# Patient Record
Sex: MALE | Race: WHITE | NOT HISPANIC OR LATINO | Employment: OTHER | URBAN - METROPOLITAN AREA
[De-identification: names, ages, dates, MRNs, and addresses within clinical notes are randomized per-mention and may not be internally consistent; named-entity substitution may affect disease eponyms.]

---

## 2020-05-21 ENCOUNTER — OFFICE VISIT (OUTPATIENT)
Dept: INTERNAL MEDICINE | Facility: CLINIC | Age: 65
End: 2020-05-21

## 2020-05-21 VITALS
HEART RATE: 68 BPM | HEIGHT: 71 IN | SYSTOLIC BLOOD PRESSURE: 144 MMHG | WEIGHT: 197 LBS | BODY MASS INDEX: 27.58 KG/M2 | DIASTOLIC BLOOD PRESSURE: 70 MMHG | TEMPERATURE: 97.3 F

## 2020-05-21 DIAGNOSIS — E11.65 TYPE 2 DIABETES MELLITUS WITH HYPERGLYCEMIA, WITH LONG-TERM CURRENT USE OF INSULIN (HCC): ICD-10-CM

## 2020-05-21 DIAGNOSIS — Z79.4 TYPE 2 DIABETES MELLITUS WITH HYPERGLYCEMIA, WITH LONG-TERM CURRENT USE OF INSULIN (HCC): ICD-10-CM

## 2020-05-21 DIAGNOSIS — I10 ESSENTIAL HYPERTENSION: Primary | ICD-10-CM

## 2020-05-21 DIAGNOSIS — K57.90 DIVERTICULOSIS: ICD-10-CM

## 2020-05-21 PROBLEM — E11.9 DIABETES (HCC): Status: ACTIVE | Noted: 2020-05-21

## 2020-05-21 PROCEDURE — 99203 OFFICE O/P NEW LOW 30 MIN: CPT | Performed by: INTERNAL MEDICINE

## 2020-05-21 RX ORDER — AMLODIPINE BESYLATE 10 MG/1
10 TABLET ORAL DAILY
COMMUNITY
Start: 2020-05-08 | End: 2021-07-28

## 2020-05-21 RX ORDER — LOSARTAN POTASSIUM 50 MG/1
50 TABLET ORAL DAILY
COMMUNITY
Start: 2020-05-08 | End: 2020-05-21 | Stop reason: SDUPTHER

## 2020-05-21 RX ORDER — LOSARTAN POTASSIUM 100 MG/1
100 TABLET ORAL DAILY
Qty: 90 TABLET | Refills: 3 | Status: SHIPPED | OUTPATIENT
Start: 2020-05-21 | End: 2021-04-05

## 2020-05-21 NOTE — PROGRESS NOTES
Patient is a 64 y.o. male who is here to establish care for hypertension and diabetes.  Chief Complaint   Patient presents with   • Diabetes   • Hypertension         HPI:    Here for mgmt of HTN and DM.  Onset years. Diagnosed with DM 2 years ago.  Initially place on metformin.  AIC was 17.  Was then placed on Basaglar.  Tries to watch his carbs.  Has some polyuria and dipsia.  Some nocturia, 2 to 4 times per night.  Last eye exam about 1 year ago.  No paresthesia.    Has long hx of HTN since 1998.  No dizziness or lightheadedness.  No CP.  No edema.  No palpitations.      History:     Patient Active Problem List   Diagnosis   • Diabetes (CMS/HCC)   • Essential hypertension   • Diverticulosis       Past Medical History:   Diagnosis Date   • Melanoma (CMS/HCC)        Past Surgical History:   Procedure Laterality Date   • ANKLE SURGERY      metal in ankle   • COLON RESECTION  05/2009   • SKIN CANCER EXCISION      melanoma       Current Outpatient Medications on File Prior to Visit   Medication Sig   • amLODIPine (NORVASC) 10 MG tablet Take 10 mg by mouth Daily.   • Insulin Glargine (BASAGLAR KWIKPEN SC) 24 Units Daily.   • [DISCONTINUED] losartan (COZAAR) 50 MG tablet Take 50 mg by mouth Daily.     No current facility-administered medications on file prior to visit.        Family History   Problem Relation Age of Onset   • Diabetes Mother    • Heart attack Mother    • Hypertension Mother    • Hodgkin's lymphoma Father    • Hypertension Father    • Diabetes Brother    • Hyperlipidemia Brother        Social History     Socioeconomic History   • Marital status:      Spouse name: Not on file   • Number of children: Not on file   • Years of education: Not on file   • Highest education level: Not on file   Tobacco Use   • Smoking status: Current Every Day Smoker   • Smokeless tobacco: Never Used   Substance and Sexual Activity   • Alcohol use: Yes         Review of Systems   Constitutional: Positive for fatigue and  "unexpected weight change. Negative for chills, diaphoresis and fever.   HENT: Negative for congestion, ear pain, hearing loss, nosebleeds, postnasal drip, sinus pressure and sore throat.    Eyes: Negative for pain, discharge and itching.   Respiratory: Negative for cough, chest tightness, shortness of breath and wheezing.    Cardiovascular: Negative for chest pain, palpitations and leg swelling.   Gastrointestinal: Negative for abdominal distention, abdominal pain, blood in stool, constipation, diarrhea, nausea and vomiting.        2009 colonoscopy   Endocrine: Positive for polydipsia and polyuria.   Genitourinary: Positive for frequency and urgency. Negative for difficulty urinating, dysuria and hematuria.        2009 colonoscopy   Musculoskeletal: Negative for arthralgias, back pain, gait problem, joint swelling and myalgias.   Skin: Negative for rash and wound.   Neurological: Negative for dizziness, syncope, weakness and headaches.   Psychiatric/Behavioral: Negative for dysphoric mood and sleep disturbance. The patient is not nervous/anxious.        /70   Pulse 68   Temp 97.3 °F (36.3 °C) (Temporal)   Ht 180.3 cm (71\")   Wt 89.4 kg (197 lb)   BMI 27.48 kg/m²       Physical Exam   Constitutional: He is oriented to person, place, and time. He appears well-developed and well-nourished.   HENT:   Head: Normocephalic and atraumatic.   Right Ear: External ear normal.   Left Ear: External ear normal.   Mouth/Throat: Oropharynx is clear and moist.   Eyes: Conjunctivae and EOM are normal.   Neck: Normal range of motion. Neck supple.   Cardiovascular: Normal rate, regular rhythm and normal heart sounds.   Pulmonary/Chest: Effort normal and breath sounds normal.   Abdominal: Soft. Bowel sounds are normal.   Musculoskeletal: Normal range of motion.   Lymphadenopathy:     He has no cervical adenopathy.   Neurological: He is alert and oriented to person, place, and time.   Skin: Skin is warm and dry.   Psychiatric: " He has a normal mood and affect. His behavior is normal. Thought content normal.       Procedure:      Discussion/Summary:    HTN-increase losartan to 100 mg  DM-fasting labs soon  High risk meds-labs soon  Hx of melanoma-advised f/u with Derm  Diverticulosis-Citrucel qd        Current Outpatient Medications:   •  amLODIPine (NORVASC) 10 MG tablet, Take 10 mg by mouth Daily., Disp: , Rfl:   •  Insulin Glargine (BASAGLAR KWIKPEN SC), 24 Units Daily., Disp: , Rfl:   •  losartan (COZAAR) 100 MG tablet, Take 1 tablet by mouth Daily., Disp: 90 tablet, Rfl: 3        Costa was seen today for diabetes and hypertension.    Diagnoses and all orders for this visit:    Essential hypertension  -     losartan (COZAAR) 100 MG tablet; Take 1 tablet by mouth Daily.    Diverticulosis    Type 2 diabetes mellitus with hyperglycemia, with long-term current use of insulin (CMS/[facility])

## 2020-06-10 ENCOUNTER — TELEPHONE (OUTPATIENT)
Dept: INTERNAL MEDICINE | Facility: CLINIC | Age: 65
End: 2020-06-10

## 2020-06-10 DIAGNOSIS — Z82.49 FAMILY HISTORY OF EARLY CAD: ICD-10-CM

## 2020-06-10 DIAGNOSIS — I10 ESSENTIAL HYPERTENSION: Primary | ICD-10-CM

## 2020-06-10 DIAGNOSIS — R07.89 OTHER CHEST PAIN: ICD-10-CM

## 2020-06-10 NOTE — TELEPHONE ENCOUNTER
Pt stated that he has double his Losartan to 100mg as requested. Pt stated that the pas 2 or 3 days he has experienced some light headiness, and pain around shoulder area.  Pt took bp readings in both arms and read,     Right arm- 138/84    Left arm-176/96    He would like to know 's opinion on current symptoms  [phone]

## 2021-04-05 ENCOUNTER — OFFICE VISIT (OUTPATIENT)
Dept: FAMILY MEDICINE CLINIC | Facility: CLINIC | Age: 66
End: 2021-04-05

## 2021-04-05 VITALS
TEMPERATURE: 97.8 F | HEIGHT: 71 IN | SYSTOLIC BLOOD PRESSURE: 132 MMHG | WEIGHT: 194 LBS | HEART RATE: 76 BPM | DIASTOLIC BLOOD PRESSURE: 78 MMHG | BODY MASS INDEX: 27.16 KG/M2 | RESPIRATION RATE: 18 BRPM

## 2021-04-05 DIAGNOSIS — E78.00 HYPERCHOLESTEROLEMIA: ICD-10-CM

## 2021-04-05 DIAGNOSIS — I10 ESSENTIAL HYPERTENSION: ICD-10-CM

## 2021-04-05 DIAGNOSIS — Z79.4 TYPE 2 DIABETES MELLITUS WITH HYPERGLYCEMIA, WITH LONG-TERM CURRENT USE OF INSULIN (HCC): Primary | ICD-10-CM

## 2021-04-05 DIAGNOSIS — E11.65 TYPE 2 DIABETES MELLITUS WITH HYPERGLYCEMIA, WITH LONG-TERM CURRENT USE OF INSULIN (HCC): Primary | ICD-10-CM

## 2021-04-05 PROCEDURE — 99214 OFFICE O/P EST MOD 30 MIN: CPT | Performed by: FAMILY MEDICINE

## 2021-04-05 RX ORDER — INSULIN GLARGINE 100 [IU]/ML
24 INJECTION, SOLUTION SUBCUTANEOUS EVERY MORNING
Qty: 5 PEN | Refills: 2 | Status: SHIPPED | OUTPATIENT
Start: 2021-04-05 | End: 2021-07-28 | Stop reason: SDUPTHER

## 2021-04-05 RX ORDER — LISINOPRIL 40 MG/1
40 TABLET ORAL DAILY
Qty: 90 TABLET | Refills: 0 | Status: SHIPPED | OUTPATIENT
Start: 2021-04-05 | End: 2021-07-28 | Stop reason: SDUPTHER

## 2021-04-05 NOTE — PROGRESS NOTES
Subjective   Costa Robbins is a 65 y.o. male.     History of Present Illness     Diabetes Mellitus Type II, Follow-up:   Costa Robbins is a 65 y.o. male who is here for follow-up of Type 2 diabetes mellitus.  Current symptoms/problems include none and have been stable. Patient is adherent with medications.  Known diabetic complications: none  Cardiovascular risk factors: advanced age (older than 55 for men, 65 for women), diabetes mellitus, hypertension and male gender  Current diabetic medications include basaglar 24 units.   Current monitoring regimen: none  Home blood sugar records: not checking his labs at all  Any episodes of hypoglycemia? no  Eye exam current (within one year): no  He is not on ACE inhibitor or angiotensin II receptor blocker. Patient is not on a statin.   He notes he drinks water all the time, about 1 gallon per day      Costa Robbins  is here for follow-up of hypertension of several years duration. He is not exercising and is not adherent to a low-salt diet. Patient does not check his blood pressure.   He is compliant with meds.      Pt complains of fatigue all the time  He does feel drained all the time  Hard to do things     The following portions of the patient's history were reviewed and updated as appropriate: allergies, current medications, past family history, past medical history, past social history, past surgical history and problem list.    Review of Systems   Constitutional: Negative.    Psychiatric/Behavioral: Negative.        Objective   Physical Exam  Vitals and nursing note reviewed.   Constitutional:       General: He is not in acute distress.     Appearance: Normal appearance. He is well-developed.   HENT:      Head: Normocephalic and atraumatic.      Right Ear: Tympanic membrane, ear canal and external ear normal.      Left Ear: Tympanic membrane, ear canal and external ear normal.      Nose: Nose normal.   Eyes:      Extraocular Movements: Extraocular movements  intact.      Conjunctiva/sclera: Conjunctivae normal.   Neck:      Thyroid: No thyromegaly.   Cardiovascular:      Rate and Rhythm: Normal rate and regular rhythm.      Heart sounds: Normal heart sounds. No murmur heard.     Pulmonary:      Effort: Pulmonary effort is normal. No respiratory distress.      Breath sounds: Normal breath sounds.   Abdominal:      General: Bowel sounds are normal. There is no distension.      Palpations: Abdomen is soft.      Tenderness: There is no abdominal tenderness.   Musculoskeletal:      Cervical back: Normal range of motion and neck supple.   Lymphadenopathy:      Cervical: No cervical adenopathy.   Skin:     General: Skin is warm and dry.   Neurological:      Mental Status: He is alert and oriented to person, place, and time.   Psychiatric:         Mood and Affect: Mood normal.         Behavior: Behavior normal.         Thought Content: Thought content normal.         Judgment: Judgment normal.         Assessment/Plan   Diagnoses and all orders for this visit:    1. Type 2 diabetes mellitus with hyperglycemia, with long-term current use of insulin (CMS/[facility]) (Primary)  -     Hemoglobin A1c  -     metFORMIN (Glucophage) 500 MG tablet; Take 1 tablet by mouth 2 (Two) Times a Day With Meals.  Dispense: 60 tablet; Refill: 2  -     Insulin Glargine (BASAGLAR KWIKPEN) 100 UNIT/ML injection pen; Inject 24 Units under the skin into the appropriate area as directed Every Morning.  Dispense: 5 pen; Refill: 2    2. Essential hypertension  -     lisinopril (PRINIVIL,ZESTRIL) 40 MG tablet; Take 1 tablet by mouth Daily.  Dispense: 90 tablet; Refill: 0    3. Hypercholesterolemia  -     Lipid Panel    he has been on insulin only, 24 units, and not checking his glucose levels at all.  Discussed need for metformin and will start this.  He notes he is tired and drinks water all the time.  I have high concern about poor DM control due to symptoms and history of HgA1C at 17 in the past!  Discussed  need to check glucose at home to adjust medicine.  He will look into glucometer that is affordable  Will use lisinopril instead of cozaar and amlodipine for BP control.  He wonders if the Bp medicines were making him tired and had no SE to lisinopril in the past.  Recheck in 3 months  He likely needs to be on a statin due to DM and family history of CAD.  Will check levels and recheck in 3 months.  Med decision pending labs    F/u in 3 months

## 2021-07-26 ENCOUNTER — LAB (OUTPATIENT)
Dept: FAMILY MEDICINE CLINIC | Facility: CLINIC | Age: 66
End: 2021-07-26

## 2021-07-27 LAB
CHOLEST SERPL-MCNC: 248 MG/DL (ref 100–199)
HBA1C MFR BLD: 12.3 % (ref 4.8–5.6)
HDLC SERPL-MCNC: 51 MG/DL
LDLC SERPL CALC-MCNC: 156 MG/DL (ref 0–99)
TRIGL SERPL-MCNC: 224 MG/DL (ref 0–149)
VLDLC SERPL CALC-MCNC: 41 MG/DL (ref 5–40)

## 2021-07-28 ENCOUNTER — OFFICE VISIT (OUTPATIENT)
Dept: FAMILY MEDICINE CLINIC | Facility: CLINIC | Age: 66
End: 2021-07-28

## 2021-07-28 VITALS
BODY MASS INDEX: 27.16 KG/M2 | HEART RATE: 76 BPM | SYSTOLIC BLOOD PRESSURE: 142 MMHG | DIASTOLIC BLOOD PRESSURE: 70 MMHG | TEMPERATURE: 98.4 F | RESPIRATION RATE: 18 BRPM | WEIGHT: 194 LBS | HEIGHT: 71 IN

## 2021-07-28 DIAGNOSIS — I10 ESSENTIAL HYPERTENSION: ICD-10-CM

## 2021-07-28 DIAGNOSIS — E78.00 HYPERCHOLESTEROLEMIA: ICD-10-CM

## 2021-07-28 DIAGNOSIS — Z79.4 TYPE 2 DIABETES MELLITUS WITH HYPERGLYCEMIA, WITH LONG-TERM CURRENT USE OF INSULIN (HCC): Primary | ICD-10-CM

## 2021-07-28 DIAGNOSIS — E11.65 TYPE 2 DIABETES MELLITUS WITH HYPERGLYCEMIA, WITH LONG-TERM CURRENT USE OF INSULIN (HCC): Primary | ICD-10-CM

## 2021-07-28 PROCEDURE — 99212 OFFICE O/P EST SF 10 MIN: CPT | Performed by: FAMILY MEDICINE

## 2021-07-28 RX ORDER — ATORVASTATIN CALCIUM 40 MG/1
40 TABLET, FILM COATED ORAL DAILY
Qty: 30 TABLET | Refills: 5 | Status: SHIPPED | OUTPATIENT
Start: 2021-07-28 | End: 2022-01-28 | Stop reason: SDUPTHER

## 2021-07-28 RX ORDER — LISINOPRIL 40 MG/1
40 TABLET ORAL DAILY
Qty: 90 TABLET | Refills: 1 | Status: SHIPPED | OUTPATIENT
Start: 2021-07-28 | End: 2022-01-28 | Stop reason: SDUPTHER

## 2021-07-28 RX ORDER — INSULIN GLARGINE 100 [IU]/ML
50 INJECTION, SOLUTION SUBCUTANEOUS EVERY MORNING
Qty: 10 PEN | Refills: 2 | Status: SHIPPED | OUTPATIENT
Start: 2021-07-28 | End: 2021-08-06

## 2021-07-28 NOTE — PROGRESS NOTES
Subjective   Costa Robbins is a 66 y.o. male.     History of Present Illness     He has not been checking his glucose as he has no insurance and so has no glucometer  He has been out of lisinopril as well  tolerating the metformin 500 ok    Not taking a statin at this time either      Review of Systems   Constitutional: Negative.        Objective   Physical Exam  Vitals and nursing note reviewed.   Constitutional:       General: He is not in acute distress.     Appearance: Normal appearance. He is well-developed.   Cardiovascular:      Rate and Rhythm: Normal rate and regular rhythm.      Heart sounds: Normal heart sounds.   Pulmonary:      Effort: Pulmonary effort is normal.      Breath sounds: Normal breath sounds.   Neurological:      Mental Status: He is alert and oriented to person, place, and time.   Psychiatric:         Mood and Affect: Mood normal.         Behavior: Behavior normal.         Thought Content: Thought content normal.         Judgment: Judgment normal.         Assessment/Plan   Diagnoses and all orders for this visit:    1. Type 2 diabetes mellitus with hyperglycemia, with long-term current use of insulin (CMS/[facility]) (Primary)  -     metFORMIN (Glucophage) 1000 MG tablet; Take 1 tablet by mouth 2 (Two) Times a Day With Meals.  Dispense: 60 tablet; Refill: 5  -     Insulin Glargine (BASAGLAR KWIKPEN) 100 UNIT/ML injection pen; Inject 50 Units under the skin into the appropriate area as directed Every Morning.  Dispense: 10 pen; Refill: 2    2. Essential hypertension  -     lisinopril (PRINIVIL,ZESTRIL) 40 MG tablet; Take 1 tablet by mouth Daily.  Dispense: 90 tablet; Refill: 1    3. Hypercholesterolemia  -     atorvastatin (Lipitor) 40 MG tablet; Take 1 tablet by mouth Daily.  Dispense: 30 tablet; Refill: 5    needs to increase insulin as his HghA1C is 12, will increase to 48 and he will check numbers at home.  Increase metformin to 1000 BID  Start statin as well and check labs in 3  months

## 2021-08-06 ENCOUNTER — OFFICE VISIT (OUTPATIENT)
Dept: FAMILY MEDICINE CLINIC | Facility: CLINIC | Age: 66
End: 2021-08-06

## 2021-08-06 ENCOUNTER — TELEPHONE (OUTPATIENT)
Dept: FAMILY MEDICINE CLINIC | Facility: CLINIC | Age: 66
End: 2021-08-06

## 2021-08-06 VITALS
HEART RATE: 80 BPM | HEIGHT: 71 IN | BODY MASS INDEX: 27.64 KG/M2 | RESPIRATION RATE: 20 BRPM | SYSTOLIC BLOOD PRESSURE: 190 MMHG | WEIGHT: 197.4 LBS | OXYGEN SATURATION: 98 % | DIASTOLIC BLOOD PRESSURE: 90 MMHG | TEMPERATURE: 99.3 F

## 2021-08-06 DIAGNOSIS — L08.9 INFECTED WOUND: Primary | ICD-10-CM

## 2021-08-06 DIAGNOSIS — E11.65 TYPE 2 DIABETES MELLITUS WITH HYPERGLYCEMIA, WITH LONG-TERM CURRENT USE OF INSULIN (HCC): ICD-10-CM

## 2021-08-06 DIAGNOSIS — Z79.4 TYPE 2 DIABETES MELLITUS WITH HYPERGLYCEMIA, WITH LONG-TERM CURRENT USE OF INSULIN (HCC): ICD-10-CM

## 2021-08-06 DIAGNOSIS — T14.8XXA INFECTED WOUND: Primary | ICD-10-CM

## 2021-08-06 PROCEDURE — 99213 OFFICE O/P EST LOW 20 MIN: CPT | Performed by: FAMILY MEDICINE

## 2021-08-06 RX ORDER — INSULIN GLARGINE 100 [IU]/ML
40 INJECTION, SOLUTION SUBCUTANEOUS EVERY MORNING
Qty: 10 PEN | Refills: 2 | COMMUNITY
Start: 2021-08-06 | End: 2022-01-28 | Stop reason: SDUPTHER

## 2021-08-06 RX ORDER — SULFAMETHOXAZOLE AND TRIMETHOPRIM 800; 160 MG/1; MG/1
1 TABLET ORAL 2 TIMES DAILY
Qty: 14 TABLET | Refills: 0 | Status: SHIPPED | OUTPATIENT
Start: 2021-08-06 | End: 2022-01-28

## 2021-08-06 NOTE — PROGRESS NOTES
Assessment/Plan       Diagnoses and all orders for this visit:    1. Infected wound (Primary)  -     sulfamethoxazole-trimethoprim (Bactrim DS) 800-160 MG per tablet; Take 1 tablet by mouth 2 (Two) Times a Day.  Dispense: 14 tablet; Refill: 0    2. Type 2 diabetes mellitus with hyperglycemia, with long-term current use of insulin (CMS/[facility])           Follow up: Return if symptoms worsen or fail to improve.     DISCUSSION  Infected wound of left hand.  Keep clean.  Add Bactrim DS 1 twice a day.  Continue topical antibiotic.  If not improving or worsening over the next several days, he is to call.    Bactrim was given since significant allergy to penicillin and he frequently in the sun and at risk for reaction if taken doxycycline.    Diabetes mellitus type 2.  He is wishing to try a medication which is sold directly to consumer called Glucofort.  Explained to him that it may not be helpful and could interact with his medication but no way to tell.  He still wishes to try get off some of his medication.          MEDICATIONS PRESCRIBED  Requested Prescriptions     Signed Prescriptions Disp Refills   • sulfamethoxazole-trimethoprim (Bactrim DS) 800-160 MG per tablet 14 tablet 0     Sig: Take 1 tablet by mouth 2 (Two) Times a Day.          -------------------------------------------    Subjective     Chief Complaint   Patient presents with   • check L hand wound     scratched hand on gate one week ago          History of Present Illness    Left hand  Scraped on a gate  + bled at the time  + redness around the wound  No drainage    Used H202 and now neosporin and bandage  A friend gave her some med for horses - used that  Used for one day ( this am)    Rash with penicillin    [place]    DM 2  Bought some Glucofort (direct to consumer)  ? If can take with his other meds        Social History     Tobacco Use   Smoking Status Current Every Day Smoker   • Packs/day: 0.50   • Years: 43.00   • Pack years: 21.50  "  Smokeless Tobacco Never Used          Past Medical History,Medications, Allergies, and social history was reviewed.          Review of Systems   Constitutional: Negative.    HENT: Negative.    Respiratory: Negative.    Cardiovascular: Negative.    Gastrointestinal: Negative.        Objective     Vitals:    08/06/21 1504   BP: (!) 190/90   Pulse: 80   Resp: 20   Temp: 99.3 °F (37.4 °C)   SpO2: 98%   Weight: 89.5 kg (197 lb 6.4 oz)   Height: 180.3 cm (70.98\")          Physical Exam  Vitals and nursing note reviewed.   Constitutional:       Appearance: He is well-developed.   HENT:      Head: Normocephalic and atraumatic.      Right Ear: External ear normal.      Left Ear: External ear normal.   Eyes:      Pupils: Pupils are equal, round, and reactive to light.   Pulmonary:      Effort: Pulmonary effort is normal.   Skin:     General: Skin is warm and dry.   Neurological:      Mental Status: He is alert.   Psychiatric:         Behavior: Behavior normal.       Left hand.  There is a 1 cm abrasion with surrounding erythema.  No active drainage.  No abscess.              Robert Krishna MD    "

## 2021-08-06 NOTE — TELEPHONE ENCOUNTER
Please call, I was reviewing his chart and missed that his BP was increased. Have him keep a check on that and let us know if not coming down. It was good the last time he was here.

## 2022-01-28 ENCOUNTER — OFFICE VISIT (OUTPATIENT)
Dept: FAMILY MEDICINE CLINIC | Facility: CLINIC | Age: 67
End: 2022-01-28

## 2022-01-28 VITALS
RESPIRATION RATE: 18 BRPM | SYSTOLIC BLOOD PRESSURE: 142 MMHG | TEMPERATURE: 98.9 F | HEART RATE: 84 BPM | WEIGHT: 213 LBS | HEIGHT: 71 IN | DIASTOLIC BLOOD PRESSURE: 84 MMHG | BODY MASS INDEX: 29.82 KG/M2

## 2022-01-28 DIAGNOSIS — Z79.4 TYPE 2 DIABETES MELLITUS WITH HYPERGLYCEMIA, WITH LONG-TERM CURRENT USE OF INSULIN: ICD-10-CM

## 2022-01-28 DIAGNOSIS — I10 ESSENTIAL HYPERTENSION: ICD-10-CM

## 2022-01-28 DIAGNOSIS — E11.65 TYPE 2 DIABETES MELLITUS WITH HYPERGLYCEMIA, WITH LONG-TERM CURRENT USE OF INSULIN: ICD-10-CM

## 2022-01-28 DIAGNOSIS — E78.00 HYPERCHOLESTEROLEMIA: ICD-10-CM

## 2022-01-28 LAB
EXPIRATION DATE: ABNORMAL
HBA1C MFR BLD: 8.3 %
Lab: ABNORMAL

## 2022-01-28 RX ORDER — LISINOPRIL 40 MG/1
40 TABLET ORAL DAILY
Qty: 30 TABLET | Refills: 5 | Status: SHIPPED | OUTPATIENT
Start: 2022-01-28 | End: 2022-09-01 | Stop reason: SDUPTHER

## 2022-01-28 RX ORDER — ATORVASTATIN CALCIUM 40 MG/1
40 TABLET, FILM COATED ORAL DAILY
Qty: 30 TABLET | Refills: 5 | Status: SHIPPED | OUTPATIENT
Start: 2022-01-28 | End: 2022-05-16

## 2022-01-28 RX ORDER — INSULIN GLARGINE 100 [IU]/ML
40 INJECTION, SOLUTION SUBCUTANEOUS EVERY MORNING
Qty: 10 PEN | Refills: 2 | Status: SHIPPED | OUTPATIENT
Start: 2022-01-28

## 2022-01-28 NOTE — PROGRESS NOTES
Subjective   Costa Robbins is a 66 y.o. male.     History of Present Illness     Diabetes Mellitus Type II, Follow-up:   Costa Robbins is a 66 y.o. male who is here for follow-up of Type 2 diabetes mellitus.  Current symptoms/problems include none and have been stable. Patient is adherent with medications.  Known diabetic complications: none  Cardiovascular risk factors: diabetes mellitus, dyslipidemia, hypertension and male gender  Current diabetic medications include basaglar 40 units QAM.   Current monitoring regimen: home blood tests - daily  Home blood sugar records: fasting range: low 100s  Any episodes of hypoglycemia? no  Eye exam current (within one year): yes  He is on ACE inhibitor or angiotensin II receptor blocker. Patient is on a statin.       Csota Robbins  is here for follow-up of hypertension of several years duration. He is not exercising and is not adherent to a low-salt diet. Patient does not check his blood pressure.  He is compliant with meds.        Costa Robbins returns today for follow up of Hyperlipidemia  Costa indicates his exercise level as irregularly.  Diet: trying to eat healthy  Patient is compliant with medications   Any side effects to medications:   chest pain No myalgia No memory change No  Pt is due for labs        The following portions of the patient's history were reviewed and updated as appropriate: allergies, current medications, past family history, past medical history, past social history, past surgical history and problem list.    Review of Systems   Constitutional: Negative.    Respiratory: Negative.    Cardiovascular: Negative.    Psychiatric/Behavioral: Negative.        Objective   Physical Exam  Vitals and nursing note reviewed.   Constitutional:       General: He is not in acute distress.     Appearance: Normal appearance. He is well-developed.   Cardiovascular:      Rate and Rhythm: Normal rate and regular rhythm.      Heart sounds: Normal heart sounds.    Pulmonary:      Effort: Pulmonary effort is normal.      Breath sounds: Normal breath sounds.   Neurological:      Mental Status: He is alert and oriented to person, place, and time.   Psychiatric:         Mood and Affect: Mood normal.         Behavior: Behavior normal.         Thought Content: Thought content normal.         Judgment: Judgment normal.         Assessment/Plan   Diagnoses and all orders for this visit:    1. Essential hypertension  -     lisinopril (PRINIVIL,ZESTRIL) 40 MG tablet; Take 1 tablet by mouth Daily.  Dispense: 30 tablet; Refill: 5    2. Hypercholesterolemia  -     atorvastatin (Lipitor) 40 MG tablet; Take 1 tablet by mouth Daily.  Dispense: 30 tablet; Refill: 5    3. Type 2 diabetes mellitus with hyperglycemia, with long-term current use of insulin (HCC)  -     metFORMIN (Glucophage) 1000 MG tablet; Take 1 tablet by mouth 2 (Two) Times a Day With Meals.  Dispense: 60 tablet; Refill: 5  -     Insulin Glargine (BASAGLAR KWIKPEN) 100 UNIT/ML injection pen; Inject 40 Units under the skin into the appropriate area as directed Every Morning.  Dispense: 10 pen; Refill: 2    last HgA1C was 12.3, cost of test was an issue for pt.  Checked with machine here today and level was 8.3, excellent improvement.  Continue lantus and metformin, recheck in future.  BP doing well on lisinopril, no change in medicine  Will continue lipitor and check lipids in future when he has insurance

## 2022-05-16 ENCOUNTER — OFFICE VISIT (OUTPATIENT)
Dept: FAMILY MEDICINE CLINIC | Facility: CLINIC | Age: 67
End: 2022-05-16

## 2022-05-16 VITALS
DIASTOLIC BLOOD PRESSURE: 82 MMHG | OXYGEN SATURATION: 99 % | RESPIRATION RATE: 18 BRPM | HEART RATE: 74 BPM | BODY MASS INDEX: 27.37 KG/M2 | HEIGHT: 71 IN | TEMPERATURE: 97.3 F | SYSTOLIC BLOOD PRESSURE: 162 MMHG | WEIGHT: 195.5 LBS

## 2022-05-16 DIAGNOSIS — Z85.820 PERSONAL HISTORY OF MALIGNANT MELANOMA OF SKIN: ICD-10-CM

## 2022-05-16 DIAGNOSIS — R10.12 LUQ PAIN: Primary | ICD-10-CM

## 2022-05-16 DIAGNOSIS — R10.9 LEFT FLANK PAIN: ICD-10-CM

## 2022-05-16 DIAGNOSIS — Z12.5 SCREENING FOR MALIGNANT NEOPLASM OF PROSTATE: ICD-10-CM

## 2022-05-16 LAB
BILIRUB BLD-MCNC: NEGATIVE MG/DL
CLARITY, POC: CLEAR
COLOR UR: YELLOW
EXPIRATION DATE: ABNORMAL
GLUCOSE UR STRIP-MCNC: ABNORMAL MG/DL
KETONES UR QL: NEGATIVE
LEUKOCYTE EST, POC: NEGATIVE
Lab: ABNORMAL
NITRITE UR-MCNC: NEGATIVE MG/ML
PH UR: 6 [PH] (ref 5–8)
PROT UR STRIP-MCNC: ABNORMAL MG/DL
RBC # UR STRIP: NEGATIVE /UL
SP GR UR: 1.01 (ref 1–1.03)
UROBILINOGEN UR QL: NORMAL

## 2022-05-16 PROCEDURE — 99214 OFFICE O/P EST MOD 30 MIN: CPT | Performed by: PHYSICIAN ASSISTANT

## 2022-05-16 NOTE — PROGRESS NOTES
"Subjective   Costa Robbins is a 66 y.o. male.   Chief Complaint   Patient presents with   • Pain     Pain left side below rib cage going to back same side for 3-4 weeks      History of Present Illness   Pt presents with CC of left upper abdominal/ side pain below rib cage. Pain is going into back on this same side X 3-4 weeks. Feels like burning pins and needles sensation multiple times per day   No rash. Sometimes itches     No bowel changes   Some decrease in urine stream over the last several years but no recent changes   No hx of kidney stones     No SOB or chest pain     Nothing seems to make pain better or worse     No URI symptoms  No fever or chills.     No indigestion or heartburn     Hx of diverticulitis. States this does not feel the same     Hx of shingles. States this does not feel the same     Some NSAID use. No hx of pancreatitis or stomach ulcer. Food does not seem to make pain better or worse.     No injury. No abdominal wall bulge or mass.   No rib pain       Pt has hx of melanoma of back. Worried about metastatic disease. Has not followed with dermatology in several years. Denies referral at this time.     Pt does have uncontrolled diabetes    The following portions of the patient's history were reviewed and updated as appropriate: allergies, current medications, past family history, past medical history, past social history, past surgical history and problem list.    Review of Systems   Constitutional: Negative for chills and fever.   Respiratory: Negative for cough, shortness of breath and wheezing.    Cardiovascular: Negative for chest pain.   Gastrointestinal: Positive for abdominal pain. Negative for nausea and vomiting.   Genitourinary: Negative for difficulty urinating, dysuria, frequency and urgency.   Musculoskeletal: Positive for back pain.   Skin: Negative for rash.       Objective    Blood pressure 162/82, pulse 74, temperature 97.3 °F (36.3 °C), resp. rate 18, height 180.3 cm (71\"), " weight 88.7 kg (195 lb 8 oz), SpO2 99 %.     Physical Exam  Vitals and nursing note reviewed.   Constitutional:       Appearance: Normal appearance.   HENT:      Head: Normocephalic.      Right Ear: Tympanic membrane, ear canal and external ear normal.      Left Ear: Tympanic membrane, ear canal and external ear normal.      Nose: Nose normal.      Mouth/Throat:      Pharynx: No oropharyngeal exudate or posterior oropharyngeal erythema.   Eyes:      Conjunctiva/sclera: Conjunctivae normal.   Cardiovascular:      Rate and Rhythm: Normal rate and regular rhythm.      Heart sounds: Normal heart sounds.   Pulmonary:      Effort: Pulmonary effort is normal. No respiratory distress.      Breath sounds: Normal breath sounds. No wheezing or rhonchi.   Abdominal:      Tenderness: There is abdominal tenderness in the suprapubic area and left upper quadrant.   Skin:     General: Skin is warm and dry.   Neurological:      Mental Status: He is alert and oriented to person, place, and time.   Psychiatric:         Mood and Affect: Mood normal.         Assessment & Plan   Diagnoses and all orders for this visit:    1. LUQ pain (Primary)  -     POCT urinalysis dipstick, automated  -     CBC w AUTO Differential  -     Comprehensive metabolic panel  -     Lipase  -     CT Abdomen Pelvis With Contrast    2. Left flank pain  -     POCT urinalysis dipstick, automated  -     CBC w AUTO Differential  -     Comprehensive metabolic panel  -     CT Abdomen Pelvis With Contrast    3. Screening for malignant neoplasm of prostate  -     PSA SCREENING    4. Personal history of malignant melanoma of skin  -     CT Abdomen Pelvis With Contrast    UA normal other than positive glucose   Pt is self pay. Check labs as outlined in plan   Order for CT scan placed for additional evaluation of pain

## 2022-05-17 LAB
BASOPHILS # BLD AUTO: 0.03 10*3/MM3 (ref 0–0.2)
BASOPHILS NFR BLD AUTO: 0.4 % (ref 0–1.5)
EOSINOPHIL # BLD AUTO: 0.21 10*3/MM3 (ref 0–0.4)
EOSINOPHIL NFR BLD AUTO: 2.5 % (ref 0.3–6.2)
ERYTHROCYTE [DISTWIDTH] IN BLOOD BY AUTOMATED COUNT: 12.8 % (ref 12.3–15.4)
HCT VFR BLD AUTO: 47.4 % (ref 37.5–51)
HGB BLD-MCNC: 15.9 G/DL (ref 13–17.7)
IMM GRANULOCYTES # BLD AUTO: 0.03 10*3/MM3 (ref 0–0.05)
IMM GRANULOCYTES NFR BLD AUTO: 0.4 % (ref 0–0.5)
LIPASE SERPL-CCNC: 70 U/L (ref 13–60)
LYMPHOCYTES # BLD AUTO: 1.64 10*3/MM3 (ref 0.7–3.1)
LYMPHOCYTES NFR BLD AUTO: 19.3 % (ref 19.6–45.3)
MCH RBC QN AUTO: 32.4 PG (ref 26.6–33)
MCHC RBC AUTO-ENTMCNC: 33.5 G/DL (ref 31.5–35.7)
MCV RBC AUTO: 96.7 FL (ref 79–97)
MONOCYTES # BLD AUTO: 0.64 10*3/MM3 (ref 0.1–0.9)
MONOCYTES NFR BLD AUTO: 7.5 % (ref 5–12)
NEUTROPHILS # BLD AUTO: 5.94 10*3/MM3 (ref 1.7–7)
NEUTROPHILS NFR BLD AUTO: 69.9 % (ref 42.7–76)
NRBC BLD AUTO-RTO: 0 /100 WBC (ref 0–0.2)
PLATELET # BLD AUTO: 226 10*3/MM3 (ref 140–450)
PSA SERPL-MCNC: 1.29 NG/ML (ref 0–4)
RBC # BLD AUTO: 4.9 10*6/MM3 (ref 4.14–5.8)
REQUEST PROBLEM: NORMAL
WBC # BLD AUTO: 8.49 10*3/MM3 (ref 3.4–10.8)

## 2022-05-24 ENCOUNTER — TELEPHONE (OUTPATIENT)
Dept: FAMILY MEDICINE CLINIC | Facility: CLINIC | Age: 67
End: 2022-05-24

## 2022-05-24 NOTE — TELEPHONE ENCOUNTER
Patient is waiting to know what was order on the CT Scan because the price of it went up. He would like a call back.

## 2022-06-01 ENCOUNTER — TELEPHONE (OUTPATIENT)
Dept: FAMILY MEDICINE CLINIC | Facility: CLINIC | Age: 67
End: 2022-06-01

## 2022-06-01 NOTE — TELEPHONE ENCOUNTER
Please call pt about ct scan. He states he has not heard from our office.    [phone]

## 2022-06-03 ENCOUNTER — TELEPHONE (OUTPATIENT)
Dept: FAMILY MEDICINE CLINIC | Facility: CLINIC | Age: 67
End: 2022-06-03

## 2022-06-03 DIAGNOSIS — K43.9 VENTRAL HERNIA WITHOUT OBSTRUCTION OR GANGRENE: Primary | ICD-10-CM

## 2022-06-03 NOTE — TELEPHONE ENCOUNTER
Caller: Costa Robbins    Relationship: Self    Best call back number: [phone]     What is the medical concern/diagnosis: HERNIA IN ABDOMINAL WALL    What specialty or service is being requested:GASTROENTEROLOGIST     Any additional details: PATIENT ASKED FOR CORI SMITH TO PUT A REFERRAL IN FOR HIM SINCE SHE KNEW WHAT WAS GOING ON WITH HIM.

## 2022-06-24 ENCOUNTER — TELEPHONE (OUTPATIENT)
Dept: FAMILY MEDICINE CLINIC | Facility: CLINIC | Age: 67
End: 2022-06-24

## 2022-06-24 NOTE — TELEPHONE ENCOUNTER
Caller: Costa Robbins    Relationship: Self    Best call back number: [phone]    What form or medical record are you requesting: REPORT OF CT SCAN FROM 5/26/2022    How would you like to receive the form or medical records (pick-up, mail, fax): FAX ATTN EVELIN [phone]    Timeframe paperwork needed: AS SOON AS POSSIBLE

## 2022-06-29 NOTE — TELEPHONE ENCOUNTER
Who is Juanjo?     After speaking w/ pt, Juanjo is w/ Dr Mckenna in [facility]. Results faxed via Epic. Pt aware.

## 2022-07-31 DIAGNOSIS — Z79.4 TYPE 2 DIABETES MELLITUS WITH HYPERGLYCEMIA, WITH LONG-TERM CURRENT USE OF INSULIN: ICD-10-CM

## 2022-07-31 DIAGNOSIS — E11.65 TYPE 2 DIABETES MELLITUS WITH HYPERGLYCEMIA, WITH LONG-TERM CURRENT USE OF INSULIN: ICD-10-CM

## 2022-08-03 DIAGNOSIS — Z79.4 TYPE 2 DIABETES MELLITUS WITH HYPERGLYCEMIA, WITH LONG-TERM CURRENT USE OF INSULIN: ICD-10-CM

## 2022-08-03 DIAGNOSIS — E11.65 TYPE 2 DIABETES MELLITUS WITH HYPERGLYCEMIA, WITH LONG-TERM CURRENT USE OF INSULIN: ICD-10-CM

## 2022-08-03 NOTE — TELEPHONE ENCOUNTER
Caller: Costa Robbins    Relationship: Self    Best call back number [phone]    Requested Prescriptions:   Requested Prescriptions     Pending Prescriptions Disp Refills   • metFORMIN (Glucophage) 1000 MG tablet 60 tablet 5     Sig: Take 1 tablet by mouth 2 (Two) Times a Day With Meals.        Pharmacy where request should be sent: [facility] [address] [place] [address] [phone] [facility] [phone] FX     Additional details provided by patient:   PATIENT HAS ONE PILL LEFT OF MEDICATION     Does the patient have less than a 3 day supply:  [x] Yes  [] No    Harman Negrete Rep   08/03/22 12:57 EDT

## 2022-08-04 ENCOUNTER — TELEPHONE (OUTPATIENT)
Dept: FAMILY MEDICINE CLINIC | Facility: CLINIC | Age: 67
End: 2022-08-04

## 2022-08-04 NOTE — TELEPHONE ENCOUNTER
Caller: LAWRENCE KESSLER    Relationship to patient: Emergency Contact    Best call back number:     Chief complaint: SINUS PRESSURE, HEADACHE, EAR PAIN    Type of visit: OFFICE VISIT, WANTING TOMORROW IF POSSIBLE     Requested date: Friday, [id number]      Additional notes: THERE WERE NO APPTS FOR TOMORROW, AND PATIENT PASSED COVD SCREENING

## 2022-08-05 ENCOUNTER — OFFICE VISIT (OUTPATIENT)
Dept: FAMILY MEDICINE CLINIC | Facility: CLINIC | Age: 67
End: 2022-08-05

## 2022-08-05 VITALS
TEMPERATURE: 97.8 F | BODY MASS INDEX: 27.58 KG/M2 | OXYGEN SATURATION: 99 % | HEIGHT: 71 IN | SYSTOLIC BLOOD PRESSURE: 138 MMHG | HEART RATE: 76 BPM | WEIGHT: 197 LBS | RESPIRATION RATE: 16 BRPM | DIASTOLIC BLOOD PRESSURE: 82 MMHG

## 2022-08-05 DIAGNOSIS — R05.9 COUGH: ICD-10-CM

## 2022-08-05 DIAGNOSIS — U07.1 ACUTE COVID-19: Primary | ICD-10-CM

## 2022-08-05 DIAGNOSIS — Z79.4 TYPE 2 DIABETES MELLITUS WITH HYPERGLYCEMIA, WITH LONG-TERM CURRENT USE OF INSULIN: ICD-10-CM

## 2022-08-05 DIAGNOSIS — E11.65 TYPE 2 DIABETES MELLITUS WITH HYPERGLYCEMIA, WITH LONG-TERM CURRENT USE OF INSULIN: ICD-10-CM

## 2022-08-05 LAB
EXPIRATION DATE: ABNORMAL
FLUAV AG UPPER RESP QL IA.RAPID: NOT DETECTED
FLUBV AG UPPER RESP QL IA.RAPID: NOT DETECTED
INTERNAL CONTROL: ABNORMAL
Lab: ABNORMAL
SARS-COV-2 AG UPPER RESP QL IA.RAPID: DETECTED

## 2022-08-05 PROCEDURE — 87428 SARSCOV & INF VIR A&B AG IA: CPT | Performed by: FAMILY MEDICINE

## 2022-08-05 PROCEDURE — 99213 OFFICE O/P EST LOW 20 MIN: CPT | Performed by: FAMILY MEDICINE

## 2022-08-05 NOTE — PROGRESS NOTES
"Chief Complaint  Fever w/ body aches  (103 yesterday ) and Cough w/ congestion     Subjective          Costa Robbins presents to [facility]  Cough  This is a new problem. The current episode started in the past 7 days. The problem has been unchanged. The cough is productive of sputum. Associated symptoms include a fever and headaches. Pertinent negatives include no shortness of breath. Associated symptoms comments: Body aches  . Treatments tried: Robitussin  The treatment provided moderate relief.     The following portions of the patient's history were reviewed and updated as appropriate: allergies, current medications, past family history, past medical history, past social history, past surgical history and problem list.    Objective   Vital Signs:   /82   Pulse 76   Temp 97.8 °F (36.6 °C)   Resp 16   Ht 180.3 cm (71\")   Wt 89.4 kg (197 lb)   SpO2 99%   BMI 27.48 kg/m²     Physical Exam  Vitals and nursing note reviewed.   Constitutional:       General: He is not in acute distress.     Appearance: He is well-developed.   HENT:      Head: Normocephalic and atraumatic.      Right Ear: External ear normal.      Left Ear: External ear normal.   Eyes:      Conjunctiva/sclera: Conjunctivae normal.   Cardiovascular:      Rate and Rhythm: Normal rate and regular rhythm.      Heart sounds: Normal heart sounds.   Pulmonary:      Effort: Pulmonary effort is normal.      Breath sounds: Normal breath sounds. No wheezing or rhonchi.   Neurological:      Mental Status: He is alert and oriented to person, place, and time.        Result Review :{Labs  Result Review  Imaging  Med Tab  Media :23}                 Assessment and Plan    Diagnoses and all orders for this visit:    1. Acute COVID-19 (Primary)    2. Cough  -     POCT SARS-CoV-2 Antigen LINDA + Flu    3. Type 2 diabetes mellitus with hyperglycemia, with long-term current use of insulin ([facility])  Comments:  He needs to follow " up with PCP to update labs  Orders:  -     metFORMIN (Glucophage) 1000 MG tablet; Take 1 tablet by mouth 2 (Two) Times a Day With Meals.  Dispense: 60 tablet; Refill: 5    COVID positive  Self quarantine instructions provided to him  He declined treatment with Paxlovid or any additional cough syrup.   States that his symptoms has started improving as of today, will continue Robitussin.       Follow Up   Return if symptoms worsen or fail to improve.  Patient was given instructions and counseling regarding his condition or for health maintenance advice. Please see specific information pulled into the AVS if appropriate.

## 2022-08-31 ENCOUNTER — TELEPHONE (OUTPATIENT)
Dept: FAMILY MEDICINE CLINIC | Facility: CLINIC | Age: 67
End: 2022-08-31

## 2022-09-01 ENCOUNTER — OFFICE VISIT (OUTPATIENT)
Dept: FAMILY MEDICINE CLINIC | Facility: CLINIC | Age: 67
End: 2022-09-01

## 2022-09-01 VITALS
BODY MASS INDEX: 27.8 KG/M2 | WEIGHT: 198.6 LBS | OXYGEN SATURATION: 97 % | SYSTOLIC BLOOD PRESSURE: 170 MMHG | RESPIRATION RATE: 20 BRPM | TEMPERATURE: 98.4 F | HEART RATE: 75 BPM | DIASTOLIC BLOOD PRESSURE: 80 MMHG | HEIGHT: 71 IN

## 2022-09-01 DIAGNOSIS — F17.210 CIGARETTE SMOKER: ICD-10-CM

## 2022-09-01 DIAGNOSIS — E11.65 TYPE 2 DIABETES MELLITUS WITH HYPERGLYCEMIA, WITH LONG-TERM CURRENT USE OF INSULIN: ICD-10-CM

## 2022-09-01 DIAGNOSIS — R00.2 PALPITATIONS: ICD-10-CM

## 2022-09-01 DIAGNOSIS — I20.8 ANGINA AT REST: ICD-10-CM

## 2022-09-01 DIAGNOSIS — I10 ESSENTIAL HYPERTENSION: Primary | ICD-10-CM

## 2022-09-01 DIAGNOSIS — E78.2 MIXED HYPERLIPIDEMIA: ICD-10-CM

## 2022-09-01 DIAGNOSIS — Z79.4 TYPE 2 DIABETES MELLITUS WITH HYPERGLYCEMIA, WITH LONG-TERM CURRENT USE OF INSULIN: ICD-10-CM

## 2022-09-01 DIAGNOSIS — Z82.49 FAMILY HISTORY OF CORONARY ARTERY DISEASE: ICD-10-CM

## 2022-09-01 PROCEDURE — 93000 ELECTROCARDIOGRAM COMPLETE: CPT | Performed by: FAMILY MEDICINE

## 2022-09-01 PROCEDURE — 99214 OFFICE O/P EST MOD 30 MIN: CPT | Performed by: FAMILY MEDICINE

## 2022-09-01 RX ORDER — LISINOPRIL 40 MG/1
40 TABLET ORAL DAILY
Qty: 30 TABLET | Refills: 5 | Status: SHIPPED | OUTPATIENT
Start: 2022-09-01 | End: 2023-03-06

## 2022-09-01 NOTE — PROGRESS NOTES
"Chief Complaint   Patient presents with   • light headed episodes      Pt has been out of Lisinopril and taking old Rx of Losartan ?mg & Amlodipine 10mg     • Rapid Heart Rate   • Left arm tingling    • episodes of shooting pain in head        Subjective      Costa Robbins is a 67 y.o. who presents for concerns regarding episodes of lightheadedness, rapid heart rate that woke him from sleep, abnormal sensation in the left forearm and episodes of shooting pain in his head.   48 hours prior to this visit patient was awoken from sleep at 2 AM with sensation of racing heart.  Patient estimates it lasted 1 hour before resolving gradually.  Because of the poor quality sleep he was not able to work is complete shift that day.    Patient had COVID-19 approximately 3 weeks ago.  He has noticed episodes of lightheadedness not associated with position changes.  Patient also had an episode of discomfort in the left arm described as tingling.  He has also noticed a shooting pain that begins on the right side of his head and crosses over to the left front of his head.    CV risk factors: Uncontrolled diabetes, uncontrolled hypertension, untreated hypercholesterolemia, family history of coronary artery disease in multiple first-degree relatives including the death of his brother in 2011 in his mid 50s.  Patient is also a cigarette smoker    The following portions of the patient's history were reviewed and updated as appropriate: allergies, current medications, past family history, past medical history, past social history, past surgical history and problem list.    Review of Systems    Objective   Vital Signs:  /80   Pulse 75   Temp 98.4 °F (36.9 °C)   Resp 20   Ht 180.3 cm (70.98\")   Wt 90.1 kg (198 lb 9.6 oz)   SpO2 97%   BMI 27.71 kg/m²           Physical Exam     Result Review   The following data was reviewed by: Elias Vásquez MD on 09/01/2022:      Data reviewed: Lipid panel from July 2021       ECG 12 " Lead    Date/Time: 9/1/2022 1:04 PM  Performed by: Elias Vásquez MD  Authorized by: Elias Vásquez MD   Comparison: not compared with previous ECG   Previous ECG: no previous ECG available  Rhythm: sinus rhythm  Rate: normal  Conduction: conduction normal  ST Segments: ST segments normal  T Waves: T waves normal  QRS axis: normal  Other: no other findings    Clinical impression: normal ECG                Assessment and Plan  Diagnoses and all orders for this visit:    1. Essential hypertension (Primary)  -     lisinopril (PRINIVIL,ZESTRIL) 40 MG tablet; Take 1 tablet by mouth Daily.  Dispense: 30 tablet; Refill: 5  -     Cancel: Stress Test With Myocardial Perfusion - One Day; Future  -     Stress Test With Myocardial Perfusion - One Day; Future  -     ECG 12 Lead    2. Angina at rest (HCC)  -     Cancel: Stress Test With Myocardial Perfusion - One Day; Future  -     Stress Test With Myocardial Perfusion - One Day; Future  -     ECG 12 Lead    3. Palpitations  -     Cancel: Stress Test With Myocardial Perfusion - One Day; Future  -     Stress Test With Myocardial Perfusion - One Day; Future  -     ECG 12 Lead    4. Type 2 diabetes mellitus with hyperglycemia, with long-term current use of insulin (HCC)  -     Cancel: Stress Test With Myocardial Perfusion - One Day; Future  -     Stress Test With Myocardial Perfusion - One Day; Future  -     ECG 12 Lead    5. Mixed hyperlipidemia  -     Cancel: Stress Test With Myocardial Perfusion - One Day; Future  -     Stress Test With Myocardial Perfusion - One Day; Future  -     ECG 12 Lead    6. Family history of coronary artery disease  -     Cancel: Stress Test With Myocardial Perfusion - One Day; Future  -     Stress Test With Myocardial Perfusion - One Day; Future  -     ECG 12 Lead    7. Cigarette smoker  -     Stress Test With Myocardial Perfusion - One Day; Future  -     ECG 12 Lead    1.  Blood pressure is not controlled on lisinopril 40 mg was  refilled today.  Patient seemingly has a better response to this then losartan and amlodipine both of which were old prescriptions.  2.  Recommend aspirin 81 mg daily until patient's stress test is completed  3.  Recommended statin therapy although he can discuss this further with Dr. Landrum at his follow-up maintenance visit.  4.  Arrange pharmacologic stress testing.  Patient has had 1 prior exercise stress test but due to sciatic issues that developed after walking approximately 60 yards he did not feel like he can complete an exercise stress test.        Follow Up  No follow-ups on file.  Patient was given instructions and counseling regarding his condition or for health maintenance advice. Please see specific information pulled into the AVS if appropriate.

## 2022-09-08 ENCOUNTER — OFFICE VISIT (OUTPATIENT)
Dept: FAMILY MEDICINE CLINIC | Facility: CLINIC | Age: 67
End: 2022-09-08

## 2022-09-08 VITALS
RESPIRATION RATE: 20 BRPM | HEART RATE: 66 BPM | BODY MASS INDEX: 28.11 KG/M2 | SYSTOLIC BLOOD PRESSURE: 190 MMHG | HEIGHT: 71 IN | DIASTOLIC BLOOD PRESSURE: 90 MMHG | WEIGHT: 200.8 LBS | TEMPERATURE: 98 F | OXYGEN SATURATION: 98 %

## 2022-09-08 DIAGNOSIS — I10 ESSENTIAL HYPERTENSION: Primary | ICD-10-CM

## 2022-09-08 PROCEDURE — 99213 OFFICE O/P EST LOW 20 MIN: CPT | Performed by: FAMILY MEDICINE

## 2022-09-08 RX ORDER — AMLODIPINE BESYLATE 10 MG/1
10 TABLET ORAL DAILY
Qty: 30 TABLET | Refills: 0 | Status: SHIPPED | OUTPATIENT
Start: 2022-09-08 | End: 2022-09-28 | Stop reason: SDUPTHER

## 2022-09-08 RX ORDER — ASPIRIN 325 MG
325 TABLET ORAL DAILY
COMMUNITY

## 2022-09-08 NOTE — PROGRESS NOTES
"Chief Complaint   Patient presents with   • Follow-up   • Hypertension     Pt say's, this mornings readings 204/114 and 238/183 several days ago. Scheduled for stress test in the morning.        Subjective      Costa Robbins is a 67 y.o. who presents for concerns over extremely high blood pressure.  Patient reports at home during the week his blood pressure has been as high as 238 systolic and 183 diastolic.  Patient states today it was 183 he felt terrible and today he \"feels like I been hit by a truck\".  He is accompanied by his wife.  Patient has a stress test tomorrow.  Patient was seen last week and placed on lisinopril 40 mg.    The following portions of the patient's history were reviewed and updated as appropriate: allergies, current medications, past family history, past medical history, past social history, past surgical history and problem list.    Review of Systems    Objective   Vital Signs:  BP (!) 190/90   Pulse 66   Temp 98 °F (36.7 °C)   Resp 20   Ht 180.3 cm (70.98\")   Wt 91.1 kg (200 lb 12.8 oz)   SpO2 98%   BMI 28.02 kg/m²             Physical Exam  Cardiovascular:      Rate and Rhythm: Normal rate and regular rhythm.      Pulses: Normal pulses.      Heart sounds: Normal heart sounds.   Neurological:      General: No focal deficit present.      Mental Status: He is alert.          Result Review                     Assessment and Plan  Diagnoses and all orders for this visit:    1. Essential hypertension (Primary)  -     amLODIPine (NORVASC) 10 MG tablet; Take 1 tablet by mouth Daily.  Dispense: 30 tablet; Refill: 0  -     Basic Metabolic Panel; Future    Add amlodipine 10 mg to her regimen.  Take a dose when he returns home this afternoon.  May repeat his dose this evening.  Patient has stress test tomorrow.  Patient is high risk for vascular disease due to multiple comorbidities.  BMP ordered for tomorrow        Follow Up  No follow-ups on file.  Patient was given instructions and " counseling regarding his condition or for health maintenance advice. Please see specific information pulled into the AVS if appropriate.

## 2022-09-09 ENCOUNTER — HOSPITAL ENCOUNTER (OUTPATIENT)
Dept: CARDIOLOGY | Facility: HOSPITAL | Age: 67
Discharge: HOME | End: 2022-09-09
Admitting: FAMILY MEDICINE

## 2022-09-09 VITALS
SYSTOLIC BLOOD PRESSURE: 170 MMHG | HEIGHT: 71 IN | BODY MASS INDEX: 28 KG/M2 | WEIGHT: 200 LBS | HEART RATE: 63 BPM | DIASTOLIC BLOOD PRESSURE: 110 MMHG | OXYGEN SATURATION: 100 %

## 2022-09-09 DIAGNOSIS — E78.2 MIXED HYPERLIPIDEMIA: ICD-10-CM

## 2022-09-09 DIAGNOSIS — Z82.49 FAMILY HISTORY OF CORONARY ARTERY DISEASE: ICD-10-CM

## 2022-09-09 DIAGNOSIS — F17.210 CIGARETTE SMOKER: ICD-10-CM

## 2022-09-09 DIAGNOSIS — I10 ESSENTIAL HYPERTENSION: ICD-10-CM

## 2022-09-09 DIAGNOSIS — Z79.4 TYPE 2 DIABETES MELLITUS WITH HYPERGLYCEMIA, WITH LONG-TERM CURRENT USE OF INSULIN: ICD-10-CM

## 2022-09-09 DIAGNOSIS — I20.8 ANGINA AT REST: ICD-10-CM

## 2022-09-09 DIAGNOSIS — R00.2 PALPITATIONS: ICD-10-CM

## 2022-09-09 DIAGNOSIS — E11.65 TYPE 2 DIABETES MELLITUS WITH HYPERGLYCEMIA, WITH LONG-TERM CURRENT USE OF INSULIN: ICD-10-CM

## 2022-09-09 PROCEDURE — 0 TECHNETIUM SESTAMIBI: Performed by: FAMILY MEDICINE

## 2022-09-09 PROCEDURE — A9500 TC99M SESTAMIBI: HCPCS | Performed by: FAMILY MEDICINE

## 2022-09-09 PROCEDURE — 78452 HT MUSCLE IMAGE SPECT MULT: CPT

## 2022-09-09 PROCEDURE — 25010000002 REGADENOSON 0.4 MG/5ML SOLUTION: Performed by: FAMILY MEDICINE

## 2022-09-09 PROCEDURE — 93017 CV STRESS TEST TRACING ONLY: CPT

## 2022-09-09 RX ADMIN — REGADENOSON 0.4 MG: 0.08 INJECTION, SOLUTION INTRAVENOUS at 10:10

## 2022-09-09 RX ADMIN — TECHNETIUM TC 99M SESTAMIBI 1 DOSE: 1 INJECTION INTRAVENOUS at 08:31

## 2022-09-09 RX ADMIN — TECHNETIUM TC 99M SESTAMIBI 1 DOSE: 1 INJECTION INTRAVENOUS at 10:10

## 2022-09-12 LAB
BH CV REST NUCLEAR ISOTOPE DOSE: 9.4 MCI
BH CV STRESS BP STAGE 1: NORMAL
BH CV STRESS BP STAGE 3: NORMAL
BH CV STRESS COMMENTS STAGE 1: NORMAL
BH CV STRESS DOSE REGADENOSON STAGE 1: 0.4
BH CV STRESS DURATION MIN STAGE 1: 1
BH CV STRESS DURATION MIN STAGE 2: 1
BH CV STRESS DURATION MIN STAGE 3: 1
BH CV STRESS DURATION MIN STAGE 4: 1
BH CV STRESS DURATION SEC STAGE 1: 0
BH CV STRESS DURATION SEC STAGE 2: 0
BH CV STRESS DURATION SEC STAGE 3: 0
BH CV STRESS DURATION SEC STAGE 4: 0
BH CV STRESS HR STAGE 1: 62
BH CV STRESS HR STAGE 2: 84
BH CV STRESS HR STAGE 3: 81
BH CV STRESS HR STAGE 4: 78
BH CV STRESS NUCLEAR ISOTOPE DOSE: 31.7 MCI
BH CV STRESS O2 STAGE 1: 100
BH CV STRESS O2 STAGE 2: 99
BH CV STRESS O2 STAGE 3: 100
BH CV STRESS O2 STAGE 4: 100
BH CV STRESS PROTOCOL 1: NORMAL
BH CV STRESS RECOVERY BP: NORMAL MMHG
BH CV STRESS RECOVERY HR: 75 BPM
BH CV STRESS RECOVERY O2: 100 %
BH CV STRESS STAGE 1: 1
BH CV STRESS STAGE 2: 2
BH CV STRESS STAGE 3: 3
BH CV STRESS STAGE 4: 4
LV EF NUC BP: 73 %
MAXIMAL PREDICTED HEART RATE: 155 BPM
PERCENT MAX PREDICTED HR: 54.84 %
STRESS BASELINE BP: NORMAL MMHG
STRESS BASELINE HR: 63 BPM
STRESS O2 SAT REST: 100 %
STRESS PERCENT HR: 65 %
STRESS POST ESTIMATED WORKLOAD: 1 METS
STRESS POST EXERCISE DUR MIN: 4 MIN
STRESS POST EXERCISE DUR SEC: 0 SEC
STRESS POST O2 SAT PEAK: 100 %
STRESS POST PEAK BP: NORMAL MMHG
STRESS POST PEAK HR: 85 BPM
STRESS TARGET HR: 132 BPM

## 2022-09-13 ENCOUNTER — TELEPHONE (OUTPATIENT)
Dept: FAMILY MEDICINE CLINIC | Facility: CLINIC | Age: 67
End: 2022-09-13

## 2022-09-13 NOTE — TELEPHONE ENCOUNTER
Pt called and stated that he would like to switch providers from dr pace to dr montague for his PCP?    Wanted to make sure this is okay?

## 2022-09-14 NOTE — TELEPHONE ENCOUNTER
I called and let him know what you said.. he made lai for 09/28 and told him about blood pressure and a1c check.

## 2022-09-28 ENCOUNTER — OFFICE VISIT (OUTPATIENT)
Dept: FAMILY MEDICINE CLINIC | Facility: CLINIC | Age: 67
End: 2022-09-28

## 2022-09-28 VITALS
SYSTOLIC BLOOD PRESSURE: 170 MMHG | TEMPERATURE: 98.2 F | HEIGHT: 71 IN | DIASTOLIC BLOOD PRESSURE: 82 MMHG | WEIGHT: 203.2 LBS | HEART RATE: 72 BPM | BODY MASS INDEX: 28.45 KG/M2 | OXYGEN SATURATION: 97 % | RESPIRATION RATE: 20 BRPM

## 2022-09-28 DIAGNOSIS — Z79.4 TYPE 2 DIABETES MELLITUS WITH HYPERGLYCEMIA, WITH LONG-TERM CURRENT USE OF INSULIN: Primary | ICD-10-CM

## 2022-09-28 DIAGNOSIS — I10 PRIMARY HYPERTENSION: ICD-10-CM

## 2022-09-28 DIAGNOSIS — E78.2 MIXED HYPERLIPIDEMIA: ICD-10-CM

## 2022-09-28 DIAGNOSIS — E11.65 TYPE 2 DIABETES MELLITUS WITH HYPERGLYCEMIA, WITH LONG-TERM CURRENT USE OF INSULIN: Primary | ICD-10-CM

## 2022-09-28 DIAGNOSIS — Z79.899 HIGH RISK MEDICATION USE: ICD-10-CM

## 2022-09-28 LAB
EXPIRATION DATE: ABNORMAL
HBA1C MFR BLD: 8.5 %
Lab: ABNORMAL

## 2022-09-28 PROCEDURE — 99214 OFFICE O/P EST MOD 30 MIN: CPT | Performed by: FAMILY MEDICINE

## 2022-09-28 PROCEDURE — 83036 HEMOGLOBIN GLYCOSYLATED A1C: CPT | Performed by: FAMILY MEDICINE

## 2022-09-28 PROCEDURE — 3052F HG A1C>EQUAL 8.0%<EQUAL 9.0%: CPT | Performed by: FAMILY MEDICINE

## 2022-09-28 RX ORDER — SPIRONOLACTONE 25 MG/1
25 TABLET ORAL DAILY
Qty: 30 TABLET | Refills: 0 | Status: SHIPPED | OUTPATIENT
Start: 2022-09-28 | End: 2022-10-27

## 2022-09-28 RX ORDER — DAPAGLIFLOZIN 5 MG/1
5 TABLET, FILM COATED ORAL DAILY
Qty: 30 TABLET | Refills: 0 | Status: SHIPPED | OUTPATIENT
Start: 2022-09-28 | End: 2022-10-27

## 2022-09-28 RX ORDER — AMLODIPINE BESYLATE 10 MG/1
10 TABLET ORAL DAILY
Qty: 30 TABLET | Refills: 5 | Status: SHIPPED | OUTPATIENT
Start: 2022-09-28

## 2022-09-28 NOTE — PROGRESS NOTES
"Chief Complaint   Patient presents with   • Follow-up   • Diabetes   • Hypertension       Subjective      Costa Robbins is a 67 y.o. who presents for DM, HTN.     DM. Does not check glucose. Forgets evening dose of Metformin sometimes    HTN. BP still severely elevated at home with systolics in 220. He has quit his job to avoid the stress.     Review of Systems    Objective   Vital Signs:  /82   Pulse 72   Temp 98.2 °F (36.8 °C)   Resp 20   Ht 180.3 cm (70.98\")   Wt 92.2 kg (203 lb 3.2 oz)   SpO2 97%   BMI 28.35 kg/m²         Physical Exam  Cardiovascular:      Rate and Rhythm: Normal rate and regular rhythm.      Pulses: Normal pulses.      Heart sounds: Normal heart sounds.   Pulmonary:      Effort: Pulmonary effort is normal.      Breath sounds: Normal breath sounds.   Neurological:      Mental Status: He is alert.          Result Review   The following data was reviewed by: Elias Vásquez MD on 09/28/2022:    Data reviewed: ESS-8(copy in chart)         Advance Care Planning   ACP discussion was held with the patient during this visit. Patient does not have an advance directive, information provided.          Assessment and Plan  Diagnoses and all orders for this visit:    1. Type 2 diabetes mellitus with hyperglycemia, with long-term current use of insulin (HCC) (Primary)  Comments:  Add SGLT-2 if affordable. Cont. Metformin and Basaglar  Orders:  -     Cancel: Hemoglobin A1c  -     POC Glycosylated Hemoglobin (Hb A1C)  -     dapagliflozin (Farxiga) 5 MG tablet tablet; Take 1 tablet by mouth Daily.  Dispense: 30 tablet; Refill: 0    2. Primary hypertension  Comments:  Add Spironolactone. BMP in 1 week. IF no improvement begin workup for secondary HTN  Orders:  -     spironolactone (Aldactone) 25 MG tablet; Take 1 tablet by mouth Daily.  Dispense: 30 tablet; Refill: 0  -     amLODIPine (NORVASC) 10 MG tablet; Take 1 tablet by mouth Daily.  Dispense: 30 tablet; Refill: 5  -     Basic " Metabolic Panel; Future    3. Mixed hyperlipidemia  Comments:  Continue Statin    4. High risk medication use  -     Basic Metabolic Panel; Future            Follow Up  Return in about 4 weeks (around 10/26/2022).  Patient was given instructions and counseling regarding his condition or for health maintenance advice. Please see specific information pulled into the AVS if appropriate.

## 2022-10-03 ENCOUNTER — APPOINTMENT (OUTPATIENT)
Dept: CARDIOLOGY | Facility: HOSPITAL | Age: 67
End: 2022-10-03

## 2022-10-27 ENCOUNTER — OFFICE VISIT (OUTPATIENT)
Dept: FAMILY MEDICINE CLINIC | Facility: CLINIC | Age: 67
End: 2022-10-27

## 2022-10-27 VITALS
DIASTOLIC BLOOD PRESSURE: 80 MMHG | WEIGHT: 200.8 LBS | RESPIRATION RATE: 20 BRPM | BODY MASS INDEX: 28.11 KG/M2 | HEART RATE: 76 BPM | SYSTOLIC BLOOD PRESSURE: 190 MMHG | HEIGHT: 71 IN | TEMPERATURE: 97.8 F

## 2022-10-27 DIAGNOSIS — I10 PRIMARY HYPERTENSION: ICD-10-CM

## 2022-10-27 PROCEDURE — 99213 OFFICE O/P EST LOW 20 MIN: CPT | Performed by: FAMILY MEDICINE

## 2022-10-27 RX ORDER — ATENOLOL 25 MG/1
25 TABLET ORAL DAILY
Qty: 30 TABLET | Refills: 2 | Status: SHIPPED | OUTPATIENT
Start: 2022-10-27 | End: 2023-02-20

## 2022-10-27 RX ORDER — SPIRONOLACTONE 50 MG/1
50 TABLET, FILM COATED ORAL DAILY
Qty: 30 TABLET | Refills: 2 | Status: SHIPPED | OUTPATIENT
Start: 2022-10-27 | End: 2022-10-27 | Stop reason: SDUPTHER

## 2022-10-27 RX ORDER — SPIRONOLACTONE 50 MG/1
50 TABLET, FILM COATED ORAL DAILY
Qty: 30 TABLET | Refills: 2 | Status: SHIPPED | OUTPATIENT
Start: 2022-10-27 | End: 2023-02-20

## 2022-10-27 NOTE — PROGRESS NOTES
"Chief Complaint   Patient presents with   • Follow-up   • Hypertension       Subjective      Costa Robbins is a 67 y.o. who presents for Hypertension. He has had no side effects from medications and daily BP shows consistent -190. He has a constant dull headache.     Review of Systems    Objective   Vital Signs:  BP (!) 190/80   Pulse 76   Temp 97.8 °F (36.6 °C)   Resp 20   Ht 180.3 cm (70.98\")   Wt 91.1 kg (200 lb 12.8 oz)   BMI 28.02 kg/m²             Physical Exam  Vitals (Repeat /80) reviewed.   Neurological:      Mental Status: He is alert.          Result Review                     Assessment and Plan  Diagnoses and all orders for this visit:    1. Primary hypertension  Comments:  Increase spironolactone to 50mg. Add Atenolol 25mg. RTO 1 month. Start secondary w/u labs if bp not improving  Orders:  -     Discontinue: spironolactone (Aldactone) 50 MG tablet; Take 1 tablet by mouth Daily.  Dispense: 30 tablet; Refill: 2  -     spironolactone (Aldactone) 50 MG tablet; Take 1 tablet by mouth Daily.  Dispense: 30 tablet; Refill: 2  -     atenolol (Tenormin) 25 MG tablet; Take 1 tablet by mouth Daily.  Dispense: 30 tablet; Refill: 2            Follow Up  Return in about 1 month (around 11/27/2022) for Recheck.  Patient was given instructions and counseling regarding his condition or for health maintenance advice. Please see specific information pulled into the AVS if appropriate.       "

## 2022-11-02 ENCOUNTER — TELEPHONE (OUTPATIENT)
Dept: FAMILY MEDICINE CLINIC | Facility: CLINIC | Age: 67
End: 2022-11-02

## 2022-11-02 NOTE — TELEPHONE ENCOUNTER
Caller: Costa Robbins    Relationship: Self    Best call back number: [phone]     What medications are you currently taking:   Current Outpatient Medications on File Prior to Visit   Medication Sig Dispense Refill   • amLODIPine (NORVASC) 10 MG tablet Take 1 tablet by mouth Daily. 30 tablet 5   • aspirin 325 MG tablet Take 325 mg by mouth Daily.     • atenolol (Tenormin) 25 MG tablet Take 1 tablet by mouth Daily. 30 tablet 2   • Insulin Glargine (BASAGLAR KWIKPEN) 100 UNIT/ML injection pen Inject 40 Units under the skin into the appropriate area as directed Every Morning. 10 pen 2   • lisinopril (PRINIVIL,ZESTRIL) 40 MG tablet Take 1 tablet by mouth Daily. 30 tablet 5   • metFORMIN (Glucophage) 1000 MG tablet Take 1 tablet by mouth 2 (Two) Times a Day With Meals. 60 tablet 5   • spironolactone (Aldactone) 50 MG tablet Take 1 tablet by mouth Daily. 30 tablet 2     No current facility-administered medications on file prior to visit.          When did you start taking these medications: 3 DAYS AGO    Which medication are you concerned about: atenolol (Tenormin) 25 MG tablet    Who prescribed you this medication: DR LUNSFORD    What are your concerns: PATIENT STATED THAT HE TOOK HIS BLOOD PRESSURE DAILY.  FOR THE PAST 3 DAYS HIS HEAR RATED IN THE MORNING HAS BEEN 53, 46, AND 51.  PATIENT STOPPED TAKING THE MEDICATION ON THE 2ND NIGHT.  PATIENT FEELS LIKE THIS IS TOO LOW.     How long have you had these concerns:

## 2022-11-03 NOTE — TELEPHONE ENCOUNTER
Take 1/2 tablet daily with daily monitoring of pulse AND blood pressure. Contact office on Monday with results

## 2022-12-28 ENCOUNTER — TELEPHONE (OUTPATIENT)
Dept: CARDIAC SURGERY | Facility: CLINIC | Age: 67
End: 2022-12-28

## 2023-01-18 ENCOUNTER — OFFICE VISIT (OUTPATIENT)
Dept: CARDIAC SURGERY | Facility: CLINIC | Age: 68
End: 2023-01-18
Payer: MEDICARE

## 2023-01-18 VITALS
SYSTOLIC BLOOD PRESSURE: 118 MMHG | HEART RATE: 75 BPM | DIASTOLIC BLOOD PRESSURE: 70 MMHG | TEMPERATURE: 98.6 F | WEIGHT: 199 LBS | HEIGHT: 72 IN | BODY MASS INDEX: 26.95 KG/M2 | OXYGEN SATURATION: 98 %

## 2023-01-18 DIAGNOSIS — I70.1 RENAL ARTERY STENOSIS: Primary | ICD-10-CM

## 2023-01-18 PROCEDURE — 99203 OFFICE O/P NEW LOW 30 MIN: CPT | Performed by: THORACIC SURGERY (CARDIOTHORACIC VASCULAR SURGERY)

## 2023-01-18 NOTE — PROGRESS NOTES
01/18/2023  Patient Information  Costa Robbins                                                                                          [address] [place] [address]   1955  'PCP/Referring Physician'  Elias Vásquez MD  [phone]  Jonathan Conner MD  [phone]  Chief Complaint   Patient presents with   • Consult     N/p per Dr. Griffin Conner for renal artery stenosis. Pt states that his blood pressure has been high for the last couple of months. Today his bp was 118/70 pt states that this the lowest it has been in weeks. Complains of weak lower extremities, SOB, fatigue and no stamina. Also concerned about left shoulder pain and left arm pain.        History of Present Illness:    The patient is a 67-year-old male everyday smoker 1/2 pk/day for 43 years who is being referred for possible renal artery stenosis by Dr. Ori Conner. He has had a renal artery ultrasound, which did not reveal any tight stenosis. There were 0% to 59% bilateral stenosis. He has been referred for evaluation. His blood pressure has been running, systolic over 200 and his diastolic has been over 100. He has seen Dr. Conner after seeing other physicians. He has had hypertension since his mid 30's.       Patient Active Problem List   Diagnosis   • Diabetes (HCC)   • Essential hypertension   • Diverticulosis   • Family history of coronary artery disease   • Mixed hyperlipidemia   • Renal artery stenosis (HCC)     Past Medical History:   Diagnosis Date   • Melanoma (HCC)      Past Surgical History:   Procedure Laterality Date   • ANKLE SURGERY      metal in ankle   • COLON RESECTION  05/2009    partial colectomy   • SKIN CANCER EXCISION      melanoma       Current Outpatient Medications:   •  amLODIPine (NORVASC) 10 MG tablet, Take 1 tablet by mouth Daily., Disp: 30 tablet, Rfl: 5  •  aspirin 325 MG tablet, Take 325 mg by mouth Daily., Disp: , Rfl:   •  Insulin Glargine (BASAGLAR KWIKPEN) 100 UNIT/ML injection pen,  Inject 40 Units under the skin into the appropriate area as directed Every Morning., Disp: 10 pen, Rfl: 2  •  lisinopril (PRINIVIL,ZESTRIL) 40 MG tablet, Take 1 tablet by mouth Daily., Disp: 30 tablet, Rfl: 5  •  metFORMIN (Glucophage) 1000 MG tablet, Take 1 tablet by mouth 2 (Two) Times a Day With Meals., Disp: 60 tablet, Rfl: 5  •  spironolactone (Aldactone) 50 MG tablet, Take 1 tablet by mouth Daily., Disp: 30 tablet, Rfl: 2  •  atenolol (Tenormin) 25 MG tablet, Take 1 tablet by mouth Daily., Disp: 30 tablet, Rfl: 2  Allergies   Allergen Reactions   • Morphine Itching   • Penicillins Rash     Social History     Socioeconomic History   • Marital status:    • Number of children: 0   Tobacco Use   • Smoking status: Every Day     Packs/day: 0.50     Years: 43.00     Pack years: 21.50     Types: Cigarettes   • Smokeless tobacco: Never   • Tobacco comments:     Pt states that he is smoking less 1/2 ppd   Vaping Use   • Vaping Use: Never used   Substance and Sexual Activity   • Alcohol use: Yes     Comment: beers every day   • Sexual activity: Yes     Partners: Female     Comment:      Family History   Problem Relation Age of Onset   • Diabetes Mother    • Heart attack Mother    • Hypertension Mother    • Hyperlipidemia Mother    • Stroke Mother    • Hodgkin's lymphoma Father    • Hypertension Father    • Diabetes Brother    • Hyperlipidemia Brother      Review of Systems   Constitutional: Positive for decreased appetite, malaise/fatigue and weight loss (pt states that he has lost about 12 pounds). Negative for chills, fever and weight gain.   HENT: Negative for hearing loss.    Cardiovascular: Positive for dyspnea on exertion and leg swelling (slight lower leg swelling at the ankles). Negative for chest pain, claudication, cyanosis, irregular heartbeat, near-syncope, orthopnea, palpitations, paroxysmal nocturnal dyspnea and syncope.   Endocrine: Negative for polydipsia, polyphagia and polyuria.  "  Hematologic/Lymphatic: Negative for adenopathy. Does not bruise/bleed easily.   Musculoskeletal: Positive for back pain, joint pain (left shoulder pain ), muscle cramps and muscle weakness. Negative for arthritis, falls, gout, joint swelling and myalgias.   Gastrointestinal: Negative for abdominal pain, anorexia, dysphagia, heartburn, nausea and vomiting.   Genitourinary: Positive for frequency and nocturia. Negative for dysuria and hematuria.   Neurological: Positive for dizziness (when bending over ) and numbness (bilateral toes and finger tips). Negative for focal weakness, headaches, loss of balance, seizures, vertigo and weakness.   Psychiatric/Behavioral: Negative for altered mental status, depression, substance abuse and suicidal ideas. The patient is not nervous/anxious.    Allergic/Immunologic: Negative for HIV exposure and persistent infections.     Vitals:    01/18/23 1039   BP: 118/70   Pulse: 75   Temp: 98.6 °F (37 °C)   SpO2: 98%   Weight: 90.3 kg (199 lb)   Height: 181.6 cm (71.5\")      Physical Exam  CONSTITUTIONAL:  Oriented x3, well-nourished, well developed, in no acute distress.  EYES:    Eyes anicteric.  EOMI.  PERRLA.   ENT:                Good dentition.  NECK:    Supple without masses or thyromegaly.  LUNGS:           Decreased in the bases; no wheezing, rales or rhonchi.  CARDIOVASCULAR:  Regular rate and rhythm without murmur, rub or gallop.  There are no carotid bruits.  GI:     Abdomen is soft, nontender, nondistended with normal bowel sounds.  No hepatosplenomegaly and no masses.  EXTREMITIES:   No cyanosis, clubbing or edema.  SKIN:   No ulcerations, petechiae or bruising.  MUSCULOSKELETAL:  Full range of motion with no deformity of extremities.  NEURO:  Cranial nerves II-XII, motor and sensory exams are intact.  PSYCH:  Alert and oriented; mood and affect good.    The ROS, surgical history, family history, social history and vitals were reviewed by myself and corrected as needed.  "     Labs/Imaging:  I have obtained and reviewed the medical records from Dr. Conner, including arterial doppler demonstrating     Assessment/Plan:    The patient is a 67-year-old  male everyday smoker 1/2 pk/day for 43 years who presents with a history of severe hypertension with systolic greater than 200 and diastolic greater than 100. Certainly, this is concerning. I spent 3 to 5 minutes talking with him about the importance of smoking cessation and the consequences thereof. I am not sure he is willing to quit. We discussed an advance directive, which the patient does not have. I have emphasized that we will proceed with a CTA of his renal arteries to further assess this. We will also get a BMP to assess his creatinine and renal function and we will see him back in follow-up.     Patient Active Problem List   Diagnosis   • Diabetes (HCC)   • Essential hypertension   • Diverticulosis   • Family history of coronary artery disease   • Mixed hyperlipidemia   • Renal artery stenosis (HCC)       CC: MD Jonathan Sifuentes MD Regina Fugate editing for Moses Escalante MD    I, Moses Escalante MD, have read and agree with the editing done by Kamille Baer, .

## 2023-01-19 DIAGNOSIS — I70.1 RENAL ARTERY STENOSIS: Primary | ICD-10-CM

## 2023-02-01 ENCOUNTER — HOSPITAL ENCOUNTER (OUTPATIENT)
Dept: CT IMAGING | Facility: HOSPITAL | Age: 68
Discharge: HOME | End: 2023-02-01
Admitting: REGISTERED NURSE
Payer: MEDICARE

## 2023-02-01 DIAGNOSIS — I70.1 RENAL ARTERY STENOSIS: ICD-10-CM

## 2023-02-01 LAB — CREAT BLDA-MCNC: 0.8 MG/DL (ref 0.6–1.3)

## 2023-02-01 PROCEDURE — 74174 CTA ABD&PLVS W/CONTRAST: CPT

## 2023-02-01 PROCEDURE — 82565 ASSAY OF CREATININE: CPT

## 2023-02-01 PROCEDURE — 0 IOPAMIDOL PER 1 ML: Performed by: REGISTERED NURSE

## 2023-02-01 RX ADMIN — IOPAMIDOL 95 ML: 755 INJECTION, SOLUTION INTRAVENOUS at 10:43

## 2023-02-03 ENCOUNTER — TELEPHONE (OUTPATIENT)
Dept: CARDIAC SURGERY | Facility: CLINIC | Age: 68
End: 2023-02-03
Payer: MEDICARE

## 2023-02-17 DIAGNOSIS — I70.1 RENAL ARTERY STENOSIS: ICD-10-CM

## 2023-02-20 ENCOUNTER — OFFICE VISIT (OUTPATIENT)
Dept: CARDIAC SURGERY | Facility: CLINIC | Age: 68
End: 2023-02-20
Payer: MEDICARE

## 2023-02-20 VITALS
SYSTOLIC BLOOD PRESSURE: 148 MMHG | OXYGEN SATURATION: 97 % | BODY MASS INDEX: 26.65 KG/M2 | DIASTOLIC BLOOD PRESSURE: 84 MMHG | HEART RATE: 71 BPM | TEMPERATURE: 96.8 F | WEIGHT: 193.8 LBS

## 2023-02-20 DIAGNOSIS — I73.9 PVD (PERIPHERAL VASCULAR DISEASE): Primary | ICD-10-CM

## 2023-02-20 PROCEDURE — 99213 OFFICE O/P EST LOW 20 MIN: CPT | Performed by: REGISTERED NURSE

## 2023-02-20 NOTE — PROGRESS NOTES
[facility] Cardiothoracic Surgery Office Follow Up Note    Date of Encounter: 2023     Name: Costa Robbins  : 1955     Referred By: No ref. provider found  PCP: Jonathan Conner MD    Chief Complaint:    Chief Complaint   Patient presents with   • Follow-up     Patient presents in follow up for renal stenosis after recent CTA scan.  He states his blood pressure has still been running high at home - 196/110 this am.         Subjective      History of Present Illness:    It was nice to see Costa Robbins in follow up.  He is a pleasant 67 y.o. male with PMH significant for HTN, HLD, insulin dependent DM, and significant family history of CAD.      Mr. Robbins was initially seen by Dr. Escalante on 2023 for evaluation of possible renal artery stenosis after referral from Dr. Griffin Conner.  Patient reported persistent HTN at the time despite medication compliance.  Dr. Escalante recommended  CTA.      Patient presents to office today for follow-up and review of imaging.  He continues to report persistent HTN with blood pressures averaging 180-190.  Denies claudication symptoms.  He does report some right leg back pain that causes numbness in the leg after extensive ambulation which he believes to be related to a past fall.  Patient reports following with PCP.  He does not follow with Cardiology.  He reports a strong family history of CAD with several family passing in their 50s due to myocardial infarction or coronary complications.  Patient does smoke ~0.5 ppd.     Review of Systems:  Review of Systems   Constitutional: Positive for weight loss. Negative for chills, decreased appetite, diaphoresis, fever, malaise/fatigue, night sweats and weight gain.   HENT: Negative.  Negative for hoarse voice.    Eyes: Negative.  Negative for blurred vision, double vision and visual disturbance.   Cardiovascular: Positive for chest pain (sporadic left side pain). Negative for claudication, dyspnea on  exertion, irregular heartbeat, leg swelling, near-syncope, orthopnea, palpitations, paroxysmal nocturnal dyspnea and syncope.   Respiratory: Negative.  Negative for cough, hemoptysis, shortness of breath, sputum production and wheezing.    Endocrine: Negative.    Hematologic/Lymphatic: Negative.  Negative for adenopathy and bleeding problem. Does not bruise/bleed easily.   Skin: Negative.  Negative for color change, nail changes, poor wound healing and rash.   Musculoskeletal: Negative.  Negative for back pain, falls and muscle cramps.   Gastrointestinal: Negative.  Negative for abdominal pain, dysphagia and heartburn.   Genitourinary: Negative.  Negative for flank pain.   Neurological: Positive for numbness (bilateral feet ). Negative for brief paralysis, disturbances in coordination, dizziness, focal weakness, headaches, light-headedness, loss of balance, sensory change, vertigo and weakness.   Psychiatric/Behavioral: Negative.  Negative for depression and suicidal ideas.   Allergic/Immunologic: Negative for persistent infections.       I have reviewed the following portions of the patient's history: allergies, current medications, past family history, past medical history, past social history, past surgical history and problem list and confirm it's accurate.    Allergies:  Allergies   Allergen Reactions   • Morphine Itching   • Penicillins Rash       Medications:      Current Outpatient Medications:   •  amLODIPine (NORVASC) 10 MG tablet, Take 1 tablet by mouth Daily., Disp: 30 tablet, Rfl: 5  •  aspirin 325 MG tablet, Take 325 mg by mouth Daily., Disp: , Rfl:   •  Insulin Glargine (BASAGLAR KWIKPEN) 100 UNIT/ML injection pen, Inject 40 Units under the skin into the appropriate area as directed Every Morning., Disp: 10 pen, Rfl: 2  •  lisinopril (PRINIVIL,ZESTRIL) 40 MG tablet, Take 1 tablet by mouth Daily., Disp: 30 tablet, Rfl: 5  •  atenolol (Tenormin) 25 MG tablet, Take 1 tablet by mouth Daily., Disp: 30  tablet, Rfl: 2  •  metFORMIN (Glucophage) 1000 MG tablet, Take 1 tablet by mouth 2 (Two) Times a Day With Meals., Disp: 60 tablet, Rfl: 5  •  spironolactone (Aldactone) 50 MG tablet, Take 1 tablet by mouth Daily., Disp: 30 tablet, Rfl: 2    History:   Past Medical History:   Diagnosis Date   • Hypertension    • Melanoma (HCC)        Past Surgical History:   Procedure Laterality Date   • ANKLE SURGERY      metal in ankle   • COLON RESECTION  2009    partial colectomy   • SKIN CANCER EXCISION      melanoma       Social History     Socioeconomic History   • Marital status:    • Number of children: 0   Tobacco Use   • Smoking status: Former     Packs/day: 0.50     Years: 43.00     Pack years: 21.50     Types: Cigarettes     Quit date: 2023     Years since quittin.0   • Smokeless tobacco: Never   • Tobacco comments:     Pt states that he has been able to stop smoking this month - 2023   Vaping Use   • Vaping Use: Never used   Substance and Sexual Activity   • Alcohol use: Yes     Comment: 2-3 beers every day   • Sexual activity: Yes     Partners: Female     Comment:         Family History   Problem Relation Age of Onset   • Diabetes Mother    • Heart attack Mother    • Hypertension Mother    • Hyperlipidemia Mother    • Stroke Mother    • Hodgkin's lymphoma Father    • Hypertension Father    • Diabetes Brother    • Hyperlipidemia Brother        Objective     Physical Exam:  Vitals:    23 1141 23 1142   BP: 140/80 148/84   BP Location: Left arm Right arm   Patient Position: Sitting Sitting   Pulse: 71    Temp: 96.8 °F (36 °C)    SpO2: 97%    Weight: 87.9 kg (193 lb 12.8 oz)       Body mass index is 26.65 kg/m².    Physical Exam  Vitals reviewed.   Constitutional:       General: He is not in acute distress.     Appearance: Normal appearance. He is not ill-appearing.   Eyes:      Pupils: Pupils are equal, round, and reactive to light.   Pulmonary:      Effort: Pulmonary effort is  normal.   Skin:     General: Skin is warm and dry.      Comments: Absent of ulceration or non-healing wounds.    Neurological:      Mental Status: He is alert and oriented to person, place, and time.   Psychiatric:         Mood and Affect: Mood normal.         Behavior: Behavior normal.         Thought Content: Thought content normal.         Judgment: Judgment normal.         Imaging/Labs:  CT Angiogram Abdomen Pelvis    Result Date: 2/1/2023  Impression: 1. Mild plaquing at the origin of the right renal artery, but no hemodynamically significant stenosis bilaterally. 2. Moderate segment occlusion of the right common iliac artery multifocal stenosis of the right external iliac artery 3. Moderate focal stenosis of the mid left common iliac artery and moderate to high-grade focal stenosis of the distal left external iliac artery. Electronically Signed: Leno Murillo  2/1/2023 1:17 PM EST  Workstation ID: [id number]   CT Angiogram Abdomen Pelvis (02/01/2023 10:42)    I personally reviewed this report and imaging.     Assessment / Plan      Assessment / Plan:  PMH significant for HTN, HLD, insulin dependent DM, and significant family history of CAD.      1.  Peripheral Vascular Disease  · Initially seen by Dr. Escalante on 1/18/2023 for evaluation of possible renal artery stenosis after referral from Dr. Griffin Conner.    · Reported persistent HTN at the time despite medication compliance.    · Dr. Escalante recommended CTA.    · Presents to office today for follow-up and review of imaging.    · Denies claudication symptoms.  Does report some right sided back pain that causes numbness in the leg after extensive ambulation which he believes to be related to a past fall.   · CTA resulted above.  Renal arteries noted to be patent.  Moderate segment occlusion of the right common iliac artery multifocal stenosis of the right external iliac artery.  Moderate focal stenosis of the mid left common iliac artery and moderate to  high-grade focal stenosis of the distal left external iliac artery.   · Reports following with PCP.  He does not follow with Cardiology.    · Patient does smoke ~0.5 ppd.     2.  Hypertension  · Continues to report persistent HTN with blood pressures averaging 180-190.   · He reports a strong family history of CAD with several family members passing in their 50s due to myocardial infarction or coronary complications.   · Given persistent HTN and family history, we will facilitate referral to Cardiology.     Patient reviewed with Dr. Escalante.      Patient Education:  ..A structured walking regiment is used to improve the circulation or blood flow in your legs.  Recognize that walking may hurt at first -- and that is good.  In fact, the goal is to walk at a pace that causes mild or moderate pain or tightness in your legs.  Pace yourself, stop to rest for a few minutes, but the resume walking.  This discomfort triggers your body to improve your circulation.  Repeat several times:  Walk at a pace that causes mild or moderate leg pain, then rest, and keep going.  Over time, you may find you are able to walk longer with less pain.  That is a sign that your blood vessels are recovering.  It may take months, so be patient and persistent.     Follow Up:   We will plan for follow-up in ~4 months.   Or sooner for any further concerns or worsening sign and symptoms.  Patient encouraged to call the office with any questions or concerns.     Thank you for allowing me to participate in your care.  Best Regards,    CHERISE Shabazz  [facility] Cardiothoracic Surgery  02/20/23  11:44 EST

## 2023-03-04 DIAGNOSIS — I10 ESSENTIAL HYPERTENSION: ICD-10-CM

## 2023-03-06 ENCOUNTER — OFFICE VISIT (OUTPATIENT)
Dept: CARDIOLOGY | Facility: CLINIC | Age: 68
End: 2023-03-06
Payer: MEDICARE

## 2023-03-06 VITALS
DIASTOLIC BLOOD PRESSURE: 74 MMHG | WEIGHT: 196 LBS | OXYGEN SATURATION: 98 % | HEART RATE: 70 BPM | HEIGHT: 72 IN | BODY MASS INDEX: 26.55 KG/M2 | SYSTOLIC BLOOD PRESSURE: 180 MMHG

## 2023-03-06 DIAGNOSIS — I10 ESSENTIAL HYPERTENSION: Primary | ICD-10-CM

## 2023-03-06 DIAGNOSIS — E78.2 MIXED HYPERLIPIDEMIA: Primary | ICD-10-CM

## 2023-03-06 PROCEDURE — 99204 OFFICE O/P NEW MOD 45 MIN: CPT | Performed by: INTERNAL MEDICINE

## 2023-03-06 RX ORDER — DOXAZOSIN 2 MG/1
2 TABLET ORAL NIGHTLY
Qty: 30 TABLET | Refills: 5 | Status: SHIPPED | OUTPATIENT
Start: 2023-03-06

## 2023-03-06 RX ORDER — LISINOPRIL 40 MG/1
TABLET ORAL
Qty: 90 TABLET | Refills: 0 | Status: SHIPPED | OUTPATIENT
Start: 2023-03-06

## 2023-03-06 NOTE — PROGRESS NOTES
Subjective:     Encounter Date:03/06/2023    Primary Care Physician: Jonathan Conner MD      Patient ID: Costa Robbins is a 67 y.o. male.    Chief Complaint:Hypertension and Family Hx of Heart Disease     PROBLEM LIST:  1. Hypertension  2. Diabetes  3. Dyslipidemia   4. PAD  a. 2/1/2023 CTA of the abdomen with mild plaquing  b. Origin of the right renal artery with no hemodynamically significant stenosis bilaterally.  Moderate segment occlusion of the right common iliac artery multifocal stenosis of the right external iliac artery.  Moderate focal stenosis of the mid left common iliac artery and moderate to high-grade focal stenosis of the distal left external iliac artery.  5. Family history of CAD  6. Melanoma  7. Surgeries:  a. Ankle surgery, left  b. Colon resection secondary to diverticulitis with rupture  c. Skin cancer removal.       Allergies   Allergen Reactions   • Atenolol Other (See Comments)     Bradycardia.   • Morphine Itching   • Penicillins Rash         Current Outpatient Medications:   •  amLODIPine (NORVASC) 10 MG tablet, Take 1 tablet by mouth Daily., Disp: 30 tablet, Rfl: 5  •  aspirin 325 MG tablet, Take 1 tablet by mouth Daily., Disp: , Rfl:   •  Insulin Glargine (BASAGLAR KWIKPEN) 100 UNIT/ML injection pen, Inject 40 Units under the skin into the appropriate area as directed Every Morning., Disp: 10 pen, Rfl: 2  •  lisinopril (PRINIVIL,ZESTRIL) 40 MG tablet, Take 1 tablet by mouth Daily., Disp: 30 tablet, Rfl: 5        History of Present Illness    Patient is a 67-year-old  male who is being seen today for further evaluation of hypertension.  Notes that he has had high blood pressure since roughly 1997.  Has been on different medications in the past.  Has previously been on Aldactone which she notes did not change his blood pressure significantly and atenolol which caused bradycardia.  Over the last few months his blood pressure has been fairly labile.  Typically, however has  been running higher with systolic blood pressures in the 170-180 range.  No reported syncope.  Has some occasional chest discomfort but had a negative perfusion study back in September.  Also endorses some fatigue and leg claudication symptoms.  He had a CTA of his abdomen to evaluate for possible renal artery stenosis.  This was overall negative.  He denies history of significant dyslipidemia and is not on statin therapy.  Has not had a 24-hour urine to follow his blood pressure.  Notes he is here predominantly to get his blood pressure under control.    The following portions of the patient's history were reviewed and updated as appropriate: allergies, current medications, past family history, past medical history, past social history, past surgical history and problem list.    Family History   Problem Relation Age of Onset   • Diabetes Mother    • Heart attack Mother    • Hypertension Mother    • Hyperlipidemia Mother    • Stroke Mother    • Hodgkin's lymphoma Father    • Hypertension Father    • Diabetes Brother    • Hyperlipidemia Brother        Social History     Tobacco Use   • Smoking status: Former     Packs/day: 0.50     Years: 43.00     Pack years: 21.50     Types: Cigarettes     Quit date: 2023     Years since quittin.0   • Smokeless tobacco: Never   • Tobacco comments:     Pt states that he has been able to stop smoking this month - 2023   Vaping Use   • Vaping Use: Never used   Substance Use Topics   • Alcohol use: Yes     Comment: 2-3 beers every day   • Drug use: Never         Review of Systems   Constitutional: Positive for malaise/fatigue. Negative for fever.   HENT: Negative for nosebleeds.    Eyes: Negative for redness and visual disturbance.   Cardiovascular: Positive for chest pain and claudication. Negative for orthopnea, palpitations and paroxysmal nocturnal dyspnea.   Respiratory: Negative for cough, snoring, sputum production and wheezing.    Hematologic/Lymphatic: Negative for  "bleeding problem.   Skin: Negative for flushing, itching and rash.   Musculoskeletal: Negative for falls, joint pain and muscle cramps.   Gastrointestinal: Positive for heartburn (rare). Negative for abdominal pain, diarrhea, nausea and vomiting.   Genitourinary: Negative for hematuria.   Neurological: Positive for headaches. Negative for excessive daytime sleepiness, dizziness, tremors and weakness.   Psychiatric/Behavioral: Negative for substance abuse. The patient is not nervous/anxious.           Objective:   /74 (BP Location: Right arm, Patient Position: Sitting)   Pulse 70   Ht 181.6 cm (71.5\")   Wt 88.9 kg (196 lb)   SpO2 98%   BMI 26.96 kg/m²         Vitals reviewed.   Constitutional:       Appearance: Healthy appearance. Well-developed and not in distress.   Eyes:      Conjunctiva/sclera: Conjunctivae normal.      Pupils: Pupils are equal, round, and reactive to light.   HENT:      Head: Normocephalic and atraumatic.    Mouth/Throat:      Pharynx: Oropharynx is clear.   Neck:      Thyroid: Thyroid normal. No thyromegaly.      Vascular: Normal carotid pulses. No carotid bruit or JVD. JVD normal.      Lymphadenopathy: No cervical adenopathy.   Pulmonary:      Effort: No respiratory distress.      Breath sounds: No wheezing. No rales.   Chest:      Chest wall: Not tender to palpatation.   Cardiovascular:      Normal rate. Regular rhythm.      No gallop.   Pulses:     Carotid: 2+ bilaterally.     Dorsalis pedis: 2+ bilaterally.     Posterior tibial: 2+ bilaterally.  Abdominal:      General: There is no distension or abdominal bruit.      Palpations: There is no abdominal mass.      Tenderness: There is no abdominal tenderness. There is no rebound.   Musculoskeletal:         General: No tenderness or deformity.      Extremities: No clubbing present.Skin:     General: Skin is warm and dry. There is no cyanosis.      Findings: No rash.   Neurological:      Mental Status: Alert, oriented to person, " "place, and time and oriented to person, place and time.         Procedures          Assessment:   Assessment & Plan      Diagnoses and all orders for this visit:    1. Essential hypertension (Primary)  -     Catecholamines, Fractionated, Urine, 24 Hour - Urine, Clean Catch; Future  -     Metanephrines, Urine, 24 Hour - Urine, Clean Catch; Future      1.  Hypertension, poorly controlled, likely essential hypertension.  No evidence of renal artery stenosis by CT scan or renal ultrasound.  2.  Dyslipidemia last   3.  Diabetes last hemoglobin A1c of 12  4.  Right lower extremity claudication, severe at 30 yards.  CTA showed right common iliac artery occlusion.    Recommendations  1.  We will start patient on doxazosin 2 mg for hypertension, as well as some urinary retention symptoms.  2.  We will recheck fasting lipid profile, given his PAD diabetes will need statin.  However patient is reluctant to start \"chemicals\" as he does not like them.  3.  Discussed with Dr. Escalante, who will evaluate the patient for possible revascularization of his PAD  4.  Given his intermittent hypotension we will also check a 24-hour urinary catecholamines  5.  Continue other current medical therapy  6.  Patient to call if his blood pressure remains elevated we can uptitrate his doxazosin dose.  7.  Revisit in 3 months.       Melly LUONG scribed portions of this dictation for Dr. Moses Frankel.     I have seen and examined the patient, I have reviewed the note, discussed the case with the advance practice clinician, made necessary changes and I agree with the final note.    Moses Frankel MD  03/06/23  12:38 EST              Dictated utilizing Dragon dictation  "

## 2023-03-08 ENCOUNTER — LAB (OUTPATIENT)
Dept: LAB | Facility: HOSPITAL | Age: 68
End: 2023-03-08
Payer: MEDICARE

## 2023-03-08 DIAGNOSIS — I10 ESSENTIAL HYPERTENSION: ICD-10-CM

## 2023-03-08 PROCEDURE — 81050 URINALYSIS VOLUME MEASURE: CPT

## 2023-03-08 PROCEDURE — 82384 ASSAY THREE CATECHOLAMINES: CPT

## 2023-03-08 PROCEDURE — 83835 ASSAY OF METANEPHRINES: CPT

## 2023-03-14 LAB
DOPAMINE 24H UR-MRATE: 153 UG/24 HR (ref 0–510)
DOPAMINE UR-MCNC: 51 UG/L
EPINEPH 24H UR-MRATE: 3 UG/24 HR (ref 0–20)
EPINEPH UR-MCNC: 1 UG/L
NOREPINEPH 24H UR-MRATE: 45 UG/24 HR (ref 0–135)
NOREPINEPH UR-MCNC: 15 UG/L

## 2023-03-15 LAB
METANEPH 24H UR-MRATE: 57 UG/24 HR (ref 58–276)
METANEPHS 24H UR-MCNC: 19 UG/L
NORMETANEPHRINE 24H UR-MCNC: 77 UG/L
NORMETANEPHRINE 24H UR-MRATE: 231 UG/24 HR (ref 156–729)

## 2023-03-20 ENCOUNTER — OFFICE VISIT (OUTPATIENT)
Dept: CARDIAC SURGERY | Facility: CLINIC | Age: 68
End: 2023-03-20
Payer: MEDICARE

## 2023-03-20 VITALS
SYSTOLIC BLOOD PRESSURE: 126 MMHG | HEIGHT: 71 IN | WEIGHT: 203 LBS | OXYGEN SATURATION: 97 % | DIASTOLIC BLOOD PRESSURE: 69 MMHG | BODY MASS INDEX: 28.42 KG/M2 | TEMPERATURE: 97.3 F | HEART RATE: 71 BPM

## 2023-03-20 DIAGNOSIS — I73.9 PVD (PERIPHERAL VASCULAR DISEASE) WITH CLAUDICATION: Primary | ICD-10-CM

## 2023-03-20 DIAGNOSIS — I10 ESSENTIAL HYPERTENSION: ICD-10-CM

## 2023-03-20 PROCEDURE — 3078F DIAST BP <80 MM HG: CPT | Performed by: REGISTERED NURSE

## 2023-03-20 PROCEDURE — 99212 OFFICE O/P EST SF 10 MIN: CPT | Performed by: REGISTERED NURSE

## 2023-03-20 PROCEDURE — 3074F SYST BP LT 130 MM HG: CPT | Performed by: REGISTERED NURSE

## 2023-03-20 PROCEDURE — 1160F RVW MEDS BY RX/DR IN RCRD: CPT | Performed by: REGISTERED NURSE

## 2023-03-20 PROCEDURE — 1159F MED LIST DOCD IN RCRD: CPT | Performed by: REGISTERED NURSE

## 2023-03-20 NOTE — PROGRESS NOTES
[facility] Cardiothoracic Surgery Office Follow Up Note    Date of Encounter: 2023     Name: Costa Robbins  : 1955     Referred By: No ref. provider found  PCP: Jonathan Conner MD    Chief Complaint:    Chief Complaint   Patient presents with   • Peripheral Vascular Disease     Follow-up per Dr. Frankel for PVD. Patient has right leg claudication.        Subjective      History of Present Illness:    It was nice to see Costa Robbins in follow up.  He is a pleasant 67 y.o. male with PMH significant for former smoker, HTN, HLD, insulin dependent DM, and significant family history of CAD.      Mr. Robbins was initially seen by Dr. Escalante on 2023 for evaluation of possible renal artery stenosis after referral from Dr. Griffin Conner.  Patient reported persistent HTN at the time despite medication compliance.  Dr. Escalante recommended  CTA.      Patient was last seen in office on 2023 for follow-up and review of imaging.  He continued to report persistent HTN with blood pressures averaging 180-190.  Denied claudication symptoms.  He did report some right leg back pain that caused numbness in the leg after extensive ambulation which he believed to be related to a past fall.  Patient reported a strong family history of CAD with several family members passing in their 50s due to myocardial infarction or coronary complications.  Patient was referred to Dr. Frankel for hypertension management.     Mr. Robbins presents to office today after reporting right lower extremity claudication symptoms to Dr. Frankel.  Patient was seen by Dr. Frankel on 3/6/2023 after referral.  He reports claudication of the right lower extremity but denies claudication of the left lower extremity.  Patient denies ulceration or nonhealing wound.  He reports cessation of smoking approximately 1 month ago.    Review of Systems:  Review of Systems   Constitutional: Positive for weight loss. Negative for chills,  decreased appetite, diaphoresis, fever, malaise/fatigue, night sweats and weight gain.   HENT: Negative.  Negative for hoarse voice.    Eyes: Negative.  Negative for blurred vision, double vision and visual disturbance.   Cardiovascular: Positive for chest pain (sporadic left side pain-) and claudication. Negative for dyspnea on exertion, irregular heartbeat, leg swelling, near-syncope, orthopnea, palpitations, paroxysmal nocturnal dyspnea and syncope.   Respiratory: Negative.  Negative for cough, hemoptysis, shortness of breath, sputum production and wheezing.    Endocrine: Negative.    Hematologic/Lymphatic: Negative.  Negative for adenopathy and bleeding problem. Does not bruise/bleed easily.   Skin: Negative.  Negative for color change, nail changes, poor wound healing and rash.   Musculoskeletal: Positive for back pain. Negative for falls and muscle cramps.   Gastrointestinal: Negative.  Negative for abdominal pain, dysphagia and heartburn.        Reflux     Genitourinary: Negative.  Negative for flank pain.   Neurological: Positive for numbness (bilateral feet ). Negative for brief paralysis, disturbances in coordination, dizziness, focal weakness, headaches, light-headedness, loss of balance, sensory change, vertigo and weakness.   Psychiatric/Behavioral: Negative.  Negative for depression and suicidal ideas.   Allergic/Immunologic: Negative for persistent infections.       I have reviewed the following portions of the patient's history: allergies, current medications, past family history, past medical history, past social history, past surgical history and problem list and confirm it's accurate.    Allergies:  Allergies   Allergen Reactions   • Atenolol Other (See Comments)     Bradycardia.   • Morphine Itching   • Penicillins Rash       Medications:      Current Outpatient Medications:   •  amLODIPine (NORVASC) 10 MG tablet, Take 1 tablet by mouth Daily., Disp: 30 tablet, Rfl: 5  •  aspirin 325 MG tablet,  "Take 1 tablet by mouth Daily., Disp: , Rfl:   •  doxazosin (Cardura) 2 MG tablet, Take 1 tablet by mouth Every Night., Disp: 30 tablet, Rfl: 5  •  Insulin Glargine (BASAGLAR KWIKPEN) 100 UNIT/ML injection pen, Inject 40 Units under the skin into the appropriate area as directed Every Morning., Disp: 10 pen, Rfl: 2  •  lisinopril (PRINIVIL,ZESTRIL) 40 MG tablet, Take 1 tablet by mouth once daily, Disp: 90 tablet, Rfl: 0    History:   Past Medical History:   Diagnosis Date   • Hypertension    • Melanoma (HCC)        Past Surgical History:   Procedure Laterality Date   • ANKLE SURGERY      metal in ankle   • COLON RESECTION  2009    partial colectomy   • SKIN CANCER EXCISION      melanoma       Social History     Socioeconomic History   • Marital status:    • Number of children: 0   Tobacco Use   • Smoking status: Former     Packs/day: 0.50     Years: 43.00     Pack years: 21.50     Types: Cigarettes     Quit date: 2023     Years since quittin.1   • Smokeless tobacco: Never   • Tobacco comments:     Pt states that he has been able to stop smoking this month - 2023   Vaping Use   • Vaping Use: Never used   Substance and Sexual Activity   • Alcohol use: Yes     Comment: 2-3 beers every day   • Drug use: Never   • Sexual activity: Yes     Partners: Female     Comment:         Family History   Problem Relation Age of Onset   • Diabetes Mother    • Heart attack Mother    • Hypertension Mother    • Hyperlipidemia Mother    • Stroke Mother    • Hodgkin's lymphoma Father    • Hypertension Father    • Diabetes Brother    • Hyperlipidemia Brother        Objective     Physical Exam:  Vitals:    23 1512 23 1515   BP: 130/70 126/69   BP Location: Right arm Left arm   Patient Position: Sitting Sitting   Pulse: 71    Temp: 97.3 °F (36.3 °C)    SpO2: 97%    Weight: 92.1 kg (203 lb)    Height: 180.3 cm (71\")  Comment: patient reported       Body mass index is 28.31 kg/m².    Physical Exam  Vitals " reviewed.   Constitutional:       General: He is not in acute distress.     Appearance: Normal appearance. He is not ill-appearing.   Eyes:      Pupils: Pupils are equal, round, and reactive to light.   Cardiovascular:      Pulses:           Dorsalis pedis pulses are 1+ on the right side and 1+ on the left side.        Posterior tibial pulses are 1+ on the right side and 1+ on the left side.      Comments: Pulses attainable by palpation.   Pulmonary:      Effort: Pulmonary effort is normal.   Musculoskeletal:      Right lower leg: No edema.      Left lower leg: No edema.   Feet:      Right foot:      Skin integrity: Skin integrity normal.      Toenail Condition: Right toenails are abnormally thick.      Left foot:      Skin integrity: Skin integrity normal.      Toenail Condition: Left toenails are abnormally thick.   Skin:     General: Skin is warm and dry.      Comments: Absent of ulceration or non-healing wounds.    Neurological:      Mental Status: He is alert and oriented to person, place, and time.   Psychiatric:         Mood and Affect: Mood normal.         Behavior: Behavior normal.         Thought Content: Thought content normal.         Judgment: Judgment normal.         Imaging/Labs:   CT Angiogram Abdomen Pelvis (02/01/2023 10:42)    I personally reviewed this report and imaging.     Assessment / Plan      Assessment / Plan:  PMH significant for HTN, HLD, insulin dependent DM, and significant family history of CAD.      1.  Peripheral Vascular Disease  · Initially seen by Dr. Escalante on 1/18/2023 for evaluation of possible renal artery stenosis after referral from Dr. Griffin Conner.    · Reported persistent HTN at the time despite medication compliance.    · Dr. Escalante recommended CTA.    · Last seen in office on 2/20/2023 for follow-up and review of imaging.    · Denied claudication symptoms.    · Reportd some right leg back pain that caused numbness in the leg after extensive ambulation which he  believed to be related to a past fall.    · Reported several family members passing in their 50s due to myocardial infarction or coronary complications.    · Presents to office today after reporting right lower extremity claudication symptoms to Dr. Frankel.    · Reports claudication of the right lower extremity but denies claudication of the left lower extremity.    · Denies ulceration or nonhealing wound.    · CTA with right common iliac occlusion, high grade stenosis.  · Pulses attainable by palpation.  BLE without ulceration or non-healing wound.  · Toenails noted to be abnormally thick -- refer to Podiatry.   · Reports cessation of smoking approximately 1 month ago.    2.  Hypertension  · Continues to report persistent HTN with blood pressures averaging 180-190.   · Reports a strong family history of CAD with several family members passing in their 50s due to myocardial infarction or coronary complications.   · Seen by Dr. Frankel on 3/6/2023 -- initiated on doxazosin.     Will review with Dr. Escalante.      Patient Education:  ..A structured walking regiment is used to improve the circulation or blood flow in your legs.  Recognize that walking may hurt at first -- and that is good.  In fact, the goal is to walk at a pace that causes mild or moderate pain or tightness in your legs.  Pace yourself, stop to rest for a few minutes, but the resume walking.  This discomfort triggers your body to improve your circulation.  Repeat several times:  Walk at a pace that causes mild or moderate leg pain, then rest, and keep going.  Over time, you may find you are able to walk longer with less pain.  That is a sign that your blood vessels are recovering.  It may take months, so be patient and persistent.     Follow Up:   We will plan for follow-up after review with Dr. Escalante.  Or sooner for any further concerns or worsening sign and symptoms.  Patient encouraged to call the office with any questions or concerns.     Thank you  for allowing me to participate in your care.  Best Regards,    CHERISE Shabazz  [facility] Cardiothoracic Surgery  03/21/23  15:00 EDT

## 2023-03-21 PROBLEM — I73.9 PVD (PERIPHERAL VASCULAR DISEASE) WITH CLAUDICATION: Status: ACTIVE | Noted: 2023-03-21

## 2023-03-28 DIAGNOSIS — I73.9 PVD (PERIPHERAL VASCULAR DISEASE) WITH CLAUDICATION: Primary | ICD-10-CM

## 2023-03-30 ENCOUNTER — PREP FOR SURGERY (OUTPATIENT)
Dept: OTHER | Facility: HOSPITAL | Age: 68
End: 2023-03-30
Payer: MEDICARE

## 2023-03-30 DIAGNOSIS — I73.9 PVD (PERIPHERAL VASCULAR DISEASE) WITH CLAUDICATION: Primary | ICD-10-CM

## 2023-04-07 ENCOUNTER — PRE-ADMISSION TESTING (OUTPATIENT)
Dept: PREADMISSION TESTING | Facility: HOSPITAL | Age: 68
End: 2023-04-07
Payer: MEDICARE

## 2023-04-07 VITALS — WEIGHT: 198.41 LBS | BODY MASS INDEX: 28.41 KG/M2 | HEIGHT: 70 IN

## 2023-04-07 DIAGNOSIS — Z01.89 LABORATORY TEST: Primary | ICD-10-CM

## 2023-04-07 DIAGNOSIS — I73.9 PVD (PERIPHERAL VASCULAR DISEASE) WITH CLAUDICATION: ICD-10-CM

## 2023-04-07 LAB
ABO GROUP BLD: NORMAL
ANION GAP SERPL CALCULATED.3IONS-SCNC: 14 MMOL/L (ref 5–15)
APTT PPP: 26.3 SECONDS (ref 22–39)
BUN SERPL-MCNC: 12 MG/DL (ref 8–23)
BUN/CREAT SERPL: 13.3 (ref 7–25)
CALCIUM SPEC-SCNC: 10.4 MG/DL (ref 8.6–10.5)
CHLORIDE SERPL-SCNC: 94 MMOL/L (ref 98–107)
CO2 SERPL-SCNC: 23 MMOL/L (ref 22–29)
CREAT SERPL-MCNC: 0.9 MG/DL (ref 0.76–1.27)
DEPRECATED RDW RBC AUTO: 41.1 FL (ref 37–54)
EGFRCR SERPLBLD CKD-EPI 2021: 94.8 ML/MIN/1.73
ERYTHROCYTE [DISTWIDTH] IN BLOOD BY AUTOMATED COUNT: 11.9 % (ref 12.3–15.4)
GLUCOSE SERPL-MCNC: 680 MG/DL (ref 65–99)
HBA1C MFR BLD: 14 % (ref 4.8–5.6)
HCT VFR BLD AUTO: 43.1 % (ref 37.5–51)
HGB BLD-MCNC: 14.5 G/DL (ref 13–17.7)
INR PPP: 0.88 (ref 0.84–1.13)
MCH RBC QN AUTO: 31.3 PG (ref 26.6–33)
MCHC RBC AUTO-ENTMCNC: 33.6 G/DL (ref 31.5–35.7)
MCV RBC AUTO: 93.1 FL (ref 79–97)
PLATELET # BLD AUTO: 243 10*3/MM3 (ref 140–450)
PMV BLD AUTO: 11.5 FL (ref 6–12)
POTASSIUM SERPL-SCNC: 4.8 MMOL/L (ref 3.5–5.2)
PROTHROMBIN TIME: 11.8 SECONDS (ref 11.4–14.4)
RBC # BLD AUTO: 4.63 10*6/MM3 (ref 4.14–5.8)
RH BLD: POSITIVE
SODIUM SERPL-SCNC: 131 MMOL/L (ref 136–145)
WBC NRBC COR # BLD: 6.18 10*3/MM3 (ref 3.4–10.8)

## 2023-04-07 PROCEDURE — 86900 BLOOD TYPING SEROLOGIC ABO: CPT

## 2023-04-07 PROCEDURE — 93005 ELECTROCARDIOGRAM TRACING: CPT

## 2023-04-07 PROCEDURE — 80048 BASIC METABOLIC PNL TOTAL CA: CPT

## 2023-04-07 PROCEDURE — 36415 COLL VENOUS BLD VENIPUNCTURE: CPT

## 2023-04-07 PROCEDURE — 86901 BLOOD TYPING SEROLOGIC RH(D): CPT

## 2023-04-07 PROCEDURE — 83036 HEMOGLOBIN GLYCOSYLATED A1C: CPT

## 2023-04-07 PROCEDURE — 85730 THROMBOPLASTIN TIME PARTIAL: CPT

## 2023-04-07 PROCEDURE — 85610 PROTHROMBIN TIME: CPT

## 2023-04-07 PROCEDURE — 85027 COMPLETE CBC AUTOMATED: CPT

## 2023-04-07 PROCEDURE — 93010 ELECTROCARDIOGRAM REPORT: CPT | Performed by: INTERNAL MEDICINE

## 2023-04-07 RX ORDER — SULFAMETHOXAZOLE AND TRIMETHOPRIM 800; 160 MG/1; MG/1
1 TABLET ORAL 2 TIMES DAILY
COMMUNITY
Start: 2023-04-07

## 2023-04-07 NOTE — PAT
Patient viewed general PAT education video as instructed in their preoperative information received from their surgeon.  Patient stated the general [facility] education video was viewed in its entirety and survey completed.  Copies of [facility] general education handouts (Incentive Spirometry, Meds to Beds Program, Patient Belongings, Pre-op skin preparation instructions, Blood Glucose testing, Visitor policy, Surgery FAQ, Code H) distributed to patient if not printed. Education related to the PAT pass and skin preparation for surgery (if applicable) completed in PAT as a reinforcement to PAT education video. Patient instructed to return PAT pass provided today as well as completed skin preparation sheet (if applicable) on the day of procedure.     Additionally if patient had not viewed video yet but intended to view it at home or in our waiting area, then referred them to the handout with QR code/link provided during PAT visit.  Instructed patient to complete survey after viewing the video in its entirety.  Encouraged patient/family to read [facility] general education handouts thoroughly and notify PAT staff with any questions or concerns. Patient verbalized understanding of all information and priority content.    An arrival time for procedure was not provided during PAT visit. If patient had any questions or concerns about their arrival time, they were instructed to contact their surgeon/physician.  Additionally, if the patient referred to an arrival time that was acquired from their my chart account, patient was encouraged to verify that time with their surgeon/physician. Arrival times are NOT provided in Pre Admission Testing Department.    Patient to apply Chlorhexadine wipes  to surgical area (as instructed) the night before procedure and the AM of procedure. Wipes provided.    Per Anesthesia Request, patient instructed not to take their ACE/ARB medications on the AM of surgery.    Too early to draw type and screen in PAT.   Please obtain blood bank specimen in pre-op on the day of surgery.    PATIENT STATES THAT HE WAS PERSCRIBED BACTRIM TODAY DURING GP VISIT FOR WOUNDS ON LEFT HAND AND RIGHT ANKLE. KALYN CHRISTIANSON INFORMED    EKG FAXED TO DR SCHMIDT. BEST ATTEMPT, PATIENT HAIRY

## 2023-04-09 ENCOUNTER — DOCUMENTATION (OUTPATIENT)
Dept: CARDIAC SURGERY | Facility: CLINIC | Age: 68
End: 2023-04-09
Payer: MEDICARE

## 2023-04-09 LAB
QT INTERVAL: 526 MS
QTC INTERVAL: 636 MS

## 2023-04-09 NOTE — PROGRESS NOTES
I was called over the weekend from [facility] notified me that Mr. Robbins's blood sugars were over 600   I did call and discussed this with the patient and he is following his blood sugars at home and he is awarel

## 2023-04-12 ENCOUNTER — HOSPITAL ENCOUNTER (OUTPATIENT)
Facility: HOSPITAL | Age: 68
Discharge: HOME | End: 2023-04-13
Attending: THORACIC SURGERY (CARDIOTHORACIC VASCULAR SURGERY) | Admitting: THORACIC SURGERY (CARDIOTHORACIC VASCULAR SURGERY)
Payer: MEDICARE

## 2023-04-12 ENCOUNTER — ANESTHESIA (OUTPATIENT)
Dept: PERIOP | Facility: HOSPITAL | Age: 68
End: 2023-04-12
Payer: MEDICARE

## 2023-04-12 ENCOUNTER — APPOINTMENT (OUTPATIENT)
Dept: GENERAL RADIOLOGY | Facility: HOSPITAL | Age: 68
End: 2023-04-12
Payer: MEDICARE

## 2023-04-12 ENCOUNTER — ANESTHESIA EVENT (OUTPATIENT)
Dept: PERIOP | Facility: HOSPITAL | Age: 68
End: 2023-04-12
Payer: MEDICARE

## 2023-04-12 DIAGNOSIS — I73.9 PVD (PERIPHERAL VASCULAR DISEASE) WITH CLAUDICATION: Primary | ICD-10-CM

## 2023-04-12 DIAGNOSIS — I73.9 PVD (PERIPHERAL VASCULAR DISEASE) WITH CLAUDICATION: ICD-10-CM

## 2023-04-12 LAB
ABO GROUP BLD: NORMAL
BLD GP AB SCN SERPL QL: NEGATIVE
GLUCOSE BLDC GLUCOMTR-MCNC: 223 MG/DL (ref 70–130)
GLUCOSE BLDC GLUCOMTR-MCNC: 322 MG/DL (ref 70–130)
RH BLD: POSITIVE
T&S EXPIRATION DATE: NORMAL

## 2023-04-12 PROCEDURE — 63710000001 INSULIN REGULAR HUMAN PER 5 UNITS: Performed by: ANESTHESIOLOGY

## 2023-04-12 PROCEDURE — C1769 GUIDE WIRE: HCPCS | Performed by: THORACIC SURGERY (CARDIOTHORACIC VASCULAR SURGERY)

## 2023-04-12 PROCEDURE — 25010000002 PROPOFOL 10 MG/ML EMULSION: Performed by: ANESTHESIOLOGY

## 2023-04-12 PROCEDURE — 25510000001 IOPAMIDOL 61 % SOLUTION: Performed by: THORACIC SURGERY (CARDIOTHORACIC VASCULAR SURGERY)

## 2023-04-12 PROCEDURE — C1894 INTRO/SHEATH, NON-LASER: HCPCS | Performed by: THORACIC SURGERY (CARDIOTHORACIC VASCULAR SURGERY)

## 2023-04-12 PROCEDURE — G0378 HOSPITAL OBSERVATION PER HR: HCPCS

## 2023-04-12 PROCEDURE — C1876 STENT, NON-COA/NON-COV W/DEL: HCPCS | Performed by: THORACIC SURGERY (CARDIOTHORACIC VASCULAR SURGERY)

## 2023-04-12 PROCEDURE — C1887 CATHETER, GUIDING: HCPCS | Performed by: THORACIC SURGERY (CARDIOTHORACIC VASCULAR SURGERY)

## 2023-04-12 PROCEDURE — 86900 BLOOD TYPING SEROLOGIC ABO: CPT | Performed by: THORACIC SURGERY (CARDIOTHORACIC VASCULAR SURGERY)

## 2023-04-12 PROCEDURE — 86923 COMPATIBILITY TEST ELECTRIC: CPT

## 2023-04-12 PROCEDURE — 37221 PR REVSC OPN/PRQ ILIAC ART W/STNT PLMT & ANGIOPLSTY: CPT | Performed by: PHYSICIAN ASSISTANT

## 2023-04-12 PROCEDURE — 25010000002 ONDANSETRON PER 1 MG: Performed by: ANESTHESIOLOGY

## 2023-04-12 PROCEDURE — 82962 GLUCOSE BLOOD TEST: CPT

## 2023-04-12 PROCEDURE — 86850 RBC ANTIBODY SCREEN: CPT | Performed by: THORACIC SURGERY (CARDIOTHORACIC VASCULAR SURGERY)

## 2023-04-12 PROCEDURE — 75716 ARTERY X-RAYS ARMS/LEGS: CPT | Performed by: THORACIC SURGERY (CARDIOTHORACIC VASCULAR SURGERY)

## 2023-04-12 PROCEDURE — 86901 BLOOD TYPING SEROLOGIC RH(D): CPT | Performed by: THORACIC SURGERY (CARDIOTHORACIC VASCULAR SURGERY)

## 2023-04-12 PROCEDURE — 25010000002 FENTANYL CITRATE (PF) 100 MCG/2ML SOLUTION: Performed by: ANESTHESIOLOGY

## 2023-04-12 PROCEDURE — 25010000002 HEPARIN (PORCINE) PER 1000 UNITS: Performed by: ANESTHESIOLOGY

## 2023-04-12 PROCEDURE — 75625 CONTRAST EXAM ABDOMINL AORTA: CPT | Performed by: THORACIC SURGERY (CARDIOTHORACIC VASCULAR SURGERY)

## 2023-04-12 PROCEDURE — 75630 X-RAY AORTA LEG ARTERIES: CPT

## 2023-04-12 PROCEDURE — 0 LIDOCAINE 1 % SOLUTION: Performed by: THORACIC SURGERY (CARDIOTHORACIC VASCULAR SURGERY)

## 2023-04-12 PROCEDURE — 25010000002 HEPARIN (PORCINE) PER 1000 UNITS: Performed by: THORACIC SURGERY (CARDIOTHORACIC VASCULAR SURGERY)

## 2023-04-12 PROCEDURE — 37221 PR REVSC OPN/PRQ ILIAC ART W/STNT PLMT & ANGIOPLSTY: CPT | Performed by: THORACIC SURGERY (CARDIOTHORACIC VASCULAR SURGERY)

## 2023-04-12 DEVICE — STNT BIL VISIPRO .035 7F 27MM 80CM: Type: IMPLANTABLE DEVICE | Site: ARTERY ILIAC | Status: FUNCTIONAL

## 2023-04-12 RX ORDER — SODIUM CHLORIDE 450 MG/100ML
100 INJECTION, SOLUTION INTRAVENOUS CONTINUOUS
Status: DISCONTINUED | OUTPATIENT
Start: 2023-04-12 | End: 2023-04-13 | Stop reason: HOSPADM

## 2023-04-12 RX ORDER — HEPARIN SODIUM 1000 [USP'U]/ML
INJECTION, SOLUTION INTRAVENOUS; SUBCUTANEOUS AS NEEDED
Status: DISCONTINUED | OUTPATIENT
Start: 2023-04-12 | End: 2023-04-12 | Stop reason: SURG

## 2023-04-12 RX ORDER — SODIUM CHLORIDE, SODIUM LACTATE, POTASSIUM CHLORIDE, CALCIUM CHLORIDE 600; 310; 30; 20 MG/100ML; MG/100ML; MG/100ML; MG/100ML
INJECTION, SOLUTION INTRAVENOUS CONTINUOUS PRN
Status: DISCONTINUED | OUTPATIENT
Start: 2023-04-12 | End: 2023-04-12 | Stop reason: SURG

## 2023-04-12 RX ORDER — NICARDIPINE HYDROCHLORIDE 2.5 MG/ML
INJECTION INTRAVENOUS
Status: DISPENSED
Start: 2023-04-12 | End: 2023-04-13

## 2023-04-12 RX ORDER — SULFAMETHOXAZOLE AND TRIMETHOPRIM 800; 160 MG/1; MG/1
1 TABLET ORAL 2 TIMES DAILY
Status: DISCONTINUED | OUTPATIENT
Start: 2023-04-12 | End: 2023-04-13 | Stop reason: HOSPADM

## 2023-04-12 RX ORDER — FAMOTIDINE 10 MG/ML
20 INJECTION, SOLUTION INTRAVENOUS
Status: COMPLETED | OUTPATIENT
Start: 2023-04-12 | End: 2023-04-12

## 2023-04-12 RX ORDER — LIDOCAINE HYDROCHLORIDE 10 MG/ML
0.5 INJECTION, SOLUTION EPIDURAL; INFILTRATION; INTRACAUDAL; PERINEURAL ONCE AS NEEDED
Status: COMPLETED | OUTPATIENT
Start: 2023-04-12 | End: 2023-04-12

## 2023-04-12 RX ORDER — LISINOPRIL 20 MG/1
40 TABLET ORAL DAILY
Status: DISCONTINUED | OUTPATIENT
Start: 2023-04-12 | End: 2023-04-13 | Stop reason: HOSPADM

## 2023-04-12 RX ORDER — AMLODIPINE BESYLATE 10 MG/1
10 TABLET ORAL DAILY
Status: DISCONTINUED | OUTPATIENT
Start: 2023-04-12 | End: 2023-04-13 | Stop reason: HOSPADM

## 2023-04-12 RX ORDER — SODIUM CHLORIDE 9 MG/ML
INJECTION, SOLUTION INTRAVENOUS CONTINUOUS PRN
Status: DISCONTINUED | OUTPATIENT
Start: 2023-04-12 | End: 2023-04-12 | Stop reason: SURG

## 2023-04-12 RX ORDER — LIDOCAINE HYDROCHLORIDE 10 MG/ML
INJECTION, SOLUTION INFILTRATION; PERINEURAL AS NEEDED
Status: DISCONTINUED | OUTPATIENT
Start: 2023-04-12 | End: 2023-04-12 | Stop reason: HOSPADM

## 2023-04-12 RX ORDER — SODIUM CHLORIDE 0.9 % (FLUSH) 0.9 %
10 SYRINGE (ML) INJECTION AS NEEDED
Status: DISCONTINUED | OUTPATIENT
Start: 2023-04-12 | End: 2023-04-12 | Stop reason: HOSPADM

## 2023-04-12 RX ORDER — PROPOFOL 10 MG/ML
VIAL (ML) INTRAVENOUS AS NEEDED
Status: DISCONTINUED | OUTPATIENT
Start: 2023-04-12 | End: 2023-04-12 | Stop reason: SURG

## 2023-04-12 RX ORDER — ONDANSETRON 2 MG/ML
INJECTION INTRAMUSCULAR; INTRAVENOUS AS NEEDED
Status: DISCONTINUED | OUTPATIENT
Start: 2023-04-12 | End: 2023-04-12 | Stop reason: SURG

## 2023-04-12 RX ORDER — LIDOCAINE HYDROCHLORIDE 10 MG/ML
INJECTION, SOLUTION EPIDURAL; INFILTRATION; INTRACAUDAL; PERINEURAL AS NEEDED
Status: DISCONTINUED | OUTPATIENT
Start: 2023-04-12 | End: 2023-04-12 | Stop reason: SURG

## 2023-04-12 RX ORDER — ASPIRIN 325 MG
325 TABLET ORAL DAILY
Status: DISCONTINUED | OUTPATIENT
Start: 2023-04-12 | End: 2023-04-13 | Stop reason: HOSPADM

## 2023-04-12 RX ORDER — HYDROCODONE BITARTRATE AND ACETAMINOPHEN 7.5; 325 MG/1; MG/1
1 TABLET ORAL EVERY 4 HOURS PRN
Status: DISCONTINUED | OUTPATIENT
Start: 2023-04-12 | End: 2023-04-13 | Stop reason: HOSPADM

## 2023-04-12 RX ORDER — FENTANYL CITRATE 50 UG/ML
INJECTION, SOLUTION INTRAMUSCULAR; INTRAVENOUS AS NEEDED
Status: DISCONTINUED | OUTPATIENT
Start: 2023-04-12 | End: 2023-04-12 | Stop reason: SURG

## 2023-04-12 RX ORDER — SODIUM CHLORIDE, SODIUM LACTATE, POTASSIUM CHLORIDE, CALCIUM CHLORIDE 600; 310; 30; 20 MG/100ML; MG/100ML; MG/100ML; MG/100ML
1000 INJECTION, SOLUTION INTRAVENOUS CONTINUOUS
Status: DISCONTINUED | OUTPATIENT
Start: 2023-04-12 | End: 2023-04-13 | Stop reason: HOSPADM

## 2023-04-12 RX ORDER — TERAZOSIN 2 MG/1
2 CAPSULE ORAL NIGHTLY
Status: DISCONTINUED | OUTPATIENT
Start: 2023-04-12 | End: 2023-04-13 | Stop reason: HOSPADM

## 2023-04-12 RX ORDER — FAMOTIDINE 20 MG/1
20 TABLET, FILM COATED ORAL
Status: COMPLETED | OUTPATIENT
Start: 2023-04-12 | End: 2023-04-12

## 2023-04-12 RX ORDER — FENTANYL CITRATE 50 UG/ML
50 INJECTION, SOLUTION INTRAMUSCULAR; INTRAVENOUS
Status: DISCONTINUED | OUTPATIENT
Start: 2023-04-12 | End: 2023-04-12 | Stop reason: HOSPADM

## 2023-04-12 RX ORDER — EPHEDRINE SULFATE 50 MG/ML
INJECTION INTRAVENOUS AS NEEDED
Status: DISCONTINUED | OUTPATIENT
Start: 2023-04-12 | End: 2023-04-12 | Stop reason: SURG

## 2023-04-12 RX ADMIN — SODIUM CHLORIDE, POTASSIUM CHLORIDE, SODIUM LACTATE AND CALCIUM CHLORIDE: 600; 310; 30; 20 INJECTION, SOLUTION INTRAVENOUS at 11:07

## 2023-04-12 RX ADMIN — NICARDIPINE HYDROCHLORIDE 7.5 MG/HR: 25 INJECTION, SOLUTION INTRAVENOUS at 16:10

## 2023-04-12 RX ADMIN — NICARDIPINE HYDROCHLORIDE 5 MG/HR: 25 INJECTION, SOLUTION INTRAVENOUS at 12:27

## 2023-04-12 RX ADMIN — SODIUM CHLORIDE, POTASSIUM CHLORIDE, SODIUM LACTATE AND CALCIUM CHLORIDE: 600; 310; 30; 20 INJECTION, SOLUTION INTRAVENOUS at 11:06

## 2023-04-12 RX ADMIN — SODIUM CHLORIDE, POTASSIUM CHLORIDE, SODIUM LACTATE AND CALCIUM CHLORIDE 1000 ML: 600; 310; 30; 20 INJECTION, SOLUTION INTRAVENOUS at 10:08

## 2023-04-12 RX ADMIN — LIDOCAINE HYDROCHLORIDE 50 MG: 10 INJECTION, SOLUTION EPIDURAL; INFILTRATION; INTRACAUDAL; PERINEURAL at 11:11

## 2023-04-12 RX ADMIN — FAMOTIDINE 20 MG: 20 TABLET, FILM COATED ORAL at 10:08

## 2023-04-12 RX ADMIN — INSULIN HUMAN 5 UNITS: 100 INJECTION, SOLUTION PARENTERAL at 10:28

## 2023-04-12 RX ADMIN — ASPIRIN 325 MG: 325 TABLET ORAL at 15:30

## 2023-04-12 RX ADMIN — SODIUM CHLORIDE 100 ML/HR: 4.5 INJECTION, SOLUTION INTRAVENOUS at 15:27

## 2023-04-12 RX ADMIN — FENTANYL CITRATE 50 MCG: 50 INJECTION, SOLUTION INTRAMUSCULAR; INTRAVENOUS at 11:11

## 2023-04-12 RX ADMIN — EPHEDRINE SULFATE 5 MG: 50 INJECTION INTRAVENOUS at 11:18

## 2023-04-12 RX ADMIN — LIDOCAINE HYDROCHLORIDE 0.5 ML: 10 INJECTION, SOLUTION EPIDURAL; INFILTRATION; INTRACAUDAL; PERINEURAL at 09:53

## 2023-04-12 RX ADMIN — LISINOPRIL 40 MG: 20 TABLET ORAL at 15:30

## 2023-04-12 RX ADMIN — HEPARIN SODIUM 3000 UNITS: 1000 INJECTION, SOLUTION INTRAVENOUS; SUBCUTANEOUS at 11:30

## 2023-04-12 RX ADMIN — ONDANSETRON 4 MG: 2 INJECTION INTRAMUSCULAR; INTRAVENOUS at 11:37

## 2023-04-12 RX ADMIN — FENTANYL CITRATE 50 MCG: 50 INJECTION, SOLUTION INTRAMUSCULAR; INTRAVENOUS at 11:26

## 2023-04-12 RX ADMIN — PROPOFOL 200 MG: 10 INJECTION, EMULSION INTRAVENOUS at 11:11

## 2023-04-12 RX ADMIN — NICARDIPINE HYDROCHLORIDE 5 MG/HR: 25 INJECTION, SOLUTION INTRAVENOUS at 21:56

## 2023-04-12 RX ADMIN — SODIUM CHLORIDE: 9 INJECTION, SOLUTION INTRAVENOUS at 11:15

## 2023-04-12 RX ADMIN — AMLODIPINE BESYLATE 10 MG: 10 TABLET ORAL at 15:30

## 2023-04-12 NOTE — ANESTHESIA POSTPROCEDURE EVALUATION
Patient: Costa Robbins    Procedure Summary     Date: 04/12/23 Room / Location: [facility] OR 01 / [facility] HYBRID VICKY    Anesthesia Start: 1107 Anesthesia Stop: 1201    Procedure: AORTAGRAM WITH  RUNOFFS  STENT of the left illiac arteryy (Bilateral) Diagnosis:       PVD (peripheral vascular disease) with claudication      (PVD (peripheral vascular disease) with claudication (HCC) [I73.9])    Surgeons: Moses Escalante MD Provider: Suzette Castorena MD    Anesthesia Type: general ASA Status: 3          Anesthesia Type: general    Vitals  Vitals Value Taken Time   /64 04/12/23 1159   Temp     Pulse 76 04/12/23 1202   Resp     SpO2 97 % 04/12/23 1202   Vitals shown include unvalidated device data.        Post Anesthesia Care and Evaluation    Patient location during evaluation: PACU  Patient participation: complete - patient participated  Level of consciousness: awake and alert  Pain management: adequate    Airway patency: patent  Anesthetic complications: No anesthetic complications  PONV Status: none  Cardiovascular status: hemodynamically stable and acceptable  Respiratory status: nonlabored ventilation, acceptable and nasal cannula  Hydration status: acceptable

## 2023-04-12 NOTE — PLAN OF CARE
Goal Outcome Evaluation:  Patient is resting in bed, alert and orientedx4. No s/s of pain or headache noted during shift. No s/s of SOB. VS stable. Continue on Cardene drip @ 5 mg/hr. No s/s of bleeding or hematoma noted at this time. Call light in reach

## 2023-04-12 NOTE — PLAN OF CARE
Goal Outcome Evaluation:      Late entry: patient was admitted to room 476,alert and orientedx4. No s/s of pain or SOB noted at this time. VS stable, dressing to left groin, no hematoma or bleeding at this time. Patient on Cardene drip @7.5 mg/hr. Patient is on bedrest until 6. Call light in reach

## 2023-04-12 NOTE — H&P
Pre-Op H&P  Costa Robbins  [id number]  1955      Chief complaint: Right Leg Pain      Subjective:  Patient is a 67 y.o.male presents for scheduled surgery by Dr. Escalante. He anticipates a AORTAGRAM WITH OR WITHOUT RUNOFFS POSSIBLE STENT - Bilateral today.  The patient stated that he has had intermittent right leg pain for the past 4 to 5 years.  He explains the pain to get worse with movement, and better with rest.  He endorses having numbness down his right leg and tingling in his toes at times.  He denies taking any medications for his pain.  He denies using any assistive devices to help him move around.      Review of Systems:  Constitutional-- No fever, chills or sweats. No fatigue.  CV-- No chest pain, palpitation or syncope. +HTN  Resp-- No SOB, cough, hemoptysis  Skin--No rashes or lesions      Allergies:   Allergies   Allergen Reactions   • Atenolol Other (See Comments)     Bradycardia.   • Morphine Itching   • Penicillins Rash         Home Meds:  Medications Prior to Admission   Medication Sig Dispense Refill Last Dose   • amLODIPine (NORVASC) 10 MG tablet Take 1 tablet by mouth Daily. 30 tablet 5 4/12/2023 at 0650   • aspirin 325 MG tablet Take 1 tablet by mouth Daily.   4/12/2023 at 0650   • doxazosin (Cardura) 2 MG tablet Take 1 tablet by mouth Every Night. 30 tablet 5 4/11/2023   • Insulin Glargine (BASAGLAR KWIKPEN) 100 UNIT/ML injection pen Inject 40 Units under the skin into the appropriate area as directed Every Morning. 10 pen 2 4/11/2023   • lisinopril (PRINIVIL,ZESTRIL) 40 MG tablet Take 1 tablet by mouth once daily (Patient taking differently: Take 1 tablet by mouth Daily.) 90 tablet 0 4/11/2023   • sulfamethoxazole-trimethoprim (BACTRIM DS,SEPTRA DS) 800-160 MG per tablet Take 1 tablet by mouth 2 (Two) Times a Day.   4/12/2023 at 0650         PMH:   Past Medical History:   Diagnosis Date   • COVID     2021   • Diabetes mellitus    • Diverticulitis    • Hearing decreased    •  "Hypertension    • Melanoma     CHEST, BACK, NECK MULTI, HEAD   • PVD (peripheral vascular disease)    • Wears glasses      PSH:    Past Surgical History:   Procedure Laterality Date   • COLON RESECTION  2009    partial colectomy   • COLONOSCOPY     • ORIF ANKLE FRACTURE Left    • SKIN CANCER EXCISION      melanoma       Immunization History:  Influenza: No  Pneumococcal: No  Tetanus: Yes  Covid : No    Social History:   Tobacco:   Social History     Tobacco Use   Smoking Status Former   • Packs/day: 0.50   • Years: 43.00   • Pack years: 21.50   • Types: Cigarettes   • Quit date: 2023   • Years since quittin.1   Smokeless Tobacco Never   Tobacco Comments    Pt states that he has been able to stop smoking this month - 2023      Alcohol:     Social History     Substance and Sexual Activity   Alcohol Use Yes    Comment: 2-3 beers every day         Physical Exam:/79 (BP Location: Right arm, Patient Position: Lying)   Pulse 71   Temp 97 °F (36.1 °C) (Temporal)   Resp 16   Ht 177.8 cm (70\")   Wt 90 kg (198 lb 6.6 oz)   SpO2 98%   BMI 28.47 kg/m²       General Appearance:    Alert, cooperative, no distress, appears stated age   Head:    Normocephalic, without obvious abnormality, atraumatic   Lungs:     Clear to auscultation bilaterally, respirations unlabored    Heart:   Regular rate and rhythm, S1 and S2 normal    Abdomen:    Soft without tenderness   Extremities:   Extremities normal, atraumatic, no cyanosis or edema   Skin:   Skin color, texture, turgor normal, no rashes or lesions   Neurologic:   Grossly intact     Results Review:     LABS:  Lab Results   Component Value Date    WBC 6.18 2023    HGB 14.5 2023    HCT 43.1 2023    MCV 93.1 2023     2023    NEUTROABS 5.94 2022    GLUCOSE 680 (C) 2023    BUN 12 2023    CREATININE 0.90 2023     (L) 2023    K 4.8 2023    CL 94 (L) 2023    CO2 23.0 2023    " CALCIUM 10.4 04/07/2023       RADIOLOGY:  CT ANGIOGRAM ABDOMEN PELVIS     Date of Exam: 2/1/2023 10:07 AM EST     Indication: Renal artery stenosis.     Comparison: None available.     Technique: CTA of the abdomen and pelvis was performed after the uneventful intravenous administration of contrast. . Reconstructed coronal and sagittal images were also obtained. In addition, a 3-D volume rendered image was created for interpretation.   Automated exposure control and iterative reconstruction methods were used.      Findings:  There is mild scattered plaquing of the descending thoracic aorta as well as the abdominal aorta. The celiac and SMA are patent with minimal scattered plaquing. There is a single right and left renal artery. No significant stenosis is identified within   the left renal artery. There is mild plaquing of the right renal artery, but no hemodynamically significant stenosis identified. There is irregular plaquing of the infrarenal abdominal aorta, but not hemodynamically significant. There is moderate segment   occlusion of the right common iliac artery. There is high-grade stenosis at the origin of the right internal iliac artery and moderate multifocal disease of the right external iliac artery. The left common iliac artery demonstrates moderate stenosis   within its midportion. The left internal iliac artery is patent. There is hemodynamically significant focal stenosis within the distal left external iliac artery. The bilateral infrainguinal vasculature is patent.     Limited views of the lung bases demonstrate changes of emphysema. The liver is low in density suggestive of hepatic steatosis. The spleen is unremarkable. The pancreas is normal. Gallbladder is normal.  There is bilateral adrenal nodules. Parapelvic   cysts on the right. No evidence of hydronephrosis. The bladder is unremarkable. Postsurgical changes within the colon. There is diverticulosis without evidence of diverticulitis. The  appendix is normal. The small bowel is normal. No adenopathy is   identified. No aggressive appearing lytic or sclerotic bone lesions are identified.     IMPRESSION:  Impression:     1. Mild plaquing at the origin of the right renal artery, but no hemodynamically significant stenosis bilaterally.  2. Moderate segment occlusion of the right common iliac artery multifocal stenosis of the right external iliac artery  3. Moderate focal stenosis of the mid left common iliac artery and moderate to high-grade focal stenosis of the distal left external iliac artery.  I reviewed the patient's new clinical results.      Impression: PVD (peripheral vascular disease) with claudication      Plan: AORTAGRAM WITH OR WITHOUT RUNOFFS POSSIBLE STENT - Bilateral      Mushtaq Encarnacion, CHERISE   4/12/2023   10:31 EDT

## 2023-04-12 NOTE — ANESTHESIA PREPROCEDURE EVALUATION
" Anesthesia Evaluation     Patient summary reviewed and Nursing notes reviewed   no history of anesthetic complications:  NPO Solid Status: > 8 hours  NPO Liquid Status: > 2 hours           Airway   Mallampati: II  TM distance: >3 FB  Neck ROM: full  No difficulty expected  Dental - normal exam     Pulmonary - normal exam    breath sounds clear to auscultation  (+) a smoker (quit x 2-3 months, previous 40+ pack year hx) Former, sleep apnea (Possible),   Cardiovascular - normal exam    ECG reviewed  Rhythm: regular  Rate: normal    (+) hypertension, PVD,     ROS comment: 2022 Stress Echo:  ? Left ventricular ejection fraction is hyperdynamic (Calculated EF > 70%). .  ? Myocardial perfusion imaging indicates a normal myocardial perfusion study with no evidence of ischemia.  ? Impressions are consistent with a low risk study.  ? There is no prior study available for comparison.  ? Findings consistent with a normal ECG stress test.      Neuro/Psych- negative ROS  GI/Hepatic/Renal/Endo    (+)   renal disease (Renal artery stenosis), diabetes mellitus type 2 poorly controlled using insulin,     Musculoskeletal     Abdominal    Substance History      OB/GYN          Other                      Anesthesia Plan    ASA 3     general     (Refuses blood from vaccinated individuals.  Explained we cannot guarantee it is \"vaccine free\".  Pt refusing blood if we can't guarantee.)  intravenous induction     Anesthetic plan, risks, benefits, and alternatives have been provided, discussed and informed consent has been obtained with: patient.    Plan discussed with CRNA.        CODE STATUS:       "

## 2023-04-12 NOTE — OP NOTE
Operative Report  Costa Robbins  [id number]  1955    Preop Diagnosis: Atherosclerotic peripheral vascular disease    Results of STS risk score discussed with patient and family    Postop Diagnosis same        Procedure: #1 aortogram with runoff to the ankles #2 left external iliac stent with a Medtronic Visi-Pro 7 x 27 stent #3 completion left iliac angiogram        Surgeons: Moses Escalante        Assistant: Juliocesar Villalta  Assistant: Juliocesar Alford PA was responsible for performing the following activities: Retraction, Suction, Closing and Placing Dressing and their skilled assistance was necessary for the success of this case.       Operative Findings:         Description: Patient was brought to the operating room placed in supine position on the fluoroscopy table monitored.  The patient's groins were prepped and draped in usual sterile fashion.  Left groin was stuck using a modified Seldinger technique and a 4 Martiniquais sheath was inserted.  A Magic torque wire navigated the iliac and a pigtail catheter is placed at L1 and pullback for L3 for an arteriogram.     Operative findings: Patient had bilateral single renal arteries.  The aorta revealed no occlusive disease.  The right common iliac was completely occluded with reconstitution of the right external iliac the left external iliac had approximately a 60% stenosis.  There was what appeared to be a 80% stenosis at the takeoff of the left SFA.  Right common femoral and profunda appeared to be normal.  The right SFA had some irregularities in the midportion.  The right popliteal and trifurcation on the right appeared to be normal.  The left SFA distally it was normal.  The left popliteal and left trifurcation was normal as well.           At this time we then gave the patient 3000 units of heparin.  The 4 Martiniquais sheath was exchanged for a 6 Martiniquais sheath.  The patient was placed in a RICHARD 30 degree angle and the left external iliac was further delineated.  A 7  x 27 VISI pro Medtronic stent was then positioned and insufflated to 10 adam of pressure.  Completion angiogram revealed that the 60% stenosis that left external iliac had been reduced to 0% stenosis.  The sheath was removed without difficulty and pressure was held.  Patient tolerated procedure without difficulty.  Radiation dose was 198 mGy, contrast 80 cc of Visipaque 320, and fluoroscopy time was 2 minutes 42 seconds.  EBL: Less than 25 cc      Please note that portions of this note were completed with a voice recognition program. Efforts were made to edit the dictations, but words may be mistranscribed      Moses Escalante MD  04/12/23 11:41 EDT

## 2023-04-12 NOTE — ANESTHESIA PROCEDURE NOTES
Airway  Urgency: elective    Date/Time: 4/12/2023 11:12 AM  Airway not difficult    General Information and Staff    Patient location during procedure: OR  Anesthesiologist: Suzette Castorena MD    Indications and Patient Condition  Indications for airway management: airway protection    Preoxygenated: yes  Mask difficulty assessment: 1 - vent by mask    Final Airway Details  Final airway type: supraglottic airway      Successful airway: I-gel  Size 4     Number of attempts at approach: 1  Assessment: lips, teeth, and gum same as pre-op and atraumatic intubation

## 2023-04-13 VITALS
RESPIRATION RATE: 16 BRPM | BODY MASS INDEX: 28.41 KG/M2 | TEMPERATURE: 98.3 F | HEART RATE: 73 BPM | OXYGEN SATURATION: 98 % | WEIGHT: 198.41 LBS | SYSTOLIC BLOOD PRESSURE: 141 MMHG | DIASTOLIC BLOOD PRESSURE: 78 MMHG | HEIGHT: 70 IN

## 2023-04-13 PROCEDURE — G0378 HOSPITAL OBSERVATION PER HR: HCPCS

## 2023-04-13 PROCEDURE — 99213 OFFICE O/P EST LOW 20 MIN: CPT | Performed by: THORACIC SURGERY (CARDIOTHORACIC VASCULAR SURGERY)

## 2023-04-13 RX ORDER — CLOPIDOGREL BISULFATE 75 MG/1
75 TABLET ORAL DAILY
Qty: 30 TABLET | Refills: 6 | Status: SHIPPED | OUTPATIENT
Start: 2023-04-13

## 2023-04-13 RX ORDER — CLOPIDOGREL BISULFATE 75 MG/1
75 TABLET ORAL DAILY
Status: DISCONTINUED | OUTPATIENT
Start: 2023-04-13 | End: 2023-04-13 | Stop reason: HOSPADM

## 2023-04-13 RX ADMIN — LISINOPRIL 40 MG: 20 TABLET ORAL at 08:56

## 2023-04-13 RX ADMIN — ASPIRIN 325 MG: 325 TABLET ORAL at 08:57

## 2023-04-13 RX ADMIN — CLOPIDOGREL BISULFATE 75 MG: 75 TABLET ORAL at 08:57

## 2023-04-13 RX ADMIN — SULFAMETHOXAZOLE AND TRIMETHOPRIM 1 TABLET: 800; 160 TABLET ORAL at 08:57

## 2023-04-13 RX ADMIN — AMLODIPINE BESYLATE 10 MG: 10 TABLET ORAL at 08:57

## 2023-04-13 NOTE — PROGRESS NOTES
"Costa Robbins  [id number]  1955     LOS: 0 days   Patient Care Team:  Jonathan Conner MD as PCP - General (Family Medicine)  Moses Frankel MD as Consulting Physician (Cardiology)    Chief Complaint: Peripheral vascular disease      Subjective: No complaints wants to go home    Objective:     Vital Sign Min/Max for last 24 hours  Temp  Min: 97 °F (36.1 °C)  Max: 98.8 °F (37.1 °C)   BP  Min: 92/66  Max: 154/79   Pulse  Min: 64  Max: 75   Resp  Min: 16  Max: 19   SpO2  Min: 95 %  Max: 98 %   No data recorded   Weight  Min: 90 kg (198 lb 6.6 oz)  Max: 90 kg (198 lb 6.6 oz)     Flowsheet Rows    Flowsheet Row First Filed Value   Admission Height 177.8 cm (70\") Documented at 04/12/2023 0952   Admission Weight 90 kg (198 lb 6.6 oz) Documented at 04/12/2023 0952          Physical Exam:    Wound: Satisfactory, pulses intact    Pulses:     Mediastinal and Chest Tube Drainage:       Results Review:   Results from last 7 days   Lab Units 04/07/23  1453   WBC 10*3/mm3 6.18   HEMOGLOBIN g/dL 14.5   HEMATOCRIT % 43.1   PLATELETS 10*3/mm3 243     Results from last 7 days   Lab Units 04/07/23  1453   SODIUM mmol/L 131*   POTASSIUM mmol/L 4.8   CHLORIDE mmol/L 94*   CO2 mmol/L 23.0   BUN mg/dL 12   CREATININE mg/dL 0.90   GLUCOSE mg/dL 680*   CALCIUM mg/dL 10.4             Assessment      PVD (peripheral vascular disease) with claudication      Discharge patient.        Moses Escalante MD  04/13/23  06:53 EDT      Please note that portions of this note were completed with a voice recognition program. Efforts were made to edit the dictations, but words may be mistranscribed  "

## 2023-04-16 LAB
BH BB BLOOD EXPIRATION DATE: NORMAL
BH BB BLOOD TYPE BARCODE: 5100
BH BB DISPENSE STATUS: NORMAL
BH BB PRODUCT CODE: NORMAL
BH BB UNIT NUMBER: NORMAL
CROSSMATCH INTERPRETATION: NORMAL
UNIT  ABO: NORMAL
UNIT  RH: NORMAL

## 2023-05-03 DIAGNOSIS — I10 PRIMARY HYPERTENSION: ICD-10-CM

## 2023-05-03 RX ORDER — AMLODIPINE BESYLATE 10 MG/1
TABLET ORAL
Qty: 30 TABLET | Refills: 0 | OUTPATIENT
Start: 2023-05-03

## 2023-05-04 ENCOUNTER — PREP FOR SURGERY (OUTPATIENT)
Dept: OTHER | Facility: HOSPITAL | Age: 68
End: 2023-05-04
Payer: MEDICARE

## 2023-05-04 DIAGNOSIS — I73.9 PVD (PERIPHERAL VASCULAR DISEASE) WITH CLAUDICATION: Primary | ICD-10-CM

## 2023-05-04 RX ORDER — CHLORHEXIDINE GLUCONATE 500 MG/1
1 CLOTH TOPICAL EVERY 12 HOURS PRN
OUTPATIENT
Start: 2023-05-15

## 2023-05-04 RX ORDER — CEFAZOLIN SODIUM 2 G/100ML
2 INJECTION, SOLUTION INTRAVENOUS ONCE
OUTPATIENT
Start: 2023-05-16 | End: 2023-05-16

## 2023-05-15 ENCOUNTER — PRE-ADMISSION TESTING (OUTPATIENT)
Dept: PREADMISSION TESTING | Facility: HOSPITAL | Age: 68
End: 2023-05-15
Payer: MEDICARE

## 2023-05-15 ENCOUNTER — TELEPHONE (OUTPATIENT)
Dept: CARDIAC SURGERY | Facility: CLINIC | Age: 68
End: 2023-05-15
Payer: MEDICARE

## 2023-05-15 ENCOUNTER — HOSPITAL ENCOUNTER (OUTPATIENT)
Dept: GENERAL RADIOLOGY | Facility: HOSPITAL | Age: 68
Discharge: HOME | End: 2023-05-15
Payer: MEDICARE

## 2023-05-15 VITALS — OXYGEN SATURATION: 99 % | WEIGHT: 194.78 LBS | BODY MASS INDEX: 27.27 KG/M2 | HEIGHT: 71 IN

## 2023-05-15 DIAGNOSIS — I73.9 PVD (PERIPHERAL VASCULAR DISEASE) WITH CLAUDICATION: ICD-10-CM

## 2023-05-15 LAB
ABO GROUP BLD: NORMAL
ANION GAP SERPL CALCULATED.3IONS-SCNC: 14 MMOL/L (ref 5–15)
APTT PPP: 25.2 SECONDS (ref 22–39)
BLD GP AB SCN SERPL QL: NEGATIVE
BUN SERPL-MCNC: 14 MG/DL (ref 8–23)
BUN/CREAT SERPL: 15.9 (ref 7–25)
CALCIUM SPEC-SCNC: 10.4 MG/DL (ref 8.6–10.5)
CHLORIDE SERPL-SCNC: 94 MMOL/L (ref 98–107)
CO2 SERPL-SCNC: 23 MMOL/L (ref 22–29)
CREAT SERPL-MCNC: 0.88 MG/DL (ref 0.76–1.27)
DEPRECATED RDW RBC AUTO: 44.4 FL (ref 37–54)
EGFRCR SERPLBLD CKD-EPI 2021: 95.4 ML/MIN/1.73
ERYTHROCYTE [DISTWIDTH] IN BLOOD BY AUTOMATED COUNT: 12.5 % (ref 12.3–15.4)
GLUCOSE SERPL-MCNC: 561 MG/DL (ref 65–99)
HBA1C MFR BLD: 12.2 % (ref 4.8–5.6)
HCT VFR BLD AUTO: 40.3 % (ref 37.5–51)
HGB BLD-MCNC: 13.3 G/DL (ref 13–17.7)
INR PPP: 0.86 (ref 0.89–1.12)
MCH RBC QN AUTO: 31.8 PG (ref 26.6–33)
MCHC RBC AUTO-ENTMCNC: 33 G/DL (ref 31.5–35.7)
MCV RBC AUTO: 96.4 FL (ref 79–97)
PLATELET # BLD AUTO: 242 10*3/MM3 (ref 140–450)
PMV BLD AUTO: 11 FL (ref 6–12)
POTASSIUM SERPL-SCNC: 5.6 MMOL/L (ref 3.5–5.2)
PROTHROMBIN TIME: 11.9 SECONDS (ref 12.2–14.5)
RBC # BLD AUTO: 4.18 10*6/MM3 (ref 4.14–5.8)
RH BLD: POSITIVE
SODIUM SERPL-SCNC: 131 MMOL/L (ref 136–145)
T&S EXPIRATION DATE: NORMAL
WBC NRBC COR # BLD: 6.13 10*3/MM3 (ref 3.4–10.8)

## 2023-05-15 PROCEDURE — 85610 PROTHROMBIN TIME: CPT

## 2023-05-15 PROCEDURE — 83036 HEMOGLOBIN GLYCOSYLATED A1C: CPT

## 2023-05-15 PROCEDURE — 86900 BLOOD TYPING SEROLOGIC ABO: CPT

## 2023-05-15 PROCEDURE — 85730 THROMBOPLASTIN TIME PARTIAL: CPT

## 2023-05-15 PROCEDURE — 71046 X-RAY EXAM CHEST 2 VIEWS: CPT

## 2023-05-15 PROCEDURE — 36415 COLL VENOUS BLD VENIPUNCTURE: CPT

## 2023-05-15 PROCEDURE — 86901 BLOOD TYPING SEROLOGIC RH(D): CPT

## 2023-05-15 PROCEDURE — 80048 BASIC METABOLIC PNL TOTAL CA: CPT

## 2023-05-15 PROCEDURE — 86850 RBC ANTIBODY SCREEN: CPT

## 2023-05-15 PROCEDURE — 85027 COMPLETE CBC AUTOMATED: CPT

## 2023-05-15 RX ORDER — CHLORHEXIDINE GLUCONATE 500 MG/1
1 CLOTH TOPICAL EVERY 12 HOURS PRN
Status: ACTIVE | OUTPATIENT
Start: 2023-05-15

## 2023-05-15 NOTE — PAT
An arrival time for procedure was not provided during PAT visit. If patient had any questions or concerns about their arrival time, they were instructed to contact their surgeon/physician.  Additionally, if the patient referred to an arrival time that was acquired from their my chart account, patient was encouraged to verify that time with their surgeon/physician. Arrival times are NOT provided in Pre Admission Testing Department.    Patient viewed general PAT education video as instructed in their preoperative information received from their surgeon.  Patient stated the general PAT education video was viewed in its entirety and survey completed.  Copies of PAT general education handouts (Incentive Spirometry, Meds to Beds Program, Patient Belongings, Pre-op skin preparation instructions, Blood Glucose testing, Visitor policy, Surgery FAQ, Code H) distributed to patient if not printed. Education related to the PAT pass and skin preparation for surgery (if applicable) completed in PAT as a reinforcement to PAT education video. Patient instructed to return PAT pass provided today as well as completed skin preparation sheet (if applicable) on the day of procedure.     Additionally if patient had not viewed video yet but intended to view it at home or in our waiting area, then referred them to the handout with QR code/link provided during PAT visit.  Instructed patient to complete survey after viewing the video in its entirety.  Encouraged patient/family to read PAT general education handouts thoroughly and notify PAT staff with any questions or concerns. Patient verbalized understanding of all information and priority content.    Patient denies any current skin issues.     Patient to apply Chlorhexadine wipes  to surgical area (as instructed) the night before procedure and the AM of procedure. Wipes provided.    Bactroban to be applied to each nostril during PAT visit if surgery the following day.  If surgery NOT the  following day, then Bactroban supplied to patient with instructions both written and verbally to insert into each nares the night before surgery.    Per Anesthesia Request, patient instructed not to take their ACE/ARB medications on the AM of surgery.    Blood bank bracelet applied to patient during Pre Admission Testing visit.  Patient instructed not to remove from arm until after procedure and they are discharged from the hospital.  Explained to patient that they may shower and get the bracelet wet, but not to immerse under water for longer periods (bathing, swimming, hand dishwashing, etc).  Patient verbalized understanding.    Patient directed to Radiology Department for CXR after Pre Admission Testing Appointment.     EKG FROM 4/7/23 ON CHART. PT DENIES NEW CP/SOA.

## 2023-05-15 NOTE — TELEPHONE ENCOUNTER
I spoke with Mr. Robbins's spouse, Prisca and informed her that his surgery tomorrow will need to be rescheduled due to elevated blood glucose. Patient was on his riding  at the time of my call. She verbalized understanding and will relay the message to patient.

## 2023-05-24 ENCOUNTER — ANESTHESIA EVENT (OUTPATIENT)
Dept: PERIOP | Facility: HOSPITAL | Age: 68
DRG: 253 | End: 2023-05-24
Payer: MEDICARE

## 2023-05-24 RX ORDER — FAMOTIDINE 10 MG/ML
20 INJECTION, SOLUTION INTRAVENOUS ONCE
Status: CANCELLED | OUTPATIENT
Start: 2023-05-24 | End: 2023-05-24

## 2023-05-25 ENCOUNTER — ANESTHESIA (OUTPATIENT)
Dept: PERIOP | Facility: HOSPITAL | Age: 68
DRG: 253 | End: 2023-05-25
Payer: MEDICARE

## 2023-05-25 ENCOUNTER — HOSPITAL ENCOUNTER (INPATIENT)
Facility: HOSPITAL | Age: 68
LOS: 3 days | Discharge: HOME | DRG: 253 | End: 2023-05-28
Attending: THORACIC SURGERY (CARDIOTHORACIC VASCULAR SURGERY) | Admitting: THORACIC SURGERY (CARDIOTHORACIC VASCULAR SURGERY)
Payer: MEDICARE

## 2023-05-25 DIAGNOSIS — I73.9 PVD (PERIPHERAL VASCULAR DISEASE) WITH CLAUDICATION: ICD-10-CM

## 2023-05-25 PROBLEM — I10 ESSENTIAL HYPERTENSION: Chronic | Status: ACTIVE | Noted: 2020-05-21

## 2023-05-25 PROBLEM — Z78.9 ALCOHOL USE: Status: ACTIVE | Noted: 2023-05-25

## 2023-05-25 PROBLEM — Z78.9 ALCOHOL USE: Chronic | Status: ACTIVE | Noted: 2023-05-25

## 2023-05-25 PROBLEM — E11.9 T2DM (TYPE 2 DIABETES MELLITUS): Chronic | Status: ACTIVE | Noted: 2020-05-21

## 2023-05-25 PROBLEM — Z87.891 FORMER SMOKER: Status: ACTIVE | Noted: 2023-05-25

## 2023-05-25 LAB
ABO GROUP BLD: NORMAL
ANION GAP SERPL CALCULATED.3IONS-SCNC: 11 MMOL/L (ref 5–15)
BLD GP AB SCN SERPL QL: NEGATIVE
BUN SERPL-MCNC: 13 MG/DL (ref 8–23)
BUN/CREAT SERPL: 18.1 (ref 7–25)
CALCIUM SPEC-SCNC: 9.9 MG/DL (ref 8.6–10.5)
CHLORIDE SERPL-SCNC: 105 MMOL/L (ref 98–107)
CO2 SERPL-SCNC: 22 MMOL/L (ref 22–29)
CREAT SERPL-MCNC: 0.72 MG/DL (ref 0.76–1.27)
DEPRECATED RDW RBC AUTO: 43.9 FL (ref 37–54)
EGFRCR SERPLBLD CKD-EPI 2021: 101.4 ML/MIN/1.73
ERYTHROCYTE [DISTWIDTH] IN BLOOD BY AUTOMATED COUNT: 12.3 % (ref 12.3–15.4)
GLUCOSE BLDC GLUCOMTR-MCNC: 216 MG/DL (ref 70–130)
GLUCOSE BLDC GLUCOMTR-MCNC: 220 MG/DL (ref 70–130)
GLUCOSE BLDC GLUCOMTR-MCNC: 285 MG/DL (ref 70–130)
GLUCOSE SERPL-MCNC: 205 MG/DL (ref 65–99)
HCT VFR BLD AUTO: 34.3 % (ref 37.5–51)
HGB BLD-MCNC: 11.1 G/DL (ref 13–17.7)
MCH RBC QN AUTO: 31.7 PG (ref 26.6–33)
MCHC RBC AUTO-ENTMCNC: 32.4 G/DL (ref 31.5–35.7)
MCV RBC AUTO: 98 FL (ref 79–97)
PLATELET # BLD AUTO: 203 10*3/MM3 (ref 140–450)
PMV BLD AUTO: 11.2 FL (ref 6–12)
POTASSIUM SERPL-SCNC: 3.9 MMOL/L (ref 3.5–5.2)
RBC # BLD AUTO: 3.5 10*6/MM3 (ref 4.14–5.8)
RH BLD: POSITIVE
SODIUM SERPL-SCNC: 138 MMOL/L (ref 136–145)
T&S EXPIRATION DATE: NORMAL
WBC NRBC COR # BLD: 12.98 10*3/MM3 (ref 3.4–10.8)

## 2023-05-25 PROCEDURE — 25010000002 HYDROMORPHONE PER 4 MG: Performed by: THORACIC SURGERY (CARDIOTHORACIC VASCULAR SURGERY)

## 2023-05-25 PROCEDURE — 25010000002 PROPOFOL 10 MG/ML EMULSION

## 2023-05-25 PROCEDURE — P9041 ALBUMIN (HUMAN),5%, 50ML: HCPCS | Performed by: ANESTHESIOLOGY

## 2023-05-25 PROCEDURE — 80048 BASIC METABOLIC PNL TOTAL CA: CPT | Performed by: ANESTHESIOLOGY

## 2023-05-25 PROCEDURE — 25010000002 FENTANYL CITRATE (PF) 50 MCG/ML SOLUTION

## 2023-05-25 PROCEDURE — 25010000002 HEPARIN (PORCINE) PER 1000 UNITS

## 2023-05-25 PROCEDURE — 63710000001 INSULIN LISPRO (HUMAN) PER 5 UNITS: Performed by: NURSE PRACTITIONER

## 2023-05-25 PROCEDURE — 25010000002 CEFAZOLIN IN DEXTROSE 2-4 GM/100ML-% SOLUTION: Performed by: PHYSICIAN ASSISTANT

## 2023-05-25 PROCEDURE — 25010000002 SUGAMMADEX 200 MG/2ML SOLUTION: Performed by: ANESTHESIOLOGY

## 2023-05-25 PROCEDURE — 25010000002 HYDROMORPHONE 1 MG/ML SOLUTION

## 2023-05-25 PROCEDURE — 25010000002 ALBUMIN HUMAN 5% PER 50 ML: Performed by: ANESTHESIOLOGY

## 2023-05-25 PROCEDURE — 04CL0ZZ EXTIRPATION OF MATTER FROM LEFT FEMORAL ARTERY, OPEN APPROACH: ICD-10-PCS | Performed by: THORACIC SURGERY (CARDIOTHORACIC VASCULAR SURGERY)

## 2023-05-25 PROCEDURE — 82948 REAGENT STRIP/BLOOD GLUCOSE: CPT

## 2023-05-25 PROCEDURE — 041L0JJ BYPASS LEFT FEMORAL ARTERY TO LEFT FEMORAL ARTERY WITH SYNTHETIC SUBSTITUTE, OPEN APPROACH: ICD-10-PCS | Performed by: THORACIC SURGERY (CARDIOTHORACIC VASCULAR SURGERY)

## 2023-05-25 PROCEDURE — 99232 SBSQ HOSP IP/OBS MODERATE 35: CPT | Performed by: NURSE PRACTITIONER

## 2023-05-25 PROCEDURE — 25010000002 ONDANSETRON PER 1 MG: Performed by: ANESTHESIOLOGY

## 2023-05-25 PROCEDURE — 25010000002 HEPARIN (PORCINE) PER 1000 UNITS: Performed by: THORACIC SURGERY (CARDIOTHORACIC VASCULAR SURGERY)

## 2023-05-25 PROCEDURE — 85027 COMPLETE CBC AUTOMATED: CPT | Performed by: THORACIC SURGERY (CARDIOTHORACIC VASCULAR SURGERY)

## 2023-05-25 PROCEDURE — 86900 BLOOD TYPING SEROLOGIC ABO: CPT | Performed by: PHYSICIAN ASSISTANT

## 2023-05-25 PROCEDURE — 88304 TISSUE EXAM BY PATHOLOGIST: CPT | Performed by: THORACIC SURGERY (CARDIOTHORACIC VASCULAR SURGERY)

## 2023-05-25 PROCEDURE — C1768 GRAFT, VASCULAR: HCPCS | Performed by: THORACIC SURGERY (CARDIOTHORACIC VASCULAR SURGERY)

## 2023-05-25 PROCEDURE — 35661 BPG FEMORAL-FEMORAL: CPT | Performed by: THORACIC SURGERY (CARDIOTHORACIC VASCULAR SURGERY)

## 2023-05-25 PROCEDURE — 86923 COMPATIBILITY TEST ELECTRIC: CPT

## 2023-05-25 PROCEDURE — 25010000002 FENTANYL CITRATE (PF) 100 MCG/2ML SOLUTION

## 2023-05-25 PROCEDURE — 25010000002 PROTAMINE SULFATE PER 10 MG

## 2023-05-25 PROCEDURE — 86901 BLOOD TYPING SEROLOGIC RH(D): CPT | Performed by: PHYSICIAN ASSISTANT

## 2023-05-25 PROCEDURE — 35661 BPG FEMORAL-FEMORAL: CPT | Performed by: PHYSICIAN ASSISTANT

## 2023-05-25 PROCEDURE — 25010000002 DEXAMETHASONE PER 1 MG

## 2023-05-25 PROCEDURE — 86850 RBC ANTIBODY SCREEN: CPT | Performed by: PHYSICIAN ASSISTANT

## 2023-05-25 DEVICE — SEALANT HEMO TACHOSIL FIBRIN PTCH 9.5X4.8CM: Type: IMPLANTABLE DEVICE | Site: GROIN | Status: FUNCTIONAL

## 2023-05-25 DEVICE — GRFT VASC PROPAT THNSTRCH REMVRNG8X30X40: Type: IMPLANTABLE DEVICE | Site: GROIN | Status: FUNCTIONAL

## 2023-05-25 RX ORDER — CLOPIDOGREL BISULFATE 75 MG/1
75 TABLET ORAL DAILY
Status: DISCONTINUED | OUTPATIENT
Start: 2023-05-26 | End: 2023-05-28 | Stop reason: HOSPADM

## 2023-05-25 RX ORDER — SODIUM CHLORIDE 450 MG/100ML
50 INJECTION, SOLUTION INTRAVENOUS CONTINUOUS
Status: DISCONTINUED | OUTPATIENT
Start: 2023-05-25 | End: 2023-05-26

## 2023-05-25 RX ORDER — LABETALOL HYDROCHLORIDE 5 MG/ML
INJECTION, SOLUTION INTRAVENOUS AS NEEDED
Status: DISCONTINUED | OUTPATIENT
Start: 2023-05-25 | End: 2023-05-25 | Stop reason: SURG

## 2023-05-25 RX ORDER — LIDOCAINE HYDROCHLORIDE 10 MG/ML
INJECTION, SOLUTION EPIDURAL; INFILTRATION; INTRACAUDAL; PERINEURAL AS NEEDED
Status: DISCONTINUED | OUTPATIENT
Start: 2023-05-25 | End: 2023-05-25 | Stop reason: SURG

## 2023-05-25 RX ORDER — HYDROMORPHONE HCL 110MG/55ML
0.25 PATIENT CONTROLLED ANALGESIA SYRINGE INTRAVENOUS
Status: DISCONTINUED | OUTPATIENT
Start: 2023-05-25 | End: 2023-05-28 | Stop reason: HOSPADM

## 2023-05-25 RX ORDER — IBUPROFEN 600 MG/1
1 TABLET ORAL
Status: DISCONTINUED | OUTPATIENT
Start: 2023-05-25 | End: 2023-05-28 | Stop reason: HOSPADM

## 2023-05-25 RX ORDER — BISACODYL 10 MG
10 SUPPOSITORY, RECTAL RECTAL DAILY PRN
Status: DISCONTINUED | OUTPATIENT
Start: 2023-05-25 | End: 2023-05-28 | Stop reason: HOSPADM

## 2023-05-25 RX ORDER — EPHEDRINE SULFATE 50 MG/ML
INJECTION INTRAVENOUS AS NEEDED
Status: DISCONTINUED | OUTPATIENT
Start: 2023-05-25 | End: 2023-05-25 | Stop reason: SURG

## 2023-05-25 RX ORDER — FENTANYL CITRATE 50 UG/ML
50 INJECTION, SOLUTION INTRAMUSCULAR; INTRAVENOUS
Status: DISCONTINUED | OUTPATIENT
Start: 2023-05-25 | End: 2023-05-25 | Stop reason: HOSPADM

## 2023-05-25 RX ORDER — ROCURONIUM BROMIDE 10 MG/ML
INJECTION, SOLUTION INTRAVENOUS AS NEEDED
Status: DISCONTINUED | OUTPATIENT
Start: 2023-05-25 | End: 2023-05-25 | Stop reason: SURG

## 2023-05-25 RX ORDER — NICARDIPINE HYDROCHLORIDE 2.5 MG/ML
INJECTION INTRAVENOUS AS NEEDED
Status: DISCONTINUED | OUTPATIENT
Start: 2023-05-25 | End: 2023-05-25

## 2023-05-25 RX ORDER — NICARDIPINE HYDROCHLORIDE 2.5 MG/ML
INJECTION INTRAVENOUS CONTINUOUS PRN
Status: DISCONTINUED | OUTPATIENT
Start: 2023-05-25 | End: 2023-05-25 | Stop reason: SURG

## 2023-05-25 RX ORDER — TERAZOSIN 2 MG/1
2 CAPSULE ORAL NIGHTLY
Status: DISCONTINUED | OUTPATIENT
Start: 2023-05-25 | End: 2023-05-28 | Stop reason: HOSPADM

## 2023-05-25 RX ORDER — AMOXICILLIN 250 MG
2 CAPSULE ORAL 2 TIMES DAILY
Status: DISCONTINUED | OUTPATIENT
Start: 2023-05-25 | End: 2023-05-28 | Stop reason: HOSPADM

## 2023-05-25 RX ORDER — FENTANYL CITRATE 50 UG/ML
INJECTION, SOLUTION INTRAMUSCULAR; INTRAVENOUS AS NEEDED
Status: DISCONTINUED | OUTPATIENT
Start: 2023-05-25 | End: 2023-05-25 | Stop reason: SURG

## 2023-05-25 RX ORDER — MIDAZOLAM HYDROCHLORIDE 1 MG/ML
0.5 INJECTION INTRAMUSCULAR; INTRAVENOUS
Status: DISCONTINUED | OUTPATIENT
Start: 2023-05-25 | End: 2023-05-25 | Stop reason: HOSPADM

## 2023-05-25 RX ORDER — ONDANSETRON 4 MG/1
4 TABLET, FILM COATED ORAL EVERY 6 HOURS PRN
Status: DISCONTINUED | OUTPATIENT
Start: 2023-05-25 | End: 2023-05-28 | Stop reason: HOSPADM

## 2023-05-25 RX ORDER — CEFAZOLIN SODIUM 2 G/100ML
2 INJECTION, SOLUTION INTRAVENOUS ONCE
Status: COMPLETED | OUTPATIENT
Start: 2023-05-25 | End: 2023-05-25

## 2023-05-25 RX ORDER — HYDROCODONE BITARTRATE AND ACETAMINOPHEN 7.5; 325 MG/1; MG/1
1 TABLET ORAL EVERY 4 HOURS PRN
Status: DISCONTINUED | OUTPATIENT
Start: 2023-05-25 | End: 2023-05-26

## 2023-05-25 RX ORDER — ALBUMIN, HUMAN INJ 5% 5 %
SOLUTION INTRAVENOUS CONTINUOUS PRN
Status: DISCONTINUED | OUTPATIENT
Start: 2023-05-25 | End: 2023-05-25 | Stop reason: SURG

## 2023-05-25 RX ORDER — SODIUM CHLORIDE 0.9 % (FLUSH) 0.9 %
10 SYRINGE (ML) INJECTION EVERY 12 HOURS SCHEDULED
Status: DISCONTINUED | OUTPATIENT
Start: 2023-05-25 | End: 2023-05-25 | Stop reason: HOSPADM

## 2023-05-25 RX ORDER — ONDANSETRON 2 MG/ML
INJECTION INTRAMUSCULAR; INTRAVENOUS AS NEEDED
Status: DISCONTINUED | OUTPATIENT
Start: 2023-05-25 | End: 2023-05-25 | Stop reason: SURG

## 2023-05-25 RX ORDER — SODIUM CHLORIDE 0.9 % (FLUSH) 0.9 %
10 SYRINGE (ML) INJECTION AS NEEDED
Status: DISCONTINUED | OUTPATIENT
Start: 2023-05-25 | End: 2023-05-25 | Stop reason: HOSPADM

## 2023-05-25 RX ORDER — SODIUM CHLORIDE 9 MG/ML
INJECTION, SOLUTION INTRAVENOUS AS NEEDED
Status: DISCONTINUED | OUTPATIENT
Start: 2023-05-25 | End: 2023-05-25 | Stop reason: HOSPADM

## 2023-05-25 RX ORDER — MULTIPLE VITAMINS W/ MINERALS TAB 9MG-400MCG
1 TAB ORAL DAILY
Status: DISCONTINUED | OUTPATIENT
Start: 2023-05-26 | End: 2023-05-28 | Stop reason: HOSPADM

## 2023-05-25 RX ORDER — DEXAMETHASONE SODIUM PHOSPHATE 4 MG/ML
INJECTION, SOLUTION INTRA-ARTICULAR; INTRALESIONAL; INTRAMUSCULAR; INTRAVENOUS; SOFT TISSUE AS NEEDED
Status: DISCONTINUED | OUTPATIENT
Start: 2023-05-25 | End: 2023-05-25 | Stop reason: SURG

## 2023-05-25 RX ORDER — HEPARIN SODIUM 1000 [USP'U]/ML
INJECTION, SOLUTION INTRAVENOUS; SUBCUTANEOUS AS NEEDED
Status: DISCONTINUED | OUTPATIENT
Start: 2023-05-25 | End: 2023-05-25 | Stop reason: SURG

## 2023-05-25 RX ORDER — PROPOFOL 10 MG/ML
VIAL (ML) INTRAVENOUS AS NEEDED
Status: DISCONTINUED | OUTPATIENT
Start: 2023-05-25 | End: 2023-05-25 | Stop reason: SURG

## 2023-05-25 RX ORDER — ONDANSETRON 2 MG/ML
4 INJECTION INTRAMUSCULAR; INTRAVENOUS EVERY 6 HOURS PRN
Status: DISCONTINUED | OUTPATIENT
Start: 2023-05-25 | End: 2023-05-28 | Stop reason: HOSPADM

## 2023-05-25 RX ORDER — NICOTINE POLACRILEX 4 MG
15 LOZENGE BUCCAL
Status: DISCONTINUED | OUTPATIENT
Start: 2023-05-25 | End: 2023-05-28 | Stop reason: HOSPADM

## 2023-05-25 RX ORDER — BISACODYL 5 MG/1
5 TABLET, DELAYED RELEASE ORAL DAILY PRN
Status: DISCONTINUED | OUTPATIENT
Start: 2023-05-25 | End: 2023-05-28 | Stop reason: HOSPADM

## 2023-05-25 RX ORDER — FAMOTIDINE 20 MG/1
20 TABLET, FILM COATED ORAL ONCE
Status: COMPLETED | OUTPATIENT
Start: 2023-05-25 | End: 2023-05-25

## 2023-05-25 RX ORDER — THROMBIN HUMAN AND FIBRINOGEN 2; 5.5 [USP'U]/1; MG/1
PATCH TOPICAL AS NEEDED
Status: DISCONTINUED | OUTPATIENT
Start: 2023-05-25 | End: 2023-05-25 | Stop reason: HOSPADM

## 2023-05-25 RX ORDER — FOLIC ACID 1 MG/1
1 TABLET ORAL DAILY
Status: DISCONTINUED | OUTPATIENT
Start: 2023-05-26 | End: 2023-05-28 | Stop reason: HOSPADM

## 2023-05-25 RX ORDER — GLYCOPYRROLATE 0.2 MG/ML
INJECTION INTRAMUSCULAR; INTRAVENOUS AS NEEDED
Status: DISCONTINUED | OUTPATIENT
Start: 2023-05-25 | End: 2023-05-25 | Stop reason: SURG

## 2023-05-25 RX ORDER — LIDOCAINE HYDROCHLORIDE 10 MG/ML
0.5 INJECTION, SOLUTION EPIDURAL; INFILTRATION; INTRACAUDAL; PERINEURAL ONCE AS NEEDED
Status: COMPLETED | OUTPATIENT
Start: 2023-05-25 | End: 2023-05-25

## 2023-05-25 RX ORDER — DEXTROSE MONOHYDRATE 25 G/50ML
25 INJECTION, SOLUTION INTRAVENOUS
Status: DISCONTINUED | OUTPATIENT
Start: 2023-05-25 | End: 2023-05-28 | Stop reason: HOSPADM

## 2023-05-25 RX ORDER — NICARDIPINE HYDROCHLORIDE 2.5 MG/ML
INJECTION INTRAVENOUS
Status: DISPENSED
Start: 2023-05-25 | End: 2023-05-26

## 2023-05-25 RX ORDER — SODIUM CHLORIDE 9 MG/ML
40 INJECTION, SOLUTION INTRAVENOUS AS NEEDED
Status: DISCONTINUED | OUTPATIENT
Start: 2023-05-25 | End: 2023-05-25 | Stop reason: HOSPADM

## 2023-05-25 RX ORDER — AMLODIPINE BESYLATE 10 MG/1
10 TABLET ORAL DAILY
Status: DISCONTINUED | OUTPATIENT
Start: 2023-05-26 | End: 2023-05-28 | Stop reason: HOSPADM

## 2023-05-25 RX ORDER — SODIUM CHLORIDE, SODIUM LACTATE, POTASSIUM CHLORIDE, CALCIUM CHLORIDE 600; 310; 30; 20 MG/100ML; MG/100ML; MG/100ML; MG/100ML
9 INJECTION, SOLUTION INTRAVENOUS CONTINUOUS
Status: DISCONTINUED | OUTPATIENT
Start: 2023-05-25 | End: 2023-05-25

## 2023-05-25 RX ORDER — FENTANYL CITRATE 50 UG/ML
INJECTION, SOLUTION INTRAMUSCULAR; INTRAVENOUS
Status: COMPLETED
Start: 2023-05-25 | End: 2023-05-25

## 2023-05-25 RX ORDER — ENALAPRILAT 2.5 MG/2ML
2.5 INJECTION INTRAVENOUS EVERY 6 HOURS PRN
Status: DISCONTINUED | OUTPATIENT
Start: 2023-05-25 | End: 2023-05-28 | Stop reason: HOSPADM

## 2023-05-25 RX ORDER — LISINOPRIL 40 MG/1
40 TABLET ORAL DAILY
Status: DISCONTINUED | OUTPATIENT
Start: 2023-05-25 | End: 2023-05-28 | Stop reason: HOSPADM

## 2023-05-25 RX ORDER — CEFAZOLIN SODIUM 2 G/100ML
2 INJECTION, SOLUTION INTRAVENOUS EVERY 8 HOURS
Status: COMPLETED | OUTPATIENT
Start: 2023-05-25 | End: 2023-05-27

## 2023-05-25 RX ORDER — INSULIN LISPRO 100 [IU]/ML
2-9 INJECTION, SOLUTION INTRAVENOUS; SUBCUTANEOUS
Status: DISCONTINUED | OUTPATIENT
Start: 2023-05-25 | End: 2023-05-26

## 2023-05-25 RX ORDER — PROTAMINE SULFATE 10 MG/ML
INJECTION, SOLUTION INTRAVENOUS AS NEEDED
Status: DISCONTINUED | OUTPATIENT
Start: 2023-05-25 | End: 2023-05-25 | Stop reason: SURG

## 2023-05-25 RX ORDER — MIDAZOLAM HYDROCHLORIDE 1 MG/ML
1 INJECTION INTRAMUSCULAR; INTRAVENOUS
Status: DISCONTINUED | OUTPATIENT
Start: 2023-05-25 | End: 2023-05-25 | Stop reason: HOSPADM

## 2023-05-25 RX ORDER — ESMOLOL HYDROCHLORIDE 10 MG/ML
INJECTION INTRAVENOUS AS NEEDED
Status: DISCONTINUED | OUTPATIENT
Start: 2023-05-25 | End: 2023-05-25 | Stop reason: SURG

## 2023-05-25 RX ORDER — POLYETHYLENE GLYCOL 3350 17 G/17G
17 POWDER, FOR SOLUTION ORAL DAILY PRN
Status: DISCONTINUED | OUTPATIENT
Start: 2023-05-25 | End: 2023-05-28 | Stop reason: HOSPADM

## 2023-05-25 RX ORDER — ASPIRIN 325 MG
325 TABLET ORAL DAILY
Status: DISCONTINUED | OUTPATIENT
Start: 2023-05-26 | End: 2023-05-28 | Stop reason: HOSPADM

## 2023-05-25 RX ADMIN — FENTANYL CITRATE 50 MCG: 50 INJECTION, SOLUTION INTRAMUSCULAR; INTRAVENOUS at 14:43

## 2023-05-25 RX ADMIN — ESMOLOL HYDROCHLORIDE 10 MG: 10 INJECTION, SOLUTION INTRAVENOUS at 15:05

## 2023-05-25 RX ADMIN — ALBUMIN (HUMAN): 12.5 INJECTION, SOLUTION INTRAVENOUS at 16:29

## 2023-05-25 RX ADMIN — FENTANYL CITRATE 50 MCG: 50 INJECTION, SOLUTION INTRAMUSCULAR; INTRAVENOUS at 16:22

## 2023-05-25 RX ADMIN — Medication 0.5 MG: at 17:28

## 2023-05-25 RX ADMIN — HEPARIN SODIUM 8000 UNITS: 1000 INJECTION, SOLUTION INTRAVENOUS; SUBCUTANEOUS at 15:08

## 2023-05-25 RX ADMIN — NICARDIPINE HYDROCHLORIDE 5 MG/HR: 25 INJECTION INTRAVENOUS at 15:50

## 2023-05-25 RX ADMIN — ROCURONIUM BROMIDE 10 MG: 10 SOLUTION INTRAVENOUS at 15:22

## 2023-05-25 RX ADMIN — FENTANYL CITRATE 50 MCG: 50 INJECTION, SOLUTION INTRAMUSCULAR; INTRAVENOUS at 16:17

## 2023-05-25 RX ADMIN — TERAZOSIN HYDROCHLORIDE 2 MG: 2 CAPSULE ORAL at 20:23

## 2023-05-25 RX ADMIN — DOCUSATE SODIUM 50 MG AND SENNOSIDES 8.6 MG 2 TABLET: 8.6; 5 TABLET, FILM COATED ORAL at 20:23

## 2023-05-25 RX ADMIN — FENTANYL CITRATE 50 MCG: 50 INJECTION, SOLUTION INTRAMUSCULAR; INTRAVENOUS at 17:16

## 2023-05-25 RX ADMIN — NICARDIPINE HYDROCHLORIDE 15 MG/HR: 25 INJECTION, SOLUTION INTRAVENOUS at 19:14

## 2023-05-25 RX ADMIN — ESMOLOL HYDROCHLORIDE 20 MG: 10 INJECTION, SOLUTION INTRAVENOUS at 15:08

## 2023-05-25 RX ADMIN — INSULIN LISPRO 6 UNITS: 100 INJECTION, SOLUTION INTRAVENOUS; SUBCUTANEOUS at 20:23

## 2023-05-25 RX ADMIN — DEXAMETHASONE SODIUM PHOSPHATE 4 MG: 4 INJECTION, SOLUTION INTRAMUSCULAR; INTRAVENOUS at 14:45

## 2023-05-25 RX ADMIN — NICARDIPINE HYDROCHLORIDE 15 MG/HR: 25 INJECTION, SOLUTION INTRAVENOUS at 21:38

## 2023-05-25 RX ADMIN — NICARDIPINE HYDROCHLORIDE 5 MG/HR: 25 INJECTION, SOLUTION INTRAVENOUS at 17:52

## 2023-05-25 RX ADMIN — EPHEDRINE SULFATE 5 MG: 50 INJECTION INTRAVENOUS at 15:32

## 2023-05-25 RX ADMIN — ROCURONIUM BROMIDE 50 MG: 10 SOLUTION INTRAVENOUS at 14:37

## 2023-05-25 RX ADMIN — LISINOPRIL 40 MG: 40 TABLET ORAL at 18:41

## 2023-05-25 RX ADMIN — SODIUM CHLORIDE, POTASSIUM CHLORIDE, SODIUM LACTATE AND CALCIUM CHLORIDE 9 ML/HR: 600; 310; 30; 20 INJECTION, SOLUTION INTRAVENOUS at 12:21

## 2023-05-25 RX ADMIN — ONDANSETRON 4 MG: 2 INJECTION INTRAMUSCULAR; INTRAVENOUS at 16:15

## 2023-05-25 RX ADMIN — EPHEDRINE SULFATE 5 MG: 50 INJECTION INTRAVENOUS at 14:50

## 2023-05-25 RX ADMIN — FAMOTIDINE 20 MG: 20 TABLET ORAL at 12:21

## 2023-05-25 RX ADMIN — CEFAZOLIN SODIUM 2 G: 2 INJECTION, SOLUTION INTRAVENOUS at 14:44

## 2023-05-25 RX ADMIN — SODIUM CHLORIDE, POTASSIUM CHLORIDE, SODIUM LACTATE AND CALCIUM CHLORIDE: 600; 310; 30; 20 INJECTION, SOLUTION INTRAVENOUS at 16:03

## 2023-05-25 RX ADMIN — LABETALOL HYDROCHLORIDE 5 MG: 5 INJECTION, SOLUTION INTRAVENOUS at 15:54

## 2023-05-25 RX ADMIN — LABETALOL HYDROCHLORIDE 10 MG: 5 INJECTION, SOLUTION INTRAVENOUS at 16:00

## 2023-05-25 RX ADMIN — LIDOCAINE HYDROCHLORIDE 0.5 ML: 10 INJECTION, SOLUTION EPIDURAL; INFILTRATION; INTRACAUDAL; PERINEURAL at 12:21

## 2023-05-25 RX ADMIN — HYDROMORPHONE HYDROCHLORIDE 0.5 MG: 1 INJECTION, SOLUTION INTRAMUSCULAR; INTRAVENOUS; SUBCUTANEOUS at 17:28

## 2023-05-25 RX ADMIN — FENTANYL CITRATE 50 MCG: 50 INJECTION, SOLUTION INTRAMUSCULAR; INTRAVENOUS at 14:37

## 2023-05-25 RX ADMIN — NICARDIPINE HYDROCHLORIDE 15 MG/HR: 25 INJECTION, SOLUTION INTRAVENOUS at 23:37

## 2023-05-25 RX ADMIN — PROPOFOL 50 MG: 10 INJECTION, EMULSION INTRAVENOUS at 15:43

## 2023-05-25 RX ADMIN — HYDROCODONE BITARTRATE AND ACETAMINOPHEN 1 TABLET: 7.5; 325 TABLET ORAL at 20:23

## 2023-05-25 RX ADMIN — ROCURONIUM BROMIDE 10 MG: 10 SOLUTION INTRAVENOUS at 15:37

## 2023-05-25 RX ADMIN — HYDROMORPHONE HYDROCHLORIDE 0.25 MG: 2 INJECTION, SOLUTION INTRAMUSCULAR; INTRAVENOUS; SUBCUTANEOUS at 22:37

## 2023-05-25 RX ADMIN — SUGAMMADEX 200 MG: 100 INJECTION, SOLUTION INTRAVENOUS at 16:16

## 2023-05-25 RX ADMIN — GLYCOPYRROLATE 0.4 MG: 0.4 INJECTION INTRAMUSCULAR; INTRAVENOUS at 16:21

## 2023-05-25 RX ADMIN — LIDOCAINE HYDROCHLORIDE 100 MG: 10 INJECTION, SOLUTION EPIDURAL; INFILTRATION; INTRACAUDAL; PERINEURAL at 14:37

## 2023-05-25 RX ADMIN — PROPOFOL 150 MG: 10 INJECTION, EMULSION INTRAVENOUS at 14:37

## 2023-05-25 RX ADMIN — ESMOLOL HYDROCHLORIDE 10 MG: 10 INJECTION, SOLUTION INTRAVENOUS at 14:39

## 2023-05-25 RX ADMIN — EPHEDRINE SULFATE 10 MG: 50 INJECTION INTRAVENOUS at 16:38

## 2023-05-25 RX ADMIN — PROTAMINE SULFATE 100 MG: 10 INJECTION, SOLUTION INTRAVENOUS at 15:57

## 2023-05-25 RX ADMIN — EPHEDRINE SULFATE 5 MG: 50 INJECTION INTRAVENOUS at 14:56

## 2023-05-25 RX ADMIN — ENALAPRILAT 2.5 MG: 2.5 INJECTION INTRAVENOUS at 21:56

## 2023-05-25 RX ADMIN — ESMOLOL HYDROCHLORIDE 10 MG: 10 INJECTION, SOLUTION INTRAVENOUS at 15:42

## 2023-05-25 RX ADMIN — CEFAZOLIN SODIUM 2 G: 2 INJECTION, SOLUTION INTRAVENOUS at 23:47

## 2023-05-25 NOTE — ANESTHESIA PREPROCEDURE EVALUATION
Anesthesia Evaluation     Patient summary reviewed and Nursing notes reviewed   no history of anesthetic complications:  NPO Solid Status: > 8 hours  NPO Liquid Status: > 2 hours           Airway   Mallampati: II  TM distance: >3 FB  Neck ROM: full  No difficulty expected  Dental - normal exam     Pulmonary - normal exam    breath sounds clear to auscultation  (+) a smoker (quit x 2-3 months, previous 40+ pack year hx) Former, sleep apnea (Possible),   Cardiovascular - normal exam    ECG reviewed  Rhythm: regular  Rate: normal    (+) hypertension, PVD,     ROS comment: 2022 Stress Echo:  ? Left ventricular ejection fraction is hyperdynamic (Calculated EF > 70%). .  ? Myocardial perfusion imaging indicates a normal myocardial perfusion study with no evidence of ischemia.  ? Impressions are consistent with a low risk study.  ? There is no prior study available for comparison.  ? Findings consistent with a normal ECG stress test.      Neuro/Psych- negative ROS  GI/Hepatic/Renal/Endo    (+)   renal disease (Renal artery stenosis), diabetes mellitus type 2 poorly controlled using insulin,     Musculoskeletal     Abdominal    Substance History      OB/GYN          Other                          Anesthesia Plan    ASA 3     general     intravenous induction     Anesthetic plan, risks, benefits, and alternatives have been provided, discussed and informed consent has been obtained with: patient.    Use of blood products discussed with patient  Consented to blood products.   Plan discussed with CRNA.        CODE STATUS:

## 2023-05-25 NOTE — ANESTHESIA POSTPROCEDURE EVALUATION
Patient: Costa Robbins    Procedure Summary     Date: 05/25/23 Room / Location:  [facility] [address] [place] [address] [facility]    Anesthesia Start: 1430 Anesthesia Stop: 1648    Procedures:       FEMORAL FEMORAL BYPASS (Groin)      FEMORAL ENDARTERECTOMY WITH PROPATEN GRAFT 8MM X 40CM (Left: Groin) Diagnosis:       PVD (peripheral vascular disease) with claudication      (PVD (peripheral vascular disease) with claudication [I73.9])    Surgeons: Moses Escalante MD Provider: Librado Julio Jr., MD    Anesthesia Type: general ASA Status: 3          Anesthesia Type: general    Vitals  Vitals Value Taken Time   /57 05/25/23 1644   Temp     Pulse 66 05/25/23 1646   Resp     SpO2 100 % 05/25/23 1646   Vitals shown include unvalidated device data.        Post Anesthesia Care and Evaluation    Patient location during evaluation: PACU  Patient participation: complete - patient participated  Level of consciousness: awake and alert  Pain management: adequate    Airway patency: patent  Anesthetic complications: No anesthetic complications  PONV Status: none  Cardiovascular status: hemodynamically stable and acceptable  Respiratory status: nonlabored ventilation, acceptable and nasal cannula  Hydration status: acceptable

## 2023-05-25 NOTE — ANESTHESIA PROCEDURE NOTES
Airway  Urgency: elective    Date/Time: 5/25/2023 2:41 PM  Airway not difficult    General Information and Staff    Patient location during procedure: OR  CRNA/CAA: Kenia Ralph CRNA    Indications and Patient Condition  Indications for airway management: airway protection    Preoxygenated: yes  MILS not maintained throughout  Mask difficulty assessment: 1 - vent by mask    Final Airway Details  Final airway type: endotracheal airway      Successful airway: ETT  Cuffed: yes   Successful intubation technique: direct laryngoscopy  Facilitating devices/methods: intubating stylet  Endotracheal tube insertion site: oral  Blade: Altamirano  Blade size: 3  ETT size (mm): 7.5  Cormack-Lehane Classification: grade IIa - partial view of glottis  Placement verified by: chest auscultation and capnometry   Inital cuff pressure (cm H2O): 8  Measured from: lips  ETT/EBT  to lips (cm): 23  Number of attempts at approach: 1  Assessment: lips, teeth, and gum same as pre-op and atraumatic intubation    Additional Comments  Negative epigastric sounds, Breath sound equal bilaterally with symmetric chest rise and fall

## 2023-05-25 NOTE — OP NOTE
Operative Report  Costa Robbins  [id number]  1955    Preop Diagnosis: Right iliac occlusion, severe left SFA stenosis, severe bilateral claudication    Results of STS risk score discussed with patient and family    Postop Diagnosis same        Procedure: #1 left femoral endarterectomy #2 femoral-femoral bypass with 8 mm Independence-Wm graft        Surgeons: Moses Escalante        Assistant: Adelia Shelton  Assistant: Adelia Shelton PA-C was responsible for performing the following activities: Retraction, Suction, Irrigation, Suturing, Closing and Placing Dressing and their skilled assistance was necessary for the success of this case.       Operative Findings:         Description: Patient was brought to the operating room and placed under general anesthesia.  The patient's abdomen groins were prepped and draped in usual sterile fashion.  A vertical incision was made in both groins and proceeded to isolate the common femoral profunda superficial femoral arteries.  A tunnel was made suprapubically and 8 mm Independence-Wm graft was brought through this.  At this time patient was given 8000's of heparin.  The SFA on the left as well as the profunda and the left common was occluded with DeBakey clamps.  The common femoral arteries open.  Endarterectomy is carried out the left common femoral artery extending down the left SFA with a stenosis was located.  The arteriotomy was thoroughly irrigated out.  The Independence-Wm graft was spatulated widely and a end-to-side anastomosis running 6-0 Prolene suture was constructed was tied down.  At this time the right common femoral artery was clamped proximally and distally open extended proximally distally the graft was cut to appropriate length and spatulated end-to-side anastomosis running 6-0 Prolene suture was constructed prior to tying it down was flushed and flows was resumed retrograde and antegrade.  There was excellent Doppler flow demonstrated bilaterally.  The patient was  reversed 100 mg of protamine.  The wounds were closed in 4 layers running 2-0 Vicryl, 2-0 Vicryl, 3-0 Vicryl, 3-0 Monocryl subcuticular stitch.        EBL: 700 cc      Please note that portions of this note were completed with a voice recognition program. Efforts were made to edit the dictations, but words may be mistranscribed      Moses Escalante MD  05/25/23 16:27 EDT

## 2023-05-25 NOTE — H&P
Pre-Op H&P  Costa Robbins  [id number]  1955      Chief complaint: Leg pain      Subjective:  Patient is a 67 y.o.male presents for scheduled surgery by Dr. Escalante. He anticipates a FEMORAL FEMORAL BYPASS; FEMORAL ENDARTERECTOMY WITH PERICARDIAL PATCH today. He has RLE claudication symptoms that have worsened over the last 5 years. No non-healing ulcers. On 4/12/2023 he underwent scheduled aortogram with runoffs and left external iliac stent with a [employer] Visi-Pro 7 x 27 with Dr. Escalante.       Review of Systems:  Constitutional-- No fever, chills or sweats. No fatigue.  CV-- No chest pain, palpitation or syncope. +HTN, CAD, PVD  Resp-- No SOB, cough, hemoptysis  Skin--No rashes or lesions      Allergies:   Allergies   Allergen Reactions   • Atenolol Other (See Comments)     Bradycardia.   • Morphine Itching   • Penicillins Rash         Home Meds:  Medications Prior to Admission   Medication Sig Dispense Refill Last Dose   • amLODIPine (NORVASC) 10 MG tablet Take 1 tablet by mouth Daily. 30 tablet 5 5/25/2023 at 0900   • aspirin 325 MG tablet Take 1 tablet by mouth Daily.   5/25/2023 at 0900   • clopidogrel (PLAVIX) 75 MG tablet Take 1 tablet by mouth Daily. 30 tablet 6 5/25/2023 at 0900   • doxazosin (Cardura) 2 MG tablet Take 1 tablet by mouth Every Night. 30 tablet 5 5/24/2023 at 2230   • Insulin Glargine (BASAGLAR KWIKPEN) 100 UNIT/ML injection pen Inject 40 Units under the skin into the appropriate area as directed Every Morning. (Patient taking differently: Inject 40 Units under the skin into the appropriate area as directed Every Night.) 10 pen 2 5/24/2023 at 2230   • lisinopril (PRINIVIL,ZESTRIL) 40 MG tablet Take 1 tablet by mouth once daily (Patient taking differently: Take 1 tablet by mouth Daily.) 90 tablet 0 5/24/2023 at 0700         PMH:   Past Medical History:   Diagnosis Date   • Coronary artery disease    • COVID     2021   • Diabetes mellitus    • Diverticulitis    • Hearing decreased   "  • Hypertension    • Melanoma     CHEST, BACK, NECK MULTI, HEAD   • PVD (peripheral vascular disease)    • Wears glasses      PSH:    Past Surgical History:   Procedure Laterality Date   • AORTAGRAM Bilateral 2023    Procedure: AORTAGRAM WITH RUNOFFS  STENT OF THE LEFT ILIAC ARTERY;  Surgeon: Moses Escalante MD;  Location: [facility];  Service: Vascular;  Laterality: Bilateral;   • COLON RESECTION  2009    partial colectomy   • COLONOSCOPY     • ORIF ANKLE FRACTURE Left    • SKIN CANCER EXCISION      melanoma       Social History:   Tobacco:   Social History     Tobacco Use   Smoking Status Former   • Packs/day: 0.50   • Years: 43.00   • Pack years: 21.50   • Types: Cigarettes   • Quit date: 2023   • Years since quittin.3   Smokeless Tobacco Never   Tobacco Comments    Pt states that he has been able to stop smoking this month - 2023      Alcohol:     Social History     Substance and Sexual Activity   Alcohol Use Yes   • Alcohol/week: 28.0 standard drinks   • Types: 28 Cans of beer per week    Comment: 3-4 BEERS PER DAY         Physical Exam:/92 (BP Location: Right arm, Patient Position: Lying)   Pulse 73   Temp 97.8 °F (36.6 °C) (Temporal)   Resp 16   Ht 180.3 cm (71\")   Wt 88.3 kg (194 lb 12.4 oz)   SpO2 100%   BMI 27.17 kg/m²       General Appearance:    Alert, cooperative, no distress, appears stated age   Head:    Normocephalic, without obvious abnormality, atraumatic   Lungs:     Clear to auscultation bilaterally, respirations unlabored    Heart:   Regular rate and rhythm, S1 and S2 normal    Abdomen:    Soft without tenderness   Extremities:   Extremities normal, atraumatic, no cyanosis or edema   Skin:   Skin color, texture, turgor normal, no rashes or lesions   Neurologic:   Grossly intact     Results Review:     LABS:  Lab Results   Component Value Date    WBC 6.13 05/15/2023    HGB 13.3 05/15/2023    HCT 40.3 05/15/2023    MCV 96.4 05/15/2023     05/15/2023 "    NEUTROABS 5.94 05/16/2022    GLUCOSE 561 (C) 05/15/2023    BUN 14 05/15/2023    CREATININE 0.88 05/15/2023     (L) 05/15/2023    K 5.6 (H) 05/15/2023    CL 94 (L) 05/15/2023    CO2 23.0 05/15/2023    CALCIUM 10.4 05/15/2023       RADIOLOGY:  Imaging Results (Last 72 Hours)     ** No results found for the last 72 hours. **          I reviewed the patient's new clinical results.    Cancer Staging (if applicable)  Cancer Patient: __ yes __no __unknown; If yes, clinical stage T:__ N:__M:__, stage group or __N/A      Impression: PVD (peripheral vascular disease)      Plan: FEMORAL FEMORAL BYPASS; FEMORAL ENDARTERECTOMY WITH PERICARDIAL PATCH      Estelita Lugo, CHERISE   5/25/2023   12:50 EDT

## 2023-05-25 NOTE — PROGRESS NOTES
Intensive Care Follow-up     Hospital:  LOS: 0 days   Mr. Costa Robbins, 67 y.o. male is followed for:   PVD (peripheral vascular disease) with claudication        Subjective   Interval History:  Costa Robbins is a 67 y.o. male who arrives to ICU from OR today s/p elective bifemoral bypass per Dr. Escalante.     Patient examined upon arrival to ICU, resting comfortably with family present at bedside. BP is maintained <140 mmHg on Cardene infusion and he is oxygenating appropriately on RA.       He denies current shortness of breath, chest pain, nausea, vomiting, diarrhea, abdominal discomfort, or extremity numbness / tingling. He does note some incisional discomfort, relieved with PRN pain medications.      The patient's past medical, surgical and social history were reviewed and updated in Epic as appropriate.    Objective     Infusions:  lactated ringers, 9 mL/hr, Last Rate: 9 mL/hr (05/25/23 1430)  niCARdipine, 5-15 mg/hr, Last Rate: 5 mg/hr (05/25/23 1752)  sodium chloride, 50 mL/hr      Medications:  [START ON 5/26/2023] amLODIPine, 10 mg, Oral, Daily  [START ON 5/26/2023] aspirin, 325 mg, Oral, Daily  ceFAZolin, 2 g, Intravenous, Q8H  [START ON 5/26/2023] clopidogrel, 75 mg, Oral, Daily  HYDROmorphone, , ,   lisinopril, 40 mg, Oral, Daily  niCARdipine, , ,   senna-docusate sodium, 2 tablet, Oral, BID  terazosin, 2 mg, Oral, Nightly      I reviewed the patient's medications.    Vital Sign Min/Max for last 24 hours  Temp  Min: 97 °F (36.1 °C)  Max: 97.8 °F (36.6 °C)   BP  Min: 102/59  Max: 173/92   Pulse  Min: 58  Max: 73   Resp  Min: 16  Max: 16   SpO2  Min: 94 %  Max: 100 %   Flow (L/min)  Min: 5  Max: 5       Input/Output for last 24 hour shift  No intake/output data recorded.   S RR:  [5-10] 8    Physical Exam:  GENERAL: Male resting supine in bed and conversant. No acute distress.   HEENT: Normocephalic and atraumatic. Trachea midline. PER. EOM WNL. Glasses present.    LUNGS: Chest rise of normal depth  and symmetric. Lungs clear to auscultation bilaterally. No wheezes, rhonchi, or rales.   HEART: S1,S2 detected. Regular rate and rhythm. No rub, murmur, or gallop.   ABDOMEN: Soft, round, nondistended, and nontender. Bowel sounds present.   EXTREMITIES: No clubbing, edema, or cyanosis. Peripheral pulses present. Skin warm and dry. Bilateral femoral access sites with Aquacel dressings present, sites C/D/I.    NEURO/PSYCH: Alert and oriented. Follows commands. Moves all extremities. No focal deficits.      Results from last 7 days   Lab Units 05/25/23  1719   WBC 10*3/mm3 12.98*   HEMOGLOBIN g/dL 11.1*   PLATELETS 10*3/mm3 203     Results from last 7 days   Lab Units 05/25/23  1237   SODIUM mmol/L 138   POTASSIUM mmol/L 3.9   CO2 mmol/L 22.0   BUN mg/dL 13   CREATININE mg/dL 0.72*   GLUCOSE mg/dL 205*     Estimated Creatinine Clearance: 127.9 mL/min (A) (by C-G formula based on SCr of 0.72 mg/dL (L)).    I reviewed the patient's new clinical results.  I reviewed the patient's new imaging results/reports including actual images and agree with reports.     Assessment & Plan   Impression      PVD (peripheral vascular disease) with claudication    67 yoM with PMH of former smoker, marijuana use, chronic ETOH use (3-4 beers/daily), uncontrolled T2DM ([id number]-85.2), and PAD recently found to have right iliac occlusion and severe left SFA stenosis who was admitted to [facility] ICU 05/25/23 to undergo elective bifemoral bypass per Dr. Escalante.      Plan        1. Follow in ICU   2. Postoperative management per Dr. Escalante  3. Continue DAPT   4. Neurovascular checks per protocol   5. Monitor for any S/S of bleeding   6. Ensure effective pain control   7. For HTN: Continue home CCB & ACEI; wean Cardene as able  8. For T2DM: Add SSI correction; goal FSBG less than or equal to 180 mg/dL  9. For ETOH use: Add folate, thiamine, MV. Monitor for any S/S of withdrawal   10. AM labs     DVT Prophylaxis: Venous foot pumps  GI Prophylaxis: N/A /  on PO diet  Dispo: Monitor in ICU    Time spent: 37 minutes  Plan of care and goals reviewed with multidisciplinary/antibiotic stewardship team during rounds.   I discussed the patient's findings and my recommendations with patient, family and nursing staff     Estelita Howe DNP, APRN, AGAP-BC  Pulmonary and Critical Care Medicine

## 2023-05-26 LAB
ANION GAP SERPL CALCULATED.3IONS-SCNC: 10 MMOL/L (ref 5–15)
BASOPHILS # BLD AUTO: 0.01 10*3/MM3 (ref 0–0.2)
BASOPHILS NFR BLD AUTO: 0.1 % (ref 0–1.5)
BUN SERPL-MCNC: 19 MG/DL (ref 8–23)
BUN/CREAT SERPL: 19 (ref 7–25)
CALCIUM SPEC-SCNC: 9 MG/DL (ref 8.6–10.5)
CHLORIDE SERPL-SCNC: 103 MMOL/L (ref 98–107)
CO2 SERPL-SCNC: 20 MMOL/L (ref 22–29)
CREAT SERPL-MCNC: 1 MG/DL (ref 0.76–1.27)
DEPRECATED RDW RBC AUTO: 44.8 FL (ref 37–54)
EGFRCR SERPLBLD CKD-EPI 2021: 83.5 ML/MIN/1.73
EOSINOPHIL # BLD AUTO: 0 10*3/MM3 (ref 0–0.4)
EOSINOPHIL NFR BLD AUTO: 0 % (ref 0.3–6.2)
ERYTHROCYTE [DISTWIDTH] IN BLOOD BY AUTOMATED COUNT: 12.3 % (ref 12.3–15.4)
GLUCOSE BLDC GLUCOMTR-MCNC: 300 MG/DL (ref 70–130)
GLUCOSE BLDC GLUCOMTR-MCNC: 333 MG/DL (ref 70–130)
GLUCOSE BLDC GLUCOMTR-MCNC: 337 MG/DL (ref 70–130)
GLUCOSE BLDC GLUCOMTR-MCNC: 348 MG/DL (ref 70–130)
GLUCOSE SERPL-MCNC: 311 MG/DL (ref 65–99)
HCT VFR BLD AUTO: 34.7 % (ref 37.5–51)
HGB BLD-MCNC: 11 G/DL (ref 13–17.7)
IMM GRANULOCYTES # BLD AUTO: 0.05 10*3/MM3 (ref 0–0.05)
IMM GRANULOCYTES NFR BLD AUTO: 0.4 % (ref 0–0.5)
LYMPHOCYTES # BLD AUTO: 0.46 10*3/MM3 (ref 0.7–3.1)
LYMPHOCYTES NFR BLD AUTO: 4.1 % (ref 19.6–45.3)
MAGNESIUM SERPL-MCNC: 1.8 MG/DL (ref 1.6–2.4)
MCH RBC QN AUTO: 31.3 PG (ref 26.6–33)
MCHC RBC AUTO-ENTMCNC: 31.7 G/DL (ref 31.5–35.7)
MCV RBC AUTO: 98.6 FL (ref 79–97)
MONOCYTES # BLD AUTO: 0.65 10*3/MM3 (ref 0.1–0.9)
MONOCYTES NFR BLD AUTO: 5.8 % (ref 5–12)
NEUTROPHILS NFR BLD AUTO: 89.6 % (ref 42.7–76)
NEUTROPHILS NFR BLD AUTO: 9.98 10*3/MM3 (ref 1.7–7)
NRBC BLD AUTO-RTO: 0 /100 WBC (ref 0–0.2)
PHOSPHATE SERPL-MCNC: 3.1 MG/DL (ref 2.5–4.5)
PLATELET # BLD AUTO: 218 10*3/MM3 (ref 140–450)
PMV BLD AUTO: 11.3 FL (ref 6–12)
POTASSIUM SERPL-SCNC: 4.8 MMOL/L (ref 3.5–5.2)
RBC # BLD AUTO: 3.52 10*6/MM3 (ref 4.14–5.8)
SODIUM SERPL-SCNC: 133 MMOL/L (ref 136–145)
WBC NRBC COR # BLD: 11.15 10*3/MM3 (ref 3.4–10.8)

## 2023-05-26 PROCEDURE — 82948 REAGENT STRIP/BLOOD GLUCOSE: CPT

## 2023-05-26 PROCEDURE — 63710000001 INSULIN LISPRO (HUMAN) PER 5 UNITS

## 2023-05-26 PROCEDURE — 63710000001 INSULIN LISPRO (HUMAN) PER 5 UNITS: Performed by: NURSE PRACTITIONER

## 2023-05-26 PROCEDURE — 63710000001 INSULIN DETEMIR PER 5 UNITS: Performed by: INTERNAL MEDICINE

## 2023-05-26 PROCEDURE — 84100 ASSAY OF PHOSPHORUS: CPT | Performed by: NURSE PRACTITIONER

## 2023-05-26 PROCEDURE — 99232 SBSQ HOSP IP/OBS MODERATE 35: CPT | Performed by: INTERNAL MEDICINE

## 2023-05-26 PROCEDURE — 63710000001 INSULIN DETEMIR PER 5 UNITS: Performed by: THORACIC SURGERY (CARDIOTHORACIC VASCULAR SURGERY)

## 2023-05-26 PROCEDURE — 25010000002 HYDROMORPHONE PER 4 MG: Performed by: THORACIC SURGERY (CARDIOTHORACIC VASCULAR SURGERY)

## 2023-05-26 PROCEDURE — 80048 BASIC METABOLIC PNL TOTAL CA: CPT | Performed by: PHYSICIAN ASSISTANT

## 2023-05-26 PROCEDURE — 25010000002 HYDRALAZINE PER 20 MG: Performed by: NURSE PRACTITIONER

## 2023-05-26 PROCEDURE — 85025 COMPLETE CBC W/AUTO DIFF WBC: CPT | Performed by: PHYSICIAN ASSISTANT

## 2023-05-26 PROCEDURE — 83735 ASSAY OF MAGNESIUM: CPT | Performed by: NURSE PRACTITIONER

## 2023-05-26 PROCEDURE — 25010000002 CEFAZOLIN IN DEXTROSE 2-4 GM/100ML-% SOLUTION: Performed by: PHYSICIAN ASSISTANT

## 2023-05-26 PROCEDURE — 93005 ELECTROCARDIOGRAM TRACING: CPT | Performed by: NURSE PRACTITIONER

## 2023-05-26 RX ORDER — HYDRALAZINE HYDROCHLORIDE 20 MG/ML
20 INJECTION INTRAMUSCULAR; INTRAVENOUS EVERY 4 HOURS PRN
Status: DISCONTINUED | OUTPATIENT
Start: 2023-05-26 | End: 2023-05-28 | Stop reason: HOSPADM

## 2023-05-26 RX ORDER — INSULIN LISPRO 100 [IU]/ML
3-14 INJECTION, SOLUTION INTRAVENOUS; SUBCUTANEOUS
Status: DISCONTINUED | OUTPATIENT
Start: 2023-05-26 | End: 2023-05-28 | Stop reason: HOSPADM

## 2023-05-26 RX ORDER — INSULIN LISPRO 100 [IU]/ML
5 INJECTION, SOLUTION INTRAVENOUS; SUBCUTANEOUS
Status: DISCONTINUED | OUTPATIENT
Start: 2023-05-26 | End: 2023-05-27

## 2023-05-26 RX ORDER — HYDROCODONE BITARTRATE AND ACETAMINOPHEN 10; 325 MG/1; MG/1
1 TABLET ORAL EVERY 4 HOURS PRN
Status: DISCONTINUED | OUTPATIENT
Start: 2023-05-26 | End: 2023-05-28 | Stop reason: HOSPADM

## 2023-05-26 RX ADMIN — HYDROCODONE BITARTRATE AND ACETAMINOPHEN 1 TABLET: 7.5; 325 TABLET ORAL at 14:32

## 2023-05-26 RX ADMIN — HYDROMORPHONE HYDROCHLORIDE 0.25 MG: 2 INJECTION, SOLUTION INTRAMUSCULAR; INTRAVENOUS; SUBCUTANEOUS at 06:39

## 2023-05-26 RX ADMIN — LISINOPRIL 40 MG: 40 TABLET ORAL at 08:58

## 2023-05-26 RX ADMIN — INSULIN LISPRO 10 UNITS: 100 INJECTION, SOLUTION INTRAVENOUS; SUBCUTANEOUS at 20:27

## 2023-05-26 RX ADMIN — TERAZOSIN HYDROCHLORIDE 2 MG: 2 CAPSULE ORAL at 20:26

## 2023-05-26 RX ADMIN — DOCUSATE SODIUM 50 MG AND SENNOSIDES 8.6 MG 2 TABLET: 8.6; 5 TABLET, FILM COATED ORAL at 20:26

## 2023-05-26 RX ADMIN — NICARDIPINE HYDROCHLORIDE 7.5 MG/HR: 25 INJECTION, SOLUTION INTRAVENOUS at 04:39

## 2023-05-26 RX ADMIN — THIAMINE HCL TAB 100 MG 100 MG: 100 TAB at 08:58

## 2023-05-26 RX ADMIN — ENALAPRILAT 2.5 MG: 2.5 INJECTION INTRAVENOUS at 06:39

## 2023-05-26 RX ADMIN — HYDROCODONE BITARTRATE AND ACETAMINOPHEN 1 TABLET: 7.5; 325 TABLET ORAL at 02:18

## 2023-05-26 RX ADMIN — CEFAZOLIN SODIUM 2 G: 2 INJECTION, SOLUTION INTRAVENOUS at 22:01

## 2023-05-26 RX ADMIN — INSULIN DETEMIR 15 UNITS: 100 INJECTION, SOLUTION SUBCUTANEOUS at 20:27

## 2023-05-26 RX ADMIN — ASPIRIN 325 MG: 325 TABLET ORAL at 08:57

## 2023-05-26 RX ADMIN — INSULIN DETEMIR 10 UNITS: 100 INJECTION, SOLUTION SUBCUTANEOUS at 11:00

## 2023-05-26 RX ADMIN — CLOPIDOGREL BISULFATE 75 MG: 75 TABLET ORAL at 08:57

## 2023-05-26 RX ADMIN — DOCUSATE SODIUM 50 MG AND SENNOSIDES 8.6 MG 2 TABLET: 8.6; 5 TABLET, FILM COATED ORAL at 08:57

## 2023-05-26 RX ADMIN — INSULIN LISPRO 5 UNITS: 100 INJECTION, SOLUTION INTRAVENOUS; SUBCUTANEOUS at 18:32

## 2023-05-26 RX ADMIN — HYDROCODONE BITARTRATE AND ACETAMINOPHEN 1 TABLET: 10; 325 TABLET ORAL at 20:26

## 2023-05-26 RX ADMIN — INSULIN LISPRO 8 UNITS: 100 INJECTION, SOLUTION INTRAVENOUS; SUBCUTANEOUS at 09:01

## 2023-05-26 RX ADMIN — INSULIN LISPRO 10 UNITS: 100 INJECTION, SOLUTION INTRAVENOUS; SUBCUTANEOUS at 18:57

## 2023-05-26 RX ADMIN — CEFAZOLIN SODIUM 2 G: 2 INJECTION, SOLUTION INTRAVENOUS at 07:01

## 2023-05-26 RX ADMIN — AMLODIPINE BESYLATE 10 MG: 10 TABLET ORAL at 08:58

## 2023-05-26 RX ADMIN — FOLIC ACID 1 MG: 1 TABLET ORAL at 08:58

## 2023-05-26 RX ADMIN — HYDRALAZINE HYDROCHLORIDE 20 MG: 20 INJECTION INTRAMUSCULAR; INTRAVENOUS at 03:57

## 2023-05-26 RX ADMIN — CEFAZOLIN SODIUM 2 G: 2 INJECTION, SOLUTION INTRAVENOUS at 16:38

## 2023-05-26 RX ADMIN — NICARDIPINE HYDROCHLORIDE 10 MG/HR: 25 INJECTION, SOLUTION INTRAVENOUS at 01:42

## 2023-05-26 RX ADMIN — MULTIPLE VITAMINS W/ MINERALS TAB 1 TABLET: TAB at 08:57

## 2023-05-26 RX ADMIN — INSULIN LISPRO 7 UNITS: 100 INJECTION, SOLUTION INTRAVENOUS; SUBCUTANEOUS at 12:36

## 2023-05-26 NOTE — PROGRESS NOTES
"Costa Robbins  [id number]  1955     LOS: 1 day   Patient Care Team:  Jonathan Conner MD as PCP - General (Family Medicine)  Moses Frankel MD as Consulting Physician (Cardiology)    Chief Complaint: Peripheral vascular disease      Subjective: No complaints    Objective:     Vital Sign Min/Max for last 24 hours  Temp  Min: 96.7 °F (35.9 °C)  Max: 98.5 °F (36.9 °C)   BP  Min: 100/46  Max: 173/92   Pulse  Min: 50  Max: 75   Resp  Min: 16  Max: 20   SpO2  Min: 93 %  Max: 100 %   No data recorded   Weight  Min: 88.3 kg (194 lb 12.4 oz)  Max: 92 kg (202 lb 13.2 oz)     Flowsheet Rows    Flowsheet Row First Filed Value   Admission Height 180.3 cm (71\") Documented at 05/25/2023 1213   Admission Weight 88.3 kg (194 lb 12.4 oz) Documented at 05/25/2023 1213          Physical Exam:    Wound: Satisfactory, pulses intact    Pulses:     Mediastinal and Chest Tube Drainage:       Results Review:   Results from last 7 days   Lab Units 05/26/23  0414   WBC 10*3/mm3 11.15*   HEMOGLOBIN g/dL 11.0*   HEMATOCRIT % 34.7*   PLATELETS 10*3/mm3 218     Results from last 7 days   Lab Units 05/26/23  0414   SODIUM mmol/L 133*   POTASSIUM mmol/L 4.8   CHLORIDE mmol/L 103   CO2 mmol/L 20.0*   BUN mg/dL 19   CREATININE mg/dL 1.00   GLUCOSE mg/dL 311*   CALCIUM mg/dL 9.0             Assessment      PVD (peripheral vascular disease) with claudication    T2DM (type 2 diabetes mellitus)    Essential hypertension    ETOH use    Former smoker      Up to chair.  DC Villatoro catheter.        Moses Escalante MD  05/26/23  07:25 EDT      Please note that portions of this note were completed with a voice recognition program. Efforts were made to edit the dictations, but words may be mistranscribed  "

## 2023-05-26 NOTE — PAYOR COMM NOTE
"Auth# [id number]  Notification of admission    Utilization Review  Phone [phone]  Fax [phone]    [facility]  [address] [place] [address]          Costa Kessler (67 y.o. Male)     Date of Birth   1955    Social Security Number       Address   [address] [place] [address]    Home Phone   [phone]    MRN   [id number]       Yarsanism   None    Marital Status                               Admission Date   5/25/23    Admission Type   Elective    Admitting Provider   Moses Escalante MD    Attending Provider   Moses Escalante MD    Department, Room/Bed   [facility] 2B ICU, N233/1       Discharge Date       Discharge Disposition       Discharge Destination                               Attending Provider: Moses Escalante MD    Allergies: Atenolol, Morphine, Penicillins    Isolation: None   Infection: None   Code Status: CPR    Ht: 180.3 cm (71\")   Wt: 92 kg (202 lb 13.2 oz)    Admission Cmt: None   Principal Problem: PVD (peripheral vascular disease) with claudication [I73.9]                 Active Insurance as of 5/25/2023     Primary Coverage     Payor Plan Insurance Group Employer/Plan Group    [facility] [employer] MEDICARE REPLACEMENT      Payor Plan Address Payor Plan Phone Number Payor Plan Fax Number Effective Dates    [address] [address] [phone]  8/5/2022 - None Entered    [facility] [id number]       Subscriber Name Subscriber Birth Date Member ID       COSTA KESSLER 1955 [id number]                 Emergency Contacts      (Rel.) Home Phone Work Phone Mobile Phone    CHECOLAWRENCE (Spouse) [phone] -- [phone]               History & Physical      Estelita Lugo APRN at 05/25/23 1250     Attestation signed by Moses Escalante MD at 05/25/23 1643    I have reviewed this documentation and agree.                    Pre-Op H&P  Costa VERAS " Itzel  [id number]  1955      Chief complaint: Leg pain      Subjective:  Patient is a 67 y.o.male presents for scheduled surgery by Dr. Escalante. He anticipates a FEMORAL FEMORAL BYPASS; FEMORAL ENDARTERECTOMY WITH PERICARDIAL PATCH today. He has RLE claudication symptoms that have worsened over the last 5 years. No non-healing ulcers. On 4/12/2023 he underwent scheduled aortogram with runoffs and left external iliac stent with a Upverter Visi-Pro 7 x 27 with Dr. Escalante.       Review of Systems:  Constitutional-- No fever, chills or sweats. No fatigue.  CV-- No chest pain, palpitation or syncope. +HTN, CAD, PVD  Resp-- No SOB, cough, hemoptysis  Skin--No rashes or lesions      Allergies:   Allergies   Allergen Reactions   • Atenolol Other (See Comments)     Bradycardia.   • Morphine Itching   • Penicillins Rash         Home Meds:  Medications Prior to Admission   Medication Sig Dispense Refill Last Dose   • amLODIPine (NORVASC) 10 MG tablet Take 1 tablet by mouth Daily. 30 tablet 5 5/25/2023 at 0900   • aspirin 325 MG tablet Take 1 tablet by mouth Daily.   5/25/2023 at 0900   • clopidogrel (PLAVIX) 75 MG tablet Take 1 tablet by mouth Daily. 30 tablet 6 5/25/2023 at 0900   • doxazosin (Cardura) 2 MG tablet Take 1 tablet by mouth Every Night. 30 tablet 5 5/24/2023 at 2230   • Insulin Glargine (BASAGLAR KWIKPEN) 100 UNIT/ML injection pen Inject 40 Units under the skin into the appropriate area as directed Every Morning. (Patient taking differently: Inject 40 Units under the skin into the appropriate area as directed Every Night.) 10 pen 2 5/24/2023 at 2230   • lisinopril (PRINIVIL,ZESTRIL) 40 MG tablet Take 1 tablet by mouth once daily (Patient taking differently: Take 1 tablet by mouth Daily.) 90 tablet 0 5/24/2023 at 0700         PMH:   Past Medical History:   Diagnosis Date   • Coronary artery disease    • COVID     2021   • Diabetes mellitus    • Diverticulitis    • Hearing decreased    • Hypertension    •  "Melanoma     CHEST, BACK, NECK MULTI, HEAD   • PVD (peripheral vascular disease)    • Wears glasses      PSH:    Past Surgical History:   Procedure Laterality Date   • AORTAGRAM Bilateral 2023    Procedure: AORTAGRAM WITH RUNOFFS  STENT OF THE LEFT ILIAC ARTERY;  Surgeon: Moses Escalante MD;  Location: [facility];  Service: Vascular;  Laterality: Bilateral;   • COLON RESECTION  2009    partial colectomy   • COLONOSCOPY     • ORIF ANKLE FRACTURE Left    • SKIN CANCER EXCISION      melanoma       Social History:   Tobacco:   Social History     Tobacco Use   Smoking Status Former   • Packs/day: 0.50   • Years: 43.00   • Pack years: 21.50   • Types: Cigarettes   • Quit date: 2023   • Years since quittin.3   Smokeless Tobacco Never   Tobacco Comments    Pt states that he has been able to stop smoking this month - 2023      Alcohol:     Social History     Substance and Sexual Activity   Alcohol Use Yes   • Alcohol/week: 28.0 standard drinks   • Types: 28 Cans of beer per week    Comment: 3-4 BEERS PER DAY         Physical Exam:/92 (BP Location: Right arm, Patient Position: Lying)   Pulse 73   Temp 97.8 °F (36.6 °C) (Temporal)   Resp 16   Ht 180.3 cm (71\")   Wt 88.3 kg (194 lb 12.4 oz)   SpO2 100%   BMI 27.17 kg/m²       General Appearance:    Alert, cooperative, no distress, appears stated age   Head:    Normocephalic, without obvious abnormality, atraumatic   Lungs:     Clear to auscultation bilaterally, respirations unlabored    Heart:   Regular rate and rhythm, S1 and S2 normal    Abdomen:    Soft without tenderness   Extremities:   Extremities normal, atraumatic, no cyanosis or edema   Skin:   Skin color, texture, turgor normal, no rashes or lesions   Neurologic:   Grossly intact     Results Review:     LABS:  Lab Results   Component Value Date    WBC 6.13 05/15/2023    HGB 13.3 05/15/2023    HCT 40.3 05/15/2023    MCV 96.4 05/15/2023     05/15/2023    NEUTROABS 5.94 " 05/16/2022    GLUCOSE 561 (C) 05/15/2023    BUN 14 05/15/2023    CREATININE 0.88 05/15/2023     (L) 05/15/2023    K 5.6 (H) 05/15/2023    CL 94 (L) 05/15/2023    CO2 23.0 05/15/2023    CALCIUM 10.4 05/15/2023       RADIOLOGY:  Imaging Results (Last 72 Hours)     ** No results found for the last 72 hours. **          I reviewed the patient's new clinical results.    Cancer Staging (if applicable)  Cancer Patient: __ yes __no __unknown; If yes, clinical stage T:__ N:__M:__, stage group or __N/A      Impression: PVD (peripheral vascular disease)      Plan: FEMORAL FEMORAL BYPASS; FEMORAL ENDARTERECTOMY WITH PERICARDIAL PATCH      Estelita Lugo, APRSANDRA   5/25/2023   12:50 EDT    Electronically signed by Moses Escalante MD at 05/25/23 1643          Operative/Procedure Notes (last 48 hours)      Moses Escalante MD at 05/25/23 1458        Operative Report  Costa VERAS Itzel  [id number]  1955    Preop Diagnosis: Right iliac occlusion, severe left SFA stenosis, severe bilateral claudication    Results of STS risk score discussed with patient and family    Postop Diagnosis same        Procedure: #1 left femoral endarterectomy #2 femoral-femoral bypass with 8 mm Sharon-Wm graft        Surgeons: Moses Escalante        Assistant: Adelia Shelton  Assistant: Adelia Shelton PA-C was responsible for performing the following activities: Retraction, Suction, Irrigation, Suturing, Closing and Placing Dressing and their skilled assistance was necessary for the success of this case.       Operative Findings:         Description: Patient was brought to the operating room and placed under general anesthesia.  The patient's abdomen groins were prepped and draped in usual sterile fashion.  A vertical incision was made in both groins and proceeded to isolate the common femoral profunda superficial femoral arteries.  A tunnel was made suprapubically and 8 mm Sharon-Wm graft was brought through this.  At this time patient was  given 8000's of heparin.  The SFA on the left as well as the profunda and the left common was occluded with DeBakey clamps.  The common femoral arteries open.  Endarterectomy is carried out the left common femoral artery extending down the left SFA with a stenosis was located.  The arteriotomy was thoroughly irrigated out.  The Honeydew-Wm graft was spatulated widely and a end-to-side anastomosis running 6-0 Prolene suture was constructed was tied down.  At this time the right common femoral artery was clamped proximally and distally open extended proximally distally the graft was cut to appropriate length and spatulated end-to-side anastomosis running 6-0 Prolene suture was constructed prior to tying it down was flushed and flows was resumed retrograde and antegrade.  There was excellent Doppler flow demonstrated bilaterally.  The patient was reversed 100 mg of protamine.  The wounds were closed in 4 layers running 2-0 Vicryl, 2-0 Vicryl, 3-0 Vicryl, 3-0 Monocryl subcuticular stitch.        EBL: 700 cc      Please note that portions of this note were completed with a voice recognition program. Efforts were made to edit the dictations, but words may be mistranscribed      Moses Escalante MD  05/25/23 16:27 EDT    Electronically signed by Moses Escalante MD at 05/25/23 1630          Physician Progress Notes (last 48 hours)      Moses Escalante MD at 05/26/23 0725          Costa EDA Robbins  [id number]  1955     LOS: 1 day   Patient Care Team:  Jonathan Conner MD as PCP - General (Family Medicine)  Moses Frankel MD as Consulting Physician (Cardiology)    Chief Complaint: Peripheral vascular disease      Subjective: No complaints    Objective:     Vital Sign Min/Max for last 24 hours  Temp  Min: 96.7 °F (35.9 °C)  Max: 98.5 °F (36.9 °C)   BP  Min: 100/46  Max: 173/92   Pulse  Min: 50  Max: 75   Resp  Min: 16  Max: 20   SpO2  Min: 93 %  Max: 100 %   No data recorded   Weight  Min: 88.3 kg (194 lb 12.4  "oz)  Max: 92 kg (202 lb 13.2 oz)     Flowsheet Rows    Flowsheet Row First Filed Value   Admission Height 180.3 cm (71\") Documented at 05/25/2023 1213   Admission Weight 88.3 kg (194 lb 12.4 oz) Documented at 05/25/2023 1213          Physical Exam:    Wound: Satisfactory, pulses intact    Pulses:     Mediastinal and Chest Tube Drainage:       Results Review:   Results from last 7 days   Lab Units 05/26/23  0414   WBC 10*3/mm3 11.15*   HEMOGLOBIN g/dL 11.0*   HEMATOCRIT % 34.7*   PLATELETS 10*3/mm3 218     Results from last 7 days   Lab Units 05/26/23  0414   SODIUM mmol/L 133*   POTASSIUM mmol/L 4.8   CHLORIDE mmol/L 103   CO2 mmol/L 20.0*   BUN mg/dL 19   CREATININE mg/dL 1.00   GLUCOSE mg/dL 311*   CALCIUM mg/dL 9.0             Assessment      PVD (peripheral vascular disease) with claudication    T2DM (type 2 diabetes mellitus)    Essential hypertension    ETOH use    Former smoker      Up to chair.  DC Villatoro catheter.        Moses Escalante MD  05/26/23  07:25 EDT      Please note that portions of this note were completed with a voice recognition program. Efforts were made to edit the dictations, but words may be mistranscribed    Electronically signed by Moses Escalante MD at 05/26/23 8482     Estelita Howe, DNP, APRN at 05/25/23 5208          Intensive Care Follow-up     Hospital:  LOS: 0 days   Mr. Costa Robbins, 67 y.o. male is followed for:   PVD (peripheral vascular disease) with claudication     Subjective   Subjective   Interval History:  Costa Robbins is a 67 y.o. male who arrives to ICU from OR today s/p elective bifemoral bypass per Dr. Escalante.     Patient examined upon arrival to ICU, resting comfortably with family present at bedside. BP is maintained <140 mmHg on Cardene infusion and he is oxygenating appropriately on RA.       He denies current shortness of breath, chest pain, nausea, vomiting, diarrhea, abdominal discomfort, or extremity numbness / tingling. He does note some " incisional discomfort, relieved with PRN pain medications.      The patient's past medical, surgical and social history were reviewed and updated in Epic as appropriate.    Objective     Infusions:  lactated ringers, 9 mL/hr, Last Rate: 9 mL/hr (05/25/23 1430)  niCARdipine, 5-15 mg/hr, Last Rate: 5 mg/hr (05/25/23 1752)  sodium chloride, 50 mL/hr      Medications:  [START ON 5/26/2023] amLODIPine, 10 mg, Oral, Daily  [START ON 5/26/2023] aspirin, 325 mg, Oral, Daily  ceFAZolin, 2 g, Intravenous, Q8H  [START ON 5/26/2023] clopidogrel, 75 mg, Oral, Daily  HYDROmorphone, , ,   lisinopril, 40 mg, Oral, Daily  niCARdipine, , ,   senna-docusate sodium, 2 tablet, Oral, BID  terazosin, 2 mg, Oral, Nightly      I reviewed the patient's medications.    Vital Sign Min/Max for last 24 hours  Temp  Min: 97 °F (36.1 °C)  Max: 97.8 °F (36.6 °C)   BP  Min: 102/59  Max: 173/92   Pulse  Min: 58  Max: 73   Resp  Min: 16  Max: 16   SpO2  Min: 94 %  Max: 100 %   Flow (L/min)  Min: 5  Max: 5       Input/Output for last 24 hour shift  No intake/output data recorded.   S RR:  [5-10] 8    Physical Exam:  GENERAL: Male resting supine in bed and conversant. No acute distress.   HEENT: Normocephalic and atraumatic. Trachea midline. PER. EOM WNL. Glasses present.    LUNGS: Chest rise of normal depth and symmetric. Lungs clear to auscultation bilaterally. No wheezes, rhonchi, or rales.   HEART: S1,S2 detected. Regular rate and rhythm. No rub, murmur, or gallop.   ABDOMEN: Soft, round, nondistended, and nontender. Bowel sounds present.   EXTREMITIES: No clubbing, edema, or cyanosis. Peripheral pulses present. Skin warm and dry. Bilateral femoral access sites with Aquacel dressings present, sites C/D/I.    NEURO/PSYCH: Alert and oriented. Follows commands. Moves all extremities. No focal deficits.      Results from last 7 days   Lab Units 05/25/23  1719   WBC 10*3/mm3 12.98*   HEMOGLOBIN g/dL 11.1*   PLATELETS 10*3/mm3 203     Results from last 7  days   Lab Units 05/25/23  1237   SODIUM mmol/L 138   POTASSIUM mmol/L 3.9   CO2 mmol/L 22.0   BUN mg/dL 13   CREATININE mg/dL 0.72*   GLUCOSE mg/dL 205*     Estimated Creatinine Clearance: 127.9 mL/min (A) (by C-G formula based on SCr of 0.72 mg/dL (L)).    I reviewed the patient's new clinical results.  I reviewed the patient's new imaging results/reports including actual images and agree with reports.     Assessment & Plan   Impression      PVD (peripheral vascular disease) with claudication    67 yoM with PMH of former smoker, marijuana use, chronic ETOH use (3-4 beers/daily), uncontrolled T2DM ([id number]), and PAD recently found to have right iliac occlusion and severe left SFA stenosis who was admitted to [facility] ICU 05/25/23 to undergo elective bifemoral bypass per Dr. Escalante.      Plan        1. Follow in ICU   2. Postoperative management per Dr. Escalante  3. Continue DAPT   4. Neurovascular checks per protocol   5. Monitor for any S/S of bleeding   6. Ensure effective pain control   7. For HTN: Continue home CCB & ACEI; wean Cardene as able  8. For T2DM: Add SSI correction; goal FSBG less than or equal to 180 mg/dL  9. For ETOH use: Add folate, thiamine, MV. Monitor for any S/S of withdrawal   10. AM labs     DVT Prophylaxis: Venous foot pumps  GI Prophylaxis: N/A / on PO diet  Dispo: Monitor in ICU    Time spent: 37 minutes  Plan of care and goals reviewed with multidisciplinary/antibiotic stewardship team during rounds.   I discussed the patient's findings and my recommendations with patient, family and nursing staff     Estelita Howe, IVAN, APRN, [facility]  Pulmonary and Critical Care Medicine        Electronically signed by Estelita Howe, IVAN, APRN at 05/25/23 1949       Consult Notes (last 48 hours)  Notes from 05/24/23 1026 through 05/26/23 1026   No notes of this type exist for this encounter.

## 2023-05-26 NOTE — PROGRESS NOTES
"Intensive Care Follow-up     Hospital:  LOS: 1 day   Mr. Costa Robbins, 67 y.o. male is followed for:   PVD (peripheral vascular disease) with claudication   Followed postoperatively for medical needs including diabetes mellitus.     Subjective   Interval History:  The chart has been reviewed.  The patient has had good pain control currently.  Blood glucose has been above 300.    The patient's past medical, surgical and social history were reviewed and updated in Epic as appropriate.        Objective     Infusions:  niCARdipine, 5-15 mg/hr, Last Rate: Stopped (05/26/23 0731)      Medications:  amLODIPine, 10 mg, Oral, Daily  aspirin, 325 mg, Oral, Daily  ceFAZolin, 2 g, Intravenous, Q8H  clopidogrel, 75 mg, Oral, Daily  folic acid, 1 mg, Oral, Daily  insulin detemir, 10 Units, Subcutaneous, Q12H  insulin lispro, 2-9 Units, Subcutaneous, 4x Daily AC & at Bedtime  lisinopril, 40 mg, Oral, Daily  multivitamin with minerals, 1 tablet, Oral, Daily  senna-docusate sodium, 2 tablet, Oral, BID  terazosin, 2 mg, Oral, Nightly  thiamine, 100 mg, Oral, Daily        Vital Sign Min/Max for last 24 hours  Temp  Min: 96.7 °F (35.9 °C)  Max: 98.6 °F (37 °C)   BP  Min: 100/46  Max: 173/92   Pulse  Min: 50  Max: 90   Resp  Min: 16  Max: 20   SpO2  Min: 93 %  Max: 100 %   Flow (L/min)  Min: 5  Max: 5       Input/Output for last 24 hour shift  05/25 0701 - 05/26 0700  In: 4709.3 [P.O.:750; I.V.:3609.5]  Out: 535 [Urine:535]   S RR:  [5-10] 8  Objective:  General Appearance:  Uncomfortable, well-appearing and in no acute distress.    Vital signs: (most recent): Blood pressure 110/53, pulse 76, temperature 98.6 °F (37 °C), temperature source Oral, resp. rate 18, height 180.3 cm (71\"), weight 92 kg (202 lb 13.2 oz), SpO2 98 %.    HEENT: Normal HEENT exam.    Lungs:  Normal respiratory rate and increased effort.  Breath sounds clear to auscultation.  He is not in respiratory distress.    Heart: Regular rhythm.  S1 normal and S2 normal. "  No murmur.   Abdomen: Abdomen is soft and non-distended.  Bowel sounds are normal.   There is no abdominal tenderness.     Extremities: Normal range of motion.  There is no dependent edema.    Neurological: Patient is alert and oriented to person, place and time.    Pupils:  Pupils are equal, round, and reactive to light.  Pupils are equal.   Skin:  Warm.              Results from last 7 days   Lab Units 05/26/23  0414 05/25/23  1719   WBC 10*3/mm3 11.15* 12.98*   HEMOGLOBIN g/dL 11.0* 11.1*   PLATELETS 10*3/mm3 218 203     Results from last 7 days   Lab Units 05/26/23  0414 05/25/23  1237   SODIUM mmol/L 133* 138   POTASSIUM mmol/L 4.8 3.9   CO2 mmol/L 20.0* 22.0   BUN mg/dL 19 13   CREATININE mg/dL 1.00 0.72*   MAGNESIUM mg/dL 1.8  --    PHOSPHORUS mg/dL 3.1  --    GLUCOSE mg/dL 311* 205*     Estimated Creatinine Clearance: 85.4 mL/min (by C-G formula based on SCr of 1 mg/dL).            I reviewed the patient's results and images.     Assessment & Plan   Impression        PVD (peripheral vascular disease) with claudication    T2DM (type 2 diabetes mellitus)    Essential hypertension    ETOH use    Former smoker       Plan        Mobilize when surgically appropriate.  Pain control as necessary.  Blood glucose is out of control currently so we will go ahead and start Levemir 10 units subcutaneous twice daily and adjust as necessary.  Watch for any sign of alcohol withdrawal.  Currently none.    Plan of care and goals reviewed with mulitdisciplinary/antibiotic stewardship team during rounds.   I discussed the patient's findings and my recommendations with patient and nursing staff     Javi Hammond MD, [facility]  Pulmonology and Critical Care Medicine

## 2023-05-26 NOTE — CASE MANAGEMENT/SOCIAL WORK
Discharge Planning Assessment  [facility]     Patient Name: Costa Robbins  MRN: [id number]  Today's Date: 5/26/2023    Admit Date: 5/25/2023    Plan: Home with Family   Discharge Needs Assessment     Row Name 05/26/23 1554       Living Environment    People in Home spouse    Name(s) of People in Home Prisca rivera    Current Living Arrangements home    Potentially Unsafe Housing Conditions unable to assess    Primary Care Provided by self    Provides Primary Care For no one    Family Caregiver if Needed spouse    Family Caregiver Names Prisca rivera    Quality of Family Relationships helpful;involved;supportive    Able to Return to Prior Arrangements yes       Transition Planning    Patient/Family Anticipates Transition to home with family    Patient/Family Anticipated Services at Transition none    Transportation Anticipated family or friend will provide       Discharge Needs Assessment    Readmission Within the Last 30 Days no previous admission in last 30 days    Concerns to be Addressed discharge planning    Anticipated Changes Related to Illness none    Equipment Needed After Discharge none    Current Discharge Risk chronically ill               Discharge Plan     Row Name 05/26/23 1556       Plan    Plan Home with Family    Patient/Family in Agreement with Plan yes    Plan Comments I have met with Mr. Robbins at his bedside today to initiate a discharge plan.  He states that he lives in a [facility] with his wife, Prisca.  He reports that he is independent with activities of dailly living and mobility.  He denies use of DME with the exception of glucometer and bp cuff and current receipt of home health/outpatient services.  He states that he plans to be discharged tomorrow and that his wife will be available to provide transportation and assistance as needed.  He anticipates no discharge needs.  CM will cont to follow the plan of care and assist with discharge planning as  appropriate.    Final Discharge Disposition Code 01 - home or self-care              Continued Care and Services - Admitted Since 5/25/2023    Coordination has not been started for this encounter.          Demographic Summary     Row Name 05/26/23 5217       General Information    Admission Type inpatient    Arrived From home    Referral Source admission list    Reason for Consult discharge planning    General Information Comments I have confirmed with Mr. Robbins that his PCP is Jonathan Conner MD and his insurance is [employer] [place] [employer].       Contact Information    Permission Granted to Share Info With                Functional Status     Row Name 05/26/23 155       Functional Status, IADL    Medications independent    Meal Preparation independent    Housekeeping independent    Laundry independent    Shopping independent       Employment/    Employment Status retired               Psychosocial    No documentation.                Abuse/Neglect    No documentation.                Legal    No documentation.                Substance Abuse    No documentation.                Patient Forms    No documentation.                   Tanisha Junior RN     negative...

## 2023-05-27 ENCOUNTER — APPOINTMENT (OUTPATIENT)
Dept: CARDIOLOGY | Facility: HOSPITAL | Age: 68
DRG: 253 | End: 2023-05-27
Payer: MEDICARE

## 2023-05-27 LAB
ANION GAP SERPL CALCULATED.3IONS-SCNC: 8 MMOL/L (ref 5–15)
BUN SERPL-MCNC: 9 MG/DL (ref 8–23)
BUN/CREAT SERPL: 13 (ref 7–25)
CALCIUM SPEC-SCNC: 9.3 MG/DL (ref 8.6–10.5)
CHLORIDE SERPL-SCNC: 107 MMOL/L (ref 98–107)
CO2 SERPL-SCNC: 23 MMOL/L (ref 22–29)
CREAT SERPL-MCNC: 0.69 MG/DL (ref 0.76–1.27)
EGFRCR SERPLBLD CKD-EPI 2021: 102.7 ML/MIN/1.73
GLUCOSE BLDC GLUCOMTR-MCNC: 225 MG/DL (ref 70–130)
GLUCOSE BLDC GLUCOMTR-MCNC: 225 MG/DL (ref 70–130)
GLUCOSE BLDC GLUCOMTR-MCNC: 255 MG/DL (ref 70–130)
GLUCOSE BLDC GLUCOMTR-MCNC: 288 MG/DL (ref 70–130)
GLUCOSE SERPL-MCNC: 212 MG/DL (ref 65–99)
MAGNESIUM SERPL-MCNC: 2 MG/DL (ref 1.6–2.4)
PHOSPHATE SERPL-MCNC: 1.9 MG/DL (ref 2.5–4.5)
PHOSPHATE SERPL-MCNC: 2 MG/DL (ref 2.5–4.5)
POTASSIUM SERPL-SCNC: 4.1 MMOL/L (ref 3.5–5.2)
QT INTERVAL: 336 MS
QTC INTERVAL: 360 MS
SODIUM SERPL-SCNC: 138 MMOL/L (ref 136–145)

## 2023-05-27 PROCEDURE — 93010 ELECTROCARDIOGRAM REPORT: CPT | Performed by: INTERNAL MEDICINE

## 2023-05-27 PROCEDURE — 25010000002 HYDRALAZINE PER 20 MG: Performed by: NURSE PRACTITIONER

## 2023-05-27 PROCEDURE — 84100 ASSAY OF PHOSPHORUS: CPT | Performed by: NURSE PRACTITIONER

## 2023-05-27 PROCEDURE — 93306 TTE W/DOPPLER COMPLETE: CPT | Performed by: INTERNAL MEDICINE

## 2023-05-27 PROCEDURE — 63710000001 INSULIN LISPRO (HUMAN) PER 5 UNITS: Performed by: NURSE PRACTITIONER

## 2023-05-27 PROCEDURE — 80048 BASIC METABOLIC PNL TOTAL CA: CPT | Performed by: NURSE PRACTITIONER

## 2023-05-27 PROCEDURE — 63710000001 INSULIN DETEMIR PER 5 UNITS: Performed by: NURSE PRACTITIONER

## 2023-05-27 PROCEDURE — 99222 1ST HOSP IP/OBS MODERATE 55: CPT | Performed by: INTERNAL MEDICINE

## 2023-05-27 PROCEDURE — 99232 SBSQ HOSP IP/OBS MODERATE 35: CPT | Performed by: NURSE PRACTITIONER

## 2023-05-27 PROCEDURE — 83735 ASSAY OF MAGNESIUM: CPT | Performed by: NURSE PRACTITIONER

## 2023-05-27 PROCEDURE — 25010000002 CEFAZOLIN IN DEXTROSE 2-4 GM/100ML-% SOLUTION: Performed by: PHYSICIAN ASSISTANT

## 2023-05-27 PROCEDURE — 63710000001 INSULIN LISPRO (HUMAN) PER 5 UNITS

## 2023-05-27 PROCEDURE — 93306 TTE W/DOPPLER COMPLETE: CPT

## 2023-05-27 PROCEDURE — 82948 REAGENT STRIP/BLOOD GLUCOSE: CPT

## 2023-05-27 RX ORDER — CLONIDINE HYDROCHLORIDE 0.2 MG/1
0.2 TABLET ORAL ONCE
Status: COMPLETED | OUTPATIENT
Start: 2023-05-27 | End: 2023-05-27

## 2023-05-27 RX ORDER — INSULIN LISPRO 100 [IU]/ML
7 INJECTION, SOLUTION INTRAVENOUS; SUBCUTANEOUS
Status: DISCONTINUED | OUTPATIENT
Start: 2023-05-27 | End: 2023-05-28 | Stop reason: HOSPADM

## 2023-05-27 RX ORDER — ROSUVASTATIN CALCIUM 20 MG/1
20 TABLET, COATED ORAL NIGHTLY
Status: DISCONTINUED | OUTPATIENT
Start: 2023-05-27 | End: 2023-05-28 | Stop reason: HOSPADM

## 2023-05-27 RX ADMIN — INSULIN DETEMIR 20 UNITS: 100 INJECTION, SOLUTION SUBCUTANEOUS at 08:34

## 2023-05-27 RX ADMIN — LISINOPRIL 40 MG: 40 TABLET ORAL at 08:33

## 2023-05-27 RX ADMIN — CLONIDINE HYDROCHLORIDE 0.2 MG: 0.2 TABLET ORAL at 22:50

## 2023-05-27 RX ADMIN — THIAMINE HCL TAB 100 MG 100 MG: 100 TAB at 08:33

## 2023-05-27 RX ADMIN — SODIUM PHOSPHATE, MONOBASIC, MONOHYDRATE AND SODIUM PHOSPHATE, DIBASIC, ANHYDROUS 15 MMOL: 276; 142 INJECTION, SOLUTION INTRAVENOUS at 13:25

## 2023-05-27 RX ADMIN — INSULIN LISPRO 7 UNITS: 100 INJECTION, SOLUTION INTRAVENOUS; SUBCUTANEOUS at 08:34

## 2023-05-27 RX ADMIN — CLOPIDOGREL BISULFATE 75 MG: 75 TABLET ORAL at 08:33

## 2023-05-27 RX ADMIN — INSULIN LISPRO 5 UNITS: 100 INJECTION, SOLUTION INTRAVENOUS; SUBCUTANEOUS at 18:14

## 2023-05-27 RX ADMIN — INSULIN LISPRO 7 UNITS: 100 INJECTION, SOLUTION INTRAVENOUS; SUBCUTANEOUS at 18:14

## 2023-05-27 RX ADMIN — FOLIC ACID 1 MG: 1 TABLET ORAL at 08:33

## 2023-05-27 RX ADMIN — ASPIRIN 325 MG: 325 TABLET ORAL at 08:33

## 2023-05-27 RX ADMIN — TERAZOSIN HYDROCHLORIDE 2 MG: 2 CAPSULE ORAL at 20:20

## 2023-05-27 RX ADMIN — INSULIN LISPRO 7 UNITS: 100 INJECTION, SOLUTION INTRAVENOUS; SUBCUTANEOUS at 12:24

## 2023-05-27 RX ADMIN — MULTIPLE VITAMINS W/ MINERALS TAB 1 TABLET: TAB at 08:33

## 2023-05-27 RX ADMIN — INSULIN LISPRO 5 UNITS: 100 INJECTION, SOLUTION INTRAVENOUS; SUBCUTANEOUS at 12:24

## 2023-05-27 RX ADMIN — HYDRALAZINE HYDROCHLORIDE 20 MG: 20 INJECTION INTRAMUSCULAR; INTRAVENOUS at 19:45

## 2023-05-27 RX ADMIN — HYDROCODONE BITARTRATE AND ACETAMINOPHEN 1 TABLET: 10; 325 TABLET ORAL at 20:20

## 2023-05-27 RX ADMIN — ENALAPRILAT 2.5 MG: 2.5 INJECTION INTRAVENOUS at 20:23

## 2023-05-27 RX ADMIN — HYDROCODONE BITARTRATE AND ACETAMINOPHEN 1 TABLET: 10; 325 TABLET ORAL at 05:31

## 2023-05-27 RX ADMIN — DOCUSATE SODIUM 50 MG AND SENNOSIDES 8.6 MG 2 TABLET: 8.6; 5 TABLET, FILM COATED ORAL at 08:33

## 2023-05-27 RX ADMIN — SODIUM PHOSPHATE, MONOBASIC, MONOHYDRATE AND SODIUM PHOSPHATE, DIBASIC, ANHYDROUS 15 MMOL: 276; 142 INJECTION, SOLUTION INTRAVENOUS at 22:25

## 2023-05-27 RX ADMIN — DOCUSATE SODIUM 50 MG AND SENNOSIDES 8.6 MG 2 TABLET: 8.6; 5 TABLET, FILM COATED ORAL at 20:20

## 2023-05-27 RX ADMIN — HYDROCODONE BITARTRATE AND ACETAMINOPHEN 1 TABLET: 10; 325 TABLET ORAL at 11:42

## 2023-05-27 RX ADMIN — INSULIN LISPRO 8 UNITS: 100 INJECTION, SOLUTION INTRAVENOUS; SUBCUTANEOUS at 08:35

## 2023-05-27 RX ADMIN — AMLODIPINE BESYLATE 10 MG: 10 TABLET ORAL at 08:33

## 2023-05-27 RX ADMIN — INSULIN LISPRO 8 UNITS: 100 INJECTION, SOLUTION INTRAVENOUS; SUBCUTANEOUS at 20:19

## 2023-05-27 RX ADMIN — INSULIN DETEMIR 20 UNITS: 100 INJECTION, SOLUTION SUBCUTANEOUS at 20:19

## 2023-05-27 RX ADMIN — CEFAZOLIN SODIUM 2 G: 2 INJECTION, SOLUTION INTRAVENOUS at 08:31

## 2023-05-27 NOTE — CONSULTS
[facility]  Cardiology Consult Note  [facility] 2B ICU          Patient Identification:  Costa Robbins      [id number]  67 y.o.        male  1955       Date of Consultation:  05/27/23    Reason for Consultation: AV block    PCP: Jonathan Conner MD  Primary cardiologist: Jostin    History of Present Illness:     67-year-old gentleman with history of hypertension, diabetes mellitus, former smoker, PAD status post femorofemoral bypass and left femoral endarterectomy by Dr. Escalante 5/25/2023.  Noted to have second-degree AV block Mobitz type I 5/27/2023.  Patient is asymptomatic.  Of note patient cannot tolerate atenolol in the past due to bradycardia.  Denies chest pain or shortness of breath this morning.  Does have pain at the surgical incision site planning to ambulate this morning.    Past History:  Past Medical History:   Diagnosis Date   • Coronary artery disease    • COVID     2021   • Diabetes mellitus    • Diverticulitis    • ETOH use 5/25/2023   • Former smoker 5/25/2023   • Hearing decreased    • Hypertension    • Melanoma     CHEST, BACK, NECK MULTI, HEAD   • PVD (peripheral vascular disease)    • Wears glasses      Past Surgical History:   Procedure Laterality Date   • AORTAGRAM Bilateral 4/12/2023    Procedure: AORTAGRAM WITH RUNOFFS  STENT OF THE LEFT ILIAC ARTERY;  Surgeon: Moses Escalante MD;  Location: [facility];  Service: Vascular;  Laterality: Bilateral;   • COLON RESECTION  05/2009    partial colectomy   • COLONOSCOPY     • FEMORAL ENDARTERECTOMY Left 5/25/2023    Procedure: FEMORAL ENDARTERECTOMY WITH PROPATEN GRAFT 8MM X 40CM;  Surgeon: Moses Escalante MD;  Location: [facility];  Service: Vascular;  Laterality: Left;   • FEMORAL FEMORAL BYPASS N/A 5/25/2023    Procedure: FEMORAL FEMORAL BYPASS;  Surgeon: Moses Escalante MD;  Location: [facility];  Service: Vascular;  Laterality: N/A;   • ORIF ANKLE FRACTURE Left    •  SKIN CANCER EXCISION      melanoma     Allergies   Allergen Reactions   • Atenolol Other (See Comments)     Bradycardia.   • Morphine Itching   • Penicillins Rash     Social History     Socioeconomic History   • Marital status:    • Number of children: 0   Tobacco Use   • Smoking status: Former     Packs/day: 0.50     Years: 43.00     Pack years: 21.50     Types: Cigarettes     Quit date: 2023     Years since quittin.3   • Smokeless tobacco: Never   • Tobacco comments:     Pt states that he has been able to stop smoking this month - 2023   Vaping Use   • Vaping Use: Never used   Substance and Sexual Activity   • Alcohol use: Yes     Alcohol/week: 28.0 standard drinks     Types: 28 Cans of beer per week     Comment: 3-4 BEERS PER DAY   • Drug use: Yes     Types: Marijuana     Comment: ONCE A MONTH   • Sexual activity: Defer     Family History   Problem Relation Age of Onset   • Diabetes Mother    • Heart attack Mother    • Hypertension Mother    • Hyperlipidemia Mother    • Stroke Mother    • Hodgkin's lymphoma Father    • Hypertension Father    • Diabetes Brother    • Hyperlipidemia Brother      Medications:  Medications Prior to Admission   Medication Sig Dispense Refill Last Dose   • amLODIPine (NORVASC) 10 MG tablet Take 1 tablet by mouth Daily. 30 tablet 5 2023 at 0900   • aspirin 325 MG tablet Take 1 tablet by mouth Daily.   2023 at 0900   • clopidogrel (PLAVIX) 75 MG tablet Take 1 tablet by mouth Daily. 30 tablet 6 2023 at 0900   • doxazosin (Cardura) 2 MG tablet Take 1 tablet by mouth Every Night. 30 tablet 5 2023 at 2230   • Insulin Glargine (BASAGLAR KWIKPEN) 100 UNIT/ML injection pen Inject 40 Units under the skin into the appropriate area as directed Every Morning. (Patient taking differently: Inject 40 Units under the skin into the appropriate area as directed Every Night.) 10 pen 2 2023 at 2230   • lisinopril (PRINIVIL,ZESTRIL) 40 MG tablet Take 1 tablet by mouth  "once daily (Patient taking differently: Take 1 tablet by mouth Daily.) 90 tablet 0 5/25/2023 at 1800     Current medications:  amLODIPine, 10 mg, Oral, Daily  aspirin, 325 mg, Oral, Daily  clopidogrel, 75 mg, Oral, Daily  folic acid, 1 mg, Oral, Daily  insulin detemir, 20 Units, Subcutaneous, Q12H  insulin lispro, 3-14 Units, Subcutaneous, 4x Daily AC & at Bedtime  Insulin Lispro, 7 Units, Subcutaneous, TID With Meals  lisinopril, 40 mg, Oral, Daily  multivitamin with minerals, 1 tablet, Oral, Daily  senna-docusate sodium, 2 tablet, Oral, BID  terazosin, 2 mg, Oral, Nightly  thiamine, 100 mg, Oral, Daily      Current IV drips:       Review of Systems:    Constitutional no fever,  no weight loss   Skin no rash   Otolaryngeal no difficulty swallowing   Cardiovascular See HPI   Pulmonary no cough, no sputum production   Gastrointestinal no constipation, no diarrhea   Genitourinary no dysuria, no hematuria   Hematologic no easy bruisability, no abnormal bleeding   Musculoskeletal no muscle pain   Neurologic no dizziness, no falls     Physical exam:    /59   Pulse 84   Temp 98.1 °F (36.7 °C) (Oral)   Resp 14   Ht 180.3 cm (71\")   Wt 92 kg (202 lb 13.2 oz)   SpO2 94%   BMI 28.29 kg/m²  Body mass index is 28.29 kg/m².   Oxygen saturation   SpO2  Min: 94 %  Max: 98 %    General Appearance:   · well developed  · well nourished  HENT:   · oropharynx moist  · lips not cyanotic  Neck:  · thyroid not enlarged  · supple  Respiratory:  · no respiratory distress  · normal breath sounds  · no rales  Cardiovascular:  · no jugular venous distention  · regular rhythm  · apical impulse normal  · S1 normal, S2 normal  · no S3, no S4   · no murmur  · no rub, no thrill  · carotid pulses normal; no bruit  · pedal pulses 1+  · lower extremity edema: none    Gastrointestinal:   · bowel sounds normal  · non-tender  · no hepatomegaly, no splenomegaly  Musculoskeletal:  · no clubbing of fingers.   · normocephalic, head " atraumatic  Skin:   · warm, dry  Psychiatric:  · judgement and insight appropriate  · normal mood and affect    Cardiographics:     ECG  (personally interpreted) normal sinus rhythm with second-degree AV block Mobitz type I   Telemetry:  (personally interpreted) second-degree Mobitz type I AV block on the telemetry monitor.  As well as first-degree AV block.   ECHO:        CATH:     CARDIOLITE:   9/2022, normal.    Lab Review:       Results from last 7 days   Lab Units 05/26/23  0414 05/25/23  1719   WBC 10*3/mm3 11.15* 12.98*   HEMOGLOBIN g/dL 11.0* 11.1*   HEMATOCRIT % 34.7* 34.3*            Results from last 7 days   Lab Units 05/26/23  0414 05/25/23  1237   SODIUM mmol/L 133* 138   BUN mg/dL 19 13   CREATININE mg/dL 1.00 0.72*   GLUCOSE mg/dL 311* 205*      Estimated Creatinine Clearance: 85.4 mL/min (by C-G formula based on SCr of 1 mg/dL).     Lab Results   Component Value Date    TRIG 224 (H) 07/26/2021    HDL 51 07/26/2021     (H) 07/26/2021           No results found for: TSH     Lab Results   Component Value Date    HGBA1C 12.20 (H) 05/15/2023           No results found for: DIGOXIN   No results found for: DDIMERQUANT     Imaging:  All pertinent imaging studies were personally reviewed.    Assessment:      PVD (peripheral vascular disease) with claudication    T2DM (type 2 diabetes mellitus)    Essential hypertension    ETOH use    Former smoker      Initial cardiac assessment: 67-year-old presenting for elective femorofemoral bypass and left femoral endarterectomy by Dr. Escalante 5/25/23, found to have secondary AV block Mobitz type I.      Recommendations:  1.  Second-degree AV block Mobitz type I:  Patient has first-degree block on ECG as well as intermittent second-degree AV block Mobitz type I.  Overall benign finding  Avoid AV luh blockers  Check echocardiogram  We will mail 14-day Holter monitor to patient and have him follow-up with Dr. Frankel in 1-2 months.    2.  PAD:  Continue aspirin and  statin therapy  Status post revascularization by Dr. Escalante 5/25  Continue postoperative care.    3. HTN:  Fairly well-controlled on current regimen of amlodipine lisinopril and terazosin  Avoid AV luh blockers  Secondary work-up completed in the past.     4.  Hyperlipidemia:  Goal LDL less than 70  Start rosuvastatin 20 mg daily      Thank you for allowing us to share in his care.    Discussed with patient's nurse        Kirk Nur MD  5/27/2023  09:04 EDT

## 2023-05-27 NOTE — PROGRESS NOTES
"Costa Robbins  [id number]  1955     LOS: 2 days   Patient Care Team:  Jonathan Conner MD as PCP - General (Family Medicine)  Moses Frankel MD as Consulting Physician (Cardiology)    Chief Complaint: Peripheral vascular disease      Subjective: No specific complaints    Objective:     Vital Sign Min/Max for last 24 hours  Temp  Min: 98.1 °F (36.7 °C)  Max: 98.6 °F (37 °C)   BP  Min: 110/53  Max: 157/68   Pulse  Min: 63  Max: 91   Resp  Min: 14  Max: 18   SpO2  Min: 94 %  Max: 98 %   No data recorded   No data recorded     Flowsheet Rows    Flowsheet Row First Filed Value   Admission Height 180.3 cm (71\") Documented at 05/25/2023 1213   Admission Weight 88.3 kg (194 lb 12.4 oz) Documented at 05/25/2023 1213          Physical Exam:    Wound: Satisfactory  Pulses:     Mediastinal and Chest Tube Drainage:       Results Review:   Results from last 7 days   Lab Units 05/26/23  0414   WBC 10*3/mm3 11.15*   HEMOGLOBIN g/dL 11.0*   HEMATOCRIT % 34.7*   PLATELETS 10*3/mm3 218     Results from last 7 days   Lab Units 05/26/23  0414   SODIUM mmol/L 133*   POTASSIUM mmol/L 4.8   CHLORIDE mmol/L 103   CO2 mmol/L 20.0*   BUN mg/dL 19   CREATININE mg/dL 1.00   GLUCOSE mg/dL 311*   CALCIUM mg/dL 9.0             Assessment      PVD (peripheral vascular disease) with claudication    T2DM (type 2 diabetes mellitus)    Essential hypertension    ETOH use    Former smoker      Patient apparently had a heart block with heart rate in the 30s according to the nursing staff.  Ambulate twice daily.  Consult cardiology        Moses Escalante MD  05/27/23  07:20 EDT      Please note that portions of this note were completed with a voice recognition program. Efforts were made to edit the dictations, but words may be mistranscribed  "

## 2023-05-27 NOTE — PROGRESS NOTES
Intensive Care Follow-up     Hospital:  LOS: 2 days   Mr. Costa Robbins, 67 y.o. male is followed for:   PVD (peripheral vascular disease) with claudication        Subjective   Interval History:  The chart has been reviewed. Overnight developed AV block (with HR 30's) at rest and Cardiology has been consulted. FSBG >280 mg/dL. AM labs pending.     Patient resting supine in bed this AM in NAD. He remains afebrile, hemodynamically stable (off Cardene, with HR now 80's), and is oxygenating appropriately on RA. Tolerating a PO diet. Plans to ambulate this AM.     He denies current shortness of breath, chest pain, nausea, vomiting, diarrhea, abdominal discomfort, or extremity numbness / tingling. He notes incisional discomfort primarily with movement, relieved with PRN pain medication.      The patient's past medical, surgical and social history were reviewed and updated in Epic as appropriate.    Objective     Infusions:  niCARdipine, 5-15 mg/hr, Last Rate: Stopped (05/26/23 0731)      Medications:  amLODIPine, 10 mg, Oral, Daily  aspirin, 325 mg, Oral, Daily  ceFAZolin, 2 g, Intravenous, Q8H  clopidogrel, 75 mg, Oral, Daily  folic acid, 1 mg, Oral, Daily  insulin detemir, 20 Units, Subcutaneous, Q12H  insulin lispro, 3-14 Units, Subcutaneous, 4x Daily AC & at Bedtime  Insulin Lispro, 7 Units, Subcutaneous, TID With Meals  lisinopril, 40 mg, Oral, Daily  multivitamin with minerals, 1 tablet, Oral, Daily  senna-docusate sodium, 2 tablet, Oral, BID  terazosin, 2 mg, Oral, Nightly  thiamine, 100 mg, Oral, Daily      I reviewed the patient's medications.    Vital Sign Min/Max for last 24 hours  Temp  Min: 98.1 °F (36.7 °C)  Max: 98.4 °F (36.9 °C)   BP  Min: 110/53  Max: 157/68   Pulse  Min: 63  Max: 91   Resp  Min: 14  Max: 18   SpO2  Min: 94 %  Max: 98 %   No data recorded       Input/Output for last 24 hour shift  05/26 0701 - 05/27 0700  In: 500.7 [P.O.:120; I.V.:180.7]  Out: 1100 [Urine:1100]        Physical  Exam:  GENERAL: Male resting supine in bed and conversant. No acute distress.   HEENT: Normocephalic and atraumatic. Trachea midline. PER. EOM WNL. Glasses present.    LUNGS: Chest rise of normal depth and symmetric. Lungs clear to auscultation bilaterally. No wheezes, rhonchi, or rales.   HEART: S1,S2 detected. Regular rate and rhythm. No rub, murmur, or gallop.   ABDOMEN: Soft, round, nondistended, and nontender. Bowel sounds present.   EXTREMITIES: No clubbing, edema, or cyanosis. Peripheral pulses present. Skin warm and dry. Bilateral femoral access sites with Aquacel dressings present, sites C/D/I.    NEURO/PSYCH: Alert and oriented. Follows commands. Moves all extremities. No focal deficits.      Results from last 7 days   Lab Units 05/26/23  0414 05/25/23  1719   WBC 10*3/mm3 11.15* 12.98*   HEMOGLOBIN g/dL 11.0* 11.1*   PLATELETS 10*3/mm3 218 203     Results from last 7 days   Lab Units 05/26/23  0414 05/25/23  1237   SODIUM mmol/L 133* 138   POTASSIUM mmol/L 4.8 3.9   CO2 mmol/L 20.0* 22.0   BUN mg/dL 19 13   CREATININE mg/dL 1.00 0.72*   MAGNESIUM mg/dL 1.8  --    PHOSPHORUS mg/dL 3.1  --    GLUCOSE mg/dL 311* 205*     Estimated Creatinine Clearance: 85.4 mL/min (by C-G formula based on SCr of 1 mg/dL).    I reviewed the patient's new clinical results.  I reviewed the patient's new imaging results/reports including actual images and agree with reports.     Assessment & Plan   Impression      PVD (peripheral vascular disease) with claudication    T2DM (type 2 diabetes mellitus)    Essential hypertension    ETOH use    Former smoker    67 yoM with PMH of former smoker, marijuana use, chronic ETOH use (3-4 beers/daily), uncontrolled T2DM ([id number]), and PAD recently found to have right iliac occlusion and severe left SFA stenosis who was admitted to [facility] ICU 5/25/23 to undergo elective bifemoral bypass per Dr. Escalante.      Plan        1. Continue to follow in ICU   2. Consult Cardiology; appreciate  recs  3. Postoperative management per Dr. Escalante  4. Continue DAPT   5. Neurovascular checks per protocol   6. Monitor for any S/S of bleeding   7. Ensure effective pain control   8. Mobilize when surgically appropriate   9. For HTN: Continue home CCB & ACEI  10. For T2DM: Increase basal & prandial, and continue SSI correction; goal FSBG less than or equal to 180 mg/dL  11. For ETOH use: Continue folate, thiamine, and MV. Monitor for any S/S of withdrawal   12. Follow up AM labs and replace electrolytes if needed     DVT Prophylaxis: Venous foot pumps  GI Prophylaxis: N/A / on PO diet  Dispo: Monitor in ICU    Time spent: 37 minutes  Plan of care and goals reviewed with multidisciplinary/antibiotic stewardship team during rounds.   I discussed the patient's findings and my recommendations with patient, family and nursing staff     Estelita Howe, DNP, APRN, AGACNP-BC  Pulmonary and Critical Care Medicine

## 2023-05-27 NOTE — PLAN OF CARE
Goal Outcome Evaluation:  Plan of Care Reviewed With: patient           Outcome Evaluation: Well rested throughout the night.  Pt has reported minimal pain this evening while in bed, however transition to bed did instigate pain.  At approx midnight pt was seen to have a Mobitz 1 arrythmia on monitor and confirmed with 12 lead EKG.  Pt reported no discomfort through this.  BP has remained less than 160 systolic.  No fevers appreciated. No acute distress has been noted.

## 2023-05-28 ENCOUNTER — READMISSION MANAGEMENT (OUTPATIENT)
Dept: CALL CENTER | Facility: HOSPITAL | Age: 68
End: 2023-05-28
Payer: MEDICARE

## 2023-05-28 VITALS
HEART RATE: 68 BPM | SYSTOLIC BLOOD PRESSURE: 132 MMHG | TEMPERATURE: 97.6 F | HEIGHT: 71 IN | DIASTOLIC BLOOD PRESSURE: 67 MMHG | RESPIRATION RATE: 16 BRPM | OXYGEN SATURATION: 98 % | WEIGHT: 202.82 LBS | BODY MASS INDEX: 28.4 KG/M2

## 2023-05-28 LAB
GLUCOSE BLDC GLUCOMTR-MCNC: 130 MG/DL (ref 70–130)
GLUCOSE BLDC GLUCOMTR-MCNC: 166 MG/DL (ref 70–130)
PHOSPHATE SERPL-MCNC: 2.8 MG/DL (ref 2.5–4.5)

## 2023-05-28 PROCEDURE — 82948 REAGENT STRIP/BLOOD GLUCOSE: CPT

## 2023-05-28 PROCEDURE — 63710000001 INSULIN DETEMIR PER 5 UNITS: Performed by: NURSE PRACTITIONER

## 2023-05-28 PROCEDURE — 63710000001 INSULIN LISPRO (HUMAN) PER 5 UNITS: Performed by: NURSE PRACTITIONER

## 2023-05-28 PROCEDURE — 99232 SBSQ HOSP IP/OBS MODERATE 35: CPT | Performed by: STUDENT IN AN ORGANIZED HEALTH CARE EDUCATION/TRAINING PROGRAM

## 2023-05-28 PROCEDURE — 84100 ASSAY OF PHOSPHORUS: CPT | Performed by: NURSE PRACTITIONER

## 2023-05-28 RX ORDER — HYDROCODONE BITARTRATE AND ACETAMINOPHEN 10; 325 MG/1; MG/1
1 TABLET ORAL EVERY 6 HOURS PRN
Qty: 30 TABLET | Refills: 0 | Status: SHIPPED | OUTPATIENT
Start: 2023-05-28 | End: 2023-06-02

## 2023-05-28 RX ADMIN — INSULIN DETEMIR 20 UNITS: 100 INJECTION, SOLUTION SUBCUTANEOUS at 08:44

## 2023-05-28 RX ADMIN — THIAMINE HCL TAB 100 MG 100 MG: 100 TAB at 08:43

## 2023-05-28 RX ADMIN — AMLODIPINE BESYLATE 10 MG: 10 TABLET ORAL at 08:43

## 2023-05-28 RX ADMIN — DOCUSATE SODIUM 50 MG AND SENNOSIDES 8.6 MG 2 TABLET: 8.6; 5 TABLET, FILM COATED ORAL at 08:43

## 2023-05-28 RX ADMIN — MULTIPLE VITAMINS W/ MINERALS TAB 1 TABLET: TAB at 08:44

## 2023-05-28 RX ADMIN — CLOPIDOGREL BISULFATE 75 MG: 75 TABLET ORAL at 08:43

## 2023-05-28 RX ADMIN — ASPIRIN 325 MG: 325 TABLET ORAL at 08:44

## 2023-05-28 RX ADMIN — LISINOPRIL 40 MG: 40 TABLET ORAL at 08:43

## 2023-05-28 RX ADMIN — FOLIC ACID 1 MG: 1 TABLET ORAL at 08:43

## 2023-05-28 RX ADMIN — INSULIN LISPRO 7 UNITS: 100 INJECTION, SOLUTION INTRAVENOUS; SUBCUTANEOUS at 08:44

## 2023-05-28 NOTE — PROGRESS NOTES
[facility]  Cardiology progress note  [facility] 2B ICU          Patient Identification:  Costa Robbins      [id number]  67 y.o.        male  1955       Date of Consultation:  05/28/23    Reason for Consultation: AV block    PCP: Jonathan Conner MD  Primary cardiologist: Jostin Hu history:   No major events overnight.  Seen by Dr. Escalante this morning and planning on discharge.  Denies any palpitations.  No syncopal events at home.  No new complaints.    Past History:  Past Medical History:   Diagnosis Date   • Coronary artery disease    • COVID     2021   • Diabetes mellitus    • Diverticulitis    • ETOH use 5/25/2023   • Former smoker 5/25/2023   • Hearing decreased    • Hypertension    • Melanoma     CHEST, BACK, NECK MULTI, HEAD   • PVD (peripheral vascular disease)    • Wears glasses      Past Surgical History:   Procedure Laterality Date   • AORTAGRAM Bilateral 4/12/2023    Procedure: AORTAGRAM WITH RUNOFFS  STENT OF THE LEFT ILIAC ARTERY;  Surgeon: Moses Escalante MD;  [facility];  Service: Vascular;  Laterality: Bilateral;   • COLON RESECTION  05/2009    partial colectomy   • COLONOSCOPY     • FEMORAL ENDARTERECTOMY Left 5/25/2023    Procedure: FEMORAL ENDARTERECTOMY WITH PROPATEN GRAFT 8MM X 40CM;  Surgeon: Moses Escalante MD;  Location: [facility];  Service: Vascular;  Laterality: Left;   • FEMORAL FEMORAL BYPASS N/A 5/25/2023    Procedure: FEMORAL FEMORAL BYPASS;  Surgeon: Moses Escalante MD;  Location: [facility] OR;  Service: Vascular;  Laterality: N/A;   • ORIF ANKLE FRACTURE Left    • SKIN CANCER EXCISION      melanoma     Allergies   Allergen Reactions   • Atenolol Other (See Comments)     Bradycardia.   • Morphine Itching   • Penicillins Rash     Social History     Socioeconomic History   • Marital status:    • Number of children: 0   Tobacco Use   • Smoking status: Former     Packs/day: 0.50      Years: 43.00     Pack years: 21.50     Types: Cigarettes     Quit date: 2023     Years since quittin.3   • Smokeless tobacco: Never   • Tobacco comments:     Pt states that he has been able to stop smoking this month - 2023   Vaping Use   • Vaping Use: Never used   Substance and Sexual Activity   • Alcohol use: Yes     Alcohol/week: 28.0 standard drinks     Types: 28 Cans of beer per week     Comment: 3-4 BEERS PER DAY   • Drug use: Yes     Types: Marijuana     Comment: ONCE A MONTH   • Sexual activity: Defer     Family History   Problem Relation Age of Onset   • Diabetes Mother    • Heart attack Mother    • Hypertension Mother    • Hyperlipidemia Mother    • Stroke Mother    • Hodgkin's lymphoma Father    • Hypertension Father    • Diabetes Brother    • Hyperlipidemia Brother      Medications:  Medications Prior to Admission   Medication Sig Dispense Refill Last Dose   • amLODIPine (NORVASC) 10 MG tablet Take 1 tablet by mouth Daily. 30 tablet 5 2023 at 0900   • aspirin 325 MG tablet Take 1 tablet by mouth Daily.   2023 at 0900   • clopidogrel (PLAVIX) 75 MG tablet Take 1 tablet by mouth Daily. 30 tablet 6 2023 at 0900   • doxazosin (Cardura) 2 MG tablet Take 1 tablet by mouth Every Night. 30 tablet 5 2023 at 2230   • Insulin Glargine (BASAGLAR KWIKPEN) 100 UNIT/ML injection pen Inject 40 Units under the skin into the appropriate area as directed Every Morning. (Patient taking differently: Inject 40 Units under the skin into the appropriate area as directed Every Night.) 10 pen 2 2023 at 2230   • lisinopril (PRINIVIL,ZESTRIL) 40 MG tablet Take 1 tablet by mouth once daily (Patient taking differently: Take 1 tablet by mouth Daily.) 90 tablet 0 2023 at 1800     Current medications:  amLODIPine, 10 mg, Oral, Daily  aspirin, 325 mg, Oral, Daily  clopidogrel, 75 mg, Oral, Daily  folic acid, 1 mg, Oral, Daily  insulin detemir, 20 Units, Subcutaneous, Q12H  insulin lispro, 3-14  "Units, Subcutaneous, 4x Daily AC & at Bedtime  Insulin Lispro, 7 Units, Subcutaneous, TID With Meals  lisinopril, 40 mg, Oral, Daily  multivitamin with minerals, 1 tablet, Oral, Daily  rosuvastatin, 20 mg, Oral, Nightly  senna-docusate sodium, 2 tablet, Oral, BID  terazosin, 2 mg, Oral, Nightly  thiamine, 100 mg, Oral, Daily      Current IV drips:       Review of Systems:    Constitutional no fever,  no weight loss   Skin no rash   Otolaryngeal no difficulty swallowing   Cardiovascular See HPI   Pulmonary no cough, no sputum production   Gastrointestinal no constipation, no diarrhea   Genitourinary no dysuria, no hematuria   Hematologic no easy bruisability, no abnormal bleeding   Musculoskeletal no muscle pain   Neurologic no dizziness, no falls     Physical exam:    /64   Pulse 50   Temp 97.8 °F (36.6 °C) (Oral)   Resp 16   Ht 180.3 cm (70.98\")   Wt 92 kg (202 lb 13.2 oz)   SpO2 97%   BMI 28.30 kg/m²  Body mass index is 28.3 kg/m².   Oxygen saturation   SpO2  Min: 94 %  Max: 99 %    General Appearance:   · well developed  · well nourished  HENT:   · oropharynx moist  · lips not cyanotic  Neck:  · thyroid not enlarged  · supple  Respiratory:  · no respiratory distress  · normal breath sounds  · no rales  Cardiovascular:  · no jugular venous distention  · regular rhythm  · apical impulse normal  · S1 normal, S2 normal  · no S3, no S4   · no murmur  · no rub, no thrill  · carotid pulses normal; no bruit  · pedal pulses 1+  · lower extremity edema: none    Gastrointestinal:   · bowel sounds normal  · non-tender  · no hepatomegaly, no splenomegaly  Musculoskeletal:  · no clubbing of fingers.   · normocephalic, head atraumatic  Skin:   · warm, dry  Psychiatric:  · judgement and insight appropriate  · normal mood and affect    Cardiographics:     ECG  (personally interpreted) normal sinus rhythm with second-degree AV block Mobitz type I   Telemetry:  (personally interpreted) second-degree Mobitz type I AV " block on the telemetry monitor.  As well as first-degree AV block.   ECHO:        CATH:     CARDIOLITE:   9/2022, normal.    Lab Review:       Results from last 7 days   Lab Units 05/26/23  0414 05/25/23  1719   WBC 10*3/mm3 11.15* 12.98*   HEMOGLOBIN g/dL 11.0* 11.1*   HEMATOCRIT % 34.7* 34.3*            Results from last 7 days   Lab Units 05/27/23  0826 05/26/23  0414 05/25/23  1237   SODIUM mmol/L 138 133* 138   BUN mg/dL 9 19 13   CREATININE mg/dL 0.69* 1.00 0.72*   GLUCOSE mg/dL 212* 311* 205*      Estimated Creatinine Clearance: 123.8 mL/min (A) (by C-G formula based on SCr of 0.69 mg/dL (L)).     Lab Results   Component Value Date    TRIG 224 (H) 07/26/2021    HDL 51 07/26/2021     (H) 07/26/2021           No results found for: TSH     Lab Results   Component Value Date    HGBA1C 12.20 (H) 05/15/2023           No results found for: DIGOXIN   No results found for: DDIMERQUANT     Imaging:  All pertinent imaging studies were personally reviewed.    Assessment:      PVD (peripheral vascular disease) with claudication    T2DM (type 2 diabetes mellitus)    Essential hypertension    ETOH use    Former smoker      Initial cardiac assessment: 67-year-old presenting for elective femorofemoral bypass and left femoral endarterectomy by Dr. Escalante 5/25/23, found to have secondary AV block Mobitz type I.      Recommendations:  1.  Second-degree AV block Mobitz type I:  - Patient has first-degree block on ECG as well as intermittent second-degree AV block Mobitz type I.  -I personally interpreted telemetry for the past 24 hours.  He has periods of Mobitz type I AV block as well as periods of 2-1 AV block during sleep.  He does get somewhat bradycardic during sleep with his 2 1 AV block, however he remains asymptomatic with all of this.  Very low risk for malignant arrhythmias.  - Avoid AV luh blockers  -Echocardiogram reviewed with no significant abnormalities  -Okay for discharge from my perspective  - F/u with  Dr. Frankel in 1-2 months.    2.  PAD:  Continue aspirin and statin therapy  Status post revascularization by Dr. Escalante 5/25  Continue postoperative care.    3. HTN:  Fairly well-controlled on current regimen of amlodipine lisinopril and terazosin  Avoid AV luh blockers  Secondary work-up completed in the past.     4.  Hyperlipidemia:  Goal LDL less than 70  Start rosuvastatin 20 mg daily          William Gilman MD  5/28/2023  08:27 EDT

## 2023-05-28 NOTE — PROGRESS NOTES
"Costa Robbins  [id number]  1955     LOS: 3 days   Patient Care Team:  Jonathan Conner MD as PCP - General (Family Medicine)  Moses Frankel MD as Consulting Physician (Cardiology)    Chief Complaint: Peripheral vascular disease      Subjective: No complaints wants to go home  Objective:     Vital Sign Min/Max for last 24 hours  Temp  Min: 97.5 °F (36.4 °C)  Max: 99 °F (37.2 °C)   BP  Min: 113/57  Max: 184/76   Pulse  Min: 50  Max: 89   Resp  Min: 16  Max: 20   SpO2  Min: 94 %  Max: 99 %   No data recorded   Weight  Min: 92 kg (202 lb 13.2 oz)  Max: 92 kg (202 lb 13.2 oz)     Flowsheet Rows    Flowsheet Row First Filed Value   Admission Height 180.3 cm (71\") Documented at 05/25/2023 1213   Admission Weight 88.3 kg (194 lb 12.4 oz) Documented at 05/25/2023 1213          Physical Exam:    Wound: Satisfactory, pulses intact  Pulses:     Mediastinal and Chest Tube Drainage:       Results Review:   Results from last 7 days   Lab Units 05/26/23  0414   WBC 10*3/mm3 11.15*   HEMOGLOBIN g/dL 11.0*   HEMATOCRIT % 34.7*   PLATELETS 10*3/mm3 218     Results from last 7 days   Lab Units 05/27/23  0826   SODIUM mmol/L 138   POTASSIUM mmol/L 4.1   CHLORIDE mmol/L 107   CO2 mmol/L 23.0   BUN mg/dL 9   CREATININE mg/dL 0.69*   GLUCOSE mg/dL 212*   CALCIUM mg/dL 9.3             Assessment      PVD (peripheral vascular disease) with claudication    T2DM (type 2 diabetes mellitus)    Essential hypertension    ETOH use    Former smoker      Discharge patient.        Moses Escalante MD  05/28/23  07:09 EDT      Please note that portions of this note were completed with a voice recognition program. Efforts were made to edit the dictations, but words may be mistranscribed  "

## 2023-05-28 NOTE — PLAN OF CARE
Goal Outcome Evaluation:      Pt A&O x 4, BP maintained with PRNs, pain controlled, HR drops to 40s occasionally but not sustained, appears to still have heart block but is asymptomatic, bilateral fem sites soft, no bleeding noted, plan to d/c home today

## 2023-05-29 LAB
BH BB BLOOD EXPIRATION DATE: NORMAL
BH BB BLOOD EXPIRATION DATE: NORMAL
BH BB BLOOD TYPE BARCODE: 5100
BH BB BLOOD TYPE BARCODE: 5100
BH BB DISPENSE STATUS: NORMAL
BH BB DISPENSE STATUS: NORMAL
BH BB PRODUCT CODE: NORMAL
BH BB PRODUCT CODE: NORMAL
BH BB UNIT NUMBER: NORMAL
BH BB UNIT NUMBER: NORMAL
CROSSMATCH INTERPRETATION: NORMAL
CROSSMATCH INTERPRETATION: NORMAL
UNIT  ABO: NORMAL
UNIT  ABO: NORMAL
UNIT  RH: NORMAL
UNIT  RH: NORMAL

## 2023-05-29 NOTE — OUTREACH NOTE
Prep Survey    Flowsheet Row Responses   [facility] patient discharged from? [place]   Is LACE score < 7 ? No   Eligibility Readm Mgmt   Discharge diagnosis peripheral vascular disease, FEMORAL BYPASS   Does the patient have one of the following disease processes/diagnoses(primary or secondary)? Other   Does the patient have Home health ordered? No   Is there a DME ordered? No   Prep survey completed? Yes          Carri BEE - Registered Nurse

## 2023-05-30 LAB
ASCENDING AORTA: 3.9 CM
BH CV ECHO MEAS - AO MAX PG: 14.6 MMHG
BH CV ECHO MEAS - AO MEAN PG: 8 MMHG
BH CV ECHO MEAS - AO ROOT AREA (BSA CORRECTED): 1.8 CM2
BH CV ECHO MEAS - AO ROOT DIAM: 3.9 CM
BH CV ECHO MEAS - AO V2 MAX: 191 CM/SEC
BH CV ECHO MEAS - AO V2 VTI: 34.5 CM
BH CV ECHO MEAS - AVA(I,D): 2.12 CM2
BH CV ECHO MEAS - EDV(CUBED): 175.6 ML
BH CV ECHO MEAS - EDV(MOD-SP2): 127 ML
BH CV ECHO MEAS - EDV(MOD-SP4): 123 ML
BH CV ECHO MEAS - EF(MOD-BP): 63.6 %
BH CV ECHO MEAS - EF(MOD-SP2): 60.4 %
BH CV ECHO MEAS - EF(MOD-SP4): 65.9 %
BH CV ECHO MEAS - ESV(CUBED): 64 ML
BH CV ECHO MEAS - ESV(MOD-SP2): 50.3 ML
BH CV ECHO MEAS - ESV(MOD-SP4): 41.9 ML
BH CV ECHO MEAS - FS: 28.6 %
BH CV ECHO MEAS - IVS/LVPW: 1 CM
BH CV ECHO MEAS - IVSD: 1 CM
BH CV ECHO MEAS - LA DIMENSION: 3.5 CM
BH CV ECHO MEAS - LAT PEAK E' VEL: 12.9 CM/SEC
BH CV ECHO MEAS - LV MASS(C)D: 219.7 GRAMS
BH CV ECHO MEAS - LV MAX PG: 7.5 MMHG
BH CV ECHO MEAS - LV MEAN PG: 4 MMHG
BH CV ECHO MEAS - LV V1 MAX: 137 CM/SEC
BH CV ECHO MEAS - LV V1 VTI: 25.8 CM
BH CV ECHO MEAS - LVIDD: 5.6 CM
BH CV ECHO MEAS - LVIDS: 4 CM
BH CV ECHO MEAS - LVOT AREA: 2.8 CM2
BH CV ECHO MEAS - LVOT DIAM: 1.9 CM
BH CV ECHO MEAS - LVPWD: 1 CM
BH CV ECHO MEAS - MED PEAK E' VEL: 10.7 CM/SEC
BH CV ECHO MEAS - MV DEC SLOPE: 498 CM/SEC2
BH CV ECHO MEAS - MV P1/2T: 60.6 MSEC
BH CV ECHO MEAS - MVA(P1/2T): 3.6 CM2
BH CV ECHO MEAS - PA ACC TIME: 0.05 SEC
BH CV ECHO MEAS - PA PR(ACCEL): 55.2 MMHG
BH CV ECHO MEAS - SV(LVOT): 73.2 ML
BH CV ECHO MEAS - SV(MOD-SP2): 76.7 ML
BH CV ECHO MEAS - SV(MOD-SP4): 81.1 ML
BH CV ECHO MEAS - TAPSE (>1.6): 2.16 CM
BH CV VAS BP LEFT ARM: NORMAL MMHG
BH CV XLRA - RV BASE: 2.9 CM
BH CV XLRA - RV LENGTH: 7.3 CM
BH CV XLRA - RV MID: 2.3 CM
BH CV XLRA - TDI S': 14.6 CM/SEC
CYTO UR: NORMAL
LAB AP CASE REPORT: NORMAL
LAB AP CLINICAL INFORMATION: NORMAL
LEFT ATRIUM VOLUME INDEX: 31.9 ML/M2
MAXIMAL PREDICTED HEART RATE: 155 BPM
PATH REPORT.FINAL DX SPEC: NORMAL
PATH REPORT.GROSS SPEC: NORMAL
STRESS TARGET HR: 132 BPM

## 2023-05-30 NOTE — DISCHARGE SUMMARY
CTS Discharge Summary    Patient Care Team:  Jonathan Conner MD as PCP - General (Family Medicine)  Moses Frankel MD as Consulting Physician (Cardiology)  Consults:   Consults     Date and Time Order Name Status Description    5/27/2023  7:32 AM Inpatient Cardiology Consult Completed           Date of Admission: 5/25/2023 10:41 AM  Date of Discharge:  5/28/2023    Discharge Diagnosis  Past Medical History:   Diagnosis Date   • Coronary artery disease    • COVID     2021   • Diabetes mellitus    • Diverticulitis    • ETOH use 5/25/2023   • Former smoker 5/25/2023   • Hearing decreased    • Hypertension    • Melanoma     CHEST, BACK, NECK MULTI, HEAD   • PVD (peripheral vascular disease)    • Wears glasses      Patient Active Problem List   Diagnosis   • T2DM (type 2 diabetes mellitus)   • Essential hypertension   • Diverticulosis   • Family history of coronary artery disease   • Mixed hyperlipidemia   • Renal artery stenosis   • PVD (peripheral vascular disease) with claudication   • ETOH use   • Former smoker       PVD (peripheral vascular disease) with claudication [I73.9]     Procedures Performed  Procedure(s):  FEMORAL FEMORAL BYPASS  FEMORAL ENDARTERECTOMY WITH PROPATEN GRAFT 8MM X 40CM     History of Present Illness  Patient is a 67 y.o. male with PMH significant for former smoker, HTN, HLD, insulin dependent DM, and significant family history of CAD.  Patient underwent aortogram with runoffs on 4/12 which revealed right common iliac was completely occluded with reconstitution of the right external iliac the left external iliac had approximately a 60% stenosis.  There was what appeared to be a 80% stenosis at the takeoff of the left SFA.     Hospital Course  Patient was taken to the operating room on 5/25 for left femoral endarterectomy with femoral-femoral bypass with a 8 mm [place]-Wm graft.  Patient was extubated in the operating room and transported to recovery in stable condition.  POD 1: Up to chair.  Villatoro removed  POD 2: Heart block overnight. Cardiology consulted.  POD 3: Patient met discharge criteria and was discharged to home with f/u with Dr. Frankel in 1-2 months      Discharge Medications     Discharge Medications      New Medications      Instructions Start Date   HYDROcodone-acetaminophen  MG per tablet  Commonly known as: NORCO   1 tablet, Oral, Every 6 Hours PRN         Changes to Medications      Instructions Start Date   BASAGLAR KWIKPEN 100 UNIT/ML injection pen  What changed: when to take this   40 Units, Subcutaneous, Every Morning         Continue These Medications      Instructions Start Date   amLODIPine 10 MG tablet  Commonly known as: NORVASC   10 mg, Oral, Daily      aspirin 325 MG tablet   325 mg, Oral, Daily      clopidogrel 75 MG tablet  Commonly known as: PLAVIX   75 mg, Oral, Daily      doxazosin 2 MG tablet  Commonly known as: Cardura   2 mg, Oral, Nightly      lisinopril 40 MG tablet  Commonly known as: PRINIVILZESTRIL   Take 1 tablet by mouth once daily             Discharge Diet:   Diet Instructions    Diabatic cardiac diet           Activity at Discharge:   Activity Instructions    Activity as tolerated no lifting greater than 10 lbs           Follow-up Appointments  Future Appointments   Date Time Provider Department Center   6/21/2023  1:00 PM Mariana Benavides APRN [facility] [facility]        WOUND CARE: Keep incisions clean and dry at all times. Take a shower daily. Do NOT take a tub bath or submerge yourself in hot tubs, swimming pools, or any other body of water until seen by the cardiac surgeon in your follow-up visit. Clean  your body and incisions daily with Dial soap and water. Always use a clean washcloth. Do not scrub your incision(s). Do not re-use dressings on your incision(s). Do not use any lotions, creams, oils, powders, antibiotic ointment (i.e., Neosporin), peroxide, alcohol, or iodine UNLESS told to do by the cardiac surgeon.      Adelia Shelton,  JOSE  05/30/23  13:22 EDT

## 2023-05-31 ENCOUNTER — TELEPHONE (OUTPATIENT)
Dept: CARDIOLOGY | Facility: CLINIC | Age: 68
End: 2023-05-31

## 2023-05-31 DIAGNOSIS — R00.2 PALPITATIONS: Primary | ICD-10-CM

## 2023-05-31 DIAGNOSIS — I44.0 FIRST DEGREE AV BLOCK: ICD-10-CM

## 2023-05-31 NOTE — TELEPHONE ENCOUNTER
Spoke with pt regarding echo results. Need order for 14 day holter from hospital, pt aware we will mail to him.

## 2023-05-31 NOTE — TELEPHONE ENCOUNTER
----- Message from Kirk Nur MD sent at 5/30/2023  5:41 PM EDT -----  Please inform the patient of their test results. Thank you.

## 2023-06-01 ENCOUNTER — READMISSION MANAGEMENT (OUTPATIENT)
Dept: CALL CENTER | Facility: HOSPITAL | Age: 68
End: 2023-06-01

## 2023-06-01 NOTE — OUTREACH NOTE
Medical Week 1 Survey    Flowsheet Row Responses   [facility] patient discharged from? [place]   Does the patient have one of the following disease processes/diagnoses(primary or secondary)? Other   Week 1 attempt successful? No   Unsuccessful attempts Attempt 1          Yanni CAMEJO - Registered Nurse

## 2023-06-08 ENCOUNTER — READMISSION MANAGEMENT (OUTPATIENT)
Dept: CALL CENTER | Facility: HOSPITAL | Age: 68
End: 2023-06-08
Payer: MEDICARE

## 2023-06-08 NOTE — OUTREACH NOTE
Medical Week 2 Survey      Flowsheet Row Responses   [facility] patient discharged from? [place]   Does the patient have one of the following disease processes/diagnoses(primary or secondary)? Other   Week 2 attempt successful? Yes   Call start time 0840   Discharge diagnosis peripheral vascular disease, FEMORAL BYPASS   Call end time 0858   Person spoke with today (if not patient) and relationship patient   Meds reviewed with patient/caregiver? Yes   Is the patient having any side effects they believe may be caused by any medication additions or changes? No   Does the patient have all medications ordered at discharge? Yes   Is the patient taking all medications as directed (includes completed medication regime)? Yes   Does the patient have a primary care provider?  Yes   Does the patient have an appointment with their PCP within 7 days of discharge? Greater than 7 days   Comments regarding PCP Patient will have a followup on 6/21/03 with surgeon. (Mariana Benavides)   What is preventing the patient from scheduling follow up appointments within 7 days of discharge? Earlier appointment not available   Nursing Interventions Verified appointment date/time/provider   Has the patient kept scheduled appointments due by today? N/A   Psychosocial issues? No   Did the patient receive a copy of their discharge instructions? Yes   Nursing interventions Reviewed instructions with patient   What is the patient's perception of their health status since discharge? Same  [Still having issues with blood pressure , this am 180/85. Patient discouraged because he had hoped the surgery would help with lowering blood pressure, No s/s of infection at surgical site. He will followup with surgeons office on 6/21. ]   Is the patient/caregiver able to teach back signs and symptoms related to disease process for when to call PCP? Yes   Is the patient/caregiver able to teach back signs and symptoms related to disease process for when to call  911? Yes   Is the patient/caregiver able to teach back the hierarchy of who to call/visit for symptoms/problems? PCP, Specialist, Home health nurse, Urgent Care, ED, 911 Yes   If the patient is a current smoker, are they able to teach back resources for cessation? Not a smoker   Week 2 Call Completed? Yes   Is the patient interested in additional calls from an ambulatory ?  NOTE:  applies to high risk patients requiring additional follow-up. No            Ortiz BEASLEY - Registered Nurse

## 2023-06-19 ENCOUNTER — READMISSION MANAGEMENT (OUTPATIENT)
Dept: CALL CENTER | Facility: HOSPITAL | Age: 68
End: 2023-06-19
Payer: MEDICARE

## 2023-06-19 NOTE — OUTREACH NOTE
Medical Week 3 Survey      Flowsheet Row Responses   [facility] patient discharged from? [place]   Does the patient have one of the following disease processes/diagnoses(primary or secondary)? Other   Week 3 attempt successful? Yes   Call start time 1515   Call end time 1518   Discharge diagnosis peripheral vascular disease, FEMORAL BYPASS   Person spoke with today (if not patient) and relationship patient   Meds reviewed with patient/caregiver? Yes   Is the patient having any side effects they believe may be caused by any medication additions or changes? No   Does the patient have all medications ordered at discharge? Yes   Is the patient taking all medications as directed (includes completed medication regime)? Yes   Does the patient have a primary care provider?  Yes   Comments regarding PCP Patient will have a followup on 6/21/03 with surgeon. (Mariana Benavides)   Does the patient have an appointment with their PCP within 7 days of discharge? Greater than 7 days   Nursing Interventions Verified appointment date/time/provider   Has the patient kept scheduled appointments due by today? N/A   Psychosocial issues? No   Did the patient receive a copy of their discharge instructions? Yes   Nursing interventions Reviewed instructions with patient   What is the patient's perception of their health status since discharge? Same  [Still having blood pressure issues 180/80's . Sean NORMAN on 6/21/2023]   Is the patient/caregiver able to teach back signs and symptoms related to disease process for when to call PCP? Yes   Is the patient/caregiver able to teach back signs and symptoms related to disease process for when to call 911? Yes   Is the patient/caregiver able to teach back the hierarchy of who to call/visit for symptoms/problems? PCP, Specialist, Home health nurse, Urgent Care, ED, 911 Yes   If the patient is a current smoker, are they able to teach back resources for cessation? Not a smoker   Week 3 Call Completed? Yes    Graduated Yes   Is the patient interested in additional calls from an ambulatory ?  NOTE:  applies to high risk patients requiring additional follow-up. No            Ortiz W - Registered Nurse

## 2023-07-03 ENCOUNTER — HOSPITAL ENCOUNTER (OUTPATIENT)
Age: 68
End: 2023-07-03
Payer: MEDICARE

## 2023-07-03 DIAGNOSIS — R06.83: ICD-10-CM

## 2023-07-03 DIAGNOSIS — G47.30: Primary | ICD-10-CM

## 2023-07-03 PROCEDURE — G0399 HOME SLEEP TEST/TYPE 3 PORTA: HCPCS

## 2023-08-02 ENCOUNTER — TELEPHONE (OUTPATIENT)
Dept: CARDIOLOGY | Facility: CLINIC | Age: 68
End: 2023-08-02
Payer: MEDICARE

## 2023-08-03 ENCOUNTER — TELEPHONE (OUTPATIENT)
Dept: CARDIOLOGY | Facility: CLINIC | Age: 68
End: 2023-08-03
Payer: MEDICARE

## 2023-09-26 ENCOUNTER — TELEPHONE (OUTPATIENT)
Dept: CARDIAC SURGERY | Facility: CLINIC | Age: 68
End: 2023-09-26
Payer: MEDICARE

## 2023-09-26 NOTE — TELEPHONE ENCOUNTER
Provider: ROXANA    Caller: CHERIE KESSLER    Relationship to Patient: SELF    Phone Number: [phone]    Reason for Call:PT CALLING IN STATING HE HAS A CRACKED TOOTH NEEDS EXTRACTED WILL HAVE TO STOP TAKING PLAVIX FOR 5 DAYS PT IS NEEDING ROXANA PERMISSION TO DO SO

## 2023-09-26 NOTE — TELEPHONE ENCOUNTER
Per [facility], Ok to stop Plavix for 5 days prior to tooth extraction, notified patient and told him to ask dentist as to when he can safely resume after procedure. See also scanned nursing note.

## 2023-12-18 ENCOUNTER — HOSPITAL ENCOUNTER (EMERGENCY)
Age: 68
Discharge: HOME | End: 2023-12-18
Payer: MEDICARE

## 2023-12-18 VITALS — SYSTOLIC BLOOD PRESSURE: 124 MMHG | DIASTOLIC BLOOD PRESSURE: 73 MMHG | HEART RATE: 99 BPM | OXYGEN SATURATION: 100 %

## 2023-12-18 VITALS
HEART RATE: 90 BPM | SYSTOLIC BLOOD PRESSURE: 119 MMHG | DIASTOLIC BLOOD PRESSURE: 68 MMHG | TEMPERATURE: 98.24 F | RESPIRATION RATE: 18 BRPM | OXYGEN SATURATION: 97 %

## 2023-12-18 VITALS — BODY MASS INDEX: 28.8 KG/M2

## 2023-12-18 VITALS
TEMPERATURE: 98.2 F | RESPIRATION RATE: 20 BRPM | HEART RATE: 99 BPM | SYSTOLIC BLOOD PRESSURE: 120 MMHG | DIASTOLIC BLOOD PRESSURE: 68 MMHG | OXYGEN SATURATION: 99 %

## 2023-12-18 DIAGNOSIS — R68.81: ICD-10-CM

## 2023-12-18 DIAGNOSIS — K92.1: ICD-10-CM

## 2023-12-18 DIAGNOSIS — K21.9: Primary | ICD-10-CM

## 2023-12-18 DIAGNOSIS — R63.4: ICD-10-CM

## 2023-12-18 LAB
ALBUMIN LEVEL: 4.2 G/DL (ref 3.5–5)
ALBUMIN/GLOB SERPL: 1.2 {RATIO} (ref 1.1–1.8)
ALP ISO SERPL-ACNC: 133 U/L (ref 38–126)
ALT SERPLBLD-CCNC: 50 U/L (ref 12–78)
ANION GAP SERPL CALC-SCNC: 12.2 MEQ/L (ref 5–15)
AST SERPL QL: 23 U/L (ref 17–59)
BILIRUBIN,TOTAL: 0.5 MG/DL (ref 0.2–1.3)
BUN SERPL-MCNC: 22 MG/DL (ref 9–20)
CALCIUM SPEC-MCNC: 11.2 MG/DL (ref 8.4–10.2)
CHLORIDE SPEC-SCNC: 101 MMOL/L (ref 98–107)
CO2 SERPL-SCNC: 24 MMOL/L (ref 22–30)
CREAT BLD-SCNC: 0.9 MG/DL (ref 0.66–1.25)
CREATININE CLEARANCE ESTIMATED: 91 ML/MIN (ref 50–200)
ESTIMATED GLOMERULAR FILT RATE: 84 ML/MIN (ref 60–?)
GFR (AFRICAN AMERICAN): 102 ML/MIN (ref 60–?)
GLOBULIN SER CALC-MCNC: 3.5 G/DL (ref 1.3–3.2)
GLUCOSE: 266 MG/DL (ref 74–100)
HCT VFR BLD CALC: 46 % (ref 42–52)
HGB BLD-MCNC: 15.7 G/DL (ref 14.1–18)
LIPASE: 96 U/L (ref 23–300)
MAGNESIUM: 2.5 MG/DL (ref 1.6–2.3)
MCHC RBC-ENTMCNC: 34.2 G/DL (ref 31.8–35.4)
MCV RBC: 88.3 FL (ref 80–94)
MEAN CORPUSCULAR HEMOGLOBIN: 30.2 PG (ref 27–31.2)
PHOSPHOROUS: 3.2 MG/DL (ref 2.5–4.5)
PLATELET # BLD: 272 K/MM3 (ref 142–424)
POTASSIUM: 4.2 MMOL/L (ref 3.5–5.1)
PROT SERPL-MCNC: 7.7 G/DL (ref 6.3–8.2)
RBC # BLD AUTO: 5.21 M/MM3 (ref 4.6–6.2)
SODIUM SPEC-SCNC: 133 MMOL/L (ref 136–145)
WBC # BLD AUTO: 7.1 K/MM3 (ref 4.8–10.8)

## 2023-12-18 PROCEDURE — 80053 COMPREHEN METABOLIC PANEL: CPT

## 2023-12-18 PROCEDURE — 99285 EMERGENCY DEPT VISIT HI MDM: CPT

## 2023-12-18 PROCEDURE — 74177 CT ABD & PELVIS W/CONTRAST: CPT

## 2023-12-18 PROCEDURE — 83690 ASSAY OF LIPASE: CPT

## 2023-12-18 PROCEDURE — 84100 ASSAY OF PHOSPHORUS: CPT

## 2023-12-18 PROCEDURE — 85025 COMPLETE CBC W/AUTO DIFF WBC: CPT

## 2023-12-18 PROCEDURE — 83735 ASSAY OF MAGNESIUM: CPT

## 2024-01-26 ENCOUNTER — APPOINTMENT (OUTPATIENT)
Dept: CT IMAGING | Facility: HOSPITAL | Age: 69
DRG: 446 | End: 2024-01-26
Payer: MEDICARE

## 2024-01-26 ENCOUNTER — APPOINTMENT (OUTPATIENT)
Dept: MRI IMAGING | Facility: HOSPITAL | Age: 69
DRG: 446 | End: 2024-01-26
Payer: MEDICARE

## 2024-01-26 ENCOUNTER — HOSPITAL ENCOUNTER (INPATIENT)
Facility: HOSPITAL | Age: 69
LOS: 2 days | Discharge: HOME | DRG: 446 | End: 2024-01-28
Attending: STUDENT IN AN ORGANIZED HEALTH CARE EDUCATION/TRAINING PROGRAM | Admitting: INTERNAL MEDICINE
Payer: MEDICARE

## 2024-01-26 ENCOUNTER — APPOINTMENT (OUTPATIENT)
Dept: GENERAL RADIOLOGY | Facility: HOSPITAL | Age: 69
DRG: 446 | End: 2024-01-26
Payer: MEDICARE

## 2024-01-26 DIAGNOSIS — K85.90 ACUTE PANCREATITIS, UNSPECIFIED COMPLICATION STATUS, UNSPECIFIED PANCREATITIS TYPE: ICD-10-CM

## 2024-01-26 DIAGNOSIS — Z79.4 TYPE 2 DIABETES MELLITUS WITH HYPERGLYCEMIA, WITH LONG-TERM CURRENT USE OF INSULIN: Chronic | ICD-10-CM

## 2024-01-26 DIAGNOSIS — R63.4 UNINTENTIONAL WEIGHT LOSS: ICD-10-CM

## 2024-01-26 DIAGNOSIS — E11.65 TYPE 2 DIABETES MELLITUS WITH HYPERGLYCEMIA, WITH LONG-TERM CURRENT USE OF INSULIN: Chronic | ICD-10-CM

## 2024-01-26 DIAGNOSIS — K75.9 HEPATITIS: ICD-10-CM

## 2024-01-26 DIAGNOSIS — R79.89 ELEVATED LFTS: ICD-10-CM

## 2024-01-26 DIAGNOSIS — R17 JAUNDICE: ICD-10-CM

## 2024-01-26 DIAGNOSIS — C25.1 MALIGNANT NEOPLASM OF BODY OF PANCREAS: ICD-10-CM

## 2024-01-26 DIAGNOSIS — K86.89 PANCREATIC MASS: Primary | ICD-10-CM

## 2024-01-26 DIAGNOSIS — I10 ESSENTIAL HYPERTENSION: Chronic | ICD-10-CM

## 2024-01-26 DIAGNOSIS — E78.2 MIXED HYPERLIPIDEMIA: ICD-10-CM

## 2024-01-26 PROBLEM — K80.50 CHOLEDOCHOLITHIASIS: Status: ACTIVE | Noted: 2024-01-26

## 2024-01-26 LAB
ALBUMIN SERPL-MCNC: 4 G/DL (ref 3.5–5.2)
ALBUMIN/GLOB SERPL: 1.1 G/DL
ALP SERPL-CCNC: 790 U/L (ref 39–117)
ALT SERPL W P-5'-P-CCNC: 795 U/L (ref 1–41)
ANION GAP SERPL CALCULATED.3IONS-SCNC: 18 MMOL/L (ref 5–15)
APAP SERPL-MCNC: <5 MCG/ML (ref 0–30)
ARTERIAL PATENCY WRIST A: ABNORMAL
AST SERPL-CCNC: 336 U/L (ref 1–40)
ATMOSPHERIC PRESS: ABNORMAL MM[HG]
BASE EXCESS BLDA CALC-SCNC: -5.4 MMOL/L (ref 0–2)
BASOPHILS # BLD AUTO: 0.06 10*3/MM3 (ref 0–0.2)
BASOPHILS NFR BLD AUTO: 0.7 % (ref 0–1.5)
BDY SITE: ABNORMAL
BILIRUB CONJ SERPL-MCNC: 7.5 MG/DL (ref 0–0.3)
BILIRUB SERPL-MCNC: 9.8 MG/DL (ref 0–1.2)
BILIRUB UR QL STRIP: ABNORMAL
BODY TEMPERATURE: 37
BUN SERPL-MCNC: 17 MG/DL (ref 8–23)
BUN/CREAT SERPL: 25.8 (ref 7–25)
CALCIUM SPEC-SCNC: 11.8 MG/DL (ref 8.6–10.5)
CHLORIDE SERPL-SCNC: 89 MMOL/L (ref 98–107)
CLARITY UR: CLEAR
CO2 BLDA-SCNC: 16.4 MMOL/L (ref 22–33)
CO2 SERPL-SCNC: 19 MMOL/L (ref 22–29)
COHGB MFR BLD: 1.7 % (ref 0–2)
COLOR UR: ABNORMAL
CREAT SERPL-MCNC: 0.66 MG/DL (ref 0.76–1.27)
CRP SERPL-MCNC: <0.3 MG/DL (ref 0–0.5)
D-LACTATE SERPL-SCNC: 1.7 MMOL/L (ref 0.5–2)
DEPRECATED RDW RBC AUTO: 51.9 FL (ref 37–54)
EGFRCR SERPLBLD CKD-EPI 2021: 103.4 ML/MIN/1.73
EOSINOPHIL # BLD AUTO: 0.08 10*3/MM3 (ref 0–0.4)
EOSINOPHIL NFR BLD AUTO: 1 % (ref 0.3–6.2)
EPAP: 0
ERYTHROCYTE [DISTWIDTH] IN BLOOD BY AUTOMATED COUNT: 16.2 % (ref 12.3–15.4)
FLUAV SUBTYP SPEC NAA+PROBE: NOT DETECTED
FLUBV RNA ISLT QL NAA+PROBE: NOT DETECTED
GLOBULIN UR ELPH-MCNC: 3.5 GM/DL
GLUCOSE SERPL-MCNC: 438 MG/DL (ref 65–99)
GLUCOSE UR STRIP-MCNC: ABNORMAL MG/DL
HAV IGM SERPL QL IA: NORMAL
HBV CORE IGM SERPL QL IA: NORMAL
HBV SURFACE AG SERPL QL IA: NORMAL
HCO3 BLDA-SCNC: 15.8 MMOL/L (ref 20–26)
HCT VFR BLD AUTO: 41.4 % (ref 37.5–51)
HCT VFR BLD CALC: 40 % (ref 38–51)
HCV AB SER DONR QL: NORMAL
HGB BLD-MCNC: 14.7 G/DL (ref 13–17.7)
HGB BLDA-MCNC: 13.1 G/DL (ref 13.5–17.5)
HGB UR QL STRIP.AUTO: NEGATIVE
HOLD SPECIMEN: NORMAL
HOLD SPECIMEN: NORMAL
IMM GRANULOCYTES # BLD AUTO: 0.06 10*3/MM3 (ref 0–0.05)
IMM GRANULOCYTES NFR BLD AUTO: 0.7 % (ref 0–0.5)
INHALED O2 CONCENTRATION: 21 %
IPAP: 0
KETONES UR QL STRIP: ABNORMAL
LEUKOCYTE ESTERASE UR QL STRIP.AUTO: NEGATIVE
LIPASE SERPL-CCNC: 268 U/L (ref 13–60)
LYMPHOCYTES # BLD AUTO: 1.56 10*3/MM3 (ref 0.7–3.1)
LYMPHOCYTES NFR BLD AUTO: 19.2 % (ref 19.6–45.3)
MAGNESIUM SERPL-MCNC: 2.3 MG/DL (ref 1.6–2.4)
MCH RBC QN AUTO: 32.7 PG (ref 26.6–33)
MCHC RBC AUTO-ENTMCNC: 35.5 G/DL (ref 31.5–35.7)
MCV RBC AUTO: 92 FL (ref 79–97)
METHGB BLD QL: 0.5 % (ref 0–1.5)
MODALITY: ABNORMAL
MONOCYTES # BLD AUTO: 0.58 10*3/MM3 (ref 0.1–0.9)
MONOCYTES NFR BLD AUTO: 7.2 % (ref 5–12)
NEUTROPHILS NFR BLD AUTO: 5.77 10*3/MM3 (ref 1.7–7)
NEUTROPHILS NFR BLD AUTO: 71.2 % (ref 42.7–76)
NITRITE UR QL STRIP: NEGATIVE
NRBC BLD AUTO-RTO: 0 /100 WBC (ref 0–0.2)
NT-PROBNP SERPL-MCNC: 51.1 PG/ML (ref 0–900)
OXYHGB MFR BLDV: 97.1 % (ref 94–99)
PAW @ PEAK INSP FLOW SETTING VENT: 0 CMH2O
PCO2 BLDA: 20.7 MM HG (ref 35–45)
PCO2 TEMP ADJ BLD: 20.7 MM HG (ref 35–48)
PH BLDA: 7.49 PH UNITS (ref 7.35–7.45)
PH UR STRIP.AUTO: 5.5 [PH] (ref 5–8)
PH, TEMP CORRECTED: 7.49 PH UNITS
PHOSPHATE SERPL-MCNC: 2.5 MG/DL (ref 2.5–4.5)
PLATELET # BLD AUTO: 266 10*3/MM3 (ref 140–450)
PMV BLD AUTO: 11.8 FL (ref 6–12)
PO2 BLDA: 112 MM HG (ref 83–108)
PO2 TEMP ADJ BLD: 112 MM HG (ref 83–108)
POTASSIUM SERPL-SCNC: 4.3 MMOL/L (ref 3.5–5.2)
PROCALCITONIN SERPL-MCNC: 0.37 NG/ML (ref 0–0.25)
PROT SERPL-MCNC: 7.5 G/DL (ref 6–8.5)
PROT UR QL STRIP: NEGATIVE
RBC # BLD AUTO: 4.5 10*6/MM3 (ref 4.14–5.8)
SARS-COV-2 RNA RESP QL NAA+PROBE: NOT DETECTED
SODIUM SERPL-SCNC: 126 MMOL/L (ref 136–145)
SP GR UR STRIP: 1.04 (ref 1–1.03)
TOTAL RATE: 0 BREATHS/MINUTE
TSH SERPL DL<=0.05 MIU/L-ACNC: 0.61 UIU/ML (ref 0.27–4.2)
UROBILINOGEN UR QL STRIP: ABNORMAL
WBC NRBC COR # BLD AUTO: 8.11 10*3/MM3 (ref 3.4–10.8)
WHOLE BLOOD HOLD COAG: NORMAL
WHOLE BLOOD HOLD SPECIMEN: NORMAL

## 2024-01-26 PROCEDURE — 80053 COMPREHEN METABOLIC PANEL: CPT | Performed by: STUDENT IN AN ORGANIZED HEALTH CARE EDUCATION/TRAINING PROGRAM

## 2024-01-26 PROCEDURE — 81003 URINALYSIS AUTO W/O SCOPE: CPT | Performed by: STUDENT IN AN ORGANIZED HEALTH CARE EDUCATION/TRAINING PROGRAM

## 2024-01-26 PROCEDURE — 93005 ELECTROCARDIOGRAM TRACING: CPT | Performed by: STUDENT IN AN ORGANIZED HEALTH CARE EDUCATION/TRAINING PROGRAM

## 2024-01-26 PROCEDURE — 87636 SARSCOV2 & INF A&B AMP PRB: CPT | Performed by: STUDENT IN AN ORGANIZED HEALTH CARE EDUCATION/TRAINING PROGRAM

## 2024-01-26 PROCEDURE — 85025 COMPLETE CBC W/AUTO DIFF WBC: CPT | Performed by: STUDENT IN AN ORGANIZED HEALTH CARE EDUCATION/TRAINING PROGRAM

## 2024-01-26 PROCEDURE — 36600 WITHDRAWAL OF ARTERIAL BLOOD: CPT

## 2024-01-26 PROCEDURE — 25810000003 SODIUM CHLORIDE 0.9 % SOLUTION: Performed by: STUDENT IN AN ORGANIZED HEALTH CARE EDUCATION/TRAINING PROGRAM

## 2024-01-26 PROCEDURE — 25810000003 LACTATED RINGERS PER 1000 ML: Performed by: STUDENT IN AN ORGANIZED HEALTH CARE EDUCATION/TRAINING PROGRAM

## 2024-01-26 PROCEDURE — 87040 BLOOD CULTURE FOR BACTERIA: CPT | Performed by: STUDENT IN AN ORGANIZED HEALTH CARE EDUCATION/TRAINING PROGRAM

## 2024-01-26 PROCEDURE — 82248 BILIRUBIN DIRECT: CPT | Performed by: STUDENT IN AN ORGANIZED HEALTH CARE EDUCATION/TRAINING PROGRAM

## 2024-01-26 PROCEDURE — 84145 PROCALCITONIN (PCT): CPT | Performed by: STUDENT IN AN ORGANIZED HEALTH CARE EDUCATION/TRAINING PROGRAM

## 2024-01-26 PROCEDURE — 99285 EMERGENCY DEPT VISIT HI MDM: CPT

## 2024-01-26 PROCEDURE — 84443 ASSAY THYROID STIM HORMONE: CPT | Performed by: STUDENT IN AN ORGANIZED HEALTH CARE EDUCATION/TRAINING PROGRAM

## 2024-01-26 PROCEDURE — 80074 ACUTE HEPATITIS PANEL: CPT | Performed by: STUDENT IN AN ORGANIZED HEALTH CARE EDUCATION/TRAINING PROGRAM

## 2024-01-26 PROCEDURE — 84100 ASSAY OF PHOSPHORUS: CPT | Performed by: STUDENT IN AN ORGANIZED HEALTH CARE EDUCATION/TRAINING PROGRAM

## 2024-01-26 PROCEDURE — 83690 ASSAY OF LIPASE: CPT | Performed by: STUDENT IN AN ORGANIZED HEALTH CARE EDUCATION/TRAINING PROGRAM

## 2024-01-26 PROCEDURE — 74183 MRI ABD W/O CNTR FLWD CNTR: CPT

## 2024-01-26 PROCEDURE — 25510000001 IOPAMIDOL 61 % SOLUTION: Performed by: STUDENT IN AN ORGANIZED HEALTH CARE EDUCATION/TRAINING PROGRAM

## 2024-01-26 PROCEDURE — 83880 ASSAY OF NATRIURETIC PEPTIDE: CPT | Performed by: STUDENT IN AN ORGANIZED HEALTH CARE EDUCATION/TRAINING PROGRAM

## 2024-01-26 PROCEDURE — 83050 HGB METHEMOGLOBIN QUAN: CPT

## 2024-01-26 PROCEDURE — 83735 ASSAY OF MAGNESIUM: CPT | Performed by: STUDENT IN AN ORGANIZED HEALTH CARE EDUCATION/TRAINING PROGRAM

## 2024-01-26 PROCEDURE — 83605 ASSAY OF LACTIC ACID: CPT | Performed by: STUDENT IN AN ORGANIZED HEALTH CARE EDUCATION/TRAINING PROGRAM

## 2024-01-26 PROCEDURE — 82375 ASSAY CARBOXYHB QUANT: CPT

## 2024-01-26 PROCEDURE — 86140 C-REACTIVE PROTEIN: CPT | Performed by: STUDENT IN AN ORGANIZED HEALTH CARE EDUCATION/TRAINING PROGRAM

## 2024-01-26 PROCEDURE — 25010000002 METRONIDAZOLE 500 MG/100ML SOLUTION: Performed by: STUDENT IN AN ORGANIZED HEALTH CARE EDUCATION/TRAINING PROGRAM

## 2024-01-26 PROCEDURE — 0 GADOBENATE DIMEGLUMINE 529 MG/ML SOLUTION: Performed by: FAMILY MEDICINE

## 2024-01-26 PROCEDURE — 25010000002 CEFEPIME PER 500 MG: Performed by: STUDENT IN AN ORGANIZED HEALTH CARE EDUCATION/TRAINING PROGRAM

## 2024-01-26 PROCEDURE — 99223 1ST HOSP IP/OBS HIGH 75: CPT | Performed by: FAMILY MEDICINE

## 2024-01-26 PROCEDURE — 71045 X-RAY EXAM CHEST 1 VIEW: CPT

## 2024-01-26 PROCEDURE — 80143 DRUG ASSAY ACETAMINOPHEN: CPT | Performed by: STUDENT IN AN ORGANIZED HEALTH CARE EDUCATION/TRAINING PROGRAM

## 2024-01-26 PROCEDURE — 74177 CT ABD & PELVIS W/CONTRAST: CPT

## 2024-01-26 PROCEDURE — 82805 BLOOD GASES W/O2 SATURATION: CPT

## 2024-01-26 PROCEDURE — A9577 INJ MULTIHANCE: HCPCS | Performed by: FAMILY MEDICINE

## 2024-01-26 RX ORDER — METRONIDAZOLE 500 MG/100ML
500 INJECTION, SOLUTION INTRAVENOUS EVERY 8 HOURS
Status: COMPLETED | OUTPATIENT
Start: 2024-01-26 | End: 2024-01-26

## 2024-01-26 RX ORDER — SODIUM CHLORIDE 9 MG/ML
10 INJECTION, SOLUTION INTRAMUSCULAR; INTRAVENOUS; SUBCUTANEOUS AS NEEDED
Status: DISCONTINUED | OUTPATIENT
Start: 2024-01-26 | End: 2024-01-27

## 2024-01-26 RX ORDER — SODIUM CHLORIDE, SODIUM LACTATE, POTASSIUM CHLORIDE, CALCIUM CHLORIDE 600; 310; 30; 20 MG/100ML; MG/100ML; MG/100ML; MG/100ML
125 INJECTION, SOLUTION INTRAVENOUS CONTINUOUS
Status: DISCONTINUED | OUTPATIENT
Start: 2024-01-26 | End: 2024-01-27

## 2024-01-26 RX ADMIN — CEFEPIME 2000 MG: 2 INJECTION, POWDER, FOR SOLUTION INTRAVENOUS at 20:54

## 2024-01-26 RX ADMIN — METRONIDAZOLE 500 MG: 500 INJECTION, SOLUTION INTRAVENOUS at 21:00

## 2024-01-26 RX ADMIN — IOPAMIDOL 100 ML: 612 INJECTION, SOLUTION INTRAVENOUS at 19:55

## 2024-01-26 RX ADMIN — SODIUM CHLORIDE, POTASSIUM CHLORIDE, SODIUM LACTATE AND CALCIUM CHLORIDE 125 ML/HR: 600; 310; 30; 20 INJECTION, SOLUTION INTRAVENOUS at 23:01

## 2024-01-26 RX ADMIN — SODIUM CHLORIDE 1000 ML: 9 INJECTION, SOLUTION INTRAVENOUS at 18:41

## 2024-01-26 RX ADMIN — GADOBENATE DIMEGLUMINE 15 ML: 529 INJECTION, SOLUTION INTRAVENOUS at 22:11

## 2024-01-26 NOTE — ED PROVIDER NOTES
EMERGENCY DEPARTMENT ENCOUNTER    Pt Name: Costa Robbins  MRN: [id number]  Pt :   1955  Room Number:  S579/1  Date of encounter:  2024  PCP: Jonathan Conner MD  ED Provider: Rome Ngo MD    Historian: Patient    HPI:  Chief Complaint: Weight loss, left lower quadrant pain, transaminitis        Context: Costa Robbins is a 68-year-old man who presents the emergency department for multiple complaints.  He says he has known stomach ulcers and has had a poor appetite since late last year and has had 30 pound unintentional weight loss with poor appetite.  For the last couple of weeks he has been having lower abdominal pain that is localized to the left lower quadrant.  He saw his PCP a couple of weeks ago and they told him his liver enzymes were significantly elevated.  He says he does not drink every day and usually has 1-2 beers daily.  He has not abused Tylenol.  He has not appreciated any fevers.  He continues to have poor appetite and says all told he is lost about 30 pounds.  No other complaints at this time.      PAST MEDICAL HISTORY  Past Medical History:   Diagnosis Date    Coronary artery disease     COVID         Diabetes mellitus     Diverticulitis     ETOH use 2023    Former smoker 2023    Hearing decreased     Hypertension     Melanoma     CHEST, BACK, NECK MULTI, HEAD    PVD (peripheral vascular disease)     Wears glasses          PAST SURGICAL HISTORY  Past Surgical History:   Procedure Laterality Date    AORTAGRAM Bilateral 2023    Procedure: AORTAGRAM WITH RUNOFFS  STENT OF THE LEFT ILIAC ARTERY;  Surgeon: Moses Escalante MD;  Location: [facility];  Service: Vascular;  Laterality: Bilateral;    COLON RESECTION  2009    partial colectomy    COLONOSCOPY      FEMORAL ENDARTERECTOMY Left 2023    Procedure: FEMORAL ENDARTERECTOMY WITH PROPATEN GRAFT 8MM X 40CM;  Surgeon: Moses Escalante MD;  Location: [facility] OR;  Service: Vascular;   Laterality: Left;    FEMORAL FEMORAL BYPASS N/A 2023    Procedure: FEMORAL FEMORAL BYPASS;  Surgeon: Moses Escalante MD;  Location: [facility];  Service: Vascular;  Laterality: N/A;    ORIF ANKLE FRACTURE Left     SKIN CANCER EXCISION      melanoma         FAMILY HISTORY  Family History   Problem Relation Age of Onset    Diabetes Mother     Heart attack Mother     Hypertension Mother     Hyperlipidemia Mother     Stroke Mother     Hodgkin's lymphoma Father     Hypertension Father     Diabetes Brother     Hyperlipidemia Brother          SOCIAL HISTORY  Social History     Socioeconomic History    Marital status:     Number of children: 0   Tobacco Use    Smoking status: Former     Years: 43     Types: Cigarettes     Quit date: 2023     Years since quittin.9    Smokeless tobacco: Never    Tobacco comments:     Pt states that he has been able to stop smoking this month - 2023   Vaping Use    Vaping Use: Never used   Substance and Sexual Activity    Alcohol use: Not Currently     Comment: 2 BEERS PER DAY    Drug use: Yes     Types: Marijuana     Comment: ONCE A MONTH    Sexual activity: Defer         ALLERGIES  Atenolol, Morphine, and Penicillins        REVIEW OF SYSTEMS  Review of Systems       All systems reviewed and negative except for those discussed in HPI.       PHYSICAL EXAM    I have reviewed the triage vital signs and nursing notes.    ED Triage Vitals [24 1601]   Temp Heart Rate Resp BP SpO2   98.1 °F (36.7 °C) 86 16 (!) 176/119 100 %      Temp src Heart Rate Source Patient Position BP Location FiO2 (%)   Oral Monitor Sitting Left arm --       Physical Exam  GENERAL:   Appears in no acute distress.   HENT: Nares patent.  EYES: scleral icterus.  CV: Regular rhythm, regular rate.  RESPIRATORY: Normal effort.  No audible wheezes, rales or rhonchi.  ABDOMEN: Soft, tender in the left lower quadrant  MUSCULOSKELETAL: No deformities.   NEURO: Alert, moves all extremities, follows  commands.  SKIN: Warm, dry, no rash visualized.      LAB RESULTS  Recent Results (from the past 24 hour(s))   Comprehensive Metabolic Panel    Collection Time: 01/26/24  4:32 PM    Specimen: Blood   Result Value Ref Range    Glucose 438 (C) 65 - 99 mg/dL    BUN 17 8 - 23 mg/dL    Creatinine 0.66 (L) 0.76 - 1.27 mg/dL    Sodium 126 (L) 136 - 145 mmol/L    Potassium 4.3 3.5 - 5.2 mmol/L    Chloride 89 (L) 98 - 107 mmol/L    CO2 19.0 (L) 22.0 - 29.0 mmol/L    Calcium 11.8 (H) 8.6 - 10.5 mg/dL    Total Protein 7.5 6.0 - 8.5 g/dL    Albumin 4.0 3.5 - 5.2 g/dL    ALT (SGPT) 795 (H) 1 - 41 U/L    AST (SGOT) 336 (H) 1 - 40 U/L    Alkaline Phosphatase 790 (H) 39 - 117 U/L    Total Bilirubin 9.8 (H) 0.0 - 1.2 mg/dL    Globulin 3.5 gm/dL    A/G Ratio 1.1 g/dL    BUN/Creatinine Ratio 25.8 (H) 7.0 - 25.0    Anion Gap 18.0 (H) 5.0 - 15.0 mmol/L    eGFR 103.4 >60.0 mL/min/1.73   Lipase    Collection Time: 01/26/24  4:32 PM    Specimen: Blood   Result Value Ref Range    Lipase 268 (H) 13 - 60 U/L   Lactic Acid, Plasma    Collection Time: 01/26/24  4:32 PM    Specimen: Blood   Result Value Ref Range    Lactate 1.7 0.5 - 2.0 mmol/L   Green Top (Gel)    Collection Time: 01/26/24  4:32 PM   Result Value Ref Range    Extra Tube Hold for add-ons.    Lavender Top    Collection Time: 01/26/24  4:32 PM   Result Value Ref Range    Extra Tube hold for add-on    Gray Top    Collection Time: 01/26/24  4:32 PM   Result Value Ref Range    Extra Tube Hold for add-ons.    Light Blue Top    Collection Time: 01/26/24  4:32 PM   Result Value Ref Range    Extra Tube Hold for add-ons.    CBC Auto Differential    Collection Time: 01/26/24  4:32 PM    Specimen: Blood   Result Value Ref Range    WBC 8.11 3.40 - 10.80 10*3/mm3    RBC 4.50 4.14 - 5.80 10*6/mm3    Hemoglobin 14.7 13.0 - 17.7 g/dL    Hematocrit 41.4 37.5 - 51.0 %    MCV 92.0 79.0 - 97.0 fL    MCH 32.7 26.6 - 33.0 pg    MCHC 35.5 31.5 - 35.7 g/dL    RDW 16.2 (H) 12.3 - 15.4 %    RDW-SD 51.9  37.0 - 54.0 fl    MPV 11.8 6.0 - 12.0 fL    Platelets 266 140 - 450 10*3/mm3    Neutrophil % 71.2 42.7 - 76.0 %    Lymphocyte % 19.2 (L) 19.6 - 45.3 %    Monocyte % 7.2 5.0 - 12.0 %    Eosinophil % 1.0 0.3 - 6.2 %    Basophil % 0.7 0.0 - 1.5 %    Immature Grans % 0.7 (H) 0.0 - 0.5 %    Neutrophils, Absolute 5.77 1.70 - 7.00 10*3/mm3    Lymphocytes, Absolute 1.56 0.70 - 3.10 10*3/mm3    Monocytes, Absolute 0.58 0.10 - 0.90 10*3/mm3    Eosinophils, Absolute 0.08 0.00 - 0.40 10*3/mm3    Basophils, Absolute 0.06 0.00 - 0.20 10*3/mm3    Immature Grans, Absolute 0.06 (H) 0.00 - 0.05 10*3/mm3    nRBC 0.0 0.0 - 0.2 /100 WBC   Hepatitis Panel, Acute    Collection Time: 01/26/24  4:32 PM    Specimen: Blood   Result Value Ref Range    Hepatitis B Surface Ag Non-Reactive Non-Reactive    Hep A IgM Non-Reactive Non-Reactive    Hep B C IgM Non-Reactive Non-Reactive    Hepatitis C Ab Non-Reactive Non-Reactive   BNP    Collection Time: 01/26/24  4:32 PM    Specimen: Blood   Result Value Ref Range    proBNP 51.1 0.0 - 900.0 pg/mL   Procalcitonin    Collection Time: 01/26/24  4:32 PM    Specimen: Blood   Result Value Ref Range    Procalcitonin 0.37 (H) 0.00 - 0.25 ng/mL   C-reactive Protein    Collection Time: 01/26/24  4:32 PM    Specimen: Blood   Result Value Ref Range    C-Reactive Protein <0.30 0.00 - 0.50 mg/dL   Acetaminophen Level    Collection Time: 01/26/24  4:32 PM    Specimen: Blood   Result Value Ref Range    Acetaminophen <5.0 0.0 - 30.0 mcg/mL   Magnesium    Collection Time: 01/26/24  4:32 PM    Specimen: Blood   Result Value Ref Range    Magnesium 2.3 1.6 - 2.4 mg/dL   Phosphorus    Collection Time: 01/26/24  4:32 PM    Specimen: Blood   Result Value Ref Range    Phosphorus 2.5 2.5 - 4.5 mg/dL   TSH    Collection Time: 01/26/24  4:32 PM    Specimen: Blood   Result Value Ref Range    TSH 0.611 0.270 - 4.200 uIU/mL   Bilirubin, Direct    Collection Time: 01/26/24  4:32 PM    Specimen: Blood   Result Value Ref Range     Bilirubin, Direct 7.5 (H) 0.0 - 0.3 mg/dL   Urinalysis With Microscopic If Indicated (No Culture) - Urine, Clean Catch    Collection Time: 01/26/24  4:39 PM    Specimen: Urine, Clean Catch   Result Value Ref Range    Color, UA Dark Yellow (A) Yellow, Straw    Appearance, UA Clear Clear    pH, UA 5.5 5.0 - 8.0    Specific Gravity, UA 1.037 (H) 1.001 - 1.030    Glucose, UA >=1000 mg/dL (3+) (A) Negative    Ketones, UA 40 mg/dL (2+) (A) Negative    Bilirubin, UA Small (1+) (A) Negative    Blood, UA Negative Negative    Protein, UA Negative Negative    Leuk Esterase, UA Negative Negative    Nitrite, UA Negative Negative    Urobilinogen, UA 1.0 E.U./dL 0.2 - 1.0 E.U./dL   COVID-19 and FLU A/B PCR, 1 HR TAT - Swab, Nasopharynx    Collection Time: 01/26/24  4:39 PM    Specimen: Nasopharynx; Swab   Result Value Ref Range    COVID19 Not Detected Not Detected - Ref. Range    Influenza A PCR Not Detected Not Detected    Influenza B PCR Not Detected Not Detected   ECG 12 Lead Electrolyte Imbalance    Collection Time: 01/26/24  4:39 PM   Result Value Ref Range    QT Interval 348 ms    QTC Interval 423 ms   Blood Gas, Arterial With Co-Ox    Collection Time: 01/26/24  7:27 PM    Specimen: Arterial Blood   Result Value Ref Range    Site Left Brachial     Brandon's Test N/A     pH, Arterial 7.490 (H) 7.350 - 7.450 pH units    pCO2, Arterial 20.7 (L) 35.0 - 45.0 mm Hg    pO2, Arterial 112.0 (H) 83.0 - 108.0 mm Hg    HCO3, Arterial 15.8 (L) 20.0 - 26.0 mmol/L    Base Excess, Arterial -5.4 (L) 0.0 - 2.0 mmol/L    Hemoglobin, Blood Gas 13.1 (L) 13.5 - 17.5 g/dL    Hematocrit, Blood Gas 40.0 38.0 - 51.0 %    Oxyhemoglobin 97.1 94 - 99 %    Methemoglobin 0.50 0.00 - 1.50 %    Carboxyhemoglobin 1.7 0 - 2 %    CO2 Content 16.4 (L) 22 - 33 mmol/L    Temperature 37.0     Barometric Pressure for Blood Gas      Modality Room Air     FIO2 21 %    Rate 0 Breaths/minute    PIP 0 cmH2O    IPAP 0     EPAP 0     pH, Temp Corrected 7.490 pH Units     pCO2, Temperature Corrected 20.7 (L) 35 - 48 mm Hg    pO2, Temperature Corrected 112 (H) 83 - 108 mm Hg   Lactic Acid, Plasma    Collection Time: 01/27/24  1:02 AM    Specimen: Blood   Result Value Ref Range    Lactate 1.0 0.5 - 2.0 mmol/L       If labs were ordered, I independently reviewed the results and considered them in treating the patient.        RADIOLOGY  MRI abdomen w wo contrast mrcp    Result Date: 1/26/2024  MRI ABDOMEN W WO CONTRAST MRCP Date of Exam: 1/26/2024 9:47 PM EST Indication: Jaundice, pancreatitis, unintentional weight loss, biliary obstruction on CT scan.  Comparison: CT abdomen and pelvis 1/26/2024 and 2/1/2023 Technique:  Routine multiplanar/multisequence images of the abdomen were obtained with MRCP sequences before and after the uneventful administration of 15 cc Multihance. Findings: Visualized Chest:  The visualized lung bases and lower mediastinal structures are unremarkable. Liver: Liver is normal in size and CT density. No focal lesions. Gallbladder: A distended gallbladder is noted. No wall thickening or pericholecystic fluid. No gallstone is identified. Bile Ducts: Diffuse intrahepatic and extrahepatic biliary ductal dilation with significant narrowing distally (image 34 series 5). No definite obstructing stone is identified. Questionable mild enhancement of the ductal wall is noted (for example image 44 series 20). Spleen: Spleen is normal in size and CT density. Pancreas: Subtle hypoenhancement within the pancreatic uncinate process measuring approximately 2.3 x 2.4 cm. Minimal pancreatic ductal dilation measuring up to 0.4 cm. The remainder of the pancreatic parenchyma is unremarkable Adrenals: Small nodules in the left adrenal gland with loss of signal on out of phase images consistent with adenomas. The right adrenal gland is unremarkable. Kidneys: Calyceal diverticula are noted. Additionally a few scattered cysts are noted bilaterally. Otherwise unremarkable  Gastrointestinal: Colonic diverticulosis without evidence of acute diverticulitis Peritoneum/Mesentery: No fluid collection, ascities, or free air.   Lymph Nodes: No lymphadenopathy. Vasculature: Unremarkable Abdominal Wall: Unremarkable Bony Structures: No acute osseous abnormality     Impression: Extensive intrahepatic and extrahepatic biliary ductal dilation with significant narrowing distally and abrupt at the level of the pancreatic head/uncinate process. Additionally there is mild dilation of the common bile duct with an area of subtle hypoenhancement noted within the pancreatic head concerning for possible mass. Alternately, subtle enhancement is also noted within the distal common bile duct, which may represent cholangiocarcinoma. Recommend follow-up with GI consultation and ERCP/EUS  for further evaluation. There is distention of the gallbladder, most likely due to the above-mentioned biliary ductal dilation. No gallstones are identified on this study. Incidentally noted right renal calyceal diverticulum Electronically Signed: Nile De La Garza DO  1/26/2024 11:33 PM EST  Workstation ID: [id number]    CT Abdomen Pelvis With Contrast    Result Date: 1/26/2024  CT ABDOMEN PELVIS W CONTRAST Date of Exam: 1/26/2024 7:50 PM EST Indication: LLQ abd pain 2 weeks, jaundice, 30 lb weight loss, hx of melanoma. Comparison: None available. Technique: Axial CT images were obtained of the abdomen and pelvis following the uneventful intravenous administration of 2/1/2023 Isovue-300. Reconstructed coronal and sagittal images were also obtained. Automated exposure control and iterative construction methods were used. Findings: Visualized Chest: Scattered small nodules measuring up to 0.3 cm. Otherwise unremarkable Liver: Liver is normal in size and CT density. No focal lesions. Gallbladder: Presumed sludge within the gallbladder lumen. No definite wall thickening or pericholecystic fluid. Bile Ducts: Significant dilation of  the intrahepatic and extrahepatic bile ducts, new since 2/1/2023. Spleen: Spleen is normal in size and CT density. Pancreas: Mild hypodensity and soft tissue prominence within the pancreatic head/uncinate process. No significant pancreatic ductal dilation Adrenals: Nodular thickening of the left adrenal gland, unchanged from prior study. The right adrenal gland is unremarkable Kidneys: Calyceal diverticulum on the right. No definite suspicious mass. No stones or hydronephrosis Gastrointestinal: Mild to moderate stool burden throughout the colon. Diverticulosis of the descending and sigmoid colon without evidence of acute diverticulitis. No significant distention to suggest bowel obstruction. No significant distention to suggest bowel obstruction. Bladder: The bladder is normal. Pelvis:  No suspecious mass. Peritoneum/Mesentery: No fluid collection, ascities, or free air.   Lymph Nodes: No lymphadenopathy. Vasculature: Extensive vascular calcification of the abdominal aorta. Femoral-femoral bypass graft is noted and appears to be grossly patent. Stenting of the left external iliac artery is noted. Abdominal Wall: Unremarkable Bony Structures: No acute osseous abnormality     Impression: Significant distention of the intrahepatic and extrahepatic bile ducts is highly concerning for choledocholithiasis. Alternately a pancreatic head lesion is not completely excluded, although less likely due to lack of significant pancreatic ductal dilation. Recommend follow-up with MRI pancreatic mass protocol with dedicated MRCP images. Unchanged nodular thickening of the left adrenal gland. Possible constipation. Vascular findings as described above. Electronically Signed: Nile De La Garza DO  1/26/2024 8:07 PM EST  Workstation ID: [id number]    XR Chest 1 View    Result Date: 1/26/2024  XR CHEST 1 VW Date of Exam: 1/26/2024 4:39 PM EST Indication: dyspnea, unintentional weight loss Comparison: Chest radiograph 5/15/2023. Findings:  Cardiomediastinal silhouette is unchanged. No focal consolidation or overt pulmonary edema. No pleural effusion or pneumothorax. Unchanged old/healed right rib fractures and degenerative changes of the spine.     Impression: No evidence of acute cardiopulmonary disease. Electronically Signed: Rhys Mclean MD  1/26/2024 4:52 PM EST  Workstation ID: [id number]     I ordered and independently reviewed the above noted radiographic studies.      I viewed images of CT scan of the abdomen pelvis which shows which showed inflammation of most of the common bile duct per my independent interpretation.  Formal overread suggests likely choledocholithiasis  MRI of the abdomen pelvis continues to demonstrate the common duct dilation and inflammation around the pancreatic head concerning for mass which would be more consistent with his unintentional weight loss.  Chest x-ray which is clear.    See radiologist's dictation for official interpretation.        PROCEDURES    Procedures    ECG 12 Lead Electrolyte Imbalance   Preliminary Result   Test Reason : ABDPAIN   Blood Pressure :   */*   mmHG   Vent. Rate :  89 BPM     Atrial Rate :  89 BPM      P-R Int : 190 ms          QRS Dur :  92 ms       QT Int : 348 ms       P-R-T Axes :  74  58  56 degrees      QTc Int : 423 ms      Normal sinus rhythm   Normal ECG   When compared with ECG of 27-MAY-2023 00:00,   Sinus rhythm is no longer with 2nd degree AV block (Mobitz I)   Nonspecific T wave abnormality, improved in Inferior leads   QT has lengthened      Referred By: EDMD           Confirmed By:           MEDICATIONS GIVEN IN ER    Medications   lactated ringers infusion (125 mL/hr Intravenous New Bag 1/26/24 2301)   sodium chloride 0.9 % flush 10 mL (10 mL Intravenous Given 1/27/24 0124)   sodium chloride 0.9 % flush 10 mL (10 mL Intravenous Given 1/27/24 0124)   sodium chloride 0.9 % infusion 40 mL (has no administration in time range)   sennosides-docusate (PERICOLACE) 8.6-50 MG per  tablet 2 tablet (has no administration in time range)     And   polyethylene glycol (MIRALAX) packet 17 g (has no administration in time range)     And   bisacodyl (DULCOLAX) EC tablet 5 mg (has no administration in time range)     And   bisacodyl (DULCOLAX) suppository 10 mg (has no administration in time range)   Enoxaparin Sodium (LOVENOX) syringe 40 mg (has no administration in time range)   nitroglycerin (NITROSTAT) SL tablet 0.4 mg (has no administration in time range)   HYDROmorphone (DILAUDID) injection 0.5 mg (has no administration in time range)     And   naloxone (NARCAN) injection 0.4 mg (has no administration in time range)   ondansetron (ZOFRAN) injection 4 mg (has no administration in time range)   levoFLOXacin (LEVAQUIN) 500 mg/100 mL D5W (premix) (LEVAQUIN) 500 mg (has no administration in time range)   metroNIDAZOLE (FLAGYL) IVPB 500 mg (has no administration in time range)   dextrose (GLUTOSE) oral gel 15 g (has no administration in time range)   dextrose (D50W) (25 g/50 mL) IV injection 25 g (has no administration in time range)   glucagon (GLUCAGEN) injection 1 mg (has no administration in time range)   insulin regular (humuLIN R,novoLIN R) injection 2-9 Units (2 Units Subcutaneous Given 1/27/24 0123)   hydrALAZINE (APRESOLINE) injection 10 mg (has no administration in time range)   sodium chloride 0.9 % bolus 1,000 mL (1,000 mL Intravenous New Bag 1/26/24 1841)   iopamidol (ISOVUE-300) 61 % injection 100 mL (100 mL Intravenous Given 1/26/24 1955)   cefepime 2 gm IVPB in 100 ml NS (MBP) (0 mg Intravenous Stopped 1/26/24 2124)   metroNIDAZOLE (FLAGYL) IVPB 500 mg (0 mg Intravenous Stopped 1/26/24 2200)   gadobenate dimeglumine (MULTIHANCE) injection 15 mL (15 mL Intravenous Given 1/26/24 2211)         MEDICAL DECISION MAKING, PROGRESS, and CONSULTS    All labs, if obtained, have been independently reviewed by me.  All radiology studies, if obtained, have been reviewed by me and the radiologist  dictating the report.  All EKG's, if obtained, have been independently viewed and interpreted by me/my attending physician.      Discussion below represents my analysis of pertinent findings related to patient's condition, differential diagnosis, treatment plan and final disposition.                         Differential diagnosis:    Hepatitis, biliary obstruction, acetaminophen or alcohol toxicity, cirrhosis, bowel obstruction, intra-abdominal malignancy, sepsis, anemia, electrolyte abnormality      Additional sources:    - Discussed/ obtained information from independent historians:      - External (non-ED) record review: Previous hospital admission for femorofemoral bypass shows history of peripheral vascular disease as well as hypertension, hyperlipidemia, insulin-dependent diabetes, CAD    - Chronic or social conditions impacting care: Hypertension, hyperlipidemia, diabetes, CAD, vascular disease    - Shared decision making: Agreeable to admission      Orders placed during this visit:  Orders Placed This Encounter   Procedures    COVID PRE-OP / PRE-PROCEDURE SCREENING ORDER (NO ISOLATION) - Swab, Nasopharynx    Blood Culture - Blood,    Blood Culture - Blood,    COVID-19 and FLU A/B PCR, 1 HR TAT - Swab, Nasopharynx    XR Chest 1 View    CT Abdomen Pelvis With Contrast    MRI abdomen w wo contrast mrcp    [place] Draw    Comprehensive Metabolic Panel    Lipase    Urinalysis With Microscopic If Indicated (No Culture) - Urine, Clean Catch    Lactic Acid, Plasma    CBC Auto Differential    Hepatitis Panel, Acute    BNP    Procalcitonin    C-reactive Protein    Acetaminophen Level    Magnesium    Phosphorus    TSH    Bilirubin, Direct    Blood Gas, Arterial -With Co-Ox Panel: Yes    Blood Gas, Arterial With Co-Ox    CBC Auto Differential    Comprehensive Metabolic Panel    Magnesium    Phosphorus    Lipid Panel    Lactic Acid, Plasma    NPO Diet NPO Type: Strict NPO    Undress & Gown    Vital Signs    Intake &  Output    Weigh Patient    Oral Care    Obtain Medical Records    Telemetry - Maintain IV Access    Telemetry - Place Orders & Notify Provider of Results When Patient Experiences Acute Chest Pain, Dysrhythmia or Respiratory Distress    May Be Off Telemetry for Tests    Pulse Oximetry, Continuous    Activity - Ad Kiki    Strict Intake & Output    Daily Weights    Code Status and Medical Interventions:    Inpatient Gastroenterology Consult    Incentive Spirometry    POCT Occult Blood, Stool    POC Glucose 4x Daily Before Meals & at Bedtime    ECG 12 Lead Electrolyte Imbalance    Insert Peripheral IV    Insert Peripheral IV    Inpatient Admission    ED Bed Request    CBC & Differential    Green Top (Gel)    Lavender Top    Gray Top    Light Blue Top         Additional orders considered but not ordered:      ED Course:    Consultants:      ED Course as of 01/27/24 0157   Fri Jan 26, 2024   1617 In summary this is a 68-year-old man who presents the emergency department for multiple complaints.  He says he has known stomach ulcers and has had a poor appetite since late last year and has had 30 pound unintentional weight loss with poor appetite.  For the last couple of weeks he has been having lower abdominal pain that is localized to the left lower quadrant.  He saw his PCP a couple of weeks ago and they told him his liver enzymes were significantly elevated.  He says he does not drink every day and usually has 1-2 beers daily.  He has not abused Tylenol.  He has not appreciated any fevers.  He continues to have poor appetite and says all told he is lost about 30 pounds.  No other complaints at this time. [CC]   1619 He arrived awake and alert his only abnormal vitals mild hypertension.  He is afebrile.  He is however somewhat ill-appearing and jaundiced with scleral icterus.  Tender in the left lower quadrant.  Has history of melanoma and findings concerning for possible malignancy with the unintentional weight loss and  new jaundice.  Denies heavy alcohol use or acetaminophen use.  No tenderness in the right upper quadrant.  Obtaining full laboratory workup, direct and indirect bilirubin hepatitis panel and CT scan of the abdomen pelvis. [CC]   Sat Jan 27, 2024   0152 CBC is reassuring and nonactionable but metabolic panel shows significant hyperglycemia and anion gap of 18 fortunately no acidosis on blood gas started on IV fluids.  He has hyponatremia and hypochloremia continuing the IV fluids.  Hypercalcemia, as well as impressive transaminitis ALT as high as 795 with significantly elevated alkaline phosphatase and total bilirubin as well increased direct bilirubin CT scan of the abdomen pelvis concerning for biliary obstruction choledocholithiasis versus mass.  He also has elevated lipase at 268 consistent with pancreatitis.  Continuing IV fluids.  Elevated procalcitonin with the biliary obstruction I have concern for cholangitis starting on cefepime and Flagyl due to penicillin allergy.  I have ordered MRI pancreas and MRCP protocol which continues to demonstrate the obstruction and is concerning for possible mass which would be more consistent with his unintentional weight loss I have discussed the findings with him and hospital admission he is agreeable with this plan.  He will need GI evaluation and potentially oncology evaluation.  Medicine team consulted for admission. [CC]      ED Course User Index  [CC] Rome Ngo MD              Shared Decision Making:  After my consideration of clinical presentation and any laboratory/radiology studies obtained, I discussed the findings with the patient/patient representative who is in agreement with the treatment plan and the final disposition.   Risks and benefits of discharge and/or observation/admission were discussed.       AS OF 01:57 EST VITALS:    BP - 173/83  HR - 87  TEMP - 97.1 °F (36.2 °C) (Oral)  O2 SATS - 99%      JOANIE reviewed by Kirk Irving DO, Jeni,  Rome FRANCOIS MD, Myke Sales MD           DIAGNOSIS  Final diagnoses:   Pancreatic mass   Acute pancreatitis, unspecified complication status, unspecified pancreatitis type   Hepatitis   Jaundice   Unintentional weight loss   Essential hypertension   Mixed hyperlipidemia   Type 2 diabetes mellitus with hyperglycemia, with long-term current use of insulin         DISPOSITION  Admit      Please note that portions of this document were completed with voice recognition software.        Rome Ngo MD  01/27/24 0157

## 2024-01-26 NOTE — Clinical Note
Level of Care: Telemetry [5]   Diagnosis: Choledocholithiasis [717257]   Admitting Physician: DAVID MENDEZ [[id number]]   Attending Physician: DAVID MENDEZ [[id number]]

## 2024-01-27 ENCOUNTER — ANESTHESIA (OUTPATIENT)
Dept: GASTROENTEROLOGY | Facility: HOSPITAL | Age: 69
End: 2024-01-27
Payer: MEDICARE

## 2024-01-27 ENCOUNTER — APPOINTMENT (OUTPATIENT)
Dept: CARDIOLOGY | Facility: HOSPITAL | Age: 69
DRG: 446 | End: 2024-01-27
Payer: MEDICARE

## 2024-01-27 ENCOUNTER — APPOINTMENT (OUTPATIENT)
Dept: GENERAL RADIOLOGY | Facility: HOSPITAL | Age: 69
DRG: 446 | End: 2024-01-27
Payer: MEDICARE

## 2024-01-27 ENCOUNTER — ANESTHESIA EVENT (OUTPATIENT)
Dept: GASTROENTEROLOGY | Facility: HOSPITAL | Age: 69
End: 2024-01-27
Payer: MEDICARE

## 2024-01-27 PROBLEM — K86.89 PANCREATIC MASS: Status: ACTIVE | Noted: 2024-01-26

## 2024-01-27 PROBLEM — R17 JAUNDICE: Status: ACTIVE | Noted: 2024-01-26

## 2024-01-27 PROBLEM — R79.89 ELEVATED LFTS: Status: ACTIVE | Noted: 2024-01-26

## 2024-01-27 LAB
ALBUMIN SERPL-MCNC: 3.6 G/DL (ref 3.5–5.2)
ALBUMIN/GLOB SERPL: 1.6 G/DL
ALP SERPL-CCNC: 652 U/L (ref 39–117)
ALT SERPL W P-5'-P-CCNC: 576 U/L (ref 1–41)
ANION GAP SERPL CALCULATED.3IONS-SCNC: 19 MMOL/L (ref 5–15)
AST SERPL-CCNC: 161 U/L (ref 1–40)
BASOPHILS # BLD AUTO: 0.05 10*3/MM3 (ref 0–0.2)
BASOPHILS NFR BLD AUTO: 0.6 % (ref 0–1.5)
BH CV ECHO HCM PROVOKED MEAN GRADIENT: 10 MMHG
BH CV ECHO MEAS - AO MAX PG: 8.5 MMHG
BH CV ECHO MEAS - AO MEAN PG: 4.5 MMHG
BH CV ECHO MEAS - AO ROOT DIAM: 3.3 CM
BH CV ECHO MEAS - AO V2 MAX: 146 CM/SEC
BH CV ECHO MEAS - AO V2 VTI: 32.2 CM
BH CV ECHO MEAS - AVA(I,D): 2.47 CM2
BH CV ECHO MEAS - EDV(CUBED): 97.3 ML
BH CV ECHO MEAS - EDV(MOD-SP2): 41.2 ML
BH CV ECHO MEAS - EDV(MOD-SP4): 82.9 ML
BH CV ECHO MEAS - EF(MOD-BP): 69.5 %
BH CV ECHO MEAS - EF(MOD-SP2): 70.6 %
BH CV ECHO MEAS - EF(MOD-SP4): 67.9 %
BH CV ECHO MEAS - ESV(CUBED): 8 ML
BH CV ECHO MEAS - ESV(MOD-SP2): 12.1 ML
BH CV ECHO MEAS - ESV(MOD-SP4): 26.6 ML
BH CV ECHO MEAS - FS: 56.5 %
BH CV ECHO MEAS - IVS/LVPW: 1.44 CM
BH CV ECHO MEAS - IVSD: 1.3 CM
BH CV ECHO MEAS - LA DIMENSION: 3.4 CM
BH CV ECHO MEAS - LAT PEAK E' VEL: 11.1 CM/SEC
BH CV ECHO MEAS - LV MASS(C)D: 181.2 GRAMS
BH CV ECHO MEAS - LV MAX PG: 5.5 MMHG
BH CV ECHO MEAS - LV MEAN PG: 3 MMHG
BH CV ECHO MEAS - LV V1 MAX: 117 CM/SEC
BH CV ECHO MEAS - LV V1 VTI: 25.3 CM
BH CV ECHO MEAS - LVIDD: 4.6 CM
BH CV ECHO MEAS - LVIDS: 2 CM
BH CV ECHO MEAS - LVOT AREA: 3.1 CM2
BH CV ECHO MEAS - LVOT DIAM: 2 CM
BH CV ECHO MEAS - LVPWD: 0.9 CM
BH CV ECHO MEAS - MED PEAK E' VEL: 9.1 CM/SEC
BH CV ECHO MEAS - MV A MAX VEL: 104 CM/SEC
BH CV ECHO MEAS - MV DEC SLOPE: 149 CM/SEC2
BH CV ECHO MEAS - MV DEC TIME: 0.44 SEC
BH CV ECHO MEAS - MV E MAX VEL: 66 CM/SEC
BH CV ECHO MEAS - MV E/A: 0.63
BH CV ECHO MEAS - MV MAX PG: 7 MMHG
BH CV ECHO MEAS - MV MEAN PG: 2 MMHG
BH CV ECHO MEAS - MV V2 VTI: 24.9 CM
BH CV ECHO MEAS - MVA(VTI): 3.2 CM2
BH CV ECHO MEAS - PA ACC TIME: 0.14 SEC
BH CV ECHO MEAS - PA V2 MAX: 107 CM/SEC
BH CV ECHO MEAS - SV(LVOT): 79.5 ML
BH CV ECHO MEAS - SV(MOD-SP2): 29.1 ML
BH CV ECHO MEAS - SV(MOD-SP4): 56.3 ML
BH CV ECHO MEAS - TAPSE (>1.6): 1.99 CM
BH CV ECHO MEASUREMENTS AVERAGE E/E' RATIO: 6.53
BH CV VAS BP RIGHT ARM: NORMAL MMHG
BH CV XLRA - RV BASE: 2.9 CM
BH CV XLRA - RV LENGTH: 6.5 CM
BH CV XLRA - RV MID: 3.2 CM
BH CV XLRA - TDI S': 12.2 CM/SEC
BILIRUB SERPL-MCNC: 6.1 MG/DL (ref 0–1.2)
BUN SERPL-MCNC: 14 MG/DL (ref 8–23)
BUN/CREAT SERPL: 18.7 (ref 7–25)
CALCIUM SPEC-SCNC: 11.2 MG/DL (ref 8.6–10.5)
CANCER AG19-9 SERPL-ACNC: 445 U/ML
CHLORIDE SERPL-SCNC: 97 MMOL/L (ref 98–107)
CHOLEST SERPL-MCNC: 424 MG/DL (ref 0–200)
CO2 SERPL-SCNC: 17 MMOL/L (ref 22–29)
CREAT SERPL-MCNC: 0.75 MG/DL (ref 0.76–1.27)
D-LACTATE SERPL-SCNC: 1 MMOL/L (ref 0.5–2)
DEPRECATED RDW RBC AUTO: 49.6 FL (ref 37–54)
EGFRCR SERPLBLD CKD-EPI 2021: 99.5 ML/MIN/1.73
EOSINOPHIL # BLD AUTO: 0.18 10*3/MM3 (ref 0–0.4)
EOSINOPHIL NFR BLD AUTO: 2 % (ref 0.3–6.2)
ERYTHROCYTE [DISTWIDTH] IN BLOOD BY AUTOMATED COUNT: 15.3 % (ref 12.3–15.4)
GLOBULIN UR ELPH-MCNC: 2.2 GM/DL
GLUCOSE BLDC GLUCOMTR-MCNC: 217 MG/DL (ref 70–130)
GLUCOSE BLDC GLUCOMTR-MCNC: 240 MG/DL (ref 70–130)
GLUCOSE SERPL-MCNC: 157 MG/DL (ref 65–99)
HBA1C MFR BLD: 10.1 % (ref 4.8–5.6)
HCT VFR BLD AUTO: 38.3 % (ref 37.5–51)
HDLC SERPL-MCNC: 43 MG/DL (ref 40–60)
HGB BLD-MCNC: 13.7 G/DL (ref 13–17.7)
IMM GRANULOCYTES # BLD AUTO: 0.07 10*3/MM3 (ref 0–0.05)
IMM GRANULOCYTES NFR BLD AUTO: 0.8 % (ref 0–0.5)
LDLC SERPL CALC-MCNC: 341 MG/DL (ref 0–100)
LDLC/HDLC SERPL: 7.99 {RATIO}
LEFT ATRIUM VOLUME INDEX: 16.4 ML/M2
LYMPHOCYTES # BLD AUTO: 1.34 10*3/MM3 (ref 0.7–3.1)
LYMPHOCYTES NFR BLD AUTO: 15.2 % (ref 19.6–45.3)
MAGNESIUM SERPL-MCNC: 2 MG/DL (ref 1.6–2.4)
MCH RBC QN AUTO: 31.7 PG (ref 26.6–33)
MCHC RBC AUTO-ENTMCNC: 35.8 G/DL (ref 31.5–35.7)
MCV RBC AUTO: 88.7 FL (ref 79–97)
MONOCYTES # BLD AUTO: 0.6 10*3/MM3 (ref 0.1–0.9)
MONOCYTES NFR BLD AUTO: 6.8 % (ref 5–12)
NEUTROPHILS NFR BLD AUTO: 6.6 10*3/MM3 (ref 1.7–7)
NEUTROPHILS NFR BLD AUTO: 74.6 % (ref 42.7–76)
NRBC BLD AUTO-RTO: 0 /100 WBC (ref 0–0.2)
PHOSPHATE SERPL-MCNC: 3 MG/DL (ref 2.5–4.5)
PLATELET # BLD AUTO: 256 10*3/MM3 (ref 140–450)
PMV BLD AUTO: 12 FL (ref 6–12)
POTASSIUM SERPL-SCNC: 4.3 MMOL/L (ref 3.5–5.2)
PROT SERPL-MCNC: 5.8 G/DL (ref 6–8.5)
QT INTERVAL: 348 MS
QTC INTERVAL: 423 MS
RBC # BLD AUTO: 4.32 10*6/MM3 (ref 4.14–5.8)
SODIUM SERPL-SCNC: 133 MMOL/L (ref 136–145)
TRIGL SERPL-MCNC: 187 MG/DL (ref 0–150)
VLDLC SERPL-MCNC: 40 MG/DL (ref 5–40)
WBC NRBC COR # BLD AUTO: 8.84 10*3/MM3 (ref 3.4–10.8)

## 2024-01-27 PROCEDURE — 43274 ERCP DUCT STENT PLACEMENT: CPT | Performed by: INTERNAL MEDICINE

## 2024-01-27 PROCEDURE — 25010000002 METRONIDAZOLE 500 MG/100ML SOLUTION: Performed by: FAMILY MEDICINE

## 2024-01-27 PROCEDURE — 25010000002 LEVOFLOXACIN PER 250 MG: Performed by: INTERNAL MEDICINE

## 2024-01-27 PROCEDURE — 25010000002 SUGAMMADEX 200 MG/2ML SOLUTION

## 2024-01-27 PROCEDURE — C1769 GUIDE WIRE: HCPCS | Performed by: INTERNAL MEDICINE

## 2024-01-27 PROCEDURE — 93306 TTE W/DOPPLER COMPLETE: CPT | Performed by: INTERNAL MEDICINE

## 2024-01-27 PROCEDURE — 0FD98ZX EXTRACTION OF COMMON BILE DUCT, VIA NATURAL OR ARTIFICIAL OPENING ENDOSCOPIC, DIAGNOSTIC: ICD-10-PCS | Performed by: INTERNAL MEDICINE

## 2024-01-27 PROCEDURE — 83735 ASSAY OF MAGNESIUM: CPT | Performed by: FAMILY MEDICINE

## 2024-01-27 PROCEDURE — 84100 ASSAY OF PHOSPHORUS: CPT | Performed by: FAMILY MEDICINE

## 2024-01-27 PROCEDURE — 25810000003 LACTATED RINGERS PER 1000 ML

## 2024-01-27 PROCEDURE — 25010000002 DEXAMETHASONE PER 1 MG

## 2024-01-27 PROCEDURE — 63710000001 INSULIN REGULAR HUMAN PER 5 UNITS: Performed by: INTERNAL MEDICINE

## 2024-01-27 PROCEDURE — C1874 STENT, COATED/COV W/DEL SYS: HCPCS | Performed by: INTERNAL MEDICINE

## 2024-01-27 PROCEDURE — 25810000003 SODIUM CHLORIDE 0.9 % SOLUTION: Performed by: INTERNAL MEDICINE

## 2024-01-27 PROCEDURE — 83605 ASSAY OF LACTIC ACID: CPT | Performed by: FAMILY MEDICINE

## 2024-01-27 PROCEDURE — 25810000003 LACTATED RINGERS PER 1000 ML: Performed by: FAMILY MEDICINE

## 2024-01-27 PROCEDURE — 82948 REAGENT STRIP/BLOOD GLUCOSE: CPT

## 2024-01-27 PROCEDURE — 63710000001 INSULIN LISPRO (HUMAN) PER 5 UNITS: Performed by: INTERNAL MEDICINE

## 2024-01-27 PROCEDURE — 25010000002 FENTANYL CITRATE (PF) 100 MCG/2ML SOLUTION

## 2024-01-27 PROCEDURE — 0F798DZ DILATION OF COMMON BILE DUCT WITH INTRALUMINAL DEVICE, VIA NATURAL OR ARTIFICIAL OPENING ENDOSCOPIC: ICD-10-PCS | Performed by: INTERNAL MEDICINE

## 2024-01-27 PROCEDURE — 74330 X-RAY BILE/PANC ENDOSCOPY: CPT

## 2024-01-27 PROCEDURE — 83036 HEMOGLOBIN GLYCOSYLATED A1C: CPT | Performed by: INTERNAL MEDICINE

## 2024-01-27 PROCEDURE — 80061 LIPID PANEL: CPT | Performed by: FAMILY MEDICINE

## 2024-01-27 PROCEDURE — 85025 COMPLETE CBC W/AUTO DIFF WBC: CPT | Performed by: FAMILY MEDICINE

## 2024-01-27 PROCEDURE — 86301 IMMUNOASSAY TUMOR CA 19-9: CPT | Performed by: NURSE PRACTITIONER

## 2024-01-27 PROCEDURE — 25010000002 METRONIDAZOLE 500 MG/100ML SOLUTION: Performed by: INTERNAL MEDICINE

## 2024-01-27 PROCEDURE — 25510000001 IOPAMIDOL 61 % SOLUTION: Performed by: INTERNAL MEDICINE

## 2024-01-27 PROCEDURE — 99223 1ST HOSP IP/OBS HIGH 75: CPT | Performed by: NURSE PRACTITIONER

## 2024-01-27 PROCEDURE — 25010000002 PROPOFOL 10 MG/ML EMULSION

## 2024-01-27 PROCEDURE — 63710000001 INSULIN REGULAR HUMAN PER 5 UNITS: Performed by: FAMILY MEDICINE

## 2024-01-27 PROCEDURE — 25010000002 HYDRALAZINE PER 20 MG: Performed by: NURSE PRACTITIONER

## 2024-01-27 PROCEDURE — 25010000002 LEVOFLOXACIN PER 250 MG: Performed by: FAMILY MEDICINE

## 2024-01-27 PROCEDURE — 80053 COMPREHEN METABOLIC PANEL: CPT | Performed by: FAMILY MEDICINE

## 2024-01-27 PROCEDURE — 25010000002 HYDRALAZINE PER 20 MG: Performed by: PHYSICIAN ASSISTANT

## 2024-01-27 PROCEDURE — 99233 SBSQ HOSP IP/OBS HIGH 50: CPT | Performed by: INTERNAL MEDICINE

## 2024-01-27 PROCEDURE — 93306 TTE W/DOPPLER COMPLETE: CPT

## 2024-01-27 PROCEDURE — 25010000002 ONDANSETRON PER 1 MG

## 2024-01-27 DEVICE — STENT SYSTEM RMV
Type: IMPLANTABLE DEVICE | Site: PANCREAS | Status: FUNCTIONAL
Brand: WALLFLEX BILIARY

## 2024-01-27 RX ORDER — AMOXICILLIN 250 MG
2 CAPSULE ORAL 2 TIMES DAILY
Status: DISCONTINUED | OUTPATIENT
Start: 2024-01-27 | End: 2024-01-28 | Stop reason: HOSPADM

## 2024-01-27 RX ORDER — LIDOCAINE HYDROCHLORIDE 10 MG/ML
INJECTION, SOLUTION EPIDURAL; INFILTRATION; INTRACAUDAL; PERINEURAL AS NEEDED
Status: DISCONTINUED | OUTPATIENT
Start: 2024-01-27 | End: 2024-01-27 | Stop reason: SURG

## 2024-01-27 RX ORDER — BISACODYL 5 MG/1
5 TABLET, DELAYED RELEASE ORAL DAILY PRN
Status: DISCONTINUED | OUTPATIENT
Start: 2024-01-27 | End: 2024-01-28 | Stop reason: HOSPADM

## 2024-01-27 RX ORDER — TEMAZEPAM 15 MG/1
15 CAPSULE ORAL NIGHTLY PRN
Status: DISCONTINUED | OUTPATIENT
Start: 2024-01-27 | End: 2024-01-28 | Stop reason: HOSPADM

## 2024-01-27 RX ORDER — HYDROMORPHONE HYDROCHLORIDE 1 MG/ML
0.5 INJECTION, SOLUTION INTRAMUSCULAR; INTRAVENOUS; SUBCUTANEOUS
Status: DISCONTINUED | OUTPATIENT
Start: 2024-01-27 | End: 2024-01-28 | Stop reason: HOSPADM

## 2024-01-27 RX ORDER — INDOMETHACIN 50 MG/1
100 SUPPOSITORY RECTAL ONCE
Status: CANCELLED | OUTPATIENT
Start: 2024-01-27 | End: 2024-01-27

## 2024-01-27 RX ORDER — DEXMEDETOMIDINE HYDROCHLORIDE 100 UG/ML
INJECTION, SOLUTION INTRAVENOUS AS NEEDED
Status: DISCONTINUED | OUTPATIENT
Start: 2024-01-27 | End: 2024-01-27 | Stop reason: SURG

## 2024-01-27 RX ORDER — PROPOFOL 10 MG/ML
VIAL (ML) INTRAVENOUS AS NEEDED
Status: DISCONTINUED | OUTPATIENT
Start: 2024-01-27 | End: 2024-01-27 | Stop reason: SURG

## 2024-01-27 RX ORDER — SODIUM CHLORIDE 0.9 % (FLUSH) 0.9 %
10 SYRINGE (ML) INJECTION AS NEEDED
Status: DISCONTINUED | OUTPATIENT
Start: 2024-01-27 | End: 2024-01-28 | Stop reason: HOSPADM

## 2024-01-27 RX ORDER — FENTANYL CITRATE 50 UG/ML
INJECTION, SOLUTION INTRAMUSCULAR; INTRAVENOUS AS NEEDED
Status: DISCONTINUED | OUTPATIENT
Start: 2024-01-27 | End: 2024-01-27 | Stop reason: SURG

## 2024-01-27 RX ORDER — HYDRALAZINE HYDROCHLORIDE 20 MG/ML
10 INJECTION INTRAMUSCULAR; INTRAVENOUS ONCE
Status: COMPLETED | OUTPATIENT
Start: 2024-01-27 | End: 2024-01-27

## 2024-01-27 RX ORDER — ONDANSETRON 2 MG/ML
4 INJECTION INTRAMUSCULAR; INTRAVENOUS ONCE AS NEEDED
Status: DISCONTINUED | OUTPATIENT
Start: 2024-01-27 | End: 2024-01-27 | Stop reason: HOSPADM

## 2024-01-27 RX ORDER — LEVOFLOXACIN 5 MG/ML
500 INJECTION, SOLUTION INTRAVENOUS
Qty: 500 ML | Refills: 0 | Status: DISCONTINUED | OUTPATIENT
Start: 2024-01-27 | End: 2024-01-28 | Stop reason: HOSPADM

## 2024-01-27 RX ORDER — BISACODYL 10 MG
10 SUPPOSITORY, RECTAL RECTAL DAILY PRN
Status: DISCONTINUED | OUTPATIENT
Start: 2024-01-27 | End: 2024-01-28 | Stop reason: HOSPADM

## 2024-01-27 RX ORDER — NALOXONE HCL 0.4 MG/ML
0.4 VIAL (ML) INJECTION
Status: DISCONTINUED | OUTPATIENT
Start: 2024-01-27 | End: 2024-01-28 | Stop reason: HOSPADM

## 2024-01-27 RX ORDER — PANTOPRAZOLE SODIUM 40 MG/10ML
40 INJECTION, POWDER, LYOPHILIZED, FOR SOLUTION INTRAVENOUS
Status: DISCONTINUED | OUTPATIENT
Start: 2024-01-27 | End: 2024-01-28 | Stop reason: HOSPADM

## 2024-01-27 RX ORDER — IPRATROPIUM BROMIDE AND ALBUTEROL SULFATE 2.5; .5 MG/3ML; MG/3ML
3 SOLUTION RESPIRATORY (INHALATION) ONCE AS NEEDED
Status: DISCONTINUED | OUTPATIENT
Start: 2024-01-27 | End: 2024-01-27 | Stop reason: HOSPADM

## 2024-01-27 RX ORDER — ENOXAPARIN SODIUM 100 MG/ML
40 INJECTION SUBCUTANEOUS DAILY
Status: DISCONTINUED | OUTPATIENT
Start: 2024-01-27 | End: 2024-01-28 | Stop reason: HOSPADM

## 2024-01-27 RX ORDER — NITROGLYCERIN 0.4 MG/1
0.4 TABLET SUBLINGUAL
Status: DISCONTINUED | OUTPATIENT
Start: 2024-01-27 | End: 2024-01-28 | Stop reason: HOSPADM

## 2024-01-27 RX ORDER — ONDANSETRON 2 MG/ML
4 INJECTION INTRAMUSCULAR; INTRAVENOUS EVERY 6 HOURS PRN
Status: DISCONTINUED | OUTPATIENT
Start: 2024-01-27 | End: 2024-01-28 | Stop reason: HOSPADM

## 2024-01-27 RX ORDER — METRONIDAZOLE 500 MG/100ML
500 INJECTION, SOLUTION INTRAVENOUS EVERY 8 HOURS
Qty: 1500 ML | Refills: 0 | Status: DISCONTINUED | OUTPATIENT
Start: 2024-01-27 | End: 2024-01-28 | Stop reason: HOSPADM

## 2024-01-27 RX ORDER — POLYETHYLENE GLYCOL 3350 17 G/17G
17 POWDER, FOR SOLUTION ORAL DAILY PRN
Status: DISCONTINUED | OUTPATIENT
Start: 2024-01-27 | End: 2024-01-28 | Stop reason: HOSPADM

## 2024-01-27 RX ORDER — DEXAMETHASONE SODIUM PHOSPHATE 4 MG/ML
INJECTION, SOLUTION INTRA-ARTICULAR; INTRALESIONAL; INTRAMUSCULAR; INTRAVENOUS; SOFT TISSUE AS NEEDED
Status: DISCONTINUED | OUTPATIENT
Start: 2024-01-27 | End: 2024-01-27 | Stop reason: SURG

## 2024-01-27 RX ORDER — NICOTINE POLACRILEX 4 MG
15 LOZENGE BUCCAL
Status: DISCONTINUED | OUTPATIENT
Start: 2024-01-27 | End: 2024-01-28 | Stop reason: HOSPADM

## 2024-01-27 RX ORDER — ONDANSETRON 2 MG/ML
INJECTION INTRAMUSCULAR; INTRAVENOUS AS NEEDED
Status: DISCONTINUED | OUTPATIENT
Start: 2024-01-27 | End: 2024-01-27 | Stop reason: SURG

## 2024-01-27 RX ORDER — IBUPROFEN 600 MG/1
1 TABLET ORAL
Status: DISCONTINUED | OUTPATIENT
Start: 2024-01-27 | End: 2024-01-28 | Stop reason: HOSPADM

## 2024-01-27 RX ORDER — ROCURONIUM BROMIDE 10 MG/ML
INJECTION, SOLUTION INTRAVENOUS AS NEEDED
Status: DISCONTINUED | OUTPATIENT
Start: 2024-01-27 | End: 2024-01-27 | Stop reason: SURG

## 2024-01-27 RX ORDER — INDOMETHACIN 50 MG/1
SUPPOSITORY RECTAL AS NEEDED
Status: DISCONTINUED | OUTPATIENT
Start: 2024-01-27 | End: 2024-01-27 | Stop reason: HOSPADM

## 2024-01-27 RX ORDER — HYDROXYZINE HYDROCHLORIDE 25 MG/1
25 TABLET, FILM COATED ORAL 3 TIMES DAILY PRN
Status: DISCONTINUED | OUTPATIENT
Start: 2024-01-27 | End: 2024-01-28 | Stop reason: HOSPADM

## 2024-01-27 RX ORDER — SODIUM CHLORIDE 9 MG/ML
40 INJECTION, SOLUTION INTRAVENOUS AS NEEDED
Status: DISCONTINUED | OUTPATIENT
Start: 2024-01-27 | End: 2024-01-28 | Stop reason: HOSPADM

## 2024-01-27 RX ORDER — SODIUM CHLORIDE 0.9 % (FLUSH) 0.9 %
10 SYRINGE (ML) INJECTION EVERY 12 HOURS SCHEDULED
Status: DISCONTINUED | OUTPATIENT
Start: 2024-01-27 | End: 2024-01-28 | Stop reason: HOSPADM

## 2024-01-27 RX ORDER — SODIUM CHLORIDE 9 MG/ML
125 INJECTION, SOLUTION INTRAVENOUS CONTINUOUS
Status: DISCONTINUED | OUTPATIENT
Start: 2024-01-27 | End: 2024-01-28 | Stop reason: HOSPADM

## 2024-01-27 RX ORDER — SODIUM CHLORIDE, SODIUM LACTATE, POTASSIUM CHLORIDE, CALCIUM CHLORIDE 600; 310; 30; 20 MG/100ML; MG/100ML; MG/100ML; MG/100ML
INJECTION, SOLUTION INTRAVENOUS CONTINUOUS PRN
Status: DISCONTINUED | OUTPATIENT
Start: 2024-01-27 | End: 2024-01-27 | Stop reason: SURG

## 2024-01-27 RX ORDER — INSULIN LISPRO 100 [IU]/ML
2-9 INJECTION, SOLUTION INTRAVENOUS; SUBCUTANEOUS
Status: DISCONTINUED | OUTPATIENT
Start: 2024-01-27 | End: 2024-01-28 | Stop reason: HOSPADM

## 2024-01-27 RX ORDER — DEXTROSE MONOHYDRATE 25 G/50ML
25 INJECTION, SOLUTION INTRAVENOUS
Status: DISCONTINUED | OUTPATIENT
Start: 2024-01-27 | End: 2024-01-28 | Stop reason: HOSPADM

## 2024-01-27 RX ADMIN — FENTANYL CITRATE 50 MCG: 50 INJECTION, SOLUTION INTRAMUSCULAR; INTRAVENOUS at 11:43

## 2024-01-27 RX ADMIN — TEMAZEPAM 15 MG: 15 CAPSULE ORAL at 22:21

## 2024-01-27 RX ADMIN — METRONIDAZOLE 500 MG: 500 INJECTION, SOLUTION INTRAVENOUS at 14:50

## 2024-01-27 RX ADMIN — INSULIN HUMAN 4 UNITS: 100 INJECTION, SOLUTION PARENTERAL at 17:38

## 2024-01-27 RX ADMIN — Medication 10 ML: at 09:50

## 2024-01-27 RX ADMIN — Medication 10 ML: at 22:37

## 2024-01-27 RX ADMIN — DEXMEDETOMIDINE HYDROCHLORIDE 8 MCG: 100 INJECTION, SOLUTION INTRAVENOUS at 11:31

## 2024-01-27 RX ADMIN — INSULIN HUMAN 2 UNITS: 100 INJECTION, SOLUTION PARENTERAL at 06:45

## 2024-01-27 RX ADMIN — SODIUM CHLORIDE, POTASSIUM CHLORIDE, SODIUM LACTATE AND CALCIUM CHLORIDE: 600; 310; 30; 20 INJECTION, SOLUTION INTRAVENOUS at 11:18

## 2024-01-27 RX ADMIN — SODIUM CHLORIDE 125 ML/HR: 9 INJECTION, SOLUTION INTRAVENOUS at 14:51

## 2024-01-27 RX ADMIN — INSULIN LISPRO 4 UNITS: 100 INJECTION, SOLUTION INTRAVENOUS; SUBCUTANEOUS at 22:20

## 2024-01-27 RX ADMIN — PANTOPRAZOLE SODIUM 40 MG: 40 INJECTION, POWDER, FOR SOLUTION INTRAVENOUS at 17:38

## 2024-01-27 RX ADMIN — INSULIN HUMAN 2 UNITS: 100 INJECTION, SOLUTION PARENTERAL at 01:23

## 2024-01-27 RX ADMIN — SODIUM CHLORIDE, POTASSIUM CHLORIDE, SODIUM LACTATE AND CALCIUM CHLORIDE 125 ML/HR: 600; 310; 30; 20 INJECTION, SOLUTION INTRAVENOUS at 09:52

## 2024-01-27 RX ADMIN — SUGAMMADEX 200 MG: 100 INJECTION, SOLUTION INTRAVENOUS at 12:04

## 2024-01-27 RX ADMIN — Medication 10 ML: at 01:24

## 2024-01-27 RX ADMIN — LEVOFLOXACIN 500 MG: 500 INJECTION, SOLUTION INTRAVENOUS at 02:04

## 2024-01-27 RX ADMIN — HYDRALAZINE HYDROCHLORIDE 10 MG: 20 INJECTION INTRAMUSCULAR; INTRAVENOUS at 02:08

## 2024-01-27 RX ADMIN — DEXAMETHASONE SODIUM PHOSPHATE 4 MG: 4 INJECTION, SOLUTION INTRAMUSCULAR; INTRAVENOUS at 11:27

## 2024-01-27 RX ADMIN — DEXMEDETOMIDINE HYDROCHLORIDE 8 MCG: 100 INJECTION, SOLUTION INTRAVENOUS at 11:47

## 2024-01-27 RX ADMIN — LEVOFLOXACIN 500 MG: 500 INJECTION, SOLUTION INTRAVENOUS at 23:01

## 2024-01-27 RX ADMIN — ROCURONIUM BROMIDE 35 MG: 10 SOLUTION INTRAVENOUS at 11:23

## 2024-01-27 RX ADMIN — LIDOCAINE HYDROCHLORIDE 50 MG: 10 INJECTION, SOLUTION EPIDURAL; INFILTRATION; INTRACAUDAL; PERINEURAL at 11:23

## 2024-01-27 RX ADMIN — ONDANSETRON 4 MG: 2 INJECTION INTRAMUSCULAR; INTRAVENOUS at 12:04

## 2024-01-27 RX ADMIN — HYDRALAZINE HYDROCHLORIDE 10 MG: 20 INJECTION INTRAMUSCULAR; INTRAVENOUS at 22:36

## 2024-01-27 RX ADMIN — PROPOFOL 200 MG: 10 INJECTION, EMULSION INTRAVENOUS at 11:23

## 2024-01-27 RX ADMIN — FENTANYL CITRATE 50 MCG: 50 INJECTION, SOLUTION INTRAMUSCULAR; INTRAVENOUS at 11:46

## 2024-01-27 RX ADMIN — METRONIDAZOLE 500 MG: 500 INJECTION, SOLUTION INTRAVENOUS at 05:52

## 2024-01-27 NOTE — ANESTHESIA POSTPROCEDURE EVALUATION
Patient: Costa Robbins    Procedure Summary       Date: 01/27/24 Room / [facility]:  [facility]    Anesthesia Start: 1118 Anesthesia Stop: 1225    Procedure: ENDOSCOPIC RETROGRADE CHOLANGIOPANCREATOGRAPHY Diagnosis:       Pancreatic mass      Jaundice      Elevated LFTs      (Pancreatic mass [K86.89])      (Jaundice [R17])      (Elevated LFTs [R79.89])    Surgeons: Brunner, Mark I, MD Provider: Sanjay Chirinos MD    Anesthesia Type: general ASA Status: 3            Anesthesia Type: general    Vitals  Vitals Value Taken Time   /79 01/27/24 1222   Temp 97.8 °F (36.6 °C) 01/27/24 1222   Pulse 89 01/27/24 1222   Resp 16 01/27/24 1222   SpO2 100 % 01/27/24 1222           Post Anesthesia Care and Evaluation    Patient location during evaluation: PACU  Patient participation: complete - patient participated  Level of consciousness: awake and alert  Pain score: 0  Pain management: adequate    Airway patency: patent  Anesthetic complications: No anesthetic complications  PONV Status: none  Cardiovascular status: hemodynamically stable and acceptable  Respiratory status: nonlabored ventilation, acceptable and nasal cannula  Hydration status: acceptable

## 2024-01-27 NOTE — PLAN OF CARE
Problem: Skin Injury Risk Increased  Goal: Skin Health and Integrity  Outcome: Ongoing, Progressing  Intervention: Optimize Skin Protection  Recent Flowsheet Documentation  Taken 1/27/2024 0600 by Kaela Gee RN  Pressure Reduction Techniques:   frequent weight shift encouraged   weight shift assistance provided  Head of Bed (HOB) Positioning: HOB lowered  Pressure Reduction Devices: positioning supports utilized  Skin Protection:   tubing/devices free from skin contact   transparent dressing maintained   skin-to-device areas padded  Taken 1/27/2024 0400 by Kaela Gee RN  Pressure Reduction Techniques:   frequent weight shift encouraged   weight shift assistance provided  Head of Bed (HOB) Positioning: HOB lowered  Pressure Reduction Devices: positioning supports utilized  Skin Protection:   tubing/devices free from skin contact   transparent dressing maintained   skin-to-device areas padded  Taken 1/27/2024 0000 by Kaela Gee RN  Pressure Reduction Techniques:   frequent weight shift encouraged   weight shift assistance provided  Head of Bed (HOB) Positioning: HOB lowered  Pressure Reduction Devices: positioning supports utilized  Skin Protection:   tubing/devices free from skin contact   transparent dressing maintained   skin-to-device areas padded   Goal Outcome Evaluation:

## 2024-01-27 NOTE — BRIEF OP NOTE
ENDOSCOPIC RETROGRADE CHOLANGIOPANCREATOGRAPHY  Progress Note    Costa VERAS Robbins  1/27/2024    ERCP reveals a malignant appearing 2 cm biliary stricture in the region of the head of pancreas.  Biliary sphincterotomy performed with no bleeding.  Brushings taken in the stricture for cytology.  A 10 mm x 6 cm covered Wallstent was deployed with excellent flow of dark bile.    >> Await cytology results.  If cytology negative, will need EUS.    Mark I. Brunner, MD     Date: 1/27/2024  Time: 12:11 EST

## 2024-01-27 NOTE — CONSULTS
[facility]    Referring Provider: No ref. provider found    PCP: Jonathan Conner MD    Reason for Consultation: ERCP, biliary obstruction    Chief complaint: Abdominal pain and jaundice    History of present illness:    Costa Robbins is a 68 y.o. male who is admitted with abdominal pain and jaundice was found to have a biliary obstruction.  Consult received thereof.  Patient notes over the past few months he has had intermittent epigastric and lower abdominal pain as well as persistent back aching for the past few months.  Associated with onset, patient endorses a 35 pound unintentional weight loss with overall decreased appetite.  Has also had a change in bowel habits in terms of stool texture and color-lighter in color.  Has had itching since deer season intermittently as well as rashes.  Does not endorse any N/V/D, SOB, CP, or F/C.  No family history of hepatobiliary diseases or disorders.  Patient does smoke approximately 3 packs/week-no alcohol reported at time of admission.  CT and MRI imaging reviewed and is concerning for pancreatic head mass with associated biliary obstruction.  LFTs elevated in cholestatic fashion. Hx of melanoma noted on the chart. Past medical, surgical, social, and family histories are reviewed for accuracy.  No documented alleviating or exacerbating factors.  Does not endorse pain at time of exam.    Allergies:  Atenolol, Morphine, and Penicillins    Scheduled Meds:  enoxaparin, 40 mg, Subcutaneous, Daily  insulin regular, 2-9 Units, Subcutaneous, Q6H  levoFLOXacin, 500 mg, Intravenous, Q24H  metroNIDAZOLE, 500 mg, Intravenous, Q8H  senna-docusate sodium, 2 tablet, Oral, BID  sodium chloride, 10 mL, Intravenous, Q12H         Infusions:  lactated ringers, 125 mL/hr, Last Rate: 125 mL/hr (01/26/24 3709)        PRN Meds:    senna-docusate sodium **AND** polyethylene glycol **AND** bisacodyl **AND** bisacodyl    dextrose    dextrose    glucagon (human  recombinant)    HYDROmorphone **AND** naloxone    nitroglycerin    ondansetron    sodium chloride    sodium chloride    Home Meds:  Medications Prior to Admission   Medication Sig Dispense Refill Last Dose    amLODIPine (NORVASC) 10 MG tablet Take 1 tablet by mouth Daily. 30 tablet 5 1/26/2024    clopidogrel (PLAVIX) 75 MG tablet Take 1 tablet by mouth Daily. 30 tablet 6 1/26/2024    doxazosin (Cardura) 2 MG tablet Take 1 tablet by mouth Every Night. (Patient taking differently: Take 2 tablets by mouth Every Night.) 30 tablet 5 Past Month    Insulin Glargine (BASAGLAR KWIKPEN SC) Inject  under the skin into the appropriate area as directed.   1/26/2024    lisinopril (PRINIVIL,ZESTRIL) 40 MG tablet Take 1 tablet by mouth Daily.   1/26/2024    aspirin 325 MG tablet Take 1 tablet by mouth Daily.   More than a month       ROS: Review of Systems   Constitutional:  Positive for activity change, appetite change, fatigue and unexpected weight change. Negative for chills, diaphoresis and fever.   HENT:  Negative for sore throat, trouble swallowing and voice change.    Eyes: Negative.    Respiratory: Negative.     Cardiovascular: Negative.    Gastrointestinal:  Positive for abdominal pain, nausea and vomiting. Negative for abdominal distention, anal bleeding, blood in stool, constipation, diarrhea and rectal pain.   Endocrine: Negative.    Genitourinary: Negative.    Musculoskeletal:  Positive for back pain.   Skin:  Positive for color change and rash.        +itching, jaundice   Allergic/Immunologic: Negative.    Neurological: Negative.    Hematological: Negative.    Psychiatric/Behavioral: Negative.     All other systems reviewed and are negative.      PAST MED HX:  Past Medical History:   Diagnosis Date    Coronary artery disease     COVID     2021    Diabetes mellitus     Diverticulitis     ETOH use 5/25/2023    Former smoker 5/25/2023    Hearing decreased     Hypertension     Melanoma     CHEST, BACK, NECK MULTI, HEAD     "PVD (peripheral vascular disease)     Wears glasses        PAST SURG HX:  Past Surgical History:   Procedure Laterality Date    AORTAGRAM Bilateral 2023    Procedure: AORTAGRAM WITH RUNOFFS  STENT OF THE LEFT ILIAC ARTERY;  Surgeon: Moses Escalante MD;  Location: [facility];  Service: Vascular;  Laterality: Bilateral;    COLON RESECTION  2009    partial colectomy    COLONOSCOPY      FEMORAL ENDARTERECTOMY Left 2023    Procedure: FEMORAL ENDARTERECTOMY WITH PROPATEN GRAFT 8MM X 40CM;  Surgeon: Moses Escalante MD;  Location:  [facility];  Service: Vascular;  Laterality: Left;    FEMORAL FEMORAL BYPASS N/A 2023    Procedure: FEMORAL FEMORAL BYPASS;  Surgeon: Moses Escalante MD;  Location:  [facility];  Service: Vascular;  Laterality: N/A;    ORIF ANKLE FRACTURE Left     SKIN CANCER EXCISION      melanoma       FAM HX:  Family History   Problem Relation Age of Onset    Diabetes Mother     Heart attack Mother     Hypertension Mother     Hyperlipidemia Mother     Stroke Mother     Hodgkin's lymphoma Father     Hypertension Father     Diabetes Brother     Hyperlipidemia Brother        SOC HX:  Social History     Socioeconomic History    Marital status:     Number of children: 0   Tobacco Use    Smoking status: Former     Years: 43     Types: Cigarettes     Quit date: 2023     Years since quittin.9    Smokeless tobacco: Never    Tobacco comments:     Pt states that he has been able to stop smoking this month - 2023   Vaping Use    Vaping Use: Never used   Substance and Sexual Activity    Alcohol use: Not Currently     Comment: 2 BEERS PER DAY    Drug use: Yes     Types: Marijuana     Comment: ONCE A MONTH    Sexual activity: Defer       PHYSICAL EXAM  /82 (BP Location: Right arm, Patient Position: Sitting)   Pulse 80   Temp 97.5 °F (36.4 °C) (Oral)   Resp 18   Ht 180.3 cm (71\")   Wt 76.2 kg (168 lb 1.6 oz)   SpO2 99%   BMI 23.45 kg/m²   Wt Readings from Last 3 " Encounters:   01/27/24 76.2 kg (168 lb 1.6 oz)   07/19/23 86.6 kg (191 lb)   06/21/23 89.8 kg (198 lb)   ,body mass index is 23.45 kg/m².  Physical Exam  Vitals and nursing note reviewed.   Constitutional:       General: He is not in acute distress.     Appearance: Normal appearance. He is normal weight. He is ill-appearing. He is not toxic-appearing.   HENT:      Head: Normocephalic and atraumatic.   Eyes:      General: Scleral icterus present.      Extraocular Movements: Extraocular movements intact.      Pupils: Pupils are equal, round, and reactive to light.   Cardiovascular:      Rate and Rhythm: Normal rate and regular rhythm.      Pulses: Normal pulses.      Heart sounds: Normal heart sounds. Murmur heard.   Pulmonary:      Effort: Pulmonary effort is normal. No respiratory distress.      Breath sounds: Normal breath sounds.   Abdominal:      General: Abdomen is flat. Bowel sounds are normal. There is no distension.      Palpations: Abdomen is soft. There is no mass.      Tenderness: There is abdominal tenderness. There is no guarding or rebound.      Hernia: No hernia is present.   Musculoskeletal:         General: Normal range of motion.      Right lower leg: No edema.      Left lower leg: No edema.   Skin:     General: Skin is warm and dry.      Capillary Refill: Capillary refill takes less than 2 seconds.      Coloration: Skin is jaundiced and pale.   Neurological:      General: No focal deficit present.      Mental Status: He is alert and oriented to person, place, and time.   Psychiatric:         Mood and Affect: Mood normal.         Behavior: Behavior normal.         Thought Content: Thought content normal.         Judgment: Judgment normal.         Results Review:   I reviewed the patient's new clinical results.  I reviewed the patient's new imaging results and agree with the interpretation.  I reviewed the patient's other test results and agree with the interpretation  I personally viewed and  interpreted the patient's EKG/Telemetry data    Lab Results   Component Value Date    WBC 8.84 01/27/2024    HGB 13.7 01/27/2024    HGB 14.7 01/26/2024    HGB 11.0 (L) 05/26/2023    HCT 38.3 01/27/2024    MCV 88.7 01/27/2024     01/27/2024       Lab Results   Component Value Date    INR 0.86 (L) 05/15/2023    INR 0.88 04/07/2023       Lab Results   Component Value Date    GLUCOSE 157 (H) 01/27/2024    BUN 14 01/27/2024    CREATININE 0.75 (L) 01/27/2024    BCR 18.7 01/27/2024     (L) 01/27/2024    K 4.3 01/27/2024    CO2 17.0 (L) 01/27/2024    CALCIUM 11.2 (H) 01/27/2024    ALBUMIN 3.6 01/27/2024    ALKPHOS 652 (H) 01/27/2024    BILITOT 6.1 (H) 01/27/2024    BILIDIR 7.5 (H) 01/26/2024     (H) 01/27/2024     (H) 01/27/2024       MRI abdomen w wo contrast mrcp    Result Date: 1/26/2024  MRI ABDOMEN W WO CONTRAST MRCP Date of Exam: 1/26/2024 9:47 PM EST Indication: Jaundice, pancreatitis, unintentional weight loss, biliary obstruction on CT scan.  Comparison: CT abdomen and pelvis 1/26/2024 and 2/1/2023 Technique:  Routine multiplanar/multisequence images of the abdomen were obtained with MRCP sequences before and after the uneventful administration of 15 cc Multihance. Findings: Visualized Chest:  The visualized lung bases and lower mediastinal structures are unremarkable. Liver: Liver is normal in size and CT density. No focal lesions. Gallbladder: A distended gallbladder is noted. No wall thickening or pericholecystic fluid. No gallstone is identified. Bile Ducts: Diffuse intrahepatic and extrahepatic biliary ductal dilation with significant narrowing distally (image 34 series 5). No definite obstructing stone is identified. Questionable mild enhancement of the ductal wall is noted (for example image 44 series 20). Spleen: Spleen is normal in size and CT density. Pancreas: Subtle hypoenhancement within the pancreatic uncinate process measuring approximately 2.3 x 2.4 cm. Minimal pancreatic  ductal dilation measuring up to 0.4 cm. The remainder of the pancreatic parenchyma is unremarkable Adrenals: Small nodules in the left adrenal gland with loss of signal on out of phase images consistent with adenomas. The right adrenal gland is unremarkable. Kidneys: Calyceal diverticula are noted. Additionally a few scattered cysts are noted bilaterally. Otherwise unremarkable Gastrointestinal: Colonic diverticulosis without evidence of acute diverticulitis Peritoneum/Mesentery: No fluid collection, ascities, or free air.   Lymph Nodes: No lymphadenopathy. Vasculature: Unremarkable Abdominal Wall: Unremarkable Bony Structures: No acute osseous abnormality     Impression: Extensive intrahepatic and extrahepatic biliary ductal dilation with significant narrowing distally and abrupt at the level of the pancreatic head/uncinate process. Additionally there is mild dilation of the common bile duct with an area of subtle hypoenhancement noted within the pancreatic head concerning for possible mass. Alternately, subtle enhancement is also noted within the distal common bile duct, which may represent cholangiocarcinoma. Recommend follow-up with GI consultation and ERCP/EUS  for further evaluation. There is distention of the gallbladder, most likely due to the above-mentioned biliary ductal dilation. No gallstones are identified on this study. Incidentally noted right renal calyceal diverticulum Electronically Signed: Nile De La Garza DO  1/26/2024 11:33 PM EST  Workstation ID: [id number]    CT Abdomen Pelvis With Contrast    Result Date: 1/26/2024  CT ABDOMEN PELVIS W CONTRAST Date of Exam: 1/26/2024 7:50 PM EST Indication: LLQ abd pain 2 weeks, jaundice, 30 lb weight loss, hx of melanoma. Comparison: None available. Technique: Axial CT images were obtained of the abdomen and pelvis following the uneventful intravenous administration of 2/1/2023 Isovue-300. Reconstructed coronal and sagittal images were also obtained.  Automated exposure control and iterative construction methods were used. Findings: Visualized Chest: Scattered small nodules measuring up to 0.3 cm. Otherwise unremarkable Liver: Liver is normal in size and CT density. No focal lesions. Gallbladder: Presumed sludge within the gallbladder lumen. No definite wall thickening or pericholecystic fluid. Bile Ducts: Significant dilation of the intrahepatic and extrahepatic bile ducts, new since 2/1/2023. Spleen: Spleen is normal in size and CT density. Pancreas: Mild hypodensity and soft tissue prominence within the pancreatic head/uncinate process. No significant pancreatic ductal dilation Adrenals: Nodular thickening of the left adrenal gland, unchanged from prior study. The right adrenal gland is unremarkable Kidneys: Calyceal diverticulum on the right. No definite suspicious mass. No stones or hydronephrosis Gastrointestinal: Mild to moderate stool burden throughout the colon. Diverticulosis of the descending and sigmoid colon without evidence of acute diverticulitis. No significant distention to suggest bowel obstruction. No significant distention to suggest bowel obstruction. Bladder: The bladder is normal. Pelvis:  No suspecious mass. Peritoneum/Mesentery: No fluid collection, ascities, or free air.   Lymph Nodes: No lymphadenopathy. Vasculature: Extensive vascular calcification of the abdominal aorta. Femoral-femoral bypass graft is noted and appears to be grossly patent. Stenting of the left external iliac artery is noted. Abdominal Wall: Unremarkable Bony Structures: No acute osseous abnormality     Impression: Significant distention of the intrahepatic and extrahepatic bile ducts is highly concerning for choledocholithiasis. Alternately a pancreatic head lesion is not completely excluded, although less likely due to lack of significant pancreatic ductal dilation. Recommend follow-up with MRI pancreatic mass protocol with dedicated MRCP images. Unchanged nodular  thickening of the left adrenal gland. Possible constipation. Vascular findings as described above. Electronically Signed: Nile De La Garza DO  1/26/2024 8:07 PM EST  Workstation ID: [id number]    XR Chest 1 View    Result Date: 1/26/2024  XR CHEST 1 VW Date of Exam: 1/26/2024 4:39 PM EST Indication: dyspnea, unintentional weight loss Comparison: Chest radiograph 5/15/2023. Findings: Cardiomediastinal silhouette is unchanged. No focal consolidation or overt pulmonary edema. No pleural effusion or pneumothorax. Unchanged old/healed right rib fractures and degenerative changes of the spine.     Impression: No evidence of acute cardiopulmonary disease. Electronically Signed: Rhys Mclean MD  1/26/2024 4:52 PM EST  Workstation ID: [id number]      COVID19   Date Value Ref Range Status   01/26/2024 Not Detected Not Detected - Ref. Range Final     ASSESSMENTS/PLANS  1.  Pancreas mass of indeterminate etiology, concerning for malignancy  2.  Elevated LFTs, cholestatic fashion, obstructive jaundice   3.  Abnormal CT and MRI imaging of abdomen pelvis:   CT: Significant distention of intrahepatic biliary ducts concerning for choledocholithiasis with additional concerns of a pancreatic head lesion.  MRCP: Extensive intrahepatic biliary ductal dilation with narrowing of the distal CBD abruptly to the level of the pancreatic head/uncinate process with mild dilation of the CBD with an area of hypoenhancement concerning for mass-additionally, concerns for cholangiocarcinoma.  4. Abdominal pain, related to above   5.  Uncontrolled type 2 diabetes mellitus.  6.  Murmur noted on auscultation   7. CAD, PVD  8.  Unintentional weight loss    Costa Robbins is a 68 y.o. male who presents to hospital with abdominal/back pain and newly identified jaundice. Imaging reviewed and is concerning for a pancreas head mass with concerns raised by radiology for potential cholangiocarcinoma. Given biliary obstruction on imaging and cholestatic LFTs,  recommend ERCP today with biopsy, brushings, and stent deployment. Will obtain CA 19-9 for completeness.     >>> NPO  >>> Obtain informed consent for: endoscopic retrograde cholangiopancreatography with possible biopsy, possible biliary brushing, and possible biliary stent deployment.   >>> Rectal indomethacin to be given at time of procedure  >>> Continue antibiotics for planned endoscopic procedure  >>> Antiemetics and pain control measures  >>> GI Prophylaxis with BID PPI  >>> Will add Atarax for itching  >>> CA 19-9 ordered  >>> Echocardiogram ordered for murmur noted on auscultation.     Pending findings on ERCP, patient may ultimately require EUS for tissue sampling.  Discussed with patient my concerns of a pancreatic malignancy regards to imaging and laboratory findings.  Following today's ERCP, will proceed with CT of chest with contrast given smoking history and pancreas mass findings for staging completeness. Discussed with patient that if no evidence of metastasis, may ultimately benefit from referral to [facility] for consideration of surgical management of disease process.  All questions and concerns were addressed.  Patient voiced understanding of plan.    I discussed the patient's findings and my recommendations with patient, nursing staff, primary care team, and consulting provider    Manuel Dumont, CHERISE  01/27/24  09:51 EST

## 2024-01-27 NOTE — PROGRESS NOTES
[facility] Medicine Services  PROGRESS NOTE    Patient Name: Costa Robbins  : 1955  MRN: [id number]    Date of Admission: 2024  Primary Care Physician: Jonathan Conner MD    Subjective   Subjective     CC:  Abdominal pain, jaundice    HPI:  Feels ok today.  No current abdominal pain or nausea.      Objective   Objective     Vital Signs:   Temp:  [97 °F (36.1 °C)-98.2 °F (36.8 °C)] 98.2 °F (36.8 °C)  Heart Rate:  [77-89] 78  Resp:  [16-18] 18  BP: (128-178)/() 162/82  Flow (L/min):  [2] 2     Physical Exam:  Constitutional: No acute distress, awake, alert, jaundiced  HENT: NCAT, mucous membranes moist  Respiratory: Clear to auscultation bilaterally, respiratory effort normal   Cardiovascular: RRR, no murmurs, rubs, or gallops  Gastrointestinal: Positive bowel sounds, soft, nontender, nondistended  Musculoskeletal: No bilateral ankle edema  Psychiatric: Appropriate affect, cooperative  Neurologic: Oriented x 3, strength symmetric in all extremities, Cranial Nerves grossly intact to confrontation, speech clear  Skin: No rashes      Results Reviewed:  LAB RESULTS:      Lab 24  0542 24  0102 24  1632   WBC 8.84  --  8.11   HEMOGLOBIN 13.7  --  14.7   HEMATOCRIT 38.3  --  41.4   PLATELETS 256  --  266   NEUTROS ABS 6.60  --  5.77   IMMATURE GRANS (ABS) 0.07*  --  0.06*   LYMPHS ABS 1.34  --  1.56   MONOS ABS 0.60  --  0.58   EOS ABS 0.18  --  0.08   MCV 88.7  --  92.0   CRP  --   --  <0.30   PROCALCITONIN  --   --  0.37*   LACTATE  --  1.0 1.7         Lab 24  0542 24  1632   SODIUM 133* 126*   POTASSIUM 4.3 4.3   CHLORIDE 97* 89*   CO2 17.0* 19.0*   ANION GAP 19.0* 18.0*   BUN 14 17   CREATININE 0.75* 0.66*   EGFR 99.5 103.4   GLUCOSE 157* 438*   CALCIUM 11.2* 11.8*   MAGNESIUM 2.0 2.3   PHOSPHORUS 3.0 2.5   TSH  --  0.611         Lab 24  0542 24  1632   TOTAL PROTEIN 5.8* 7.5   ALBUMIN 3.6 4.0   GLOBULIN 2.2 3.5   ALT (SGPT)  576* 795*   AST (SGOT) 161* 336*   BILIRUBIN 6.1* 9.8*   BILIRUBIN DIRECT  --  7.5*   ALK PHOS 652* 790*   LIPASE  --  268*         Lab 01/26/24  1632   PROBNP 51.1         Lab 01/27/24  0542   CHOLESTEROL 424*   LDL CHOL 341*   HDL CHOL 43   TRIGLYCERIDES 187*             Lab 01/26/24  1927   PH, ARTERIAL 7.490*   PCO2, ARTERIAL 20.7*   PO2 .0*   FIO2 21   HCO3 ART 15.8*   BASE EXCESS ART -5.4*   CARBOXYHEMOGLOBIN 1.7     Brief Urine Lab Results  (Last result in the past 365 days)        Color   Clarity   Blood   Leuk Est   Nitrite   Protein   CREAT   Urine HCG        01/26/24 1639 Dark Yellow   Clear   Negative   Negative   Negative   Negative                   Microbiology Results Abnormal       Procedure Component Value - Date/Time    COVID PRE-OP / PRE-PROCEDURE SCREENING ORDER (NO ISOLATION) - Swab, Nasopharynx [[id number]]  (Normal) Collected: 01/26/24 1639    Lab Status: Final result Specimen: Swab from Nasopharynx Updated: 01/26/24 1713    Narrative:      The following orders were created for panel order COVID PRE-OP / PRE-PROCEDURE SCREENING ORDER (NO ISOLATION) - Swab, Nasopharynx.  Procedure                               Abnormality         Status                     ---------                               -----------         ------                     COVID-19 and FLU A/B PCR...[[id number]]  Normal              Final result                 Please view results for these tests on the individual orders.    COVID-19 and FLU A/B PCR, 1 HR TAT - Swab, Nasopharynx [[id number]]  (Normal) Collected: 01/26/24 1639    Lab Status: Final result Specimen: Swab from Nasopharynx Updated: 01/26/24 1713     COVID19 Not Detected     Influenza A PCR Not Detected     Influenza B PCR Not Detected    Narrative:      Fact sheet for providers: https://www.fda.gov/media/329298/download    Fact sheet for patients: https://www.fda.gov/media/825101/download    Test performed by PCR.            FL ERCP pancreatic and biliary  ducts    Result Date: 1/27/2024  FL ERCP PANCREATIC AND BILIARY DUCTS Date of Exam: 1/27/2024 11:21 AM EST Indication: ENDOSCOPIC RETROGRADE CHOLANGIOPANCREATOGRAPHY. Comparison: MRI abdomen 1/26/2024. Technique:  A series of radiographic digital spot films were obtained in conjunction with a endoscopic catheterization of the biliary and pancreatic ductal system, performed by the gastroenterologist. Fluoroscopic Time: 2 minutes 33 seconds Number of Images: 6 Findings: ERCP reveals a biliary stricture in the region of the pancreatic head. Stent placed. Please see procedure report for additional findings.     Impression: Impression: Please see procedure report for full discussion of findings and recommendations Electronically Signed: Rhys Mclean MD  1/27/2024 12:47 PM EST  Workstation ID: [id number]    MRI abdomen w wo contrast mrcp    Result Date: 1/26/2024  MRI ABDOMEN W WO CONTRAST MRCP Date of Exam: 1/26/2024 9:47 PM EST Indication: Jaundice, pancreatitis, unintentional weight loss, biliary obstruction on CT scan.  Comparison: CT abdomen and pelvis 1/26/2024 and 2/1/2023 Technique:  Routine multiplanar/multisequence images of the abdomen were obtained with MRCP sequences before and after the uneventful administration of 15 cc Multihance. Findings: Visualized Chest:  The visualized lung bases and lower mediastinal structures are unremarkable. Liver: Liver is normal in size and CT density. No focal lesions. Gallbladder: A distended gallbladder is noted. No wall thickening or pericholecystic fluid. No gallstone is identified. Bile Ducts: Diffuse intrahepatic and extrahepatic biliary ductal dilation with significant narrowing distally (image 34 series 5). No definite obstructing stone is identified. Questionable mild enhancement of the ductal wall is noted (for example image 44 series 20). Spleen: Spleen is normal in size and CT density. Pancreas: Subtle hypoenhancement within the pancreatic uncinate process measuring  approximately 2.3 x 2.4 cm. Minimal pancreatic ductal dilation measuring up to 0.4 cm. The remainder of the pancreatic parenchyma is unremarkable Adrenals: Small nodules in the left adrenal gland with loss of signal on out of phase images consistent with adenomas. The right adrenal gland is unremarkable. Kidneys: Calyceal diverticula are noted. Additionally a few scattered cysts are noted bilaterally. Otherwise unremarkable Gastrointestinal: Colonic diverticulosis without evidence of acute diverticulitis Peritoneum/Mesentery: No fluid collection, ascities, or free air.   Lymph Nodes: No lymphadenopathy. Vasculature: Unremarkable Abdominal Wall: Unremarkable Bony Structures: No acute osseous abnormality     Impression: Impression: Extensive intrahepatic and extrahepatic biliary ductal dilation with significant narrowing distally and abrupt at the level of the pancreatic head/uncinate process. Additionally there is mild dilation of the common bile duct with an area of subtle hypoenhancement noted within the pancreatic head concerning for possible mass. Alternately, subtle enhancement is also noted within the distal common bile duct, which may represent cholangiocarcinoma. Recommend follow-up with GI consultation and ERCP/EUS  for further evaluation. There is distention of the gallbladder, most likely due to the above-mentioned biliary ductal dilation. No gallstones are identified on this study. Incidentally noted right renal calyceal diverticulum Electronically Signed: Nile De La Garza DO  1/26/2024 11:33 PM EST  Workstation ID: [id number]    CT Abdomen Pelvis With Contrast    Result Date: 1/26/2024  CT ABDOMEN PELVIS W CONTRAST Date of Exam: 1/26/2024 7:50 PM EST Indication: LLQ abd pain 2 weeks, jaundice, 30 lb weight loss, hx of melanoma. Comparison: None available. Technique: Axial CT images were obtained of the abdomen and pelvis following the uneventful intravenous administration of 2/1/2023 Isovue-300.  Reconstructed coronal and sagittal images were also obtained. Automated exposure control and iterative construction methods were used. Findings: Visualized Chest: Scattered small nodules measuring up to 0.3 cm. Otherwise unremarkable Liver: Liver is normal in size and CT density. No focal lesions. Gallbladder: Presumed sludge within the gallbladder lumen. No definite wall thickening or pericholecystic fluid. Bile Ducts: Significant dilation of the intrahepatic and extrahepatic bile ducts, new since 2/1/2023. Spleen: Spleen is normal in size and CT density. Pancreas: Mild hypodensity and soft tissue prominence within the pancreatic head/uncinate process. No significant pancreatic ductal dilation Adrenals: Nodular thickening of the left adrenal gland, unchanged from prior study. The right adrenal gland is unremarkable Kidneys: Calyceal diverticulum on the right. No definite suspicious mass. No stones or hydronephrosis Gastrointestinal: Mild to moderate stool burden throughout the colon. Diverticulosis of the descending and sigmoid colon without evidence of acute diverticulitis. No significant distention to suggest bowel obstruction. No significant distention to suggest bowel obstruction. Bladder: The bladder is normal. Pelvis:  No suspecious mass. Peritoneum/Mesentery: No fluid collection, ascities, or free air.   Lymph Nodes: No lymphadenopathy. Vasculature: Extensive vascular calcification of the abdominal aorta. Femoral-femoral bypass graft is noted and appears to be grossly patent. Stenting of the left external iliac artery is noted. Abdominal Wall: Unremarkable Bony Structures: No acute osseous abnormality     Impression: Impression: Significant distention of the intrahepatic and extrahepatic bile ducts is highly concerning for choledocholithiasis. Alternately a pancreatic head lesion is not completely excluded, although less likely due to lack of significant pancreatic ductal dilation. Recommend follow-up with  MRI pancreatic mass protocol with dedicated MRCP images. Unchanged nodular thickening of the left adrenal gland. Possible constipation. Vascular findings as described above. Electronically Signed: Nile De La Garza DO  1/26/2024 8:07 PM EST  Workstation ID: [id number]    XR Chest 1 View    Result Date: 1/26/2024  XR CHEST 1 VW Date of Exam: 1/26/2024 4:39 PM EST Indication: dyspnea, unintentional weight loss Comparison: Chest radiograph 5/15/2023. Findings: Cardiomediastinal silhouette is unchanged. No focal consolidation or overt pulmonary edema. No pleural effusion or pneumothorax. Unchanged old/healed right rib fractures and degenerative changes of the spine.     Impression: Impression: No evidence of acute cardiopulmonary disease. Electronically Signed: Rhys Mclean MD  1/26/2024 4:52 PM EST  Workstation ID: [id number]     Results for orders placed during the hospital encounter of 05/25/23    Adult Transthoracic Echo Complete W/ Cont if Necessary Per Protocol    Interpretation Summary    Left ventricular systolic function is normal. Calculated left ventricular EF = 63.6% Left ventricular ejection fraction appears to be 61 - 65%.    Left ventricular diastolic function was normal.    No significant structural or functional valvular disease.      Current medications:  Scheduled Meds:enoxaparin, 40 mg, Subcutaneous, Daily  insulin regular, 2-9 Units, Subcutaneous, Q6H  levoFLOXacin, 500 mg, Intravenous, Q24H  metroNIDAZOLE, 500 mg, Intravenous, Q8H  pantoprazole, 40 mg, Intravenous, BID AC  senna-docusate sodium, 2 tablet, Oral, BID  sodium chloride, 10 mL, Intravenous, Q12H      Continuous Infusions:lactated ringers, 125 mL/hr, Last Rate: 125 mL/hr (01/27/24 1303)      PRN Meds:.  senna-docusate sodium **AND** polyethylene glycol **AND** bisacodyl **AND** bisacodyl    dextrose    dextrose    glucagon (human recombinant)    HYDROmorphone **AND** naloxone    hydrOXYzine    nitroglycerin    ondansetron    sodium  chloride    sodium chloride    Assessment & Plan   Assessment & Plan     Active Hospital Problems    Diagnosis  POA    **Choledocholithiasis [K80.50]  Yes    Pancreatic mass [K86.89]  Unknown    Jaundice [R17]  Unknown    Elevated LFTs [R79.89]  Unknown      Resolved Hospital Problems   No resolved problems to display.        Brief Hospital Course to date:  Costa Robbins is a 68 y.o. male     Choledocholithiasis  Pancreatitis  Common bile duct dilation  -IV Levaquin, IV Flagyl for infection prophylaxis  -MRCP showed extensive intrahepatic and extrahepatic biliary ductal dilation, area concerning for pancreatic mass, also subtle enhancement in distal CBD  -GI following.  S/p ERCP on 1/27 that showed a malignant appearing 2cm biliary stricture in the head of the pancreas.  S/p biliary sphincterotomy and stent.  Brushings taken for cytology, path pending  --elevated CA 19-9 of 445    AG Metabolic Acidosis  --IVF    Uncontrolled type 2 diabetes mellitus with hyperglycemia  -Weight-based insulin dosing with sliding scale.    -Caution with insulin while NPO  -A1c pending     Coronary artery disease  Hypertension  Peripheral vascular disease  -Continue home medications as appropriate    Expected Discharge Location and Transportation:   Expected Discharge   Expected Discharge Date: 1/29/2024; Expected Discharge Time:      DVT prophylaxis:  Medical DVT prophylaxis orders are present.         AM-PAC 6 Clicks Score (PT): 24 (01/27/24 0652)    CODE STATUS:   Code Status and Medical Interventions:   Ordered at: 01/26/24 4158     Level Of Support Discussed With:    Patient     Code Status (Patient has no pulse and is not breathing):    CPR (Attempt to Resuscitate)     Medical Interventions (Patient has pulse or is breathing):    Full Support       Alli Tirado MD  01/27/24

## 2024-01-27 NOTE — ED NOTES
Costa Robbins    Nursing Report ED to Floor:  Mental status: alert and oriented x4  Ambulatory status: independent  Oxygen Therapy:  ra  Cardiac Rhythm: nsr  Admitted from: home  Safety Concerns:  none  Social Issues: none  ED Room #:  20    ED Nurse Phone Extension - [phone] or may call [phone].      HPI:   Chief Complaint   Patient presents with    Abdominal Pain       Past Medical History:  Past Medical History:   Diagnosis Date    Coronary artery disease     COVID     2021    Diabetes mellitus     Diverticulitis     ETOH use 5/25/2023    Former smoker 5/25/2023    Hearing decreased     Hypertension     Melanoma     CHEST, BACK, NECK MULTI, HEAD    PVD (peripheral vascular disease)     Wears glasses         Past Surgical History:  Past Surgical History:   Procedure Laterality Date    AORTAGRAM Bilateral 4/12/2023    Procedure: AORTAGRAM WITH RUNOFFS  STENT OF THE LEFT ILIAC ARTERY;  Surgeon: Moses Escalante MD;  Location[facility];  Service: Vascular;  Laterality: Bilateral;    COLON RESECTION  05/2009    partial colectomy    COLONOSCOPY      FEMORAL ENDARTERECTOMY Left 5/25/2023    Procedure: FEMORAL ENDARTERECTOMY WITH PROPATEN GRAFT 8MM X 40CM;  Surgeon: Moses Escalante MD;  Location: [facility] OR;  Service: Vascular;  Laterality: Left;    FEMORAL FEMORAL BYPASS N/A 5/25/2023    Procedure: FEMORAL FEMORAL BYPASS;  Surgeon: Moses Escalante MD;  Location: [facility] OR;  Service: Vascular;  Laterality: N/A;    ORIF ANKLE FRACTURE Left     SKIN CANCER EXCISION      melanoma        Admitting Doctor:   Kirk Irving DO    Consulting Provider(s):  Consults       No orders found from 12/28/2023 to 1/27/2024.             Admitting Diagnosis:   There were no encounter diagnoses.    Most Recent Vitals:   Vitals:    01/26/24 1601 01/26/24 1830   BP: (!) 176/119 178/90   BP Location: Left arm    Patient Position: Sitting    Pulse: 86 77   Resp: 16    Temp: 98.1 °F (36.7 °C)    TempSrc: Oral    SpO2: 100%   "  Weight: 72.6 kg (160 lb)    Height: 180.3 cm (71\")        Active LDAs/IV Access:   Lines, Drains & Airways       Active LDAs       Name Placement date Placement time Site Days    Peripheral IV 01/26/24 1631 Right Antecubital 01/26/24  1631  Antecubital  less than 1                    Labs (abnormal labs have a star):   Labs Reviewed   COMPREHENSIVE METABOLIC PANEL - Abnormal; Notable for the following components:       Result Value    Glucose 438 (*)     Creatinine 0.66 (*)     Sodium 126 (*)     Chloride 89 (*)     CO2 19.0 (*)     Calcium 11.8 (*)     ALT (SGPT) 795 (*)     AST (SGOT) 336 (*)     Alkaline Phosphatase 790 (*)     Total Bilirubin 9.8 (*)     BUN/Creatinine Ratio 25.8 (*)     Anion Gap 18.0 (*)     All other components within normal limits    Narrative:     GFR Normal >60  Chronic Kidney Disease <60  Kidney Failure <15     LIPASE - Abnormal; Notable for the following components:    Lipase 268 (*)     All other components within normal limits   URINALYSIS W/ MICROSCOPIC IF INDICATED (NO CULTURE) - Abnormal; Notable for the following components:    Color, UA Dark Yellow (*)     Specific Gravity, UA 1.037 (*)     Glucose, UA >=1000 mg/dL (3+) (*)     Ketones, UA 40 mg/dL (2+) (*)     Bilirubin, UA Small (1+) (*)     All other components within normal limits    Narrative:     Urine microscopic not indicated.   CBC WITH AUTO DIFFERENTIAL - Abnormal; Notable for the following components:    RDW 16.2 (*)     Lymphocyte % 19.2 (*)     Immature Grans % 0.7 (*)     Immature Grans, Absolute 0.06 (*)     All other components within normal limits   PROCALCITONIN - Abnormal; Notable for the following components:    Procalcitonin 0.37 (*)     All other components within normal limits    Narrative:     As a Marker for Sepsis (Non-Neonates):    1. <0.5 ng/mL represents a low risk of severe sepsis and/or septic shock.  2. >2 ng/mL represents a high risk of severe sepsis and/or septic shock.    As a Marker for Lower " "Respiratory Tract Infections that require antibiotic therapy:    PCT on Admission    Antibiotic Therapy       6-12 Hrs later    >0.5                Strongly Recommended  >0.25 - <0.5        Recommended   0.1 - 0.25          Discouraged              Remeasure/reassess PCT  <0.1                Strongly Discouraged     Remeasure/reassess PCT    As 28 day mortality risk marker: \"Change in Procalcitonin Result\" (>80% or <=80%) if Day 0 (or Day 1) and Day 4 values are available. Refer to http://www.[employer] [email or web address]-pct-calculator.com    Change in PCT <=80%  A decrease of PCT levels below or equal to 80% defines a positive change in PCT test result representing a higher risk for 28-day all-cause mortality of patients diagnosed with severe sepsis for septic shock.    Change in PCT >80%  A decrease of PCT levels of more than 80% defines a negative change in PCT result representing a lower risk for 28-day all-cause mortality of patients diagnosed with severe sepsis or septic shock.      BILIRUBIN, DIRECT - Abnormal; Notable for the following components:    Bilirubin, Direct 7.5 (*)     All other components within normal limits   BLOOD GAS, ARTERIAL W/CO-OXIMETRY - Abnormal; Notable for the following components:    pH, Arterial 7.490 (*)     pCO2, Arterial 20.7 (*)     pO2, Arterial 112.0 (*)     HCO3, Arterial 15.8 (*)     Base Excess, Arterial -5.4 (*)     Hemoglobin, Blood Gas 13.1 (*)     CO2 Content 16.4 (*)     pCO2, Temperature Corrected 20.7 (*)     pO2, Temperature Corrected 112 (*)     All other components within normal limits   COVID-19 AND FLU A/B, NP SWAB IN TRANSPORT MEDIA 1 HR TAT - Normal    Narrative:     Fact sheet for providers: https://www.fda.gov/media/634646/download    Fact sheet for patients: https://www.fda.gov/media/997148/download    Test performed by PCR.   LACTIC ACID, PLASMA - Normal   HEPATITIS PANEL, ACUTE - Normal    Narrative:     Results may be falsely decreased if patient taking Biotin.    BNP " (IN-HOUSE) - Normal    Narrative:     This assay is used as an aid in the diagnosis of individuals suspected of having heart failure. It can be used as an aid in the diagnosis of acute decompensated heart failure (ADHF) in patients presenting with signs and symptoms of ADHF to the emergency department (ED). In addition, NT-proBNP of <300 pg/mL indicates ADHF is not likely.    Age Range Result Interpretation  NT-proBNP Concentration (pg/mL:      <50             Positive            >450                   Gray                 300-450                    Negative             <300    50-75           Positive            >900                  Gray                300-900                  Negative            <300      >75             Positive            >1800                  Gray                300-1800                  Negative            <300   C-REACTIVE PROTEIN - Normal   ACETAMINOPHEN LEVEL - Normal   MAGNESIUM - Normal   PHOSPHORUS - Normal   TSH - Normal   COVID PRE-OP / PRE-PROCEDURE SCREENING ORDER (NO ISOLATION)    Narrative:     The following orders were created for panel order COVID PRE-OP / PRE-PROCEDURE SCREENING ORDER (NO ISOLATION) - Swab, Nasopharynx.  Procedure                               Abnormality         Status                     ---------                               -----------         ------                     COVID-19 and FLU A/B PCR...[[id number]]  Normal              Final result                 Please view results for these tests on the individual orders.   BLOOD CULTURE   BLOOD CULTURE   RAINBOW DRAW    Narrative:     The following orders were created for panel order [place] Draw.  Procedure                               Abnormality         Status                     ---------                               -----------         ------                     Green Top (Gel)[[id number]]                                  Final result               Lavender Top[[id number]]                                      Final result               Gold Top - SST[[id number]]                                                              Colvin Top[[id number]]                                         Final result               Light Blue Top[[id number]]                                   Final result                 Please view results for these tests on the individual orders.   BLOOD GAS, ARTERIAL   BLOOD GAS, VENOUS   POCT OCCULT BLOOD STOOL   CBC AND DIFFERENTIAL    Narrative:     The following orders were created for panel order CBC & Differential.  Procedure                               Abnormality         Status                     ---------                               -----------         ------                     CBC Auto Differential[[id number]]        Abnormal            Final result                 Please view results for these tests on the individual orders.   GREEN TOP   LAVENDER TOP   GRAY TOP   LIGHT BLUE TOP       Meds Given in ED:   Medications   Sodium Chloride (PF) 0.9 % 10 mL (has no administration in time range)   lactated ringers infusion (125 mL/hr Intravenous New Bag 1/26/24 2301)   sodium chloride 0.9 % bolus 1,000 mL (1,000 mL Intravenous New Bag 1/26/24 1841)   iopamidol (ISOVUE-300) 61 % injection 100 mL (100 mL Intravenous Given 1/26/24 1955)   cefepime 2 gm IVPB in 100 ml NS (MBP) (2,000 mg Intravenous New Bag 1/26/24 2054)   metroNIDAZOLE (FLAGYL) IVPB 500 mg (500 mg Intravenous New Bag 1/26/24 2100)   gadobenate dimeglumine (MULTIHANCE) injection 15 mL (15 mL Intravenous Given 1/26/24 2211)     lactated ringers, 125 mL/hr, Last Rate: 125 mL/hr (01/26/24 2301)

## 2024-01-27 NOTE — H&P
"    [facility] Medicine Services  HISTORY AND PHYSICAL    Patient Name: Costa Robbins  : 1955  MRN: [id number]  Primary Care Physician: Jonathan Conner MD  Date of admission: 2024      Subjective   Subjective     Chief Complaint:  Abdominal pain, jaundice    HPI:  Costa Robbins is a 68 y.o. male brought into the emergency department this evening with complaints of abdominal pain.  Patient reports onset 2 months ago, has been intermittent, however the last few days it has been more constant.  Patient has not had any pain relief with Tylenol or ibuprofen.  Patient reports a 30 pound unintentional weight loss since \"deer season\" in October.  Patient reports his appetite has been decreasing since that time as well.  Patient reports \"forcing myself to eat\".  Patient reports his bowel movements have started to float and turned \"blonde\".  Patient denies ever having this issue in the past.  Denies any recent sick contacts, recent travel history.  Patient denies any fevers, chills, myalgias, headaches, chest pain, dyspnea, diarrhea.  He does endorse intermittent nausea and occasional vomiting.      Personal History     Past Medical History:   Diagnosis Date    Coronary artery disease     COVID         Diabetes mellitus     Diverticulitis     ETOH use 2023    Former smoker 2023    Hearing decreased     Hypertension     Melanoma     CHEST, BACK, NECK MULTI, HEAD    PVD (peripheral vascular disease)     Wears glasses          Past Surgical History:   Procedure Laterality Date    AORTAGRAM Bilateral 2023    Procedure: AORTAGRAM WITH RUNOFFS  STENT OF THE LEFT ILIAC ARTERY;  Surgeon: Moses Escalante MD;  Location: [facility];  Service: Vascular;  Laterality: Bilateral;    COLON RESECTION  2009    partial colectomy    COLONOSCOPY      FEMORAL ENDARTERECTOMY Left 2023    Procedure: FEMORAL ENDARTERECTOMY WITH PROPATEN GRAFT 8MM X 40CM;  Surgeon: " Moses Escalante MD;  Location:  [facility];  Service: Vascular;  Laterality: Left;    FEMORAL FEMORAL BYPASS N/A 5/25/2023    Procedure: FEMORAL FEMORAL BYPASS;  Surgeon: Moses Escalante MD;  Location:  [facility];  Service: Vascular;  Laterality: N/A;    ORIF ANKLE FRACTURE Left     SKIN CANCER EXCISION      melanoma       Family History: family history includes Diabetes in his brother and mother; Heart attack in his mother; Hodgkin's lymphoma in his father; Hyperlipidemia in his brother and mother; Hypertension in his father and mother; Stroke in his mother.     Social History:  reports that he quit smoking about a year ago. His smoking use included cigarettes. He has never used smokeless tobacco. He reports that he does not currently use alcohol. He reports current drug use. Drug: Marijuana.  Social History     Social History Narrative    Lives in [facility]        Medications:  Available home medication information reviewed.  Insulin Glargine, amLODIPine, aspirin, clopidogrel, doxazosin, and lisinopril    Allergies   Allergen Reactions    Atenolol Other (See Comments)     Bradycardia.    Morphine Itching    Penicillins Rash       Objective   Objective     Vital Signs:   Temp:  [98.1 °F (36.7 °C)] 98.1 °F (36.7 °C)  Heart Rate:  [77-86] 77  Resp:  [16] 16  BP: (176-178)/() 178/90       Physical Exam   Constitutional: Awake, alert, jaundice, ill-appearing  Eyes: PERRLA, sclerae icteric, no conjunctival injection  HENT: NCAT, mucous membranes moist  Neck: Supple, no thyromegaly, no lymphadenopathy, trachea midline  Respiratory: Clear to auscultation bilaterally, nonlabored respirations   Cardiovascular: RRR, no murmurs, rubs, or gallops, palpable pedal pulses bilaterally  Gastrointestinal: Hypoactive bowel sounds, soft, nontender, nondistended.  No rebound, no guarding, no palpable masses.  No acute abdomen at time of examination.  Musculoskeletal: No bilateral ankle edema, no clubbing or cyanosis to  extremities  Psychiatric: Appropriate affect, cooperative  Neurologic: Oriented x 3, strength symmetric in all extremities, Cranial Nerves grossly intact to confrontation, speech clear  Skin: No rashes     Result Review:  I have personally reviewed the results from the time of this admission to 1/26/2024 21:58 EST and agree with these findings:  [x]  Laboratory list / accordion  [x]  Microbiology  [x]  Radiology  [x]  EKG/Telemetry   []  Cardiology/Vascular   []  Pathology  []  Old records    Most notable findings include: Glucose 438, alk phos 790, , ALT 75, total bilirubin 9.8, direct bilirubin 7.5, lipase 268    CT abdomen pelvis with contrast: Significant distention of the intrahepatic and extrahepatic bile ducts, highly concerning for choledocholithiasis.  Pancreatic head lesion cannot be excluded.  Recommend MRI pancreatic mass protocol dedicated MRCP images.      LAB RESULTS:      Lab 01/26/24  1632   WBC 8.11   HEMOGLOBIN 14.7   HEMATOCRIT 41.4   PLATELETS 266   NEUTROS ABS 5.77   IMMATURE GRANS (ABS) 0.06*   LYMPHS ABS 1.56   MONOS ABS 0.58   EOS ABS 0.08   MCV 92.0   CRP <0.30   PROCALCITONIN 0.37*   LACTATE 1.7         Lab 01/26/24  1632   SODIUM 126*   POTASSIUM 4.3   CHLORIDE 89*   CO2 19.0*   ANION GAP 18.0*   BUN 17   CREATININE 0.66*   EGFR 103.4   GLUCOSE 438*   CALCIUM 11.8*   MAGNESIUM 2.3   PHOSPHORUS 2.5   TSH 0.611         Lab 01/26/24  1632   TOTAL PROTEIN 7.5   ALBUMIN 4.0   GLOBULIN 3.5   ALT (SGPT) 795*   AST (SGOT) 336*   BILIRUBIN 9.8*   BILIRUBIN DIRECT 7.5*   ALK PHOS 790*   LIPASE 268*         Lab 01/26/24  1632   PROBNP 51.1                 Lab 01/26/24  1927   PH, ARTERIAL 7.490*   PCO2, ARTERIAL 20.7*   PO2 .0*   FIO2 21   HCO3 ART 15.8*   BASE EXCESS ART -5.4*   CARBOXYHEMOGLOBIN 1.7     UA          1/26/2024    16:39   Urinalysis   Specific Gravity, UA 1.037    Ketones, UA 40 mg/dL (2+)    Blood, UA Negative    Leukocytes, UA Negative    Nitrite, UA Negative         Microbiology Results (last 10 days)       Procedure Component Value - Date/Time    COVID PRE-OP / PRE-PROCEDURE SCREENING ORDER (NO ISOLATION) - Swab, Nasopharynx [[id number]]  (Normal) Collected: 01/26/24 1639    Lab Status: Final result Specimen: Swab from Nasopharynx Updated: 01/26/24 1713    Narrative:      The following orders were created for panel order COVID PRE-OP / PRE-PROCEDURE SCREENING ORDER (NO ISOLATION) - Swab, Nasopharynx.  Procedure                               Abnormality         Status                     ---------                               -----------         ------                     COVID-19 and FLU A/B PCR...[[id number]]  Normal              Final result                 Please view results for these tests on the individual orders.    COVID-19 and FLU A/B PCR, 1 HR TAT - Swab, Nasopharynx [[id number]]  (Normal) Collected: 01/26/24 1639    Lab Status: Final result Specimen: Swab from Nasopharynx Updated: 01/26/24 1713     COVID19 Not Detected     Influenza A PCR Not Detected     Influenza B PCR Not Detected    Narrative:      Fact sheet for providers: https://www.fda.gov/media/489462/download    Fact sheet for patients: https://www.fda.gov/media/924666/download    Test performed by PCR.            CT Abdomen Pelvis With Contrast    Result Date: 1/26/2024  CT ABDOMEN PELVIS W CONTRAST Date of Exam: 1/26/2024 7:50 PM EST Indication: LLQ abd pain 2 weeks, jaundice, 30 lb weight loss, hx of melanoma. Comparison: None available. Technique: Axial CT images were obtained of the abdomen and pelvis following the uneventful intravenous administration of 2/1/2023 Isovue-300. Reconstructed coronal and sagittal images were also obtained. Automated exposure control and iterative construction methods were used. Findings: Visualized Chest: Scattered small nodules measuring up to 0.3 cm. Otherwise unremarkable Liver: Liver is normal in size and CT density. No focal lesions. Gallbladder: Presumed sludge  within the gallbladder lumen. No definite wall thickening or pericholecystic fluid. Bile Ducts: Significant dilation of the intrahepatic and extrahepatic bile ducts, new since 2/1/2023. Spleen: Spleen is normal in size and CT density. Pancreas: Mild hypodensity and soft tissue prominence within the pancreatic head/uncinate process. No significant pancreatic ductal dilation Adrenals: Nodular thickening of the left adrenal gland, unchanged from prior study. The right adrenal gland is unremarkable Kidneys: Calyceal diverticulum on the right. No definite suspicious mass. No stones or hydronephrosis Gastrointestinal: Mild to moderate stool burden throughout the colon. Diverticulosis of the descending and sigmoid colon without evidence of acute diverticulitis. No significant distention to suggest bowel obstruction. No significant distention to suggest bowel obstruction. Bladder: The bladder is normal. Pelvis:  No suspecious mass. Peritoneum/Mesentery: No fluid collection, ascities, or free air.   Lymph Nodes: No lymphadenopathy. Vasculature: Extensive vascular calcification of the abdominal aorta. Femoral-femoral bypass graft is noted and appears to be grossly patent. Stenting of the left external iliac artery is noted. Abdominal Wall: Unremarkable Bony Structures: No acute osseous abnormality     Impression: Impression: Significant distention of the intrahepatic and extrahepatic bile ducts is highly concerning for choledocholithiasis. Alternately a pancreatic head lesion is not completely excluded, although less likely due to lack of significant pancreatic ductal dilation. Recommend follow-up with MRI pancreatic mass protocol with dedicated MRCP images. Unchanged nodular thickening of the left adrenal gland. Possible constipation. Vascular findings as described above. Electronically Signed: Nile De La Garza DO  1/26/2024 8:07 PM EST  Workstation ID: [id number]    XR Chest 1 View    Result Date: 1/26/2024  XR CHEST 1 VW  Date of Exam: 1/26/2024 4:39 PM EST Indication: dyspnea, unintentional weight loss Comparison: Chest radiograph 5/15/2023. Findings: Cardiomediastinal silhouette is unchanged. No focal consolidation or overt pulmonary edema. No pleural effusion or pneumothorax. Unchanged old/healed right rib fractures and degenerative changes of the spine.     Impression: Impression: No evidence of acute cardiopulmonary disease. Electronically Signed: Rhys Mclean MD  1/26/2024 4:52 PM EST  Workstation ID: [id number]     Results for orders placed during the hospital encounter of 05/25/23    Adult Transthoracic Echo Complete W/ Cont if Necessary Per Protocol    Interpretation Summary    Left ventricular systolic function is normal. Calculated left ventricular EF = 63.6% Left ventricular ejection fraction appears to be 61 - 65%.    Left ventricular diastolic function was normal.    No significant structural or functional valvular disease.      Assessment & Plan   Assessment & Plan     Choledocholithiasis  Pancreatitis  Common bile duct dilation  -IV Levaquin, IV Flagyl for infection prophylaxis  -MRCP  -Query medical records from [place] [facility] in regards to left ankle plates and screws.  Medical record request placed.  -GI consult  -N.p.o. for ERCP in the a.m.  -IV fluids, pain control for pancreatitis    Uncontrolled type 2 diabetes mellitus with hyperglycemia  -Weight-based insulin dosing with sliding scale.    -Caution with insulin while NPO  -Check A1c    Coronary artery disease  Hypertension  Peripheral vascular disease  -Continue home medications as appropriate    Total time spent: 60 minutes  Time spent includes time reviewing chart, face-to-face time, counseling patient/family/caregiver, ordering medications/tests/procedures, communicating with other health care professionals, documenting clinical information in the electronic health record, and coordination of care.     DVT prophylaxis: Lovenox    CODE STATUS:  Full code    Expected Discharge TBD    Kirk Irving, DO  01/26/24

## 2024-01-27 NOTE — ANESTHESIA PREPROCEDURE EVALUATION
Anesthesia Evaluation     Patient summary reviewed and Nursing notes reviewed   no history of anesthetic complications:   NPO Solid Status: > 8 hours  NPO Liquid Status: > 2 hours           Airway   Mallampati: I  TM distance: >3 FB  Neck ROM: full  No difficulty expected  Dental - normal exam     Pulmonary     breath sounds clear to auscultation  (+) a smoker Current, Abstained day of surgery, cigarettes, COPD mild,  Cardiovascular   Exercise tolerance: good (4-7 METS)    ECG reviewed  PT is on anticoagulation therapy  Rhythm: regular  Rate: normal    (+) hypertension well controlled 2 medications or greater, CAD, PVD, hyperlipidemia    ROS comment: 5/23: Interpretation Summary       ·  Left ventricular systolic function is normal. Calculated left ventricular EF = 63.6% Left ventricular ejection fraction appears to be 61 - 65%.  ·  Left ventricular diastolic function was normal.  ·  No significant structural or functional valvular disease.         Neuro/Psych- negative ROS  GI/Hepatic/Renal/Endo    (+) diabetes mellitus type 2 poorly controlled    Musculoskeletal     Abdominal   (-) obese    Abdomen: soft.   Substance History   (+) alcohol use     OB/GYN          Other      history of cancer                Anesthesia Plan    ASA 3     general     intravenous induction     Anesthetic plan, risks, benefits, and alternatives have been provided, discussed and informed consent has been obtained with: patient.    Plan discussed with CRNA.    CODE STATUS:    Level Of Support Discussed With: Patient  Code Status (Patient has no pulse and is not breathing): CPR (Attempt to Resuscitate)  Medical Interventions (Patient has pulse or is breathing): Full Support

## 2024-01-27 NOTE — ANESTHESIA PROCEDURE NOTES
Airway  Urgency: elective    Date/Time: 1/27/2024 11:24 AM  Airway not difficult    General Information and Staff    Patient location during procedure: OR  CRNA/CAA: Heavenly Cohn CRNA    Indications and Patient Condition  Indications for airway management: airway protection    Preoxygenated: yes  MILS not maintained throughout  Mask difficulty assessment: 1 - vent by mask    Final Airway Details  Final airway type: endotracheal airway      Successful airway: ETT  Cuffed: yes   Successful intubation technique: direct laryngoscopy  Endotracheal tube insertion site: oral  Blade: Long  Blade size: 2  ETT size (mm): 7.5  Cormack-Lehane Classification: grade I - full view of glottis  Placement verified by: chest auscultation and capnometry   Measured from: lips  ETT/EBT  to lips (cm): 20  Number of attempts at approach: 1  Assessment: lips, teeth, and gum same as pre-op and atraumatic intubation    Additional Comments  Negative epigastric sounds, Breath sound equal bilaterally with symmetric chest rise and fall

## 2024-01-28 ENCOUNTER — APPOINTMENT (OUTPATIENT)
Dept: CT IMAGING | Facility: HOSPITAL | Age: 69
DRG: 446 | End: 2024-01-28
Payer: MEDICARE

## 2024-01-28 ENCOUNTER — READMISSION MANAGEMENT (OUTPATIENT)
Dept: CALL CENTER | Facility: HOSPITAL | Age: 69
End: 2024-01-28
Payer: MEDICARE

## 2024-01-28 VITALS
WEIGHT: 168.1 LBS | HEIGHT: 71 IN | OXYGEN SATURATION: 95 % | RESPIRATION RATE: 18 BRPM | SYSTOLIC BLOOD PRESSURE: 148 MMHG | TEMPERATURE: 97 F | BODY MASS INDEX: 23.53 KG/M2 | HEART RATE: 78 BPM | DIASTOLIC BLOOD PRESSURE: 74 MMHG

## 2024-01-28 PROBLEM — E43 SEVERE MALNUTRITION: Status: ACTIVE | Noted: 2024-01-28

## 2024-01-28 LAB
ALBUMIN SERPL-MCNC: 3.5 G/DL (ref 3.5–5.2)
ALBUMIN/GLOB SERPL: 1.5 G/DL
ALP SERPL-CCNC: 641 U/L (ref 39–117)
ALT SERPL W P-5'-P-CCNC: 448 U/L (ref 1–41)
ANION GAP SERPL CALCULATED.3IONS-SCNC: 21 MMOL/L (ref 5–15)
AST SERPL-CCNC: 240 U/L (ref 1–40)
BILIRUB SERPL-MCNC: 7 MG/DL (ref 0–1.2)
BUN SERPL-MCNC: 13 MG/DL (ref 8–23)
BUN/CREAT SERPL: 20 (ref 7–25)
CALCIUM SPEC-SCNC: 10.5 MG/DL (ref 8.6–10.5)
CHLORIDE SERPL-SCNC: 99 MMOL/L (ref 98–107)
CO2 SERPL-SCNC: 15 MMOL/L (ref 22–29)
CREAT SERPL-MCNC: 0.65 MG/DL (ref 0.76–1.27)
DEPRECATED RDW RBC AUTO: 52.5 FL (ref 37–54)
EGFRCR SERPLBLD CKD-EPI 2021: 103.9 ML/MIN/1.73
ERYTHROCYTE [DISTWIDTH] IN BLOOD BY AUTOMATED COUNT: 15.5 % (ref 12.3–15.4)
GLOBULIN UR ELPH-MCNC: 2.4 GM/DL
GLUCOSE BLDC GLUCOMTR-MCNC: 133 MG/DL (ref 70–130)
GLUCOSE BLDC GLUCOMTR-MCNC: 178 MG/DL (ref 70–130)
GLUCOSE BLDC GLUCOMTR-MCNC: 179 MG/DL (ref 70–130)
GLUCOSE BLDC GLUCOMTR-MCNC: 198 MG/DL (ref 70–130)
GLUCOSE BLDC GLUCOMTR-MCNC: 270 MG/DL (ref 70–130)
GLUCOSE SERPL-MCNC: 191 MG/DL (ref 65–99)
HCT VFR BLD AUTO: 39.9 % (ref 37.5–51)
HGB BLD-MCNC: 13.6 G/DL (ref 13–17.7)
MCH RBC QN AUTO: 31.3 PG (ref 26.6–33)
MCHC RBC AUTO-ENTMCNC: 34.1 G/DL (ref 31.5–35.7)
MCV RBC AUTO: 91.9 FL (ref 79–97)
PLATELET # BLD AUTO: 258 10*3/MM3 (ref 140–450)
PMV BLD AUTO: 12 FL (ref 6–12)
POTASSIUM SERPL-SCNC: 4.7 MMOL/L (ref 3.5–5.2)
PROT SERPL-MCNC: 5.9 G/DL (ref 6–8.5)
RBC # BLD AUTO: 4.34 10*6/MM3 (ref 4.14–5.8)
SODIUM SERPL-SCNC: 135 MMOL/L (ref 136–145)
WBC NRBC COR # BLD AUTO: 9.59 10*3/MM3 (ref 3.4–10.8)

## 2024-01-28 PROCEDURE — 85027 COMPLETE CBC AUTOMATED: CPT | Performed by: INTERNAL MEDICINE

## 2024-01-28 PROCEDURE — 99232 SBSQ HOSP IP/OBS MODERATE 35: CPT | Performed by: NURSE PRACTITIONER

## 2024-01-28 PROCEDURE — 25510000001 IOPAMIDOL 61 % SOLUTION: Performed by: INTERNAL MEDICINE

## 2024-01-28 PROCEDURE — 97530 THERAPEUTIC ACTIVITIES: CPT

## 2024-01-28 PROCEDURE — 25010000002 ENOXAPARIN PER 10 MG: Performed by: INTERNAL MEDICINE

## 2024-01-28 PROCEDURE — 82948 REAGENT STRIP/BLOOD GLUCOSE: CPT

## 2024-01-28 PROCEDURE — 71260 CT THORAX DX C+: CPT

## 2024-01-28 PROCEDURE — 99239 HOSP IP/OBS DSCHRG MGMT >30: CPT | Performed by: INTERNAL MEDICINE

## 2024-01-28 PROCEDURE — 25010000002 METRONIDAZOLE 500 MG/100ML SOLUTION: Performed by: INTERNAL MEDICINE

## 2024-01-28 PROCEDURE — 97166 OT EVAL MOD COMPLEX 45 MIN: CPT

## 2024-01-28 PROCEDURE — 80053 COMPREHEN METABOLIC PANEL: CPT | Performed by: INTERNAL MEDICINE

## 2024-01-28 PROCEDURE — 25010000002 HYDROMORPHONE PER 4 MG: Performed by: INTERNAL MEDICINE

## 2024-01-28 PROCEDURE — 97161 PT EVAL LOW COMPLEX 20 MIN: CPT

## 2024-01-28 PROCEDURE — 63710000001 INSULIN LISPRO (HUMAN) PER 5 UNITS: Performed by: INTERNAL MEDICINE

## 2024-01-28 RX ORDER — PANTOPRAZOLE SODIUM 40 MG/1
40 TABLET, DELAYED RELEASE ORAL DAILY
Qty: 60 TABLET | Refills: 0 | Status: SHIPPED | OUTPATIENT
Start: 2024-01-28

## 2024-01-28 RX ORDER — LIDOCAINE 4 G/G
1 PATCH TOPICAL
Qty: 30 PATCH | Refills: 0 | Status: SHIPPED | OUTPATIENT
Start: 2024-01-28

## 2024-01-28 RX ORDER — HYDROCODONE BITARTRATE AND ACETAMINOPHEN 5; 325 MG/1; MG/1
1 TABLET ORAL EVERY 6 HOURS PRN
Qty: 12 TABLET | Refills: 0 | Status: SHIPPED | OUTPATIENT
Start: 2024-01-28

## 2024-01-28 RX ORDER — HYDROXYZINE HYDROCHLORIDE 25 MG/1
25 TABLET, FILM COATED ORAL 3 TIMES DAILY PRN
Qty: 30 TABLET | Refills: 0 | Status: SHIPPED | OUTPATIENT
Start: 2024-01-28

## 2024-01-28 RX ORDER — LIDOCAINE 4 G/G
1 PATCH TOPICAL
Status: DISCONTINUED | OUTPATIENT
Start: 2024-01-28 | End: 2024-01-28 | Stop reason: HOSPADM

## 2024-01-28 RX ADMIN — INSULIN LISPRO 6 UNITS: 100 INJECTION, SOLUTION INTRAVENOUS; SUBCUTANEOUS at 12:05

## 2024-01-28 RX ADMIN — IOPAMIDOL 80 ML: 612 INJECTION, SOLUTION INTRAVENOUS at 15:47

## 2024-01-28 RX ADMIN — ENOXAPARIN SODIUM 40 MG: 100 INJECTION SUBCUTANEOUS at 08:13

## 2024-01-28 RX ADMIN — HYDROMORPHONE HYDROCHLORIDE 0.5 MG: 1 INJECTION, SOLUTION INTRAMUSCULAR; INTRAVENOUS; SUBCUTANEOUS at 05:16

## 2024-01-28 RX ADMIN — METRONIDAZOLE 500 MG: 500 INJECTION, SOLUTION INTRAVENOUS at 05:20

## 2024-01-28 RX ADMIN — Medication 10 ML: at 08:13

## 2024-01-28 RX ADMIN — LIDOCAINE 1 PATCH: 4 PATCH TOPICAL at 14:54

## 2024-01-28 RX ADMIN — INSULIN LISPRO 2 UNITS: 100 INJECTION, SOLUTION INTRAVENOUS; SUBCUTANEOUS at 08:12

## 2024-01-28 RX ADMIN — PANTOPRAZOLE SODIUM 40 MG: 40 INJECTION, POWDER, FOR SOLUTION INTRAVENOUS at 08:12

## 2024-01-28 RX ADMIN — HYDROMORPHONE HYDROCHLORIDE 0.5 MG: 1 INJECTION, SOLUTION INTRAMUSCULAR; INTRAVENOUS; SUBCUTANEOUS at 08:13

## 2024-01-28 RX ADMIN — METRONIDAZOLE 500 MG: 500 INJECTION, SOLUTION INTRAVENOUS at 13:37

## 2024-01-28 NOTE — PLAN OF CARE
Goal Outcome Evaluation:  Plan of Care Reviewed With: patient        Progress:  (IE)  Outcome Evaluation: OT Evaluation completed. Pt is A/Ox4 and participates in therapy with good effort. BUE AROM, strength, and sensation are WFL for activities. Up in room/hallway independently. No dizziness. No LOB with retrieving item from the floor.  Demonstrates good safety awareness. Independent with LB ADLs and toileting. OT will d/c at this time and defer to PT to address weakness. Recommend home with spouse support when pt is medically ready. No DME needs.      Anticipated Discharge Disposition (OT): home with assist

## 2024-01-28 NOTE — THERAPY DISCHARGE NOTE
Patient Name: Costa Robbins  : 1955    MRN: [id number]                              Today's Date: 2024       Admit Date: 2024    Visit Dx:     ICD-10-CM ICD-9-CM   1. Pancreatic mass  K86.89 577.8   2. Acute pancreatitis, unspecified complication status, unspecified pancreatitis type  K85.90 577.0   3. Hepatitis  K75.9 573.3   4. Jaundice  R17 782.4   5. Unintentional weight loss  R63.4 783.21   6. Essential hypertension  I10 401.9   7. Mixed hyperlipidemia  E78.2 272.2   8. Type 2 diabetes mellitus with hyperglycemia, with long-term current use of insulin  E11.65 250.00    Z79.4 790.29     V58.67   9. Elevated LFTs  R79.89 790.6     Patient Active Problem List   Diagnosis    T2DM (type 2 diabetes mellitus)    Essential hypertension    Diverticulosis    Family history of coronary artery disease    Mixed hyperlipidemia    Renal artery stenosis    PVD (peripheral vascular disease) with claudication    ETOH use    Former smoker    Choledocholithiasis    Pancreatic mass    Jaundice    Elevated LFTs    Severe malnutrition     Past Medical History:   Diagnosis Date    Coronary artery disease     COVID         Diabetes mellitus     Diverticulitis     ETOH use 2023    Former smoker 2023    Hearing decreased     Hypertension     Melanoma     CHEST, BACK, NECK MULTI, HEAD    PVD (peripheral vascular disease)     Wears glasses      Past Surgical History:   Procedure Laterality Date    AORTAGRAM Bilateral 2023    Procedure: AORTAGRAM WITH RUNOFFS  STENT OF THE LEFT ILIAC ARTERY;  Surgeon: Moses Escalante MD;  Location: [facility];  Service: Vascular;  Laterality: Bilateral;    COLON RESECTION  2009    partial colectomy    COLONOSCOPY      FEMORAL ENDARTERECTOMY Left 2023    Procedure: FEMORAL ENDARTERECTOMY WITH PROPATEN GRAFT 8MM X 40CM;  Surgeon: Moses Escalante MD;  Location: [facility] OR;  Service: Vascular;  Laterality: Left;    FEMORAL FEMORAL BYPASS N/A 2023     Procedure: FEMORAL FEMORAL BYPASS;  Surgeon: Moses Escalante MD;  Location: [facility];  Service: Vascular;  Laterality: N/A;    ORIF ANKLE FRACTURE Left     SKIN CANCER EXCISION      melanoma      General Information       Row Name 01/28/24 1358          Physical Therapy Time and Intention    Document Type discharge evaluation/summary  -LO     Mode of Treatment occupational therapy;individual therapy  -LO       Row Name 01/28/24 1358          General Information    Patient Profile Reviewed yes  -LO     Existing Precautions/Restrictions --  Monitor BP - elevated, generalized weakness  -LO     Barriers to Rehab medically complex  -LO       Row Name 01/28/24 1358          Living Environment    People in Home spouse  -LO       Row Name 01/28/24 1358          Home Main Entrance    Number of Stairs, Main Entrance three  -LO     Stair Railings, Main Entrance none  -LO       Row Name 01/28/24 1358          Stairs Within Home, Primary    Stairs, Within Home, Primary lives on main floor  -LO     Number of Stairs, Within Home, Primary none  -LO       Row Name 01/28/24 1358          Cognition    Orientation Status (Cognition) oriented x 4  -LO       Row Name 01/28/24 1358          Safety Issues, Functional Mobility    Safety Issues Affecting Function (Mobility) other (see comments)  none  -LO     Impairments Affecting Function (Mobility) endurance/activity tolerance  -LO               User Key  (r) = Recorded By, (t) = Taken By, (c) = Cosigned By      Initials Name Provider Type    LO Marga James, PT Physical Therapist                   Mobility       Row Name 01/28/24 1359          Bed Mobility    Bed Mobility bed mobility (all) activities  -LO     All Activities, [place] (Bed Mobility) independent  -LO     Comment, (Bed Mobility) independent  -LO       Row Name 01/28/24 1359          Transfers    Comment, (Transfers) independent  -LO       Row Name 01/28/24 1359          Sit-Stand Transfer    Sit-Stand  [place] (Transfers) independent  -LO       Row Name 01/28/24 1359          Gait/Stairs (Locomotion)    [place] Level (Gait) independent  -LO     Assistive Device (Gait) other (see comments)  none  -LO     Distance in Feet (Gait) 10  -LO     Deviations/Abnormal Patterns (Gait) other (see comments)  none  -LO     Comment, (Gait/Stairs) Patient ambulation distance limited by back pain, being tired this date. No losses of balance noted  -LO               User Key  (r) = Recorded By, (t) = Taken By, (c) = Cosigned By      Initials Name Provider Type    Marga uL PT Physical Therapist                   Obj/Interventions       Row Name 01/28/24 1401          Strength Comprehensive (MMT)    General Manual Muscle Testing (MMT) Assessment no strength deficits identified  -LO       Row Name 01/28/24 1401          Motor Skills    Motor Skills functional endurance  -LO     Functional Endurance fair, reduced from baseline  -LO       Row Name 01/28/24 1401          Balance    Balance Assessment sitting static balance;sitting dynamic balance;standing static balance;standing dynamic balance  -LO     Static Sitting Balance independent  -LO     Dynamic Sitting Balance independent  -LO     Position, Sitting Balance unsupported  -LO     Static Standing Balance independent  -LO     Dynamic Standing Balance independent  -LO     Position/Device Used, Standing Balance supported  -LO     Comment, Balance independent  -LO               User Key  (r) = Recorded By, (t) = Taken By, (c) = Cosigned By      Initials Name Provider Type    Marga Lu, PT Physical Therapist                   Goals/Plan    No documentation.                  Clinical Impression       Row Name 01/28/24 1401          Pain    Pretreatment Pain Rating 5/10  -LO     Posttreatment Pain Rating 5/10  -LO     Pain Location upper  -LO     Pain Location - back  -LO       Row Name 01/28/24 1401          Plan of Care Review    Plan of Care Reviewed With  patient  -LO     Outcome Evaluation PT eval completed. Patient demonstrating independence with all functional mobility and with BLE ROM and strength WFL. No further skilled IP PT services warranted at this time. Rec home at [place].  -LO       Row Name 01/28/24 1401          Therapy Assessment/Plan (PT)    Criteria for Skilled Interventions Met (PT) no;no problems identified which require skilled intervention;does not meet criteria for skilled intervention  -LO     Therapy Frequency (PT) evaluation only  -       Row Name 01/28/24 1401          Vital Signs    Pre Systolic BP Rehab 146  -LO     Pre Treatment Diastolic BP 85  -LO     Pretreatment Heart Rate (beats/min) 88  -LO     O2 Delivery Pre Treatment room air  -LO     O2 Delivery Intra Treatment room air  -LO     O2 Delivery Post Treatment room air  -LO     Pre Patient Position Supine  -LO     Intra Patient Position Standing  -LO     Post Patient Position Supine  -LO       Row Name 01/28/24 1401          Positioning and Restraints    Pre-Treatment Position in bed  -LO     Post Treatment Position bed  -LO     In Bed notified nsg;call light within reach;encouraged to call for assist  -LO               User Key  (r) = Recorded By, (t) = Taken By, (c) = Cosigned By      Initials Name Provider Type    Marga Lu, PT Physical Therapist                   Outcome Measures       Row Name 01/28/24 1403 01/28/24 0810       How much help from another person do you currently need...    Turning from your back to your side while in flat bed without using bedrails? 4  -LO 4  -AC    Moving from lying on back to sitting on the side of a flat bed without bedrails? 4  -LO 4  -AC    Moving to and from a bed to a chair (including a wheelchair)? 4  -LO 4  -AC    Standing up from a chair using your arms (e.g., wheelchair, bedside chair)? 4  -LO 4  -AC    Climbing 3-5 steps with a railing? 4  -LO 4  -AC    To walk in hospital room? 4  -LO 4  -AC    AM-PAC 6 Clicks Score (PT) 24  -LO 24   -AC    Highest Level of Mobility Goal 8 --> Walked 250 feet or more  -LO 8 --> Walked 250 feet or more  -AC      Row Name 01/28/24 1403 01/28/24 0849       Functional Assessment    Outcome Measure Options AM-PAC 6 Clicks Basic Mobility (PT)  -LO AM-PAC 6 Clicks Daily Activity (OT)  -TB              User Key  (r) = Recorded By, (t) = Taken By, (c) = Cosigned By      Initials Name Provider Type    TB Dede Baca, OT Occupational Therapist    Marga Lu, PT Physical Therapist    Bertha Portillo, RN Registered Nurse                  Physical Therapy Education       Title: PT OT SLP Therapies (In Progress)       Topic: Physical Therapy (Done)       Point: Mobility training (Done)       Learning Progress Summary             Patient Acceptance, E, VU by KYLER at 1/28/2024 1345    Comment: PT POC                         Point: Home exercise program (Done)       Learning Progress Summary             Patient Acceptance, E, VU by  at 1/28/2024 1345    Comment: PT POC                         Point: Body mechanics (Done)       Learning Progress Summary             Patient Acceptance, E, VU by  at 1/28/2024 1345    Comment: PT POC                         Point: Precautions (Done)       Learning Progress Summary             Patient Acceptance, E, VU by  at 1/28/2024 1345    Comment: PT POC                                         User Key       Initials Effective Dates Name Provider Type Discipline     06/16/21 -  Marga James, PT Physical Therapist PT                  PT Recommendation and Plan     Plan of Care Reviewed With: patient  Outcome Evaluation: PT eval completed. Patient demonstrating independence with all functional mobility and with BLE ROM and strength WFL. No further skilled IP PT services warranted at this time. Rec home at [place].     Time Calculation:   PT Evaluation Complexity  History, PT Evaluation Complexity: 1-2 personal factors and/or comorbidities  Examination of Body Systems (PT Eval  Complexity): 1-2 elements  Clinical Presentation (PT Evaluation Complexity): evolving  Clinical Decision Making (PT Evaluation Complexity): low complexity  Overall Complexity (PT Evaluation Complexity): low complexity     PT Charges       Row Name 01/28/24 1345             Time Calculation    Start Time 1345  -LO      PT Received On 01/28/24  -LO      PT Goal Re-Cert Due Date 02/07/24  -LO         Timed Charges    37140 - Gait Training Minutes  2  -LO      49817 - PT Therapeutic Activity Minutes 10  -LO         Untimed Charges    PT Eval/Re-eval Minutes 38  -LO         Total Minutes    Timed Charges Total Minutes 12  -LO      Untimed Charges Total Minutes 38  -LO       Total Minutes 50  -LO                User Key  (r) = Recorded By, (t) = Taken By, (c) = Cosigned By      Initials Name Provider Type    LO Marga James, PT Physical Therapist                  Therapy Charges for Today       Code Description Service Date Service Provider Modifiers Qty    47777877804 HC PT THERAPEUTIC ACT EA 15 MIN 1/28/2024 Marga James, PT GP 1    64326593173 HC PT EVAL LOW COMPLEXITY 3 1/28/2024 Marga James, PT GP 1            PT G-Codes  Outcome Measure Options: AM-PAC 6 Clicks Basic Mobility (PT)  AM-PAC 6 Clicks Score (PT): 24  AM-PAC 6 Clicks Score (OT): 23    PT Discharge Summary  Anticipated Discharge Disposition (PT): home    Marga James, PT  1/28/2024

## 2024-01-28 NOTE — THERAPY DISCHARGE NOTE
Acute Care - Occupational Therapy Discharge  [facility]    Patient Name: Costa Robbins  : 1955    MRN: [id number]                              Today's Date: 2024       Admit Date: 2024    Visit Dx:     ICD-10-CM ICD-9-CM   1. Pancreatic mass  K86.89 577.8   2. Acute pancreatitis, unspecified complication status, unspecified pancreatitis type  K85.90 577.0   3. Hepatitis  K75.9 573.3   4. Jaundice  R17 782.4   5. Unintentional weight loss  R63.4 783.21   6. Essential hypertension  I10 401.9   7. Mixed hyperlipidemia  E78.2 272.2   8. Type 2 diabetes mellitus with hyperglycemia, with long-term current use of insulin  E11.65 250.00    Z79.4 790.29     V58.67   9. Elevated LFTs  R79.89 790.6     Patient Active Problem List   Diagnosis    T2DM (type 2 diabetes mellitus)    Essential hypertension    Diverticulosis    Family history of coronary artery disease    Mixed hyperlipidemia    Renal artery stenosis    PVD (peripheral vascular disease) with claudication    ETOH use    Former smoker    Choledocholithiasis    Pancreatic mass    Jaundice    Elevated LFTs     Past Medical History:   Diagnosis Date    Coronary artery disease     COVID         Diabetes mellitus     Diverticulitis     ETOH use 2023    Former smoker 2023    Hearing decreased     Hypertension     Melanoma     CHEST, BACK, NECK MULTI, HEAD    PVD (peripheral vascular disease)     Wears glasses      Past Surgical History:   Procedure Laterality Date    AORTAGRAM Bilateral 2023    Procedure: AORTAGRAM WITH RUNOFFS  STENT OF THE LEFT ILIAC ARTERY;  Surgeon: Moses Escalante MD;  Location: [facility];  Service: Vascular;  Laterality: Bilateral;    COLON RESECTION  2009    partial colectomy    COLONOSCOPY      FEMORAL ENDARTERECTOMY Left 2023    Procedure: FEMORAL ENDARTERECTOMY WITH PROPATEN GRAFT 8MM X 40CM;  Surgeon: Moses Escalante MD;  Location: [facility];  Service: Vascular;  Laterality: Left;     FEMORAL FEMORAL BYPASS N/A 5/25/2023    Procedure: FEMORAL FEMORAL BYPASS;  Surgeon: Moses Escalante MD;  Location: [facility];  Service: Vascular;  Laterality: N/A;    ORIF ANKLE FRACTURE Left     SKIN CANCER EXCISION      melanoma      General Information       Row Name 01/28/24 0837          OT Time and Intention    Document Type discharge evaluation/summary  -TB     Mode of Treatment occupational therapy;individual therapy  -TB       Row Name 01/28/24 0837          General Information    Patient Profile Reviewed yes  -TB     Prior Level of Function independent:;all household mobility;community mobility;ADL's;driving;using stairs;yard work  Works on his farm  -TB     Existing Precautions/Restrictions other (see comments)  Monitor BP - elevated, generalized weakness  -TB     Barriers to Rehab medically complex  -TB       Row Name 01/28/24 0837          Occupational Profile    Reason for Services/Referral (Occupational Profile) Occupational decline  -TB     Environmental Supports and Barriers (Occupational Profile) Pt lives in a 2 story home with 3 steps to enter. Lives on main floor. Walk-in shower. Comfort ht commodes. No AD or falls at baseline. Independent with BADLs/IADLs prior. Works on his farm.  Recent decline with 30# wt loss and generalized weakness.  -TB       Row Name 01/28/24 0837          Living Environment    People in Home spouse  -TB       Row Name 01/28/24 0837          Home Main Entrance    Number of Stairs, Main Entrance three  -TB       Row Name 01/28/24 0837          Stairs Within Home, Primary    Stairs, Within Home, Primary Pt lives on main floor  -TB     Number of Stairs, Within Home, Primary none  -TB       Row Name 01/28/24 0837          Cognition    Orientation Status (Cognition) oriented x 4  -TB       Row Name 01/28/24 0837          Safety Issues, Functional Mobility    Comment, Safety Issues/Impairments (Mobility) Pt up in room/hallway independently. No dizziness. No LOB with  retrieving item from the floor. Good safety awareness.  -TB               User Key  (r) = Recorded By, (t) = Taken By, (c) = Cosigned By      Initials Name Provider Type    TB Dede Baca, OT Occupational Therapist                   Mobility/ADL's       Row Name 01/28/24 0843          Bed Mobility    Bed Mobility bed mobility (all) activities  -TB     All Activities, [place] (Bed Mobility) independent  -TB     Comment, (Bed Mobility) Pt transitions in/out of bed from flat surface without difficulty  -TB       Row Name 01/28/24 0843          Transfers    Transfers sit-stand transfer;stand-sit transfer  -TB       Row Name 01/28/24 0843          Sit-Stand Transfer    Sit-Stand [place] (Transfers) independent  -TB       Row Name 01/28/24 0843          Stand-Sit Transfer    Stand-Sit [place] (Transfers) independent  -TB       Row Name 01/28/24 0843          Functional Mobility    Functional Mobility- Ind. Level independent  -TB     Functional Mobility-Distance (Feet) 250  -TB     Functional Mobility- Comment Pt up in room/hallway independently. No dizziness. No LOB with retrieving item from the floor. Good safety awareness.  -TB     Patient was able to Ambulate yes  -TB       Row Name 01/28/24 0843          Activities of Daily Living    BADL Assessment/Intervention lower body dressing;toileting;feeding  -TB       Row Name 01/28/24 0843          Lower Body Dressing Assessment/Training    [place] Level (Lower Body Dressing) don;doff;shoes/slippers;independent  -TB     Position (Lower Body Dressing) edge of bed sitting  -TB       Row Name 01/28/24 0843          Toileting Assessment/Training    [place] Level (Toileting) independent  -TB     Assistive Devices (Toileting) urinal  -TB       Row Name 01/28/24 0843          Self-Feeding Assessment/Training    [place] Level (Feeding) independent;liquids to mouth  -TB               User Key  (r) = Recorded By, (t) = Taken By, (c) = Cosigned  By      Initials Name Provider Type    TB Dede Baca OT Occupational Therapist                   Obj/Interventions       Row Name 01/28/24 0844          Sensory Assessment (Somatosensory)    Sensory Assessment (Somatosensory) UE sensation intact  -TB       Row Name 01/28/24 0844          Vision Assessment/Intervention    Visual Impairment/Limitations WFL  -TB       Row Name 01/28/24 0844          Range of Motion Comprehensive    General Range of Motion bilateral upper extremity ROM WNL  -TB     Comment, General Range of Motion BUE AROM intact  -TB       Row Name 01/28/24 0844          Strength Comprehensive (MMT)    Comment, General Manual Muscle Testing (MMT) Assessment Generalized weakness, no focal deficits. BUE functionally 4+/5  -TB       Row Name 01/28/24 0844          Balance    Balance Assessment sitting dynamic balance;sit to stand dynamic balance;standing dynamic balance  -TB     Dynamic Sitting Balance independent  -TB     Position, Sitting Balance unsupported  -TB     Sit to Stand Dynamic Balance independent  -TB     Dynamic Standing Balance independent  -TB     Position/Device Used, Standing Balance unsupported  -TB     Balance Interventions sitting;standing;sit to stand;dynamic;moderate challenge;dynamic reaching;occupation based/functional task;UE activity with balance activity  -TB               User Key  (r) = Recorded By, (t) = Taken By, (c) = Cosigned By      Initials Name Provider Type    TB Dede Baca OT Occupational Therapist                   Goals/Plan    No documentation.                  Clinical Impression       Row Name 01/28/24 0845          Pain Assessment    Pretreatment Pain Rating 5/10  -TB     Posttreatment Pain Rating 5/10  -TB     Pain Location generalized  -TB     Pain Location - back  -TB     Pre/Posttreatment Pain Comment Pt pre-medicated for activity  -TB     Pain Intervention(s) Ambulation/increased activity;Repositioned  -TB       Row Name 01/28/24  0845          Plan of Care Review    Plan of Care Reviewed With patient  -TB     Progress --  IE  -TB     Outcome Evaluation OT Evaluation completed. Pt is A/Ox4 and participates in therapy with good effort. BUE AROM, strength, and sensation are WFL for activities. Up in room/hallway independently. No dizziness. No LOB with retrieving item from the floor.  Demonstrates good safety awareness. Independent with LB ADLs and toileting. OT will d/c at this time and defer to PT to address weakness. Recommend home with spouse support when pt is medically ready. No DME needs.  -TB       Row Name 01/28/24 0845          Therapy Assessment/Plan (OT)    Therapy Frequency (OT) evaluation only  -TB       Row Name 01/28/24 0845          Therapy Plan Review/Discharge Plan (OT)    Anticipated Discharge Disposition (OT) home with assist  -TB       Row Name 01/28/24 0845          Vital Signs    Pre Systolic BP Rehab --  RN cleared OT  -TB     Intra Systolic BP Rehab 166  -TB     Intra Treatment Diastolic BP 79  -TB     Pre SpO2 (%) 99  -TB     O2 Delivery Pre Treatment room air  -TB     Post SpO2 (%) 100  -TB     O2 Delivery Post Treatment room air  -TB     Pre Patient Position Supine  -TB     Intra Patient Position Standing  -TB     Post Patient Position Supine  -TB       Row Name 01/28/24 0845          Positioning and Restraints    Pre-Treatment Position in bed  -TB     Post Treatment Position bed  -TB     In Bed notified nsg;supine;call light within reach;encouraged to call for assist  -TB               User Key  (r) = Recorded By, (t) = Taken By, (c) = Cosigned By      Initials Name Provider Type    TB Dede Baca, OT Occupational Therapist                   Outcome Measures       Row Name 01/28/24 0849          How much help from another is currently needed...    Putting on and taking off regular lower body clothing? 4  -TB     Bathing (including washing, rinsing, and drying) 3  -TB     Toileting (which includes using  toilet bed pan or urinal) 4  -TB     Putting on and taking off regular upper body clothing 4  -TB     Taking care of personal grooming (such as brushing teeth) 4  -TB     Eating meals 4  -TB     AM-PAC 6 Clicks Score (OT) 23  -TB       Row Name 01/28/24 0849          Functional Assessment    Outcome Measure Options AM-PAC 6 Clicks Daily Activity (OT)  -TB               User Key  (r) = Recorded By, (t) = Taken By, (c) = Cosigned By      Initials Name Provider Type     Dede Baca OT Occupational Therapist                  Occupational Therapy Education       Title: PT OT SLP Therapies (In Progress)       Topic: Occupational Therapy (In Progress)       Point: ADL training (Done)       Description:   Instruct learner(s) on proper safety adaptation and remediation techniques during self care or transfers.   Instruct in proper use of assistive devices.                  Learning Progress Summary             Patient Acceptance, E,D, VU,DU by  at 1/28/2024 0850                         Point: Home exercise program (Not Started)       Description:   Instruct learner(s) on appropriate technique for monitoring, assisting and/or progressing therapeutic exercises/activities.                  Learner Progress:  Not documented in this visit.              Point: Precautions (Not Started)       Description:   Instruct learner(s) on prescribed precautions during self-care and functional transfers.                  Learner Progress:  Not documented in this visit.              Point: Body mechanics (Not Started)       Description:   Instruct learner(s) on proper positioning and spine alignment during self-care, functional mobility activities and/or exercises.                  Learner Progress:  Not documented in this visit.                              User Key       Initials Effective Dates Name Provider Type Discipline     07/11/23 -  Dede Baca OT Occupational Therapist OT                  OT  Recommendation and Plan  Therapy Frequency (OT): evaluation only  Plan of Care Review  Plan of Care Reviewed With: patient  Progress:  (IE)  Outcome Evaluation: OT Evaluation completed. Pt is A/Ox4 and participates in therapy with good effort. BUE AROM, strength, and sensation are WFL for activities. Up in room/hallway independently. No dizziness. No LOB with retrieving item from the floor.  Demonstrates good safety awareness. Independent with LB ADLs and toileting. OT will d/c at this time and defer to PT to address weakness. Recommend home with spouse support when pt is medically ready. No DME needs.  Plan of Care Reviewed With: patient  Outcome Evaluation: OT Evaluation completed. Pt is A/Ox4 and participates in therapy with good effort. BUE AROM, strength, and sensation are WFL for activities. Up in room/hallway independently. No dizziness. No LOB with retrieving item from the floor.  Demonstrates good safety awareness. Independent with LB ADLs and toileting. OT will d/c at this time and defer to PT to address weakness. Recommend home with spouse support when pt is medically ready. No DME needs.     Time Calculation:   Evaluation Complexity (OT)  Review Occupational Profile/Medical/Therapy History Complexity: expanded/moderate complexity  Assessment, Occupational Performance/Identification of Deficit Complexity: 3-5 performance deficits  Clinical Decision Making Complexity (OT): detailed assessment/moderate complexity  Overall Complexity of Evaluation (OT): moderate complexity     Time Calculation- OT       Row Name 01/28/24 0758             Time Calculation- OT    OT Start Time 0758  -TB      OT Received On 01/28/24  -TB         Untimed Charges    OT Eval/Re-eval Minutes 55  -TB         Total Minutes    Untimed Charges Total Minutes 55  -TB       Total Minutes 55  -TB                User Key  (r) = Recorded By, (t) = Taken By, (c) = Cosigned By      Initials Name Provider Type    TB Dede Baca, OT  Occupational Therapist                  Therapy Charges for Today       Code Description Service Date Service Provider Modifiers Qty    21373395605 HC OT EVAL MOD COMPLEXITY 4 1/28/2024 Dede Baca OT GO 1               OT Discharge Summary  Anticipated Discharge Disposition (OT): home with assist    Dede Baca OT  1/28/2024

## 2024-01-28 NOTE — PLAN OF CARE
Problem: Skin Injury Risk Increased  Goal: Skin Health and Integrity  Outcome: Ongoing, Progressing  Intervention: Optimize Skin Protection  Recent Flowsheet Documentation  Taken 1/28/2024 0600 by Kaela Gee RN  Pressure Reduction Techniques:   frequent weight shift encouraged   weight shift assistance provided  Head of Bed (HOB) Positioning: HOB lowered  Pressure Reduction Devices: positioning supports utilized  Skin Protection:   tubing/devices free from skin contact   transparent dressing maintained   skin-to-device areas padded  Taken 1/28/2024 0400 by Kaela Gee RN  Pressure Reduction Techniques:   frequent weight shift encouraged   weight shift assistance provided  Head of Bed (HOB) Positioning: HOB lowered  Pressure Reduction Devices: positioning supports utilized  Skin Protection:   tubing/devices free from skin contact   transparent dressing maintained   skin-to-device areas padded  Taken 1/28/2024 0200 by Kaela Gee RN  Pressure Reduction Techniques:   frequent weight shift encouraged   weight shift assistance provided  Head of Bed (HOB) Positioning: HOB lowered  Pressure Reduction Devices: positioning supports utilized  Skin Protection:   tubing/devices free from skin contact   transparent dressing maintained   skin-to-device areas padded  Taken 1/28/2024 0000 by Kaela Gee RN  Pressure Reduction Techniques:   frequent weight shift encouraged   weight shift assistance provided  Head of Bed (HOB) Positioning: HOB lowered  Pressure Reduction Devices: positioning supports utilized  Skin Protection:   tubing/devices free from skin contact   transparent dressing maintained   skin-to-device areas padded  Taken 1/27/2024 2200 by Kaela Gee RN  Pressure Reduction Techniques:   frequent weight shift encouraged   weight shift assistance provided  Head of Bed (HOB) Positioning: HOB lowered  Pressure Reduction Devices: positioning supports utilized  Skin Protection:    tubing/devices free from skin contact   transparent dressing maintained   skin-to-device areas padded  Taken 1/27/2024 2000 by Kaela Gee RN  Pressure Reduction Techniques:   frequent weight shift encouraged   weight shift assistance provided  Head of Bed (HOB) Positioning: HOB lowered  Pressure Reduction Devices: positioning supports utilized  Skin Protection:   tubing/devices free from skin contact   transparent dressing maintained   skin-to-device areas padded     Problem: Fall Injury Risk  Goal: Absence of Fall and Fall-Related Injury  Outcome: Ongoing, Progressing  Intervention: Identify and Manage Contributors  Recent Flowsheet Documentation  Taken 1/28/2024 0600 by Kaela Gee RN  Medication Review/Management: medications reviewed  Taken 1/28/2024 0000 by Kaela Gee RN  Medication Review/Management: medications reviewed  Taken 1/27/2024 2200 by Kaela Gee RN  Medication Review/Management: medications reviewed  Taken 1/27/2024 2000 by Kaela Gee RN  Medication Review/Management: medications reviewed  Intervention: Promote Injury-Free Environment  Recent Flowsheet Documentation  Taken 1/28/2024 0600 by Kaela Gee RN  Safety Promotion/Fall Prevention:   assistive device/personal items within reach   clutter free environment maintained   fall prevention program maintained   nonskid shoes/slippers when out of bed   room organization consistent   safety round/check completed  Taken 1/28/2024 0400 by Kaela Gee RN  Safety Promotion/Fall Prevention:   assistive device/personal items within reach   clutter free environment maintained   fall prevention program maintained   nonskid shoes/slippers when out of bed   room organization consistent   safety round/check completed  Taken 1/28/2024 0200 by Kaela Gee RN  Safety Promotion/Fall Prevention:   assistive device/personal items within reach   clutter free environment maintained   fall prevention program  maintained   nonskid shoes/slippers when out of bed   room organization consistent   safety round/check completed  Taken 1/28/2024 0000 by Kaela Gee RN  Safety Promotion/Fall Prevention:   assistive device/personal items within reach   clutter free environment maintained   fall prevention program maintained   nonskid shoes/slippers when out of bed   room organization consistent   safety round/check completed  Taken 1/27/2024 2200 by Kaeal Gee, RN  Safety Promotion/Fall Prevention:   assistive device/personal items within reach   clutter free environment maintained   fall prevention program maintained   nonskid shoes/slippers when out of bed   room organization consistent   safety round/check completed  Taken 1/27/2024 2000 by Kaela Gee, RN  Safety Promotion/Fall Prevention:   assistive device/personal items within reach   clutter free environment maintained   fall prevention program maintained   nonskid shoes/slippers when out of bed   room organization consistent   safety round/check completed   Goal Outcome Evaluation:

## 2024-01-28 NOTE — DISCHARGE SUMMARY
[facility] Medicine Services  DISCHARGE SUMMARY    Patient Name: Costa Robbins  : 1955  MRN: [id number]    Date of Admission: 2024  6:21 PM  Date of Discharge:  2024  Primary Care Physician: Jonathan Conner MD    Consults       Date and Time Order Name Status Description    2024  6:50 PM Inpatient General Surgery Consult Completed     2024 12:32 AM Inpatient Gastroenterology Consult Completed             Hospital Course     Presenting Problem:     Active Hospital Problems    Diagnosis  POA   • **Choledocholithiasis [K80.50]  Yes   • Severe malnutrition [E43]  Yes   • Pancreatic mass [K86.89]  Unknown   • Jaundice [R17]  Unknown   • Elevated LFTs [R79.89]  Unknown      Resolved Hospital Problems   No resolved problems to display.          Hospital Course:  Costa Robbins is a 68 y.o. male     Concern for pancreatic malignancy  -Is/p V Levaquin, IV Flagyl for infection prophylaxis  -MRCP showed extensive intrahepatic and extrahepatic biliary ductal dilation, area concerning for pancreatic mass, also subtle enhancement in distal CBD  -GI following.  S/p ERCP on  that showed a malignant appearing 2cm biliary stricture in the head of the pancreas.  S/p biliary sphincterotomy and stent.  Brushings taken for cytology, path pending  --elevated CA 19-9 of 445  --GI recs PPI BID and atarax for itching  --CT chest pending for staging  --d/w Dr. Maloney who is ok with patient going home after CT chest completed.  He plans to schedule outpatient diagnostic laparoscopy as next step in work up  --will give a few lortab for pain.  No driving or operating heavy machinery while taking.     AG Metabolic Acidosis     Uncontrolled type 2 diabetes mellitus with hyperglycemia  -A1c 10.10.  Resume home insulin.  Needs better control     Coronary artery disease  Hypertension  Peripheral vascular disease  -Continue home medications as appropriate      Discharge Follow Up  Recommendations for outpatient labs/diagnostics:   F/u with PCP in 1 week  F/u with Dr. Maloney to arrange outpatient diagnostic laparoscopy  F/u with GI    Day of Discharge     HPI:   Feels ok.  Did wake up with back pain in middle of night and required dilaudid.  Would like to go home today and complete w/u outpatient    Review of Systems  Gen- No fevers, chills  CV- No chest pain, palpitations  Resp- No cough, dyspnea  GI- No N/V/D, +abd pain      Vital Signs:   Temp:  [97 °F (36.1 °C)-97.9 °F (36.6 °C)] 97 °F (36.1 °C)  Heart Rate:  [78-90] 78  Resp:  [16-18] 18  BP: (148-193)/(71-92) 148/74      Physical Exam:  Constitutional: No acute distress, awake, alert, +jaundiced  HENT: NCAT, mucous membranes moist  Respiratory: Clear to auscultation bilaterally, respiratory effort normal   Cardiovascular: RRR, no murmurs, rubs, or gallops  Gastrointestinal: Positive bowel sounds, soft, nontender, nondistended  Musculoskeletal: No bilateral ankle edema  Psychiatric: Appropriate affect, cooperative  Neurologic: Oriented x 3, strength symmetric in all extremities, Cranial Nerves grossly intact to confrontation, speech clear  Skin: No rashes      Pertinent  and/or Most Recent Results     LAB RESULTS:      Lab 01/28/24  0741 01/27/24  0542 01/27/24  0102 01/26/24  1632   WBC 9.59 8.84  --  8.11   HEMOGLOBIN 13.6 13.7  --  14.7   HEMATOCRIT 39.9 38.3  --  41.4   PLATELETS 258 256  --  266   NEUTROS ABS  --  6.60  --  5.77   IMMATURE GRANS (ABS)  --  0.07*  --  0.06*   LYMPHS ABS  --  1.34  --  1.56   MONOS ABS  --  0.60  --  0.58   EOS ABS  --  0.18  --  0.08   MCV 91.9 88.7  --  92.0   CRP  --   --   --  <0.30   PROCALCITONIN  --   --   --  0.37*   LACTATE  --   --  1.0 1.7         Lab 01/28/24  0741 01/27/24  0542 01/26/24  1632   SODIUM 135* 133* 126*   POTASSIUM 4.7 4.3 4.3   CHLORIDE 99 97* 89*   CO2 15.0* 17.0* 19.0*   ANION GAP 21.0* 19.0* 18.0*   BUN 13 14 17   CREATININE 0.65* 0.75* 0.66*   EGFR 103.9 99.5 103.4    GLUCOSE 191* 157* 438*   CALCIUM 10.5 11.2* 11.8*   MAGNESIUM  --  2.0 2.3   PHOSPHORUS  --  3.0 2.5   HEMOGLOBIN A1C  --  10.10*  --    TSH  --   --  0.611         Lab 01/28/24  0741 01/27/24  0542 01/26/24  1632   TOTAL PROTEIN 5.9* 5.8* 7.5   ALBUMIN 3.5 3.6 4.0   GLOBULIN 2.4 2.2 3.5   ALT (SGPT) 448* 576* 795*   AST (SGOT) 240* 161* 336*   BILIRUBIN 7.0* 6.1* 9.8*   BILIRUBIN DIRECT  --   --  7.5*   ALK PHOS 641* 652* 790*   LIPASE  --   --  268*         Lab 01/26/24  1632   PROBNP 51.1         Lab 01/27/24  0542   CHOLESTEROL 424*   LDL CHOL 341*   HDL CHOL 43   TRIGLYCERIDES 187*             Lab 01/26/24  1927   PH, ARTERIAL 7.490*   PCO2, ARTERIAL 20.7*   PO2 .0*   FIO2 21   HCO3 ART 15.8*   BASE EXCESS ART -5.4*   CARBOXYHEMOGLOBIN 1.7     Brief Urine Lab Results  (Last result in the past 365 days)        Color   Clarity   Blood   Leuk Est   Nitrite   Protein   CREAT   Urine HCG        01/26/24 1639 Dark Yellow   Clear   Negative   Negative   Negative   Negative                 Microbiology Results (last 10 days)       Procedure Component Value - Date/Time    Blood Culture - Blood, Arm, Right [[id number]]  (Normal) Collected: 01/26/24 2147    Lab Status: Preliminary result Specimen: Blood from Arm, Right Updated: 01/27/24 2200     Blood Culture No growth at 24 hours    Narrative:      Less than seven (7) mL's of blood was collected.  Insufficient quantity may yield false negative results.    Blood Culture - Blood, Arm, Right [[id number]]  (Normal) Collected: 01/26/24 2010    Lab Status: Preliminary result Specimen: Blood from Arm, Right Updated: 01/27/24 2116     Blood Culture No growth at 24 hours    COVID PRE-OP / PRE-PROCEDURE SCREENING ORDER (NO ISOLATION) - Swab, Nasopharynx [[id number]]  (Normal) Collected: 01/26/24 1639    Lab Status: Final result Specimen: Swab from Nasopharynx Updated: 01/26/24 1713    Narrative:      The following orders were created for panel order COVID PRE-OP /  PRE-PROCEDURE SCREENING ORDER (NO ISOLATION) - Swab, Nasopharynx.  Procedure                               Abnormality         Status                     ---------                               -----------         ------                     COVID-19 and FLU A/B PCR...[[id number]]  Normal              Final result                 Please view results for these tests on the individual orders.    COVID-19 and FLU A/B PCR, 1 HR TAT - Swab, Nasopharynx [[id number]]  (Normal) Collected: 01/26/24 1639    Lab Status: Final result Specimen: Swab from Nasopharynx Updated: 01/26/24 1713     COVID19 Not Detected     Influenza A PCR Not Detected     Influenza B PCR Not Detected    Narrative:      Fact sheet for providers: https://www.fda.gov/media/942325/download    Fact sheet for patients: https://www.fda.gov/media/945020/download    Test performed by PCR.            Adult Transthoracic Echo Complete W/ Cont if Necessary Per Protocol    Result Date: 1/27/2024  •  Left ventricular systolic function is hyperdynamic (EF > 70%). Left ventricular ejection fraction appears to be greater than 70%. •  Left ventricular wall thickness is consistent with borderline concentric hypertrophy. •  Provoked left ventricular mean pressure gradient of 10 mmHg. •  Left ventricular diastolic function is consistent with (grade I) impaired relaxation.     FL ERCP pancreatic and biliary ducts    Result Date: 1/27/2024  FL ERCP PANCREATIC AND BILIARY DUCTS Date of Exam: 1/27/2024 11:21 AM EST Indication: ENDOSCOPIC RETROGRADE CHOLANGIOPANCREATOGRAPHY. Comparison: MRI abdomen 1/26/2024. Technique:  A series of radiographic digital spot films were obtained in conjunction with a endoscopic catheterization of the biliary and pancreatic ductal system, performed by the gastroenterologist. Fluoroscopic Time: 2 minutes 33 seconds Number of Images: 6 Findings: ERCP reveals a biliary stricture in the region of the pancreatic head. Stent placed. Please see procedure  report for additional findings.     Impression: Please see procedure report for full discussion of findings and recommendations Electronically Signed: Rhys Mclean MD  1/27/2024 12:47 PM EST  Workstation ID: [id number]    MRI abdomen w wo contrast mrcp    Result Date: 1/26/2024  MRI ABDOMEN W WO CONTRAST MRCP Date of Exam: 1/26/2024 9:47 PM EST Indication: Jaundice, pancreatitis, unintentional weight loss, biliary obstruction on CT scan.  Comparison: CT abdomen and pelvis 1/26/2024 and 2/1/2023 Technique:  Routine multiplanar/multisequence images of the abdomen were obtained with MRCP sequences before and after the uneventful administration of 15 cc Multihance. Findings: Visualized Chest:  The visualized lung bases and lower mediastinal structures are unremarkable. Liver: Liver is normal in size and CT density. No focal lesions. Gallbladder: A distended gallbladder is noted. No wall thickening or pericholecystic fluid. No gallstone is identified. Bile Ducts: Diffuse intrahepatic and extrahepatic biliary ductal dilation with significant narrowing distally (image 34 series 5). No definite obstructing stone is identified. Questionable mild enhancement of the ductal wall is noted (for example image 44 series 20). Spleen: Spleen is normal in size and CT density. Pancreas: Subtle hypoenhancement within the pancreatic uncinate process measuring approximately 2.3 x 2.4 cm. Minimal pancreatic ductal dilation measuring up to 0.4 cm. The remainder of the pancreatic parenchyma is unremarkable Adrenals: Small nodules in the left adrenal gland with loss of signal on out of phase images consistent with adenomas. The right adrenal gland is unremarkable. Kidneys: Calyceal diverticula are noted. Additionally a few scattered cysts are noted bilaterally. Otherwise unremarkable Gastrointestinal: Colonic diverticulosis without evidence of acute diverticulitis Peritoneum/Mesentery: No fluid collection, ascities, or free air.   Lymph Nodes:  No lymphadenopathy. Vasculature: Unremarkable Abdominal Wall: Unremarkable Bony Structures: No acute osseous abnormality     Impression: Extensive intrahepatic and extrahepatic biliary ductal dilation with significant narrowing distally and abrupt at the level of the pancreatic head/uncinate process. Additionally there is mild dilation of the common bile duct with an area of subtle hypoenhancement noted within the pancreatic head concerning for possible mass. Alternately, subtle enhancement is also noted within the distal common bile duct, which may represent cholangiocarcinoma. Recommend follow-up with GI consultation and ERCP/EUS  for further evaluation. There is distention of the gallbladder, most likely due to the above-mentioned biliary ductal dilation. No gallstones are identified on this study. Incidentally noted right renal calyceal diverticulum Electronically Signed: Nile De La Garza DO  1/26/2024 11:33 PM EST  Workstation ID: [id number]    CT Abdomen Pelvis With Contrast    Result Date: 1/26/2024  CT ABDOMEN PELVIS W CONTRAST Date of Exam: 1/26/2024 7:50 PM EST Indication: LLQ abd pain 2 weeks, jaundice, 30 lb weight loss, hx of melanoma. Comparison: None available. Technique: Axial CT images were obtained of the abdomen and pelvis following the uneventful intravenous administration of 2/1/2023 Isovue-300. Reconstructed coronal and sagittal images were also obtained. Automated exposure control and iterative construction methods were used. Findings: Visualized Chest: Scattered small nodules measuring up to 0.3 cm. Otherwise unremarkable Liver: Liver is normal in size and CT density. No focal lesions. Gallbladder: Presumed sludge within the gallbladder lumen. No definite wall thickening or pericholecystic fluid. Bile Ducts: Significant dilation of the intrahepatic and extrahepatic bile ducts, new since 2/1/2023. Spleen: Spleen is normal in size and CT density. Pancreas: Mild hypodensity and soft tissue  prominence within the pancreatic head/uncinate process. No significant pancreatic ductal dilation Adrenals: Nodular thickening of the left adrenal gland, unchanged from prior study. The right adrenal gland is unremarkable Kidneys: Calyceal diverticulum on the right. No definite suspicious mass. No stones or hydronephrosis Gastrointestinal: Mild to moderate stool burden throughout the colon. Diverticulosis of the descending and sigmoid colon without evidence of acute diverticulitis. No significant distention to suggest bowel obstruction. No significant distention to suggest bowel obstruction. Bladder: The bladder is normal. Pelvis:  No suspecious mass. Peritoneum/Mesentery: No fluid collection, ascities, or free air.   Lymph Nodes: No lymphadenopathy. Vasculature: Extensive vascular calcification of the abdominal aorta. Femoral-femoral bypass graft is noted and appears to be grossly patent. Stenting of the left external iliac artery is noted. Abdominal Wall: Unremarkable Bony Structures: No acute osseous abnormality     Impression: Significant distention of the intrahepatic and extrahepatic bile ducts is highly concerning for choledocholithiasis. Alternately a pancreatic head lesion is not completely excluded, although less likely due to lack of significant pancreatic ductal dilation. Recommend follow-up with MRI pancreatic mass protocol with dedicated MRCP images. Unchanged nodular thickening of the left adrenal gland. Possible constipation. Vascular findings as described above. Electronically Signed: Nile De La Garza DO  1/26/2024 8:07 PM EST  Workstation ID: [id number]    XR Chest 1 View    Result Date: 1/26/2024  XR CHEST 1 VW Date of Exam: 1/26/2024 4:39 PM EST Indication: dyspnea, unintentional weight loss Comparison: Chest radiograph 5/15/2023. Findings: Cardiomediastinal silhouette is unchanged. No focal consolidation or overt pulmonary edema. No pleural effusion or pneumothorax. Unchanged old/healed right  rib fractures and degenerative changes of the spine.     Impression: No evidence of acute cardiopulmonary disease. Electronically Signed: Rhys Mclean MD  1/26/2024 4:52 PM EST  Workstation ID: [id number]             Results for orders placed during the hospital encounter of 01/26/24    Adult Transthoracic Echo Complete W/ Cont if Necessary Per Protocol    Interpretation Summary  •  Left ventricular systolic function is hyperdynamic (EF > 70%). Left ventricular ejection fraction appears to be greater than 70%.  •  Left ventricular wall thickness is consistent with borderline concentric hypertrophy.  •  Provoked left ventricular mean pressure gradient of 10 mmHg.  •  Left ventricular diastolic function is consistent with (grade I) impaired relaxation.      Plan for Follow-up of Pending Labs/Results:   Pending Labs       Order Current Status    NON-GYN CYTOLOGY, [facility] ([facility]) Collected (01/27/24 1200)    Blood Culture - Blood, Arm, Right Preliminary result    Blood Culture - Blood, Arm, Right Preliminary result          Discharge Details        Discharge Medications        New Medications        Instructions Start Date   HYDROcodone-acetaminophen 5-325 MG per tablet  Commonly known as: NORCO   1 tablet, Oral, Every 6 Hours PRN      hydrOXYzine 25 MG tablet  Commonly known as: ATARAX   25 mg, Oral, 3 Times Daily PRN      Lidocaine 4 %   1 patch, Transdermal, Every 24 Hours Scheduled, Remove & Discard patch within 12 hours or as directed by MD      pantoprazole 40 MG EC tablet  Commonly known as: Protonix   40 mg, Oral, Daily             Continue These Medications        Instructions Start Date   amLODIPine 10 MG tablet  Commonly known as: NORVASC   10 mg, Oral, Daily      aspirin 325 MG tablet   325 mg, Oral, Daily      BASAGLAR KWIKPEN SC   Subcutaneous      clopidogrel 75 MG tablet  Commonly known as: PLAVIX   75 mg, Oral, Daily      doxazosin 2 MG tablet  Commonly known as: Cardura   2 mg, Oral,  Nightly      lisinopril 40 MG tablet  Commonly known as: PRINIVIL,ZESTRIL   40 mg, Oral, Daily               Allergies   Allergen Reactions   • Atenolol Other (See Comments)     Bradycardia.   • Morphine Itching   • Penicillins Rash         Discharge Disposition:  Home or Self Care    Diet:  Hospital:  Diet Order   Procedures   • Diet: Liquid Diets; Clear Liquid; Fluid Consistency: Thin (IDDSI 0)            Activity:      Restrictions or Other Recommendations:         CODE STATUS:    Code Status and Medical Interventions:   Ordered at: 01/26/24 2208     Level Of Support Discussed With:    Patient     Code Status (Patient has no pulse and is not breathing):    CPR (Attempt to Resuscitate)     Medical Interventions (Patient has pulse or is breathing):    Full Support       Future Appointments   Date Time Provider Department Center   7/17/2024 12:30 PM Mariana Benavides APRN [facility]       Additional Instructions for the Follow-ups that You Need to Schedule       Discharge Follow-up with PCP   As directed       Currently Documented PCP:    Jonathan Conner MD    PCP Phone Number:    [phone]     Follow Up Details: with PCP in 1 week        Discharge Follow-up with Specified Provider: with Dr. Maloney   As directed      To: with Dr. Maloney   Follow Up Details: plans diagnostic laparoscopy        Discharge Follow-up with Specified Provider: with GI   As directed      To: with GI   Follow Up Details: for probable pancreatic malignancy, s/p ERCP pending path                      Alli Tirado MD  01/28/24      Time Spent on Discharge:  I spent 38 minutes on this discharge activity which included: face-to-face encounter with the patient, reviewing the data in the system, coordination of the care with the nursing staff as well as consultants, documentation, and entering orders.

## 2024-01-28 NOTE — PLAN OF CARE
Goal Outcome Evaluation:  Plan of Care Reviewed With: patient           Outcome Evaluation: PT eval completed. Patient demonstrating independence with all functional mobility and with BLE ROM and strength WFL. No further skilled IP PT services warranted at this time. Rec home at dc.      Anticipated Discharge Disposition (PT): home

## 2024-01-28 NOTE — CONSULTS
Malnutrition Severity Assessment    Patient Name:  Costa Robbins  YOB: 1955  MRN: [id number]  Admit Date:  1/26/2024    Patient meets criteria for : Severe Malnutrition    Comments:  Pt meets criteria for severe malnutrition in the context of chronic illness indicated by wt loss >7.5% x 3 months, severe muscle wasting and moderate subcutaneous fat loss, po intake <75% EEN x >/=1 month.    Malnutrition Severity Assessment  Malnutrition Type: Chronic Disease - Related Malnutrition  Malnutrition Type (last 8 hours)       Malnutrition Severity Assessment       Row Name 01/28/24 0940       Malnutrition Severity Assessment    Malnutrition Type Chronic Disease - Related Malnutrition      Row Name 01/28/24 0940       Insufficient Energy Intake     Insufficient Energy Intake Findings Severe    Insufficient Energy Intake  <75% of est. energy requirement for > or equal to 1 month      Row Name 01/28/24 0940       Unintentional Weight Loss     Unintentional Weight Loss Findings Severe  27lb/13.9% x 3 months    Unintentional Weight Loss  Weight loss greater than 7.5% in three months      Row Name 01/28/24 0940       Muscle Loss    Loss of Muscle Mass Findings Severe    Lutheran Region Moderate - slight depression    Clavicle Bone Region Severe - protruding prominent bone    Acromion Bone Region Severe - squared shoulders, bones, and acromion process protrusion prominent    Scapular Bone Region Severe - prominent bones, depressions easily visible between ribs, scapula, spine, shoulders    Dorsal Hand Region Moderate - slight depression    Patellar Region Moderate - patella more prominent, less muscle definition around patella    Anterior Thigh Region Severe - line/depression along thigh, obviously thin    Posterior Calf Region Moderate - some roundness, slight firmness      Row Name 01/28/24 0940       Fat Loss    Subcutaneous Fat Loss Findings Moderate    Orbital Region  Moderate -  somewhat hollowness,  slightly dark circles    Upper Arm Region Severe - mostly skin, very little space between folds, fingers touch    Thoracic & Lumbar Region Moderate - ribs visible with mild depressions, iliac crest somewhat prominent      Row Name 01/28/24 0940       Criteria Met (Must meet criteria for severity in at least 2 of these categories: M Wasting, Fat Loss, Fluid, Secondary Signs, Wt. Status, Intake)    Patient meets criteria for  Severe Malnutrition                    Electronically signed by:  Judy Muhammad MS,RD,LD  01/28/24 10:09 EST

## 2024-01-28 NOTE — PROGRESS NOTES
"GI Daily Progress Note  Subjective:    Chief Complaint: Follow-up pancreas mass    Patient resting in bed; notes ongoing issues with back pain overnight.  Notes he just met with the surgeon.  Tolerating his liquid diet without issue.  No N/V reported.  Feeling well following ERCP with stent deployment yesterday.    Further history obtained from patient reports that when he began having symptoms in December he was complaining of mild abdominal pain and back pain but also passed some metallic/ silver stools (+Jay sign).    Patient also reports being diagnosed with a gastric ulcer by his PCP in December secondary to high risk NSAID use following a dental procedure.  Has been taking PPI and aloe vera since.    Objective:    /74 (BP Location: Right arm, Patient Position: Lying)   Pulse 78   Temp 97 °F (36.1 °C) (Oral)   Resp 18   Ht 180.3 cm (71\")   Wt 76.2 kg (168 lb 1.6 oz)   SpO2 95%   BMI 23.45 kg/m²     Physical Exam  Vitals and nursing note reviewed.   Constitutional:       General: He is not in acute distress.     Appearance: Normal appearance. He is normal weight. He is not ill-appearing or toxic-appearing.   Cardiovascular:      Rate and Rhythm: Normal rate and regular rhythm.      Pulses: Normal pulses.      Heart sounds: Normal heart sounds.   Pulmonary:      Effort: Pulmonary effort is normal. No respiratory distress.      Breath sounds: Normal breath sounds.   Abdominal:      General: Abdomen is flat. Bowel sounds are normal. There is no distension.      Palpations: Abdomen is soft. There is no mass.      Tenderness: There is no abdominal tenderness. There is no guarding or rebound.      Hernia: No hernia is present.   Skin:     Capillary Refill: Capillary refill takes less than 2 seconds.      Coloration: Skin is jaundiced.   Neurological:      General: No focal deficit present.      Mental Status: He is alert and oriented to person, place, and time.         Lab  I have personally reviewed " most recent cardiac tracings, lab results, radiology images and interpretations, and vascular studies and agree with findings.    Lab Results   Component Value Date    WBC 9.59 01/28/2024    HGB 13.6 01/28/2024    HGB 13.7 01/27/2024    HGB 14.7 01/26/2024    MCV 91.9 01/28/2024     01/28/2024    INR 0.86 (L) 05/15/2023    INR 0.88 04/07/2023       Lab Results   Component Value Date    GLUCOSE 191 (H) 01/28/2024    BUN 13 01/28/2024    CREATININE 0.65 (L) 01/28/2024    BCR 20.0 01/28/2024     (L) 01/28/2024    K 4.7 01/28/2024    CO2 15.0 (L) 01/28/2024    CALCIUM 10.5 01/28/2024    ALBUMIN 3.5 01/28/2024    ALKPHOS 641 (H) 01/28/2024    BILITOT 7.0 (H) 01/28/2024    BILIDIR 7.5 (H) 01/26/2024     (H) 01/28/2024     (H) 01/28/2024      Latest Reference Range & Units 01/27/24 09:06   CA 19-9 <=35.0 U/mL 445.0 (H)   (H): Data is abnormally high    ERCP (01/27/2024 10:46)     Results for orders placed during the hospital encounter of 01/26/24    Adult Transthoracic Echo Complete W/ Cont if Necessary Per Protocol    Interpretation Summary    Left ventricular systolic function is hyperdynamic (EF > 70%). Left ventricular ejection fraction appears to be greater than 70%.    Left ventricular wall thickness is consistent with borderline concentric hypertrophy.    Provoked left ventricular mean pressure gradient of 10 mmHg.    Left ventricular diastolic function is consistent with (grade I) impaired relaxation.    Assessment:      Choledocholithiasis    Pancreatic mass    Jaundice    Elevated LFTs    Severe malnutrition    1.  Pancreas mass of indeterminate etiology, concerning for malignancy, s/p ERCP with 2 cm biliary stricture in the region of the head of the pancreas findings concerning for pancreatic malignancy.  Stent was deployed.  2.  Elevated LFTs, cholestatic fashion, obstructive jaundice   3.  Abnormal CT and MRI imaging of abdomen pelvis:   CT: Significant distention of intrahepatic  biliary ducts concerning for choledocholithiasis with additional concerns of a pancreatic head lesion.  MRCP: Extensive intrahepatic biliary ductal dilation with narrowing of the distal CBD abruptly to the level of the pancreatic head/uncinate process with mild dilation of the CBD with an area of hypoenhancement concerning for mass-additionally, concerns for cholangiocarcinoma.  4. Abdominal pain, related to above   5.  Uncontrolled type 2 diabetes mellitus.  6.  Murmur noted on auscultation   7. CAD, PVD  8.  Unintentional weight loss  9.  History of gastric ulcer in December 2023    Plan:  Patient with back pain following ERCP and stent deployment yesterday; suspect this is secondary to stent expansion as he was having back pain leading up to this procedure.  Discussed with patient that his CA 19-9 is elevated consistent with pancreatic malignancy.  Additionally, discussed the importance of CT scan of chest for staging protocol with plans for close outpatient follow-up with surgery.  Noted Dr. Maloney's consult and appreciate his assistance on this case.    >>> Trial lidocaine patch  >>> Continue antiemetics and pain control measures  >>> Order placed for CT of chest pancreas staging protocol  >>> Echo reviewed and as noted above  >>> CA 19-9 as noted above     Okay for discharge following CT scan from GI standpoint with close outpatient follow-up with his PCP and the surgical team.  Will relay findings of the biliary brushings to patient when rendered this week.    We will sign off.  Please call with any further questions or concerns that may arise.    Manuel Dumont, APRN  01/28/24  14:30 EST

## 2024-01-28 NOTE — CONSULTS
"                  Clinical Nutrition   Nutrition Support Assessment  Reason for Visit: Consult      Patient Name: Costa Robbins  YOB: 1955  MRN: [id number]  Date of Encounter: 01/28/24 09:40 EST  Admission date: 1/26/2024    Comments:  Pt meets criteria for severe malnutrition in the context of chronic illness indicated by wt loss >7.5% x 3 months, severe muscle wasting and moderate subcutaneous fat loss, po intake <75% EEN x >/=1 month.    Pt dislikes CLD options, ordered Ensure Clear apple; encouraged po intake    Nutrition Assessment   Admission Diagnosis:  Choledocholithiasis [K80.50]      Problem List:    Choledocholithiasis    Pancreatic mass    Jaundice    Elevated LFTs        PMH:   He  has a past medical history of Coronary artery disease, COVID, Diabetes mellitus, Diverticulitis, ETOH use (5/25/2023), Former smoker (5/25/2023), Hearing decreased, Hypertension, Melanoma, PVD (peripheral vascular disease), and Wears glasses.    PSH:  He  has a past surgical history that includes Skin cancer excision; Colectomy (05/2009); ORIF ankle fracture (Left); Colonoscopy; Aortagram (Bilateral, 4/12/2023); Femoral Femoral Bypass (N/A, 5/25/2023); and Femoral Endarterectomy (Left, 5/25/2023).    Applicable Nutrition Concerns:   Skin:  Oral:  GI:    Applicable Interval History:         Reported/Observed/Food/Nutrition Related History:     Pt reports significant wt loss since 'deer season' which is ~October/November. Pt states he has been on a liquid diet for some time 2/2 teeth extraction and then stomach ulcer, now exacerbated by abdominal pain 2/2 choledocholithiasis/pancreatitis. Prior to admission pt states he was able to tolerate some soft foods. Pt reports RYJ=189atm. Does not currently have difficulty w/chewing and swallowing. NKFA.     Anthropometrics     Flowsheet Rows      Flowsheet Row First Filed Value   Admission Height 180.3 cm (71\") Documented at 01/26/2024 1601   Admission Weight 72.6 kg " "(160 lb) Documented at 01/26/2024 1601              Height: Height: 180.3 cm (71\")  Last Filed Weight: Weight: 76.2 kg (168 lb 1.6 oz) (01/27/24 0700)  Method: Weight Method: Bed scale  BMI: BMI (Calculated): 23.5  BMI classification: Normal: 18.5-24.9kg/m2  IBW:  75kg    UBW:  195lbs per pt report  Weight change:   27lb/13.9% wt loss x 3-4 months (since Oct 2023)   Weight       Weight (kg) Weight (lbs) Weight Method Visit Report   3/20/2023 92.08 kg  203 lb   --    4/7/2023 90 kg  198 lb 6.6 oz  Standing scale     4/12/2023 90 kg  198 lb 6.6 oz      5/15/2023 88.35 kg  194 lb 12.4 oz  Standing scale     5/25/2023 88.35 kg  194 lb 12.4 oz      5/26/2023 92 kg  202 lb 13.2 oz  Bed scale     5/27/2023 92 kg  202 lb 13.2 oz      6/21/2023 89.812 kg  198 lb   --    7/19/2023 86.637 kg  191 lb   --    1/26/2024 72.576 kg  160 lb      1/27/2024 76.25 kg  168 lb 1.6 oz  Bed scale      76.25 kg  168 lb 1.6 oz  Bed scale         Nutrition Focused Physical Exam     Date:    1/28     Patient meets criteria for malnutrition diagnosis, see MSA note.    Current Nutrition Prescription   PO: Diet: Liquid Diets; Clear Liquid; Fluid Consistency: Thin (IDDSI 0)  Oral Nutrition Supplement:   Intake: Insufficient data      Nutrition Diagnosis   Date:  1/28            Updated:    Problem Malnutrition severe chronic   Etiology Abdominal pain, stomach ulcer, teeth extraction   Signs/Symptoms Wt loss >7.5% x 3 months, severe muscle wasting and moderate subcutaneous fat loss, po intake <75% EEN x >/=1 month   Status:     Date:   1/28    Updated:     Problem Inadequate oral intake   Etiology On CLD, prior NPO   Signs/Symptoms Pt eating <25% trays per RD observation   Status:     Goal:   General: Nutrition to support treatment  PO: Tolerate PO, Increase intake  EN/PN: N/A    Nutrition Intervention      Follow treatment progress, Care plan reviewed, Encourage intake, Supplement provided    Ensure Clear TID    Monitoring/Evaluation:   Per " protocol, PO intake, Supplement intake, GI status, Symptoms      Judy Muhammad, MS,RD,LD  Time Spent: 30

## 2024-01-28 NOTE — OUTREACH NOTE
Prep Survey      Flowsheet Row Responses   [facility] facility patient discharged from? [place]   Is LACE score < 7 ? No   Eligibility Readm Mgmt   Discharge diagnosis Choledocholithiasis, ERCP   Does the patient have one of the following disease processes/diagnoses(primary or secondary)? Other   Does the patient have Home health ordered? No   Is there a DME ordered? No   Prep survey completed? Yes            Emily SYKES - Registered Nurse

## 2024-01-28 NOTE — CONSULTS
Patient Name:  Costa Robbins  YOB: 1955  [id number]       Patient Care Team:  Jonathan Conner MD as PCP - General (Family Medicine)  Moses Frankel MD as Consulting Physician (Cardiology)      General Surgery Consult Note     Date of Consultation: 01/28/24    Referring Physician - Alli Tirado MD    Reason for Consult -pancreas mass and biliary obstruction    Subjective     I have been asked to see  Costa Robbins , a 68 y.o. male in consultation for pancreas mass and biliary obstruction from Dr. Tirado/Brunner.  The patient has had general malaise along with 35 pound weight loss over the course of several months.  This has been associated with vague abdominal and back pain.  During a recent dentist office visit he was noted to have jaundice and was sent to the emergency room for further evaluation.  No nausea or vomiting.  No fevers or chills.  No recent change in bowel habits.      Allergy:   Allergies   Allergen Reactions    Atenolol Other (See Comments)     Bradycardia.    Morphine Itching    Penicillins Rash       Medications:  enoxaparin, 40 mg, Subcutaneous, Daily  insulin lispro, 2-9 Units, Subcutaneous, 4x Daily AC & at Bedtime  levoFLOXacin, 500 mg, Intravenous, Q24H  metroNIDAZOLE, 500 mg, Intravenous, Q8H  pantoprazole, 40 mg, Intravenous, BID AC  senna-docusate sodium, 2 tablet, Oral, BID  sodium chloride, 10 mL, Intravenous, Q12H      sodium chloride, 125 mL/hr, Last Rate: 125 mL/hr (01/27/24 1451)      No current facility-administered medications on file prior to encounter.     Current Outpatient Medications on File Prior to Encounter   Medication Sig    amLODIPine (NORVASC) 10 MG tablet Take 1 tablet by mouth Daily.    clopidogrel (PLAVIX) 75 MG tablet Take 1 tablet by mouth Daily.    doxazosin (Cardura) 2 MG tablet Take 1 tablet by mouth Every Night. (Patient taking differently: Take 2 tablets by mouth Every Night.)    Insulin Glargine (BASAGLAR KWIKPEN SC) Inject   under the skin into the appropriate area as directed.    lisinopril (PRINIVIL,ZESTRIL) 40 MG tablet Take 1 tablet by mouth Daily.    aspirin 325 MG tablet Take 1 tablet by mouth Daily.       PMHx:   Past Medical History:   Diagnosis Date    Coronary artery disease     COVID         Diabetes mellitus     Diverticulitis     ETOH use 2023    Former smoker 2023    Hearing decreased     Hypertension     Melanoma     CHEST, BACK, NECK MULTI, HEAD    PVD (peripheral vascular disease)     Wears glasses        Past Surgical History:  Past Surgical History:   Procedure Laterality Date    AORTAGRAM Bilateral 2023    Procedure: AORTAGRAM WITH RUNOFFS  STENT OF THE LEFT ILIAC ARTERY;  Surgeon: Moses Escalante MD;  Location:  [facility];  Service: Vascular;  Laterality: Bilateral;    COLON RESECTION  2009    partial colectomy    COLONOSCOPY      FEMORAL ENDARTERECTOMY Left 2023    Procedure: FEMORAL ENDARTERECTOMY WITH PROPATEN GRAFT 8MM X 40CM;  Surgeon: Moses Escalante MD;  Location:  [facility];  Service: Vascular;  Laterality: Left;    FEMORAL FEMORAL BYPASS N/A 2023    Procedure: FEMORAL FEMORAL BYPASS;  Surgeon: Moses Escalante MD;  Location:  [facility];  Service: Vascular;  Laterality: N/A;    ORIF ANKLE FRACTURE Left     SKIN CANCER EXCISION      melanoma        Family History:   Family History   Problem Relation Age of Onset    Diabetes Mother     Heart attack Mother     Hypertension Mother     Hyperlipidemia Mother     Stroke Mother     Hodgkin's lymphoma Father     Hypertension Father     Diabetes Brother     Hyperlipidemia Brother         Social History:   Social History     Socioeconomic History    Marital status:     Number of children: 0   Tobacco Use    Smoking status: Former     Years: 43     Types: Cigarettes     Quit date: 2023     Years since quittin.9    Smokeless tobacco: Never    Tobacco comments:     Pt states that he has been able to stop smoking  "this month - 2/2023   Vaping Use    Vaping Use: Never used   Substance and Sexual Activity    Alcohol use: Not Currently     Comment: 2 BEERS PER DAY    Drug use: Yes     Types: Marijuana     Comment: ONCE A MONTH    Sexual activity: Defer         Review of Systems     Constitutional: See HPI   Eyes: Denies visual changes    Cardiovascular: Denies chest pain, palpitations   Pulmonary: Denies cough or shortness of breath   Abdominal/ GI: See HPI    Genitourinary: Denies dysuria or hematuria   Musculoskeletal: Denies any but chronic joint aches, pains or deformities   Psychiatric: No recent mood changes   Neurologic: No paresthesias or loss of function          Objective     Physical Exam:      Vital Signs  /79 (BP Location: Right arm, Patient Position: Lying)   Pulse 83   Temp 97 °F (36.1 °C) (Oral)   Resp 18   Ht 180.3 cm (71\")   Wt 76.2 kg (168 lb 1.6 oz)   SpO2 97%   BMI 23.45 kg/m²     Intake/Output Summary (Last 24 hours) at 1/28/2024 0929  Last data filed at 1/27/2024 2217  Gross per 24 hour   Intake --   Output 2475 ml   Net -2475 ml         Physical Exam:    Head: Normocephalic, atraumatic.   Eyes: Pupils equal, round, react to light and accommodation, mild scleral icterus  Mouth: Oral mucosa without lesions  Neck: No masses, lymphadenopathy or carotid bruits bilaterally  CV: Rhythm and rate regular, no murmurs, rubs or gallops  Lungs: Clear to auscultation bilaterally  Abdomen: Bowel sounds positive, soft, nontender, nondistended  Groin : No obvious hernias bilaterally  Extremities:  No cyanosis, clubbing or edema bilaterally  Lymphatics: No abnormal lymphadenopathy appreciated  Neurologic: No gross deficits      Results Review: I have personally reviewed all of the recent lab and imaging results available at this time.   Creatinine 0.65      Total bilirubin 7  WBC 9.6  Hemoglobin 13.6  CA 19-9 445    CT abdomen pelvis 1/26/2024:  Impression:  Significant distention of the " intrahepatic and extrahepatic bile ducts is highly concerning for choledocholithiasis. Alternately a pancreatic head lesion is not completely excluded, although less likely due to lack of significant pancreatic ductal   dilation. Recommend follow-up with MRI pancreatic mass protocol with dedicated MRCP images.   Unchanged nodular thickening of the left adrenal gland.   Possible constipation.   Vascular findings as described above.    MRCP 1/26/2024:  Extensive intrahepatic and extrahepatic biliary ductal dilation with significant narrowing distally and abrupt at the level of the pancreatic head/uncinate process. Additionally there is mild dilation of the common bile duct with an area of subtle   hypoenhancement noted within the pancreatic head concerning for possible mass. Alternately, subtle enhancement is also noted within the distal common bile duct, which may represent cholangiocarcinoma. Recommend follow-up with GI consultation and ERCP/EUS   for further evaluation.  There is distention of the gallbladder, most likely due to the above-mentioned biliary ductal dilation. No gallstones are identified on this study.  Incidentally noted right renal calyceal diverticulum     Assessment & Plan     Assessment and Plan:    Patient with pancreatic head mass causing biliary obstruction status post ERCP with stent placement.  Brushings from this procedure are pending but concern for malignancy.  No evidence of metastatic disease on imaging to this point.  CT of the chest is pending.  Once the study has been performed okay for DC planning from surgical perspective.  Will plan for short interval outpatient diagnostic laparoscopy for neck step in workup.      I discussed the patient's findings and my recommendations with the patient and/or family, as well as the primary team     Silas Maloney MD  01/28/24  09:29 EST

## 2024-01-30 ENCOUNTER — TELEPHONE (OUTPATIENT)
Dept: GASTROENTEROLOGY | Facility: CLINIC | Age: 69
End: 2024-01-30
Payer: MEDICARE

## 2024-01-30 LAB — REF LAB TEST METHOD: NORMAL

## 2024-01-30 NOTE — TELEPHONE ENCOUNTER
I called patient and LVM for him to return call during normal office hours. Per Dr. Brunner the patient is to see Dr. Maloney for scheduled procedure at his recommendation. If the patient wishes to pursue a referral to Dr. Morales at  he can speak with his PCP on that. Will speak with patient if he returns call.

## 2024-01-30 NOTE — TELEPHONE ENCOUNTER
Provider: BRUNNER    Caller: MANUEL KESSLER    Relationship to Patient: SELF    Phone Number: [phone] OKAY TO LEAVE VM    Reason for Call: PT WANTED TO SPEAK WITH BRUNNER    When was the patient last seen: PT WAS SEEN IN HOSPITAL 1/26/24. BRUNNER DID EGD ON PT. PT WANTS TO SPEAK WITH HIM NOT A NURSE.     PHONE CALL WAS DISCONNECTED AFTER PT PLACED H ON HOLD. NO ADDITIONAL INFORMATION WAS RECIEVED

## 2024-01-31 ENCOUNTER — READMISSION MANAGEMENT (OUTPATIENT)
Dept: CALL CENTER | Facility: HOSPITAL | Age: 69
End: 2024-01-31
Payer: MEDICARE

## 2024-01-31 LAB
BACTERIA SPEC AEROBE CULT: NORMAL
BACTERIA SPEC AEROBE CULT: NORMAL

## 2024-01-31 NOTE — OUTREACH NOTE
Medical Week 1 Survey      Flowsheet Row Responses   [facility] patient discharged from? [place]   Does the patient have one of the following disease processes/diagnoses(primary or secondary)? Other   Week 1 attempt successful? Yes   Call start time 1751   Call end time 1759   Discharge diagnosis Choledocholithiasis, ERCP   Person spoke with today (if not patient) and relationship Patient   Medication alerts for this patient Norco, Atarax, Lidocaine, Protonix   Meds reviewed with patient/caregiver? Yes   Is the patient having any side effects they believe may be caused by any medication additions or changes? No   Does the patient have all medications ordered at discharge? Yes   Is the patient taking all medications as directed (includes completed medication regime)? No   What is preventing the patient from taking all medications as directed? Doesn't understand importance   Medication comments Pt states he is not taking the Protonix, he does not have acid reflux. He is not taking the Atarax, his itching is better, even at its worst he states he did not need medication for it.   Comments regarding appointments Pt sees Dr. Maloney in a week from tomorrow for appt.   Does the patient have a primary care provider?  Yes   Has home health visited the patient within 72 hours of discharge? N/A   Psychosocial issues? No   Comments Pt states his stomach and back still hurt. It has been 4 days. He states MD told him it would get worse before better after the stent placement to release the bile. He is taking Norco sparingly, only if he can't sleep at night. He states it is difficult to get into a comfortable position. He states his rash on his skin is almost gone and itching is minimal. Pt states it hurts to eat also.   Did the patient receive a copy of their discharge instructions? Yes   Nursing interventions Reviewed instructions with patient   What is the patient's perception of their health status since discharge?  Same   Is the patient/caregiver able to teach back signs and symptoms related to disease process for when to call PCP? Yes   Is the patient/caregiver able to teach back signs and symptoms related to disease process for when to call 911? Yes   Is the patient/caregiver able to teach back the hierarchy of who to call/visit for symptoms/problems? PCP, Specialist, Home health nurse, Urgent Care, ED, 911 Yes   If the patient is a current smoker, are they able to teach back resources for cessation? Not a smoker   Week 1 call completed? Yes   Would this patient benefit from a Referral to Amb Social Work? No   Is the patient interested in additional calls from an ambulatory ? No   Wrap up additional comments Pt denies any needs.   Call end time [id number]            Belgica HERNANDEZ - Registered Nurse

## 2024-01-31 NOTE — TELEPHONE ENCOUNTER
Spoke with patient, gave him information. Patient to call PCP or Dr. Maloney's office with any questions moving forward.

## 2024-02-06 RX ORDER — FINASTERIDE 5 MG/1
1 TABLET, FILM COATED ORAL DAILY
COMMUNITY
Start: 2024-01-03

## 2024-02-07 ENCOUNTER — ANESTHESIA EVENT (OUTPATIENT)
Dept: PERIOP | Facility: HOSPITAL | Age: 69
End: 2024-02-07
Payer: MEDICARE

## 2024-02-07 ENCOUNTER — HOSPITAL ENCOUNTER (OUTPATIENT)
Facility: HOSPITAL | Age: 69
Setting detail: HOSPITAL OUTPATIENT SURGERY
Discharge: HOME | End: 2024-02-07
Attending: SURGERY | Admitting: SURGERY
Payer: MEDICARE

## 2024-02-07 ENCOUNTER — ANESTHESIA (OUTPATIENT)
Dept: PERIOP | Facility: HOSPITAL | Age: 69
End: 2024-02-07
Payer: MEDICARE

## 2024-02-07 VITALS
RESPIRATION RATE: 16 BRPM | BODY MASS INDEX: 22.26 KG/M2 | WEIGHT: 159 LBS | TEMPERATURE: 98.1 F | DIASTOLIC BLOOD PRESSURE: 65 MMHG | OXYGEN SATURATION: 98 % | HEIGHT: 71 IN | HEART RATE: 80 BPM | SYSTOLIC BLOOD PRESSURE: 132 MMHG

## 2024-02-07 DIAGNOSIS — K86.89 PANCREATIC MASS: ICD-10-CM

## 2024-02-07 LAB — GLUCOSE BLDC GLUCOMTR-MCNC: 317 MG/DL (ref 70–130)

## 2024-02-07 PROCEDURE — 25010000002 DROPERIDOL PER 5 MG

## 2024-02-07 PROCEDURE — 88307 TISSUE EXAM BY PATHOLOGIST: CPT | Performed by: SURGERY

## 2024-02-07 PROCEDURE — 25010000002 FENTANYL CITRATE (PF) 50 MCG/ML SOLUTION

## 2024-02-07 PROCEDURE — 25010000002 KETOROLAC TROMETHAMINE PER 15 MG: Performed by: ANESTHESIOLOGY

## 2024-02-07 PROCEDURE — 63710000001 INSULIN REGULAR HUMAN PER 5 UNITS: Performed by: ANESTHESIOLOGY

## 2024-02-07 PROCEDURE — 88331 PATH CONSLTJ SURG 1 BLK 1SPC: CPT | Performed by: SURGERY

## 2024-02-07 PROCEDURE — 25010000002 BUPIVACAINE (PF) 0.25 % SOLUTION: Performed by: SURGERY

## 2024-02-07 PROCEDURE — 25010000002 CEFAZOLIN PER 500 MG: Performed by: SURGERY

## 2024-02-07 PROCEDURE — 25010000002 ESMOLOL 100 MG/10ML SOLUTION: Performed by: ANESTHESIOLOGY

## 2024-02-07 PROCEDURE — 25010000002 LABETALOL 5 MG/ML SOLUTION: Performed by: ANESTHESIOLOGY

## 2024-02-07 PROCEDURE — 25010000002 ONDANSETRON PER 1 MG: Performed by: ANESTHESIOLOGY

## 2024-02-07 PROCEDURE — 25010000002 PROPOFOL 10 MG/ML EMULSION: Performed by: ANESTHESIOLOGY

## 2024-02-07 PROCEDURE — 25010000002 DEXAMETHASONE PER 1 MG: Performed by: ANESTHESIOLOGY

## 2024-02-07 PROCEDURE — 25010000002 FENTANYL CITRATE (PF) 100 MCG/2ML SOLUTION: Performed by: ANESTHESIOLOGY

## 2024-02-07 PROCEDURE — 25810000003 LACTATED RINGERS PER 1000 ML: Performed by: ANESTHESIOLOGY

## 2024-02-07 PROCEDURE — 25010000002 HYDRALAZINE PER 20 MG: Performed by: ANESTHESIOLOGY

## 2024-02-07 PROCEDURE — 88313 SPECIAL STAINS GROUP 2: CPT | Performed by: SURGERY

## 2024-02-07 PROCEDURE — 25010000002 HYDROMORPHONE 1 MG/ML SOLUTION

## 2024-02-07 PROCEDURE — 82948 REAGENT STRIP/BLOOD GLUCOSE: CPT

## 2024-02-07 PROCEDURE — 25010000002 MORPHINE SULFATE (PF) 4 MG/ML SOLUTION: Performed by: ANESTHESIOLOGY

## 2024-02-07 RX ORDER — FENTANYL CITRATE 50 UG/ML
INJECTION, SOLUTION INTRAMUSCULAR; INTRAVENOUS AS NEEDED
Status: DISCONTINUED | OUTPATIENT
Start: 2024-02-07 | End: 2024-02-07 | Stop reason: SURG

## 2024-02-07 RX ORDER — MORPHINE SULFATE 4 MG/ML
INJECTION, SOLUTION INTRAMUSCULAR; INTRAVENOUS AS NEEDED
Status: DISCONTINUED | OUTPATIENT
Start: 2024-02-07 | End: 2024-02-07 | Stop reason: SURG

## 2024-02-07 RX ORDER — IPRATROPIUM BROMIDE AND ALBUTEROL SULFATE 2.5; .5 MG/3ML; MG/3ML
3 SOLUTION RESPIRATORY (INHALATION) ONCE AS NEEDED
Status: DISCONTINUED | OUTPATIENT
Start: 2024-02-07 | End: 2024-02-07 | Stop reason: HOSPADM

## 2024-02-07 RX ORDER — MAGNESIUM HYDROXIDE 1200 MG/15ML
LIQUID ORAL AS NEEDED
Status: DISCONTINUED | OUTPATIENT
Start: 2024-02-07 | End: 2024-02-07 | Stop reason: HOSPADM

## 2024-02-07 RX ORDER — ROCURONIUM BROMIDE 10 MG/ML
INJECTION, SOLUTION INTRAVENOUS AS NEEDED
Status: DISCONTINUED | OUTPATIENT
Start: 2024-02-07 | End: 2024-02-07 | Stop reason: SURG

## 2024-02-07 RX ORDER — FENTANYL CITRATE 50 UG/ML
50 INJECTION, SOLUTION INTRAMUSCULAR; INTRAVENOUS
Status: DISCONTINUED | OUTPATIENT
Start: 2024-02-07 | End: 2024-02-07 | Stop reason: HOSPADM

## 2024-02-07 RX ORDER — BUPIVACAINE HYDROCHLORIDE 2.5 MG/ML
INJECTION, SOLUTION EPIDURAL; INFILTRATION; INTRACAUDAL AS NEEDED
Status: DISCONTINUED | OUTPATIENT
Start: 2024-02-07 | End: 2024-02-07 | Stop reason: HOSPADM

## 2024-02-07 RX ORDER — NALOXONE HCL 0.4 MG/ML
0.4 VIAL (ML) INJECTION AS NEEDED
Status: DISCONTINUED | OUTPATIENT
Start: 2024-02-07 | End: 2024-02-07 | Stop reason: HOSPADM

## 2024-02-07 RX ORDER — SODIUM CHLORIDE 0.9 % (FLUSH) 0.9 %
3 SYRINGE (ML) INJECTION EVERY 12 HOURS SCHEDULED
Status: DISCONTINUED | OUTPATIENT
Start: 2024-02-07 | End: 2024-02-07 | Stop reason: HOSPADM

## 2024-02-07 RX ORDER — SODIUM CHLORIDE 9 MG/ML
40 INJECTION, SOLUTION INTRAVENOUS AS NEEDED
Status: CANCELLED | OUTPATIENT
Start: 2024-02-07

## 2024-02-07 RX ORDER — LIDOCAINE HYDROCHLORIDE 10 MG/ML
INJECTION, SOLUTION EPIDURAL; INFILTRATION; INTRACAUDAL; PERINEURAL AS NEEDED
Status: DISCONTINUED | OUTPATIENT
Start: 2024-02-07 | End: 2024-02-07 | Stop reason: SURG

## 2024-02-07 RX ORDER — LABETALOL HYDROCHLORIDE 5 MG/ML
INJECTION, SOLUTION INTRAVENOUS AS NEEDED
Status: DISCONTINUED | OUTPATIENT
Start: 2024-02-07 | End: 2024-02-07 | Stop reason: SURG

## 2024-02-07 RX ORDER — FENTANYL CITRATE 50 UG/ML
INJECTION, SOLUTION INTRAMUSCULAR; INTRAVENOUS
Status: COMPLETED
Start: 2024-02-07 | End: 2024-02-07

## 2024-02-07 RX ORDER — ONDANSETRON 2 MG/ML
4 INJECTION INTRAMUSCULAR; INTRAVENOUS ONCE AS NEEDED
Status: DISCONTINUED | OUTPATIENT
Start: 2024-02-07 | End: 2024-02-07 | Stop reason: HOSPADM

## 2024-02-07 RX ORDER — HYDROCODONE BITARTRATE AND ACETAMINOPHEN 5; 325 MG/1; MG/1
1 TABLET ORAL ONCE AS NEEDED
Status: DISCONTINUED | OUTPATIENT
Start: 2024-02-07 | End: 2024-02-07 | Stop reason: HOSPADM

## 2024-02-07 RX ORDER — PROMETHAZINE HYDROCHLORIDE 25 MG/1
25 TABLET ORAL ONCE AS NEEDED
Status: DISCONTINUED | OUTPATIENT
Start: 2024-02-07 | End: 2024-02-07 | Stop reason: HOSPADM

## 2024-02-07 RX ORDER — SODIUM CHLORIDE 0.9 % (FLUSH) 0.9 %
10 SYRINGE (ML) INJECTION AS NEEDED
Status: CANCELLED | OUTPATIENT
Start: 2024-02-07

## 2024-02-07 RX ORDER — PROMETHAZINE HYDROCHLORIDE 25 MG/1
25 SUPPOSITORY RECTAL ONCE AS NEEDED
Status: DISCONTINUED | OUTPATIENT
Start: 2024-02-07 | End: 2024-02-07 | Stop reason: HOSPADM

## 2024-02-07 RX ORDER — ESMOLOL HYDROCHLORIDE 10 MG/ML
INJECTION INTRAVENOUS AS NEEDED
Status: DISCONTINUED | OUTPATIENT
Start: 2024-02-07 | End: 2024-02-07 | Stop reason: SURG

## 2024-02-07 RX ORDER — SODIUM CHLORIDE, SODIUM LACTATE, POTASSIUM CHLORIDE, CALCIUM CHLORIDE 600; 310; 30; 20 MG/100ML; MG/100ML; MG/100ML; MG/100ML
9 INJECTION, SOLUTION INTRAVENOUS CONTINUOUS PRN
Status: DISCONTINUED | OUTPATIENT
Start: 2024-02-07 | End: 2024-02-07 | Stop reason: HOSPADM

## 2024-02-07 RX ORDER — HYDRALAZINE HYDROCHLORIDE 20 MG/ML
5 INJECTION INTRAMUSCULAR; INTRAVENOUS
Status: DISCONTINUED | OUTPATIENT
Start: 2024-02-07 | End: 2024-02-07 | Stop reason: HOSPADM

## 2024-02-07 RX ORDER — HYDROCODONE BITARTRATE AND ACETAMINOPHEN 5; 325 MG/1; MG/1
1 TABLET ORAL EVERY 4 HOURS PRN
Qty: 10 TABLET | Refills: 0 | Status: SHIPPED | OUTPATIENT
Start: 2024-02-07 | End: 2024-02-19 | Stop reason: HOSPADM

## 2024-02-07 RX ORDER — HYDRALAZINE HYDROCHLORIDE 20 MG/ML
INJECTION INTRAMUSCULAR; INTRAVENOUS AS NEEDED
Status: DISCONTINUED | OUTPATIENT
Start: 2024-02-07 | End: 2024-02-07 | Stop reason: SURG

## 2024-02-07 RX ORDER — SODIUM CHLORIDE 0.9 % (FLUSH) 0.9 %
3-10 SYRINGE (ML) INJECTION AS NEEDED
Status: DISCONTINUED | OUTPATIENT
Start: 2024-02-07 | End: 2024-02-07 | Stop reason: HOSPADM

## 2024-02-07 RX ORDER — KETOROLAC TROMETHAMINE 30 MG/ML
INJECTION, SOLUTION INTRAMUSCULAR; INTRAVENOUS AS NEEDED
Status: DISCONTINUED | OUTPATIENT
Start: 2024-02-07 | End: 2024-02-07 | Stop reason: SURG

## 2024-02-07 RX ORDER — HYDROMORPHONE HYDROCHLORIDE 1 MG/ML
0.5 INJECTION, SOLUTION INTRAMUSCULAR; INTRAVENOUS; SUBCUTANEOUS
Status: DISCONTINUED | OUTPATIENT
Start: 2024-02-07 | End: 2024-02-07 | Stop reason: HOSPADM

## 2024-02-07 RX ORDER — DROPERIDOL 2.5 MG/ML
0.62 INJECTION, SOLUTION INTRAMUSCULAR; INTRAVENOUS ONCE AS NEEDED
Status: DISCONTINUED | OUTPATIENT
Start: 2024-02-07 | End: 2024-02-07 | Stop reason: HOSPADM

## 2024-02-07 RX ORDER — SODIUM CHLORIDE 9 MG/ML
40 INJECTION, SOLUTION INTRAVENOUS AS NEEDED
Status: DISCONTINUED | OUTPATIENT
Start: 2024-02-07 | End: 2024-02-07 | Stop reason: HOSPADM

## 2024-02-07 RX ORDER — LIDOCAINE HYDROCHLORIDE 10 MG/ML
0.5 INJECTION, SOLUTION EPIDURAL; INFILTRATION; INTRACAUDAL; PERINEURAL ONCE AS NEEDED
Status: COMPLETED | OUTPATIENT
Start: 2024-02-07 | End: 2024-02-07

## 2024-02-07 RX ORDER — DROPERIDOL 2.5 MG/ML
0.62 INJECTION, SOLUTION INTRAMUSCULAR; INTRAVENOUS
Status: DISCONTINUED | OUTPATIENT
Start: 2024-02-07 | End: 2024-02-07 | Stop reason: HOSPADM

## 2024-02-07 RX ORDER — DROPERIDOL 2.5 MG/ML
INJECTION, SOLUTION INTRAMUSCULAR; INTRAVENOUS
Status: COMPLETED
Start: 2024-02-07 | End: 2024-02-07

## 2024-02-07 RX ORDER — LABETALOL HYDROCHLORIDE 5 MG/ML
5 INJECTION, SOLUTION INTRAVENOUS
Status: DISCONTINUED | OUTPATIENT
Start: 2024-02-07 | End: 2024-02-07 | Stop reason: HOSPADM

## 2024-02-07 RX ORDER — MIDAZOLAM HYDROCHLORIDE 1 MG/ML
0.5 INJECTION INTRAMUSCULAR; INTRAVENOUS
Status: DISCONTINUED | OUTPATIENT
Start: 2024-02-07 | End: 2024-02-07 | Stop reason: HOSPADM

## 2024-02-07 RX ORDER — PROPOFOL 10 MG/ML
VIAL (ML) INTRAVENOUS AS NEEDED
Status: DISCONTINUED | OUTPATIENT
Start: 2024-02-07 | End: 2024-02-07 | Stop reason: SURG

## 2024-02-07 RX ORDER — FAMOTIDINE 20 MG/1
20 TABLET, FILM COATED ORAL
Status: COMPLETED | OUTPATIENT
Start: 2024-02-07 | End: 2024-02-07

## 2024-02-07 RX ORDER — SODIUM CHLORIDE 0.9 % (FLUSH) 0.9 %
10 SYRINGE (ML) INJECTION EVERY 12 HOURS SCHEDULED
Status: CANCELLED | OUTPATIENT
Start: 2024-02-07

## 2024-02-07 RX ORDER — HYDROCODONE BITARTRATE AND ACETAMINOPHEN 5; 325 MG/1; MG/1
TABLET ORAL
Status: COMPLETED
Start: 2024-02-07 | End: 2024-02-07

## 2024-02-07 RX ORDER — ONDANSETRON 2 MG/ML
INJECTION INTRAMUSCULAR; INTRAVENOUS AS NEEDED
Status: DISCONTINUED | OUTPATIENT
Start: 2024-02-07 | End: 2024-02-07 | Stop reason: SURG

## 2024-02-07 RX ORDER — DEXAMETHASONE SODIUM PHOSPHATE 4 MG/ML
INJECTION, SOLUTION INTRA-ARTICULAR; INTRALESIONAL; INTRAMUSCULAR; INTRAVENOUS; SOFT TISSUE AS NEEDED
Status: DISCONTINUED | OUTPATIENT
Start: 2024-02-07 | End: 2024-02-07 | Stop reason: SURG

## 2024-02-07 RX ADMIN — DEXAMETHASONE SODIUM PHOSPHATE 4 MG: 4 INJECTION INTRA-ARTICULAR; INTRALESIONAL; INTRAMUSCULAR; INTRAVENOUS; SOFT TISSUE at 10:47

## 2024-02-07 RX ADMIN — PROPOFOL 150 MG: 10 INJECTION, EMULSION INTRAVENOUS at 10:47

## 2024-02-07 RX ADMIN — INSULIN HUMAN 5 UNITS: 100 INJECTION, SOLUTION PARENTERAL at 10:21

## 2024-02-07 RX ADMIN — FENTANYL CITRATE 50 MCG: 50 INJECTION, SOLUTION INTRAMUSCULAR; INTRAVENOUS at 12:35

## 2024-02-07 RX ADMIN — FENTANYL CITRATE 50 MCG: 50 INJECTION, SOLUTION INTRAMUSCULAR; INTRAVENOUS at 12:23

## 2024-02-07 RX ADMIN — ONDANSETRON 4 MG: 2 INJECTION INTRAMUSCULAR; INTRAVENOUS at 11:52

## 2024-02-07 RX ADMIN — ROCURONIUM BROMIDE 50 MG: 10 INJECTION INTRAVENOUS at 10:47

## 2024-02-07 RX ADMIN — ESMOLOL HYDROCHLORIDE 5 MG: 10 INJECTION, SOLUTION INTRAVENOUS at 11:17

## 2024-02-07 RX ADMIN — HYDROMORPHONE HYDROCHLORIDE 0.5 MG: 1 INJECTION, SOLUTION INTRAMUSCULAR; INTRAVENOUS; SUBCUTANEOUS at 12:30

## 2024-02-07 RX ADMIN — FAMOTIDINE 20 MG: 20 TABLET, FILM COATED ORAL at 10:16

## 2024-02-07 RX ADMIN — SODIUM CHLORIDE 2000 MG: 900 INJECTION INTRAVENOUS at 10:53

## 2024-02-07 RX ADMIN — DROPERIDOL 0.62 MG: 2.5 INJECTION, SOLUTION INTRAMUSCULAR; INTRAVENOUS at 12:36

## 2024-02-07 RX ADMIN — KETOROLAC TROMETHAMINE 30 MG: 30 INJECTION, SOLUTION INTRAMUSCULAR; INTRAVENOUS at 11:52

## 2024-02-07 RX ADMIN — LABETALOL HYDROCHLORIDE 2.5 MG: 5 INJECTION, SOLUTION INTRAVENOUS at 11:24

## 2024-02-07 RX ADMIN — MORPHINE SULFATE 4 MG: 4 INJECTION, SOLUTION INTRAMUSCULAR; INTRAVENOUS at 11:58

## 2024-02-07 RX ADMIN — PROPOFOL 25 MCG/KG/MIN: 10 INJECTION, EMULSION INTRAVENOUS at 10:54

## 2024-02-07 RX ADMIN — HYDRALAZINE HYDROCHLORIDE 5 MG: 20 INJECTION INTRAMUSCULAR; INTRAVENOUS at 11:47

## 2024-02-07 RX ADMIN — HYDROCODONE BITARTRATE AND ACETAMINOPHEN 1 TABLET: 5; 325 TABLET ORAL at 12:57

## 2024-02-07 RX ADMIN — ROCURONIUM BROMIDE 10 MG: 10 INJECTION INTRAVENOUS at 11:25

## 2024-02-07 RX ADMIN — FENTANYL CITRATE 100 MCG: 50 INJECTION, SOLUTION INTRAMUSCULAR; INTRAVENOUS at 10:47

## 2024-02-07 RX ADMIN — LIDOCAINE HYDROCHLORIDE 0.5 ML: 10 INJECTION, SOLUTION EPIDURAL; INFILTRATION; INTRACAUDAL; PERINEURAL at 10:00

## 2024-02-07 RX ADMIN — ROCURONIUM BROMIDE 10 MG: 10 INJECTION INTRAVENOUS at 11:10

## 2024-02-07 RX ADMIN — ESMOLOL HYDROCHLORIDE 2.5 MG: 10 INJECTION, SOLUTION INTRAVENOUS at 10:47

## 2024-02-07 RX ADMIN — DEXAMETHASONE SODIUM PHOSPHATE 4 MG: 4 INJECTION INTRA-ARTICULAR; INTRALESIONAL; INTRAMUSCULAR; INTRAVENOUS; SOFT TISSUE at 12:07

## 2024-02-07 RX ADMIN — LIDOCAINE HYDROCHLORIDE 50 MG: 10 INJECTION, SOLUTION EPIDURAL; INFILTRATION; INTRACAUDAL; PERINEURAL at 10:47

## 2024-02-07 RX ADMIN — SODIUM CHLORIDE, POTASSIUM CHLORIDE, SODIUM LACTATE AND CALCIUM CHLORIDE 9 ML/HR: 600; 310; 30; 20 INJECTION, SOLUTION INTRAVENOUS at 10:00

## 2024-02-07 RX ADMIN — HYDRALAZINE HYDROCHLORIDE 2.5 MG: 20 INJECTION INTRAMUSCULAR; INTRAVENOUS at 11:35

## 2024-02-07 NOTE — ANESTHESIA PROCEDURE NOTES
Airway  Urgency: elective    Date/Time: 2/7/2024 10:52 AM  Airway not difficult    General Information and Staff    Patient location during procedure: OR  SRNA: Marek Gray SRNA  Indications and Patient Condition  Indications for airway management: airway protection    Preoxygenated: yes  MILS not maintained throughout  Mask difficulty assessment: 2 - vent by mask + OA or adjuvant +/- NMBA    Final Airway Details  Final airway type: endotracheal airway      Successful airway: ETT  Cuffed: yes   Successful intubation technique: direct laryngoscopy  Facilitating devices/methods: Bougie and cricoid pressure  Endotracheal tube insertion site: oral  Blade: Ming  Blade size: 3  ETT size (mm): 7.5  Cormack-Lehane Classification: grade I - full view of glottis  Placement verified by: chest auscultation and capnometry   Cuff volume (mL): 8  Measured from: lips  ETT/EBT  to lips (cm): 20  Number of attempts at approach: 1  Assessment: lips, teeth, and gum same as pre-op and atraumatic intubation    Additional Comments  Negative epigastric sounds, Breath sound equal bilaterally with symmetric chest rise and fall

## 2024-02-07 NOTE — OP NOTE
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    A Liver Tissue TISSUE PATHOLOGY EXAM   Silas Maloney MD 2/7/24 1128 Yes    Description: liver lesion    Comment: liver lesion    B Perineum Peritoneal Fluid NON-GYN CYTOLOGY, [facility] ([facility])   Silas Maloney MD 2/7/24 1149 No    Description: PERITONEAL FLUID                Findings: Several white subcentimeter liver lesions identified, largest excised and sent for frozen section, without evidence of neoplasm; no other lesions noted within the inspected peritoneal cavity; peritoneal washings performed    Indications: The patient is a 68-year-old male with a history of pancreatic mass causing biliary obstruction.  With concern for malignancy discussions were made with the patient regarding diagnostic laparoscopy with biopsy of any lesions found within the peritoneal cavity and the patient was agreeable.  Informed consent was obtained.    Description of Procedure:     After obtaining informed consent, the patient was taken to the operating room and placed in supine position. After appropriate DVT and antibiotic prophylaxis, general anesthesia was induced. The abdomen was prepped and draped in standard sterile fashion.    The skin was anesthetized at Robles's point in the left upper  quadrant using local anesthetic.  The skin was then incised using an 11 blade.  A 5 mm laparoscopic port was placed into the peritoneal cavity using the Optiview technique.  The peritoneal cavity was then insufflated using carbon dioxide and laparoscope was placed through the port.  There was no evidence of underlying visceral injury.  Adhesions were noted between the previous midline incision and omentum and small bowel.  Inspection of the peritoneal cavity was then performed.  There were several subcentimeter white lesions on the anterior surface of both lobes of the liver with the largest being in the right lobe.  There is no evidence of lesions in the peritoneal lining, stomach, small bowel, or colon.  There was no ascites.  The decision was made to perform a liver biopsy on the largest of the white lesions in the right lobe.  A 5 mm port was placed in the right lateral abdominal wall under direct vision, with an 11 port placed medial and inferior to this also under direct vision.  An approximately 1 cm margin was marked out circumferentially around this area using electrocautery.  3-0 silk stitch was then placed around the lesion to assist in retraction.  A liver biopsy was then performed to excise the lesion completely along with a 1 cm rim in all directions using electrocautery.  Specimen was then placed in an Endo Catch bag and removed from the left upper quadrant port site.  This was sent for frozen section evaluation and no evidence of neoplasm was identified.  The peritoneal cavity was then instilled with 1 L of normal saline.  The saline was then removed and collected and sent for cytology.  The laparoscopic ports were removed under direct vision.  The skin was reapproximated at all sites using 4-0 Monocryl.    All sponge and needle counts were correct times two at the completion of the procedure. The patient recovered from anesthesia, was extubated in the operating room and was transported to the PACU  in stable condition.        Assistant: Jay Cruz PA  was responsible for performing the following activities: Suturing, Closing, Placing Dressing, and Held/Positioned Camera and their skilled assistance was necessary for the success of this case.    Silas Maloney MD     Date: 2/7/2024  Time: 12:00 EST

## 2024-02-07 NOTE — ANESTHESIA POSTPROCEDURE EVALUATION
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          Post Anesthesia Care and Evaluation    Patient location during evaluation: PACU  Patient participation: complete - patient participated  Level of consciousness: awake and alert  Pain management: adequate    Airway patency: patent  Anesthetic complications: No anesthetic complications  PONV Status: none  Cardiovascular status: hemodynamically stable and acceptable  Respiratory status: nonlabored ventilation, acceptable and nasal cannula  Hydration status: acceptable

## 2024-02-07 NOTE — H&P
Pre-Op H&P  Costa Robbins  [id number]  1955      Chief complaint: Pancreatic mass      Subjective:  Patient is a 68 y.o.male presents for scheduled surgery by Dr. Maloney. He anticipates a DIAGNOSTIC LAPAROSCOPY today.  The patient endorses having abdominal pain for the past couple months.  He denies having any other associated symptoms with his present condition.  He went to the ED on 1/26/2024 with abdominal pain and a pancreatic mass was subsequently found on CT scan.  The last dose of Plavix was on Thursday, 2/1/24.    Review of Systems:  Constitutional-- No fever, chills or sweats. No fatigue.  CV-- No chest pain, palpitation or syncope. +HTN.  Resp-- No SOB, cough, hemoptysis  Skin--No rashes or lesions      Allergies:   Allergies   Allergen Reactions    Atenolol Other (See Comments)     Bradycardia.    Morphine Itching    Penicillins Hives         Home Meds:  Medications Prior to Admission   Medication Sig Dispense Refill Last Dose    amLODIPine (NORVASC) 10 MG tablet Take 1 tablet by mouth Daily. 30 tablet 5 2/6/2024 at 1000    empagliflozin (Jardiance) 10 MG tablet tablet Take 1 tablet by mouth Daily.   2/6/2024 at 1000    finasteride (PROSCAR) 5 MG tablet Take 1 tablet by mouth Daily.   2/6/2024 at 1000    HYDROcodone-acetaminophen (NORCO) 5-325 MG per tablet Take 1 tablet by mouth Every 6 (Six) Hours As Needed for Moderate Pain. 12 tablet 0 2/6/2024 at 0700    Insulin Glargine (BASAGLAR KWIKPEN SC) Inject 40 Units under the skin into the appropriate area as directed Every Night.   2/5/2024    lisinopril (PRINIVIL,ZESTRIL) 40 MG tablet Take 1 tablet by mouth Daily.   2/6/2024 at 1000    clopidogrel (PLAVIX) 75 MG tablet Take 1 tablet by mouth Daily. (Patient taking differently: Take 1 tablet by mouth Daily. Stopped for surgery) 30 tablet 6 2/2/2024    hydrOXYzine (ATARAX) 25 MG tablet Take 1 tablet by mouth 3 (Three) Times a Day As Needed for Itching. 30 tablet 0 More than a month         PMH:    Past Medical History:   Diagnosis Date    Bile duct obstruction 01/27/2024    found during ERCP    COVID 2021    no hospitalization    Diabetes mellitus 2017    po meds and insulin    Diverticulitis 2009    surgically intervention    Enlarged prostate     recently started on Proscar    ETOH use 05/25/2023    Former smoker 05/25/2023    Hearing decreased     Hypertension     Melanoma     CHEST, BACK, NECK MULTI, HEAD    Pancreatic mass 01/27/2024    found on CT scan; ERCP confirmed    PVD (peripheral vascular disease)     Unintentional weight loss     Wears glasses      PSH:    Past Surgical History:   Procedure Laterality Date    AORTAGRAM Bilateral 04/12/2023    Procedure: AORTAGRAM WITH RUNOFFS  STENT OF THE LEFT ILIAC ARTERY;  Surgeon: Moses Escalante MD;  Location: [facility];  Service: Vascular;  Laterality: Bilateral;    COLON RESECTION  05/2009    partial colectomy    COLONOSCOPY      ERCP N/A 01/27/2024    Procedure: ENDOSCOPIC RETROGRADE CHOLANGIOPANCREATOGRAPHY WITH STENT PLACEMENT;  Surgeon: Brunner, Mark I, MD;  Location: [facility] ENDOSCOPY;  Service: Gastroenterology;  Laterality: N/A;    FEMORAL ENDARTERECTOMY Left 05/25/2023    Procedure: FEMORAL ENDARTERECTOMY WITH PROPATEN GRAFT 8MM X 40CM;  Surgeon: Moses Escalante MD;  Location: [facility] OR;  Service: Vascular;  Laterality: Left;    FEMORAL FEMORAL BYPASS N/A 05/25/2023    Procedure: FEMORAL FEMORAL BYPASS;  Surgeon: Moses Escalante MD;  Location: [facility] OR;  Service: Vascular;  Laterality: N/A;    ORIF ANKLE FRACTURE Left     SKIN BIOPSY      SKIN CANCER EXCISION      melanoma    WISDOM TOOTH EXTRACTION         Immunization History:  Influenza: No  Pneumococcal: No  Tetanus: Yes  Covid : No    Social History:   Tobacco:   Social History     Tobacco Use   Smoking Status Former    Packs/day: 0.50    Years: 43.00    Additional pack years: 0.00    Total pack years: 21.50    Types: Cigarettes    Quit date: 02/2023    Years since  "quittin.0    Passive exposure: Never   Smokeless Tobacco Never   Tobacco Comments    Pt states that he has been able to stop smoking this month - 2023      Alcohol:     Social History     Substance and Sexual Activity   Alcohol Use Not Currently    Comment: 2 BEERS PER DAY; hx of ETOH use         Physical Exam:/90 (BP Location: Right arm, Patient Position: Lying)   Pulse 79   Temp 97.2 °F (36.2 °C) (Temporal)   Resp 18   Ht 180.3 cm (71\")   Wt 72.1 kg (159 lb)   SpO2 100%   BMI 22.18 kg/m²       General Appearance:    Alert, cooperative, no distress, appears stated age   Head:    Normocephalic, without obvious abnormality, atraumatic   Lungs:     Clear to auscultation bilaterally, respirations unlabored    Heart:   Regular rate and rhythm, S1 and S2 normal    Abdomen:    Soft without tenderness   Extremities:   Extremities normal, atraumatic, no cyanosis or edema   Skin:   Skin color, texture, turgor normal, no rashes or lesions   Neurologic:   Grossly intact     Results Review:     LABS:  Lab Results   Component Value Date    WBC 9.59 2024    HGB 13.6 2024    HCT 39.9 2024    MCV 91.9 2024     2024    NEUTROABS 6.60 2024    GLUCOSE 191 (H) 2024    BUN 13 2024    CREATININE 0.65 (L) 2024     (L) 2024    K 4.7 2024    CL 99 2024    CO2 15.0 (L) 2024    MG 2.0 2024    PHOS 3.0 2024    CALCIUM 10.5 2024    ALBUMIN 3.5 2024     (H) 2024     (H) 2024    BILITOT 7.0 (H) 2024       RADIOLOGY:  Imaging Results (Last 72 Hours)       ** No results found for the last 72 hours. **            I reviewed the patient's new clinical results.    Cancer Staging (if applicable)  Cancer Patient: _x_ yes __no __unknown; If yes, clinical stage T:__ N:__M:__, stage group or __N/A      Impression: Pancreatic mass      Plan: DIAGNOSTIC LAPAROSCOPY       Mushtaq Encarnacion, APRN "   2/7/2024   10:37 EST

## 2024-02-07 NOTE — BRIEF OP NOTE
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     Procedure/CPT® Codes:        Procedure(s):  DIAGNOSTIC LAPAROSCOPY WITH LIVER BIOPSY AND PERITONEAL WASHINGS              Surgeon(s):  Silas Maloney MD    Anesthesia: General    Staff:   Circulator: Coni Whitney RN; Angela Waters RN; Tish Cavanaugh RN  Scrub Person: Katie Murphy; Lalito Sorto RN  Nursing Assistant: Sherita Krishna PCT  Assistant: Jay Cruz PA  Assistant: Jay Cruz PA      Estimated Blood Loss: 30 mL    Urine Voided: * No values recorded between 2/7/2024 10:45 AM and 2/7/2024 11:56 AM *    Specimens:                Specimens       ID Source Type Tests Collected By Collected At Frozen?    A Liver Tissue TISSUE PATHOLOGY EXAM   Silas Maloney MD 2/7/24 1128 Yes    Description: liver lesion    Comment: liver lesion    B Perineum Peritoneal Fluid NON-GYN CYTOLOGY, [facility] ([facility])   Silas Maloney MD 2/7/24 1149 No    Description: PERITONEAL FLUID                  Drains: * No LDAs found *    Findings: Several white subcentimeter liver lesions identified, largest excised and sent for frozen section, without evidence of neoplasm; no other lesions noted within the inspected peritoneal cavity; peritoneal washings performed        Complications: None    Assistant: Jay Cruz PA  was responsible for performing the following activities: Suturing, Closing, Placing Dressing, and Held/Positioned Camera and their skilled assistance was necessary for the success of this case.    Silas Maloney MD     Date: 2/7/2024  Time: 11:56 EST

## 2024-02-07 NOTE — ANESTHESIA PREPROCEDURE EVALUATION
Anesthesia Evaluation     Patient summary reviewed and Nursing notes reviewed   no history of anesthetic complications:   NPO Solid Status: > 8 hours  NPO Liquid Status: > 2 hours           Airway   Mallampati: II  TM distance: >3 FB  Neck ROM: full  No difficulty expected  Dental - normal exam     Pulmonary    (+) a smoker Former, Abstained day of surgery, cigarettes, COPD moderate,decreased breath sounds  Cardiovascular   Exercise tolerance: good (4-7 METS)    ECG reviewed  PT is on anticoagulation therapy  Rhythm: regular  Rate: normal    (+) hypertension well controlled 2 medications or greater, PVD, hyperlipidemia    ROS comment: 1/24: Interpretation Summary       ·  Left ventricular systolic function is hyperdynamic (EF > 70%). Left ventricular ejection fraction appears to be greater than 70%.  ·  Left ventricular wall thickness is consistent with borderline concentric hypertrophy.  ·  Provoked left ventricular mean pressure gradient of 10 mmHg.  ·  Left ventricular diastolic function is consistent with (grade I) impaired relaxation.         Neuro/Psych- negative ROS  GI/Hepatic/Renal/Endo    (+) liver disease, diabetes mellitus type 2 poorly controlled    Musculoskeletal     Abdominal   (-) obese, scaphoid    Abdomen: tender.   Substance History   (+) alcohol use     OB/GYN          Other   arthritis,   history of cancer active                Anesthesia Plan    ASA 3     general     intravenous induction     Anesthetic plan, risks, benefits, and alternatives have been provided, discussed and informed consent has been obtained with: patient.    Plan discussed with CRNA.    CODE STATUS:

## 2024-02-08 ENCOUNTER — READMISSION MANAGEMENT (OUTPATIENT)
Dept: CALL CENTER | Facility: HOSPITAL | Age: 69
End: 2024-02-08
Payer: MEDICARE

## 2024-02-08 LAB
CYTO UR: NORMAL
LAB AP CASE REPORT: NORMAL
LAB AP CLINICAL INFORMATION: NORMAL
LAB AP DIAGNOSIS COMMENT: NORMAL
Lab: NORMAL
PATH REPORT.FINAL DX SPEC: NORMAL
PATH REPORT.GROSS SPEC: NORMAL
REF LAB TEST METHOD: NORMAL

## 2024-02-08 NOTE — OUTREACH NOTE
Medical Week 2 Survey      Flowsheet Row Responses   [facility] patient discharged from? [facility]   Does the patient have one of the following disease processes/diagnoses(primary or secondary)? Other   Week 2 attempt successful? Yes   Call start time 1612   Discharge diagnosis Choledocholithiasis, ERCP   Call end time 1615   Meds reviewed with patient/caregiver? Yes   Is the patient having any side effects they believe may be caused by any medication additions or changes? No   Does the patient have all medications ordered at discharge? Yes   Is the patient taking all medications as directed (includes completed medication regime)? Yes   Does the patient have a primary care provider?  Yes   Does the patient have an appointment with their PCP within 7 days of discharge? Yes   Comments regarding PCP Has had PCP follow up   Has the patient kept scheduled appointments due by today? Yes   Has home health visited the patient within 72 hours of discharge? N/A   Psychosocial issues? No   Did the patient receive a copy of their discharge instructions? Yes   Nursing interventions Reviewed instructions with patient   What is the patient's perception of their health status since discharge? Improving   Is the patient/caregiver able to teach back signs and symptoms related to disease process for when to call PCP? Yes   Is the patient/caregiver able to teach back signs and symptoms related to disease process for when to call 911? Yes   Is the patient/caregiver able to teach back the hierarchy of who to call/visit for symptoms/problems? PCP, Specialist, Home health nurse, Urgent Care, ED, 911 Yes   Week 2 Call Completed? Yes   Is the patient interested in additional calls from an ambulatory ? No   Would this patient benefit from a Referral to Amb Social Work? No   Call end time 1615            Jessy FRANCOIS - Registered Nurse

## 2024-02-13 ENCOUNTER — HOSPITAL ENCOUNTER (INPATIENT)
Facility: HOSPITAL | Age: 69
LOS: 5 days | Discharge: HOME | DRG: 442 | End: 2024-02-19
Attending: EMERGENCY MEDICINE | Admitting: FAMILY MEDICINE
Payer: MEDICARE

## 2024-02-13 ENCOUNTER — APPOINTMENT (OUTPATIENT)
Dept: GENERAL RADIOLOGY | Facility: HOSPITAL | Age: 69
DRG: 442 | End: 2024-02-13
Payer: MEDICARE

## 2024-02-13 ENCOUNTER — APPOINTMENT (OUTPATIENT)
Dept: CT IMAGING | Facility: HOSPITAL | Age: 69
DRG: 442 | End: 2024-02-13
Payer: MEDICARE

## 2024-02-13 DIAGNOSIS — K65.1 INTRA-ABDOMINAL ABSCESS: ICD-10-CM

## 2024-02-13 DIAGNOSIS — K75.0 HEPATIC ABSCESS: ICD-10-CM

## 2024-02-13 DIAGNOSIS — Z98.890 HISTORY OF BILIARY DUCT STENT PLACEMENT: ICD-10-CM

## 2024-02-13 DIAGNOSIS — R93.2 ABNORMAL CT SCAN, LIVER: ICD-10-CM

## 2024-02-13 DIAGNOSIS — K86.89 PANCREATIC MASS: Primary | ICD-10-CM

## 2024-02-13 LAB
ALBUMIN SERPL-MCNC: 4.2 G/DL (ref 3.5–5.2)
ALBUMIN/GLOB SERPL: 1.4 G/DL
ALP SERPL-CCNC: 182 U/L (ref 39–117)
ALT SERPL W P-5'-P-CCNC: 31 U/L (ref 1–41)
ANION GAP SERPL CALCULATED.3IONS-SCNC: 19 MMOL/L (ref 5–15)
ARTERIAL PATENCY WRIST A: ABNORMAL
AST SERPL-CCNC: 15 U/L (ref 1–40)
ATMOSPHERIC PRESS: ABNORMAL MM[HG]
B-OH-BUTYR SERPL-SCNC: 4.66 MMOL/L (ref 0.02–0.27)
BASE EXCESS BLDA CALC-SCNC: -4.1 MMOL/L (ref 0–2)
BASOPHILS # BLD AUTO: 0.04 10*3/MM3 (ref 0–0.2)
BASOPHILS NFR BLD AUTO: 0.3 % (ref 0–1.5)
BDY SITE: ABNORMAL
BILIRUB SERPL-MCNC: 1.2 MG/DL (ref 0–1.2)
BODY TEMPERATURE: 37
BUN SERPL-MCNC: 12 MG/DL (ref 8–23)
BUN/CREAT SERPL: 11.8 (ref 7–25)
CALCIUM SPEC-SCNC: 11.3 MG/DL (ref 8.6–10.5)
CHLORIDE SERPL-SCNC: 94 MMOL/L (ref 98–107)
CO2 BLDA-SCNC: 19.3 MMOL/L (ref 22–33)
CO2 SERPL-SCNC: 20 MMOL/L (ref 22–29)
COHGB MFR BLD: 1.4 % (ref 0–2)
CREAT SERPL-MCNC: 1.02 MG/DL (ref 0.76–1.27)
D-LACTATE SERPL-SCNC: 1.3 MMOL/L (ref 0.5–2)
DEPRECATED RDW RBC AUTO: 46.9 FL (ref 37–54)
EGFRCR SERPLBLD CKD-EPI 2021: 81.1 ML/MIN/1.73
EOSINOPHIL # BLD AUTO: 0 10*3/MM3 (ref 0–0.4)
EOSINOPHIL NFR BLD AUTO: 0 % (ref 0.3–6.2)
EPAP: 0
ERYTHROCYTE [DISTWIDTH] IN BLOOD BY AUTOMATED COUNT: 14 % (ref 12.3–15.4)
GLOBULIN UR ELPH-MCNC: 3 GM/DL
GLUCOSE SERPL-MCNC: 310 MG/DL (ref 65–99)
HCO3 BLDA-SCNC: 18.5 MMOL/L (ref 20–26)
HCT VFR BLD AUTO: 43.7 % (ref 37.5–51)
HCT VFR BLD CALC: 41.2 % (ref 38–51)
HGB BLD-MCNC: 15.5 G/DL (ref 13–17.7)
HGB BLDA-MCNC: 13.5 G/DL (ref 13.5–17.5)
HOLD SPECIMEN: NORMAL
HOLD SPECIMEN: NORMAL
IMM GRANULOCYTES # BLD AUTO: 0.1 10*3/MM3 (ref 0–0.05)
IMM GRANULOCYTES NFR BLD AUTO: 0.7 % (ref 0–0.5)
INHALED O2 CONCENTRATION: 21 %
IPAP: 0
LIPASE SERPL-CCNC: 96 U/L (ref 13–60)
LYMPHOCYTES # BLD AUTO: 0.29 10*3/MM3 (ref 0.7–3.1)
LYMPHOCYTES NFR BLD AUTO: 1.9 % (ref 19.6–45.3)
MAGNESIUM SERPL-MCNC: 1.9 MG/DL (ref 1.6–2.4)
MCH RBC QN AUTO: 32.4 PG (ref 26.6–33)
MCHC RBC AUTO-ENTMCNC: 35.5 G/DL (ref 31.5–35.7)
MCV RBC AUTO: 91.2 FL (ref 79–97)
METHGB BLD QL: 0.3 % (ref 0–1.5)
MODALITY: ABNORMAL
MONOCYTES # BLD AUTO: 0.85 10*3/MM3 (ref 0.1–0.9)
MONOCYTES NFR BLD AUTO: 5.6 % (ref 5–12)
NEUTROPHILS NFR BLD AUTO: 13.81 10*3/MM3 (ref 1.7–7)
NEUTROPHILS NFR BLD AUTO: 91.5 % (ref 42.7–76)
NRBC BLD AUTO-RTO: 0 /100 WBC (ref 0–0.2)
OXYHGB MFR BLDV: 96 % (ref 94–99)
PAW @ PEAK INSP FLOW SETTING VENT: 0 CMH2O
PCO2 BLDA: 26.8 MM HG (ref 35–45)
PCO2 TEMP ADJ BLD: 26.8 MM HG (ref 35–48)
PH BLDA: 7.45 PH UNITS (ref 7.35–7.45)
PH, TEMP CORRECTED: 7.45 PH UNITS
PLATELET # BLD AUTO: 312 10*3/MM3 (ref 140–450)
PMV BLD AUTO: 10.9 FL (ref 6–12)
PO2 BLDA: 83 MM HG (ref 83–108)
PO2 TEMP ADJ BLD: 83 MM HG (ref 83–108)
POTASSIUM SERPL-SCNC: 3.9 MMOL/L (ref 3.5–5.2)
PROT SERPL-MCNC: 7.2 G/DL (ref 6–8.5)
RBC # BLD AUTO: 4.79 10*6/MM3 (ref 4.14–5.8)
SODIUM SERPL-SCNC: 133 MMOL/L (ref 136–145)
TOTAL RATE: 0 BREATHS/MINUTE
TROPONIN T SERPL HS-MCNC: 22 NG/L
WBC NRBC COR # BLD AUTO: 15.09 10*3/MM3 (ref 3.4–10.8)
WHOLE BLOOD HOLD COAG: NORMAL
WHOLE BLOOD HOLD SPECIMEN: NORMAL

## 2024-02-13 PROCEDURE — 84484 ASSAY OF TROPONIN QUANT: CPT | Performed by: EMERGENCY MEDICINE

## 2024-02-13 PROCEDURE — 25510000001 IOPAMIDOL 61 % SOLUTION: Performed by: EMERGENCY MEDICINE

## 2024-02-13 PROCEDURE — 93005 ELECTROCARDIOGRAM TRACING: CPT | Performed by: EMERGENCY MEDICINE

## 2024-02-13 PROCEDURE — 74177 CT ABD & PELVIS W/CONTRAST: CPT

## 2024-02-13 PROCEDURE — 80053 COMPREHEN METABOLIC PANEL: CPT | Performed by: EMERGENCY MEDICINE

## 2024-02-13 PROCEDURE — 83735 ASSAY OF MAGNESIUM: CPT | Performed by: EMERGENCY MEDICINE

## 2024-02-13 PROCEDURE — 83050 HGB METHEMOGLOBIN QUAN: CPT

## 2024-02-13 PROCEDURE — 71045 X-RAY EXAM CHEST 1 VIEW: CPT

## 2024-02-13 PROCEDURE — 36600 WITHDRAWAL OF ARTERIAL BLOOD: CPT

## 2024-02-13 PROCEDURE — 36415 COLL VENOUS BLD VENIPUNCTURE: CPT

## 2024-02-13 PROCEDURE — 85025 COMPLETE CBC W/AUTO DIFF WBC: CPT | Performed by: EMERGENCY MEDICINE

## 2024-02-13 PROCEDURE — 83605 ASSAY OF LACTIC ACID: CPT | Performed by: PHYSICIAN ASSISTANT

## 2024-02-13 PROCEDURE — 25810000003 SODIUM CHLORIDE 0.9 % SOLUTION: Performed by: PHYSICIAN ASSISTANT

## 2024-02-13 PROCEDURE — 82805 BLOOD GASES W/O2 SATURATION: CPT

## 2024-02-13 PROCEDURE — 87040 BLOOD CULTURE FOR BACTERIA: CPT | Performed by: PHYSICIAN ASSISTANT

## 2024-02-13 PROCEDURE — 99285 EMERGENCY DEPT VISIT HI MDM: CPT

## 2024-02-13 PROCEDURE — 83690 ASSAY OF LIPASE: CPT | Performed by: PHYSICIAN ASSISTANT

## 2024-02-13 PROCEDURE — 82375 ASSAY CARBOXYHB QUANT: CPT

## 2024-02-13 PROCEDURE — 82010 KETONE BODYS QUAN: CPT | Performed by: PHYSICIAN ASSISTANT

## 2024-02-13 RX ORDER — SODIUM CHLORIDE 0.9 % (FLUSH) 0.9 %
10 SYRINGE (ML) INJECTION AS NEEDED
Status: DISCONTINUED | OUTPATIENT
Start: 2024-02-13 | End: 2024-02-17 | Stop reason: SDUPTHER

## 2024-02-13 RX ORDER — METRONIDAZOLE 500 MG/100ML
500 INJECTION, SOLUTION INTRAVENOUS ONCE
Status: COMPLETED | OUTPATIENT
Start: 2024-02-13 | End: 2024-02-14

## 2024-02-13 RX ADMIN — IOPAMIDOL 90 ML: 612 INJECTION, SOLUTION INTRAVENOUS at 22:31

## 2024-02-13 RX ADMIN — SODIUM CHLORIDE 1000 ML: 9 INJECTION, SOLUTION INTRAVENOUS at 21:25

## 2024-02-13 NOTE — Clinical Note
Level of Care: Telemetry [5]   Diagnosis: Hepatic abscess [200170]   Admitting Physician: VANIA NI III [[id number]]   Attending Physician: VANIA NI III [[id number]]   Bed Request Comments: tele obs (not CDU)

## 2024-02-14 ENCOUNTER — READMISSION MANAGEMENT (OUTPATIENT)
Dept: CALL CENTER | Facility: HOSPITAL | Age: 69
End: 2024-02-14
Payer: MEDICARE

## 2024-02-14 ENCOUNTER — APPOINTMENT (OUTPATIENT)
Dept: MRI IMAGING | Facility: HOSPITAL | Age: 69
DRG: 442 | End: 2024-02-14
Payer: MEDICARE

## 2024-02-14 PROBLEM — E83.52 HYPERCALCEMIA: Status: ACTIVE | Noted: 2024-02-14

## 2024-02-14 PROBLEM — K75.0 HEPATIC ABSCESS: Status: ACTIVE | Noted: 2024-02-14

## 2024-02-14 LAB
ANION GAP SERPL CALCULATED.3IONS-SCNC: 20 MMOL/L (ref 5–15)
BASOPHILS # BLD AUTO: 0.02 10*3/MM3 (ref 0–0.2)
BASOPHILS NFR BLD AUTO: 0.1 % (ref 0–1.5)
BUN SERPL-MCNC: 15 MG/DL (ref 8–23)
BUN/CREAT SERPL: 20.8 (ref 7–25)
CA-I SERPL ISE-MCNC: 1.43 MMOL/L (ref 1.12–1.32)
CALCIUM SPEC-SCNC: 9.8 MG/DL (ref 8.6–10.5)
CANCER AG19-9 SERPL-ACNC: 456 U/ML
CHLORIDE SERPL-SCNC: 102 MMOL/L (ref 98–107)
CO2 SERPL-SCNC: 14 MMOL/L (ref 22–29)
CREAT SERPL-MCNC: 0.72 MG/DL (ref 0.76–1.27)
DEPRECATED RDW RBC AUTO: 47.1 FL (ref 37–54)
EGFRCR SERPLBLD CKD-EPI 2021: 100.8 ML/MIN/1.73
EOSINOPHIL # BLD AUTO: 0 10*3/MM3 (ref 0–0.4)
EOSINOPHIL NFR BLD AUTO: 0 % (ref 0.3–6.2)
ERYTHROCYTE [DISTWIDTH] IN BLOOD BY AUTOMATED COUNT: 14.1 % (ref 12.3–15.4)
GLUCOSE BLDC GLUCOMTR-MCNC: 196 MG/DL (ref 70–130)
GLUCOSE BLDC GLUCOMTR-MCNC: 223 MG/DL (ref 70–130)
GLUCOSE BLDC GLUCOMTR-MCNC: 322 MG/DL (ref 70–130)
GLUCOSE BLDC GLUCOMTR-MCNC: 520 MG/DL (ref 70–130)
GLUCOSE SERPL-MCNC: 235 MG/DL (ref 65–99)
HBA1C MFR BLD: 9.2 % (ref 4.8–5.6)
HCT VFR BLD AUTO: 36.2 % (ref 37.5–51)
HGB BLD-MCNC: 12.6 G/DL (ref 13–17.7)
HOLD SPECIMEN: NORMAL
IMM GRANULOCYTES # BLD AUTO: 0.08 10*3/MM3 (ref 0–0.05)
IMM GRANULOCYTES NFR BLD AUTO: 0.6 % (ref 0–0.5)
LYMPHOCYTES # BLD AUTO: 0.4 10*3/MM3 (ref 0.7–3.1)
LYMPHOCYTES NFR BLD AUTO: 2.8 % (ref 19.6–45.3)
MCH RBC QN AUTO: 31.9 PG (ref 26.6–33)
MCHC RBC AUTO-ENTMCNC: 34.8 G/DL (ref 31.5–35.7)
MCV RBC AUTO: 91.6 FL (ref 79–97)
MONOCYTES # BLD AUTO: 0.67 10*3/MM3 (ref 0.1–0.9)
MONOCYTES NFR BLD AUTO: 4.7 % (ref 5–12)
NEUTROPHILS NFR BLD AUTO: 13.1 10*3/MM3 (ref 1.7–7)
NEUTROPHILS NFR BLD AUTO: 91.8 % (ref 42.7–76)
NRBC BLD AUTO-RTO: 0 /100 WBC (ref 0–0.2)
PLATELET # BLD AUTO: 243 10*3/MM3 (ref 140–450)
PMV BLD AUTO: 11.4 FL (ref 6–12)
POTASSIUM SERPL-SCNC: 4.6 MMOL/L (ref 3.5–5.2)
RBC # BLD AUTO: 3.95 10*6/MM3 (ref 4.14–5.8)
SODIUM SERPL-SCNC: 136 MMOL/L (ref 136–145)
WBC NRBC COR # BLD AUTO: 14.27 10*3/MM3 (ref 3.4–10.8)

## 2024-02-14 PROCEDURE — 25010000002 METRONIDAZOLE 500 MG/100ML SOLUTION: Performed by: PHYSICIAN ASSISTANT

## 2024-02-14 PROCEDURE — 63710000001 INSULIN LISPRO (HUMAN) PER 5 UNITS: Performed by: PHYSICIAN ASSISTANT

## 2024-02-14 PROCEDURE — 25010000002 METRONIDAZOLE 500 MG/100ML SOLUTION

## 2024-02-14 PROCEDURE — 99232 SBSQ HOSP IP/OBS MODERATE 35: CPT | Performed by: INTERNAL MEDICINE

## 2024-02-14 PROCEDURE — 85025 COMPLETE CBC W/AUTO DIFF WBC: CPT | Performed by: PHYSICIAN ASSISTANT

## 2024-02-14 PROCEDURE — 82330 ASSAY OF CALCIUM: CPT | Performed by: PHYSICIAN ASSISTANT

## 2024-02-14 PROCEDURE — 63710000001 INSULIN LISPRO (HUMAN) PER 5 UNITS: Performed by: NURSE PRACTITIONER

## 2024-02-14 PROCEDURE — 25010000002 CEFEPIME PER 500 MG

## 2024-02-14 PROCEDURE — 0 GADOBENATE DIMEGLUMINE 529 MG/ML SOLUTION: Performed by: INTERNAL MEDICINE

## 2024-02-14 PROCEDURE — 74183 MRI ABD W/O CNTR FLWD CNTR: CPT

## 2024-02-14 PROCEDURE — 25810000003 SODIUM CHLORIDE 0.9 % SOLUTION: Performed by: PHYSICIAN ASSISTANT

## 2024-02-14 PROCEDURE — 80048 BASIC METABOLIC PNL TOTAL CA: CPT | Performed by: PHYSICIAN ASSISTANT

## 2024-02-14 PROCEDURE — 99223 1ST HOSP IP/OBS HIGH 75: CPT | Performed by: INTERNAL MEDICINE

## 2024-02-14 PROCEDURE — A9577 INJ MULTIHANCE: HCPCS | Performed by: INTERNAL MEDICINE

## 2024-02-14 PROCEDURE — 63710000001 INSULIN DETEMIR PER 5 UNITS: Performed by: NURSE PRACTITIONER

## 2024-02-14 PROCEDURE — 83036 HEMOGLOBIN GLYCOSYLATED A1C: CPT | Performed by: PHYSICIAN ASSISTANT

## 2024-02-14 PROCEDURE — 25810000003 DEXTROSE-NACL PER 500 ML: Performed by: INTERNAL MEDICINE

## 2024-02-14 PROCEDURE — 82948 REAGENT STRIP/BLOOD GLUCOSE: CPT

## 2024-02-14 PROCEDURE — 86301 IMMUNOASSAY TUMOR CA 19-9: CPT | Performed by: INTERNAL MEDICINE

## 2024-02-14 PROCEDURE — 25010000002 CEFEPIME PER 500 MG: Performed by: PHYSICIAN ASSISTANT

## 2024-02-14 RX ORDER — LISINOPRIL 40 MG/1
40 TABLET ORAL DAILY
Status: DISCONTINUED | OUTPATIENT
Start: 2024-02-14 | End: 2024-02-19 | Stop reason: HOSPADM

## 2024-02-14 RX ORDER — SODIUM CHLORIDE 9 MG/ML
40 INJECTION, SOLUTION INTRAVENOUS AS NEEDED
Status: DISCONTINUED | OUTPATIENT
Start: 2024-02-14 | End: 2024-02-19 | Stop reason: HOSPADM

## 2024-02-14 RX ORDER — DEXTROSE AND SODIUM CHLORIDE 5; .9 G/100ML; G/100ML
100 INJECTION, SOLUTION INTRAVENOUS CONTINUOUS
Status: DISCONTINUED | OUTPATIENT
Start: 2024-02-14 | End: 2024-02-15

## 2024-02-14 RX ORDER — SODIUM CHLORIDE 0.9 % (FLUSH) 0.9 %
10 SYRINGE (ML) INJECTION EVERY 12 HOURS SCHEDULED
Status: DISCONTINUED | OUTPATIENT
Start: 2024-02-14 | End: 2024-02-19 | Stop reason: HOSPADM

## 2024-02-14 RX ORDER — AMLODIPINE BESYLATE 10 MG/1
10 TABLET ORAL DAILY
Status: DISCONTINUED | OUTPATIENT
Start: 2024-02-14 | End: 2024-02-19 | Stop reason: HOSPADM

## 2024-02-14 RX ORDER — NICOTINE POLACRILEX 4 MG
15 LOZENGE BUCCAL
Status: DISCONTINUED | OUTPATIENT
Start: 2024-02-14 | End: 2024-02-19 | Stop reason: HOSPADM

## 2024-02-14 RX ORDER — INSULIN LISPRO 100 [IU]/ML
3-14 INJECTION, SOLUTION INTRAVENOUS; SUBCUTANEOUS
Status: DISCONTINUED | OUTPATIENT
Start: 2024-02-14 | End: 2024-02-18

## 2024-02-14 RX ORDER — CHOLECALCIFEROL (VITAMIN D3) 125 MCG
5 CAPSULE ORAL NIGHTLY PRN
Status: DISCONTINUED | OUTPATIENT
Start: 2024-02-14 | End: 2024-02-19 | Stop reason: HOSPADM

## 2024-02-14 RX ORDER — ONDANSETRON 2 MG/ML
4 INJECTION INTRAMUSCULAR; INTRAVENOUS EVERY 6 HOURS PRN
Status: DISCONTINUED | OUTPATIENT
Start: 2024-02-14 | End: 2024-02-19 | Stop reason: HOSPADM

## 2024-02-14 RX ORDER — ACETAMINOPHEN 325 MG/1
650 TABLET ORAL EVERY 4 HOURS PRN
Status: DISCONTINUED | OUTPATIENT
Start: 2024-02-14 | End: 2024-02-19 | Stop reason: HOSPADM

## 2024-02-14 RX ORDER — DEXTROSE MONOHYDRATE 25 G/50ML
25 INJECTION, SOLUTION INTRAVENOUS
Status: DISCONTINUED | OUTPATIENT
Start: 2024-02-14 | End: 2024-02-19 | Stop reason: HOSPADM

## 2024-02-14 RX ORDER — ONDANSETRON 4 MG/1
4 TABLET, ORALLY DISINTEGRATING ORAL EVERY 6 HOURS PRN
Status: DISCONTINUED | OUTPATIENT
Start: 2024-02-14 | End: 2024-02-19 | Stop reason: HOSPADM

## 2024-02-14 RX ORDER — SODIUM CHLORIDE 0.9 % (FLUSH) 0.9 %
10 SYRINGE (ML) INJECTION AS NEEDED
Status: DISCONTINUED | OUTPATIENT
Start: 2024-02-14 | End: 2024-02-19 | Stop reason: HOSPADM

## 2024-02-14 RX ORDER — INSULIN LISPRO 100 [IU]/ML
3-14 INJECTION, SOLUTION INTRAVENOUS; SUBCUTANEOUS
Status: DISCONTINUED | OUTPATIENT
Start: 2024-02-14 | End: 2024-02-14

## 2024-02-14 RX ORDER — SODIUM CHLORIDE 9 MG/ML
100 INJECTION, SOLUTION INTRAVENOUS CONTINUOUS
Status: DISCONTINUED | OUTPATIENT
Start: 2024-02-14 | End: 2024-02-14

## 2024-02-14 RX ORDER — METRONIDAZOLE 500 MG/100ML
500 INJECTION, SOLUTION INTRAVENOUS EVERY 8 HOURS
Status: DISCONTINUED | OUTPATIENT
Start: 2024-02-14 | End: 2024-02-15 | Stop reason: ALTCHOICE

## 2024-02-14 RX ORDER — IBUPROFEN 600 MG/1
1 TABLET ORAL
Status: DISCONTINUED | OUTPATIENT
Start: 2024-02-14 | End: 2024-02-19 | Stop reason: HOSPADM

## 2024-02-14 RX ADMIN — METRONIDAZOLE 500 MG: 500 INJECTION, SOLUTION INTRAVENOUS at 09:48

## 2024-02-14 RX ADMIN — METRONIDAZOLE 500 MG: 500 INJECTION, SOLUTION INTRAVENOUS at 23:59

## 2024-02-14 RX ADMIN — CEFEPIME 2000 MG: 2 INJECTION, POWDER, FOR SOLUTION INTRAVENOUS at 00:05

## 2024-02-14 RX ADMIN — LISINOPRIL 40 MG: 40 TABLET ORAL at 16:58

## 2024-02-14 RX ADMIN — INSULIN LISPRO 14 UNITS: 100 INJECTION, SOLUTION INTRAVENOUS; SUBCUTANEOUS at 20:34

## 2024-02-14 RX ADMIN — DEXTROSE AND SODIUM CHLORIDE 100 ML/HR: 5; 900 INJECTION, SOLUTION INTRAVENOUS at 11:45

## 2024-02-14 RX ADMIN — INSULIN DETEMIR 5 UNITS: 100 INJECTION, SOLUTION SUBCUTANEOUS at 20:34

## 2024-02-14 RX ADMIN — INSULIN LISPRO 10 UNITS: 100 INJECTION, SOLUTION INTRAVENOUS; SUBCUTANEOUS at 18:49

## 2024-02-14 RX ADMIN — SODIUM CHLORIDE 1000 ML: 9 INJECTION, SOLUTION INTRAVENOUS at 00:06

## 2024-02-14 RX ADMIN — SODIUM CHLORIDE 100 ML/HR: 9 INJECTION, SOLUTION INTRAVENOUS at 03:32

## 2024-02-14 RX ADMIN — ACETAMINOPHEN 650 MG: 325 TABLET ORAL at 20:38

## 2024-02-14 RX ADMIN — METRONIDAZOLE 500 MG: 500 INJECTION, SOLUTION INTRAVENOUS at 00:54

## 2024-02-14 RX ADMIN — CEFEPIME 1000 MG: 1 INJECTION, POWDER, FOR SOLUTION INTRAMUSCULAR; INTRAVENOUS at 18:53

## 2024-02-14 RX ADMIN — CEFEPIME 1000 MG: 1 INJECTION, POWDER, FOR SOLUTION INTRAMUSCULAR; INTRAVENOUS at 11:45

## 2024-02-14 RX ADMIN — GADOBENATE DIMEGLUMINE 14 ML: 529 INJECTION, SOLUTION INTRAVENOUS at 12:56

## 2024-02-14 RX ADMIN — METRONIDAZOLE 500 MG: 500 INJECTION, SOLUTION INTRAVENOUS at 16:58

## 2024-02-14 RX ADMIN — Medication 10 ML: at 20:35

## 2024-02-14 RX ADMIN — CEFEPIME 1000 MG: 1 INJECTION, POWDER, FOR SOLUTION INTRAMUSCULAR; INTRAVENOUS at 23:59

## 2024-02-14 RX ADMIN — AMLODIPINE BESYLATE 10 MG: 10 TABLET ORAL at 16:58

## 2024-02-14 NOTE — PROGRESS NOTES
Pharmacokinetic Consult - metronidazole Dosing  Costa Robbins is a 68 y.o. male who has been consulted to dose metronidazole for  intra-abdominal infection .    Current Antimicrobial Therapy    Anti-Infectives (From admission, onward)      Ordered     Dose/Rate Route Frequency Start Stop    02/13/24 2307  metroNIDAZOLE (FLAGYL) IVPB 500 mg        Ordering Provider: Jomar Fajardo PA    500 mg  200 mL/hr over 30 Minutes Intravenous Once 02/13/24 2323 02/14/24 0127    02/13/24 2307  cefepime 2000 mg IVPB in 100 mL NS (MBP)        Ordering Provider: Jomar Fajardo PA    2,000 mg  over 30 Minutes Intravenous Once 02/13/24 2323 02/14/24 0051            Allergies  Atenolol, Morphine, and Penicillins    Relevant clinical data and objective history reviewed:  Creatinine   Date Value Ref Range Status   02/13/2024 1.02 0.76 - 1.27 mg/dL Final   02/01/2023 0.80 0.60 - 1.30 mg/dL Final     Comment:     Serial Number: [id number]:  [id number]     Estimated Creatinine Clearance: 71.3 mL/min (by C-G formula based on SCr of 1.02 mg/dL).  No intake/output data recorded.  Patient weight: 70.8 kg (156 lb)    Asessment/Plan  1. Metronidazole 500 mg IV q8h x 5 days      Warren Pa, PharmD, [facility]  2/14/2024  03:23 EST

## 2024-02-14 NOTE — PAYOR COMM NOTE
"Costa Kessler (68 y.o. Male)       Date of Birth   1955    Social Security Number       Address   [address] [place], [place] [address]    Home Phone   [phone]    MRN   [id number]       [facility]    Marital Status                               Admission Date   24    Admission Type   Emergency    Admitting Provider   Judd Boone III, DO    Attending Provider   Ethel Camejo MD    Department, Room/Bed   [facility] 2G, S229/1       Discharge Date       Discharge Disposition       Discharge Destination                                 Attending Provider: Ethel Camejo MD    Allergies: Atenolol, Morphine, Penicillins    Isolation: None   Infection: None   Code Status: No CPR    Ht: 180.3 cm (71\")   Wt: 70.8 kg (156 lb)    Admission Cmt: None   Principal Problem: Hepatic abscess [K75.0]                   Active Insurance as of 2024       Primary Coverage       Payor Plan Insurance Group Employer/Plan Group    ANTHEM MEDICARE REPLACEMENT ANTHEM MEDICARE ADVANTAGE [id number]       Payor Plan Address Payor Plan Phone Number Payor Plan Fax Number Effective Dates    [address] [phone]  2024 - None Entered    [facility] [id number]         Subscriber Name Subscriber Birth Date Member ID       COSTA KESSLER 1955 [id number]                     Emergency Contacts        (Rel.) Home Phone Work Phone Mobile Phone    LAWRENCE KESSLER (Spouse) [phone] -- [phone]                 History & Physical        Judd Boone III, DO at [address] [place] [address] Medicine Services  HISTORY AND PHYSICAL    Patient Name: Costa Kessler  : 1955  MRN: [id number]  Primary Care Physician: Jonathan Conner MD  Date of admission: 2024    Subjective   Subjective     Chief Complaint:  Abdominal pain, N/V, Fever    HPI:  Costa Kessler is a 68 y.o. male with a history of " pancreatic mass, HTN, HLD, T2DM, and alcohol abuse who presents to [facility] ED for complaint of abdominal pain, nausea and vomiting. He states that he has had pain for several weeks now, but reports that it acutely worsened beginning this morning. Fever today at home was as high as 102. He also reports being so weak that he had difficulty getting out of bed today. Patient has a known Pancreatic Mass concerning for malignancy. He is followed by Dr. Maloney and is reportedly in talks to undergo a Whipple procedure in the near future. He underwent laparoscopy with liver biopsy on 2/7/2024 with Dr. Maloney. He reports worsening abdominal pain ever since the procedure.         Review of Systems   Constitutional:  Positive for appetite change, chills, fatigue and fever. Negative for unexpected weight change.   HENT:  Negative for nosebleeds, sore throat and trouble swallowing.    Eyes:  Negative for photophobia and visual disturbance.   Respiratory:  Negative for cough, shortness of breath and wheezing.    Cardiovascular:  Negative for chest pain and palpitations.   Gastrointestinal:  Positive for abdominal pain, nausea and vomiting.   Genitourinary:  Negative for dysuria and hematuria.   Musculoskeletal:  Negative for arthralgias and myalgias.   Skin: Negative.    Neurological:  Positive for weakness (Generalized weakness). Negative for tremors and syncope.   Psychiatric/Behavioral:  Negative for confusion. The patient is not nervous/anxious.               Personal History     Past Medical History:   Diagnosis Date    Bile duct obstruction 01/27/2024    found during ERCP    COVID 2021    no hospitalization    Diabetes mellitus 2017    po meds and insulin    Diverticulitis 2009    surgically intervention    Enlarged prostate     recently started on Proscar    ETOH use 05/25/2023    Former smoker 05/25/2023    Hearing decreased     Hypertension     Melanoma     CHEST, BACK, NECK MULTI, HEAD    Pancreatic mass  01/27/2024    found on CT scan; ERCP confirmed    PVD (peripheral vascular disease)     Unintentional weight loss     Wears glasses              Past Surgical History:   Procedure Laterality Date    AORTAGRAM Bilateral 04/12/2023    Procedure: AORTAGRAM WITH RUNOFFS  STENT OF THE LEFT ILIAC ARTERY;  Surgeon: Moses Escalante MD;  Location:  [facility];  Service: Vascular;  Laterality: Bilateral;    COLON RESECTION  05/2009    partial colectomy    COLONOSCOPY      DIAGNOSTIC LAPAROSCOPY N/A 2/7/2024    Procedure: DIAGNOSTIC LAPAROSCOPY WITH LIVER BIOPSY AND PERITONEAL WASHINGS;  Surgeon: Silas Maloney MD;  Location:  [facility];  Service: General;  Laterality: N/A;    ERCP N/A 01/27/2024    Procedure: ENDOSCOPIC RETROGRADE CHOLANGIOPANCREATOGRAPHY WITH STENT PLACEMENT;  Surgeon: Brunner, Mark I, MD;  Location:  [facility];  Service: Gastroenterology;  Laterality: N/A;    FEMORAL ENDARTERECTOMY Left 05/25/2023    Procedure: FEMORAL ENDARTERECTOMY WITH PROPATEN GRAFT 8MM X 40CM;  Surgeon: Moses Escalante MD;  Location:  [facility];  Service: Vascular;  Laterality: Left;    FEMORAL FEMORAL BYPASS N/A 05/25/2023    Procedure: FEMORAL FEMORAL BYPASS;  Surgeon: Moses Escalante MD;  Location:  [facility];  Service: Vascular;  Laterality: N/A;    ORIF ANKLE FRACTURE Left     SKIN BIOPSY      SKIN CANCER EXCISION      melanoma    WISDOM TOOTH EXTRACTION         Family History:  family history includes Diabetes in his brother and mother; Heart attack in his mother; Hodgkin's lymphoma in his father; Hyperlipidemia in his brother and mother; Hypertension in his father and mother; Stroke in his mother.     Social History:  reports that he quit smoking about a year ago. His smoking use included cigarettes. He has a 21.50 pack-year smoking history. He has never been exposed to tobacco smoke. He has never used smokeless tobacco. He reports that he does not currently use alcohol. He reports current drug use. Drug:  Marijuana.  Social History     Social History Narrative    [place]        Medications:  HYDROcodone-acetaminophen, Insulin Glargine, amLODIPine, clopidogrel, empagliflozin, finasteride, hydrOXYzine, and lisinopril    Allergies   Allergen Reactions    Atenolol Other (See Comments)     Bradycardia.    Morphine Itching    Penicillins Hives       Objective   Objective     Vital Signs:   Temp:  [98.4 °F (36.9 °C)] 98.4 °F (36.9 °C)  Heart Rate:  [] 87  Resp:  [19] 19  BP: (133-142)/(65-71) 137/65    Physical Exam  Constitutional:       General: He is not in acute distress.     Appearance: Normal appearance.   HENT:      Head: Atraumatic.      Right Ear: External ear normal.      Left Ear: External ear normal.      Nose: Nose normal.   Eyes:      Extraocular Movements: Extraocular movements intact.      Conjunctiva/sclera: Conjunctivae normal.      Pupils: Pupils are equal, round, and reactive to light.   Cardiovascular:      Rate and Rhythm: Normal rate and regular rhythm.      Pulses: Normal pulses.      Heart sounds: Normal heart sounds. No murmur heard.  Pulmonary:      Effort: Pulmonary effort is normal. No respiratory distress.      Breath sounds: Normal breath sounds. No wheezing, rhonchi or rales.   Abdominal:      General: Bowel sounds are normal. There is no distension.      Tenderness: There is abdominal tenderness. There is no guarding or rebound.   Musculoskeletal:         General: Normal range of motion.      Cervical back: No rigidity.      Right lower leg: No edema.      Left lower leg: No edema.   Skin:     General: Skin is warm and dry.      Coloration: Skin is not jaundiced.      Findings: Lesion present. No rash.      Comments: Surgical wounds on abdomen. Covered with bandage.    Neurological:      General: No focal deficit present.      Mental Status: He is alert and oriented to person, place, and time.   Psychiatric:         Attention and Perception: Attention normal.         Mood  and Affect: Mood normal.         Behavior: Behavior normal.         Thought Content: Thought content normal.          Result Review:  I have personally reviewed the results from the time of this admission to 2/14/2024 01:37 EST and agree with these findings:  [x]  Laboratory list / accordion  []  Microbiology  [x]  Radiology  []  EKG/Telemetry   []  Cardiology/Vascular   []  Pathology  [x]  Old records  []  Other:      LAB RESULTS:      Lab 02/13/24  2326 02/13/24 2043   WBC  --  15.09*   HEMOGLOBIN  --  15.5   HEMATOCRIT  --  43.7   PLATELETS  --  312   NEUTROS ABS  --  13.81*   IMMATURE GRANS (ABS)  --  0.10*   LYMPHS ABS  --  0.29*   MONOS ABS  --  0.85   EOS ABS  --  0.00   MCV  --  91.2   LACTATE 1.3  --          Lab 02/13/24 2043   SODIUM 133*   POTASSIUM 3.9   CHLORIDE 94*   CO2 20.0*   ANION GAP 19.0*   BUN 12   CREATININE 1.02   EGFR 81.1   GLUCOSE 310*   CALCIUM 11.3*   MAGNESIUM 1.9         Lab 02/13/24 2043   TOTAL PROTEIN 7.2   ALBUMIN 4.2   GLOBULIN 3.0   ALT (SGPT) 31   AST (SGOT) 15   BILIRUBIN 1.2   ALK PHOS 182*   LIPASE 96*         Lab 02/13/24 2043   HSTROP T 22*                 Lab 02/13/24  2158   PH, ARTERIAL 7.448   PCO2, ARTERIAL 26.8*   PO2 ART 83.0   FIO2 21   HCO3 ART 18.5*   BASE EXCESS ART -4.1*   CARBOXYHEMOGLOBIN 1.4     Brief Urine Lab Results  (Last result in the past 365 days)        Color   Clarity   Blood   Leuk Est   Nitrite   Protein   CREAT   Urine HCG        01/26/24 1639 Dark Yellow   Clear   Negative   Negative   Negative   Negative                 Microbiology Results (last 10 days)       ** No results found for the last 240 hours. **            CT Abdomen Pelvis With Contrast    Result Date: 2/13/2024  CT ABDOMEN PELVIS W CONTRAST Date of Exam: 2/13/2024 10:25 PM EST Indication: Worsening abdominal pain, known pancreatic mass. Comparison: 1/26/2024 Technique: Axial CT images were obtained of the abdomen and pelvis following the uneventful intravenous administration of  85 cc Isovue-300. Reconstructed coronal and sagittal images were also obtained. Automated exposure control and iterative construction methods were used. Findings: Visualized Chest: Mild patchy opacities in the posterior aspect of the lower lobes bilaterally. Otherwise unremarkable Liver: Small hypodensity adjacent to the hepatic capsule in the lateral aspect of the right hepatic lobe containing small locules of gas, measuring approximately 2.9 x 1.1 x 3.0 cm Additional scattered small hypodensities are noted in the right hepatic lobe. These are all new since prior study. Gallbladder: Layering hyperdensity within the gallbladder most likely represents biliary sludge. No wall thickening or pericholecystic fluid. Bile Ducts: A stent is noted within the common bile duct. There is associated pneumobilia. Spleen: Spleen is normal in size and CT density. Pancreas: Subtle hypoenhancement of the pancreatic head is once again noted concerning for an underlying pancreatic mass. Adrenals: Small nodules are noted within the adrenal glands bilaterally, measuring up to 1.5 cm, grossly unchanged from prior study. Kidneys: Presumed calyceal diverticulum is noted. Scattered small nonobstructing renal stones are noted on the right. No obstructing stones or hydronephrosis. Gastrointestinal: No dilated loops of bowel to suggest bowel obstruction. Mild wall thickening of the ascending colon and transverse colon. Mild to moderate stool burden noted in the descending colon and sigmoid colon. Diverticulosis of the descending colon and sigmoid colon without evidence of acute diverticulitis. Findings suggestive of partial sigmoid colon resection. Bladder: The bladder is normal. Pelvis:  No suspecious mass. Peritoneum/Mesentery: No fluid collection, ascities, or free air.   Lymph Nodes: No lymphadenopathy. Vasculature: Extensive calcified and noncalcified plaques of the abdominal aorta and iliac arteries. Near occlusion of the right common  iliac artery. Patient is status post bifemoral bypass graft. The graft appears to be patent. Abdominal Wall: Left upper anterior abdominal wall lipoma. Otherwise unremarkable. Bony Structures: No acute osseous abnormality     Impression: Impression: Findings suggestive of pancreatic head mass status post stenting of the common bile duct. Subtle, approximately 3 cm, hypodensity noted within the right lateral aspect of the liver adjacent to the hepatic capsule containing a small locule of gas. This may represent the site of prior biopsy. Given the presence of gas, this is concerning for developing abscess. Additionally new subtle hypodensities are noted within the right hepatic lobe, which are nonspecific but may represent new metastatic lesions. Possible colitis of the ascending colon and proximal transverse colon. Diverticulosis of the descending and sigmoid colon without evidence of acute diverticulitis. Mild to moderate stool burden throughout the colon is concerning for constipation. Nonspecific mild patchy opacities in the dependent portions of the lower lobes most likely represents atelectasis, although an infectious/inflammatory process is not excluded. Electronically Signed: Nile De La Garza DO  2/13/2024 10:47 PM EST  Workstation ID: [id number]    XR Chest 1 View    Result Date: 2/13/2024  XR CHEST 1 VW Date of Exam: 2/13/2024 8:51 PM EST Indication: Weak/Dizzy/AMS triage protocol Comparison: Contrast-enhanced chest CT performed on January 28, 2024 Findings: There are no consolidations.No pleural effusion. There is no evidence of pneumothorax. The pulmonary vasculature appears within normal limits. The cardiac and mediastinal silhouette appear unremarkable. No acute osseous abnormality identified. Old right rib fractures are visualized.     Impression: Impression: No radiographic evidence of acute chest process. Electronically Signed: Alphonso Duran MD  2/13/2024 9:11 PM EST  Workstation ID: [id number]      Results for orders placed during the hospital encounter of 01/26/24    Adult Transthoracic Echo Complete W/ Cont if Necessary Per Protocol    Interpretation Summary    Left ventricular systolic function is hyperdynamic (EF > 70%). Left ventricular ejection fraction appears to be greater than 70%.    Left ventricular wall thickness is consistent with borderline concentric hypertrophy.    Provoked left ventricular mean pressure gradient of 10 mmHg.    Left ventricular diastolic function is consistent with (grade I) impaired relaxation.      Assessment & Plan   Assessment & Plan       Hepatic abscess    Pancreatic mass    T2DM (type 2 diabetes mellitus)    Essential hypertension    Mixed hyperlipidemia    Hypercalcemia      Costa Robbins is a 68 y.o. male with a history of pancreatic mass, HTN, HLD, T2DM, and alcohol abuse who presents to [facility] ED for complaint of abdominal pain, nausea and vomiting.     Hepatic Abscess  Pancreatic Mass  -CT abd/pelvis tonight shows subtle, approximately 3 cm, hypodensity noted within the right lateral aspect of the liver adjacent to the hepatic capsule containing a small locule of gas, concerning for abscess.  -Leukocytosis 15.09. Lactate normal  -ED provider spoke with Dr. Tripp (Gen Surgery). Recommends starting abx, and will see in the am  -Received Flagyl and Cefepime. Continue  -NPO  -IV fluids    HTN  HLD  -Takes Norvasc and Lisinopril    T2DM  -Blood glucose elevated at 310  -Check A1C  -Fingerstick achs. SSI  -Diabetes Educator to see    Hypercalcemia  -Ca2+ 11.3  -Check Ionized calcium          DVT prophylaxis:  SCDS    CODE STATUS:  DNR/DNI per patient       Expected DischargeTBD     This note has been completed as part of a split-shared workflow.   Total APC time: 55 minutes  Signature: Electronically signed by Booker Putnam PA-C, 02/14/24, 2:42 AM EST      Attending   Admission Attestation       I have performed an independent face-to-face  "diagnostic evaluation including performing an independent physical examination.  I approve of the documented plan of care above that was reviewed and developed with the advanced practice clinician (APC) and take responsibility for that plan along with its associated risks.  I have updated the HPI as appropriate.    Brief HPI    68 M with abdominal pain, nausea, and emesis.  He states that his pain has been \"especially bad since this morning,\" but he states it has been present at a lower level for the last 3-4 weeks.  He also confirms increased generalized weakness, noting that he \"felt really weak just getting out of bed or walking around\" today (2/13 Tuesday).  He had fever at home today of 102 F.  The patient states he has a pancreatic mass that was recently biopsied, and he states that \"no cancer was found,\" but he also states he is due to have a Whipple procedure in the upcoming weeks.  He states that he also had a liver biopsy on 2/7, and states that his abdominal pain has gotten worse every day since that procedure was performed.  Workup in the ED included CT abdomen/pelvis, the radiology read which mentions small hypodensity noted in the right lateral aspect of the liver, which \"may represent the site of prior biopsy,\" presence of gas in this area, concerning for developing abscess.    Attending Physical Exam:  Temp:  [97.6 °F (36.4 °C)-98.4 °F (36.9 °C)] 97.9 °F (36.6 °C)  Heart Rate:  [] 90  Resp:  [18-19] 18  BP: (117-142)/(65-75) 117/73    Constitutional: Awake, alert, NAD.  Eyes: PERRLA, sclerae anicteric, no conjunctival injection  HENT: NCAT, mucous membranes moist  Neck: Supple, no thyromegaly, no lymphadenopathy, trachea midline  Respiratory: Clear to auscultation bilaterally, nonlabored respirations   Cardiovascular: RRR, no murmurs, rubs, or gallops, palpable pedal pulses bilaterally  Gastrointestinal: Positive bowel sounds, soft, generalized TTP but worse at the epigastrium, " nondistended  Musculoskeletal: No bilateral ankle edema, no clubbing or cyanosis to extremities  Psychiatric: Appropriate affect, cooperative  Neurologic: Oriented x 3, strength symmetric in all extremities, Cranial Nerves grossly intact to confrontation, speech clear  Skin: No rashes, normal turgor.  Surgical changes to the abdomen, dressed wounds, no acute drainage or surrounding erythema.    Result Review:  I have personally reviewed the results from the time of this admission to 2/14/2024 03:52 EST and agree with these findings:  [x]  Laboratory list / accordion  []  Microbiology  [x]  Radiology  [x]  EKG/Telemetry   []  Cardiology/Vascular   []  Pathology  []  Old records  []  Other:  Most notable findings include: I reviewed chest x-ray which is a single AP view and by my read shows no acute infiltrate/edema.  I reviewed EKG which by my read shows sinus tachycardia, ventricular rate approximately 110 bpm, normal axis, nonspecific ST/T wave changes.    Assessment and Plan:    See assessment and plan documented by APC above and updated/edited by me as appropriate.      Total time spent: 32 minutes  Time spent includes time reviewing chart, face-to-face time, counseling patient/family/caregiver, ordering medications/tests/procedures, communicating with other health care professionals, documenting clinical information in the electronic health record, and coordination of care.       Judd Boone III, DO  02/14/24                       Electronically signed by Judd Boone III, DO at 02/14/24 0400       Physician Progress Notes (all)    No notes of this type exist for this encounter.          Consult Notes (all)        Fabián Maya MD at 02/14/24 0845        Consult Orders    1. Inpatient General Surgery Consult [[id number]] ordered by Judd Boone III, DO at 02/14/24 0402                 General Surgery Consultation Note    Date of Service: 2/14/2024  Costa VERAS  "Itzel  [id number]  1955      Referring Provider: Ethel Camejo MD    Location of Consult: [facility]     Reason for Consultation: Abdominal pain, fever       History of Present Illness:  I am seeing, Costa Robbins, in consultation for Ethel Camejo MD regarding abdominal pain, fever in setting of pancreatic mass and recent stenting and diagnostic laparoscopy.      Mr. Costa Robbins is a 68-year-old gentleman with a history of high pretension, hyperlipidemia, type 2 diabetes, alcohol abuse, and recent finding of a pancreatic mass causing biliary obstruction status post ERCP and stent placement on 1/27/2024 and postoperative day 7 status post diagnostic laparoscopy and liver biopsy by my partner Dr. Mlaoney.  He now presents with acute on chronic abdominal pain, fever at home as high as 102 Fahrenheit, and weakness.  Patient states this abdominal pain started several months ago prior to his first admission for jaundice.  He describes the pain as a constant, dull, \"emptiness\" in his epigastrium.  This gets worse with fatty meals and improves slightly when fasting.  Over the past month,his pain is gotten increasingly worse.  He has also had a 40 pound weight loss despite tolerating a diet at home. Since his Dx laparoscopy patient was recovering well for 2-3 days and then declined over last few days with profound weakness, N/V, and fever developing yesterday as noted above. He was admitted to the hospitalist service early this AM.      Labs this morning are significant for a white count of 14.3, A1c of 9.2, normal creatinine of 0.72, and hyperglycemia.    CT abdomen pelvis shows a small area of gas containing fluid at the area of the prior liver biopsy.  Multiple small hypodensities in the liver concerning for possible new metastases.  And possible ascending and transverse colon colitis.     General surgery was consulted for further recommendations given history of pancreatic mass and upcoming plans for " pancreaticoduodenectomy.    Problems Addressed this Visit       Pancreatic mass - Primary     Other Visit Diagnoses       Intra-abdominal abscess        Abnormal CT scan, liver        History of biliary duct stent placement              Diagnoses         Codes Comments    Pancreatic mass    -  Primary ICD-10-CM: K86.89  ICD-9-CM: 577.8     Intra-abdominal abscess     ICD-10-CM: K65.1  ICD-9-CM: 567.22     Abnormal CT scan, liver     ICD-10-CM: R93.2  ICD-9-CM: 793.3     History of biliary duct stent placement     ICD-10-CM: Z98.890  ICD-9-CM: V45.89             Past Medical History:   Diagnosis Date    Bile duct obstruction 01/27/2024    found during ERCP    COVID 2021    no hospitalization    Diabetes mellitus 2017    po meds and insulin    Diverticulitis 2009    surgically intervention    Enlarged prostate     recently started on Proscar    ETOH use 05/25/2023    Former smoker 05/25/2023    Hearing decreased     Hypertension     Melanoma     CHEST, BACK, NECK MULTI, HEAD    Pancreatic mass 01/27/2024    found on CT scan; ERCP confirmed    PVD (peripheral vascular disease)     Unintentional weight loss     Wears glasses        Past Surgical History:    AORTAGRAM    Procedure: AORTAGRAM WITH RUNOFFS  STENT OF THE LEFT ILIAC ARTERY;  Surgeon: Moses Escalante MD;  Location: [facility];  Service: Vascular;  Laterality: Bilateral;    COLON RESECTION    partial colectomy    COLONOSCOPY    DIAGNOSTIC LAPAROSCOPY    Procedure: DIAGNOSTIC LAPAROSCOPY WITH LIVER BIOPSY AND PERITONEAL WASHINGS;  Surgeon: Silas Maloney MD;  Location: [facility];  Service: General;  Laterality: N/A;    ERCP    Procedure: ENDOSCOPIC RETROGRADE CHOLANGIOPANCREATOGRAPHY WITH STENT PLACEMENT;  Surgeon: Brunner, Mark I, MD;  Location: [facility];  Service: Gastroenterology;  Laterality: N/A;    FEMORAL ENDARTERECTOMY    Procedure: FEMORAL ENDARTERECTOMY WITH PROPATEN GRAFT 8MM X 40CM;  Surgeon: Moses Escalante MD;  Location:   [facility];  Service: Vascular;  Laterality: Left;    FEMORAL FEMORAL BYPASS    Procedure: FEMORAL FEMORAL BYPASS;  Surgeon: Moses Escalante MD;  Location:  [facility];  Service: Vascular;  Laterality: N/A;    ORIF ANKLE FRACTURE    SKIN BIOPSY    SKIN CANCER EXCISION    melanoma    WISDOM TOOTH EXTRACTION       Allergies   Allergen Reactions    Atenolol Other (See Comments)     Bradycardia.    Morphine Itching    Penicillins Hives       No current facility-administered medications on file prior to encounter.     Current Outpatient Medications on File Prior to Encounter   Medication Sig Dispense Refill    amLODIPine (NORVASC) 10 MG tablet Take 1 tablet by mouth Daily. 30 tablet 5    clopidogrel (PLAVIX) 75 MG tablet Take 1 tablet by mouth Daily. (Patient taking differently: Take 1 tablet by mouth Daily. Stopped for surgery) 30 tablet 6    empagliflozin (Jardiance) 10 MG tablet tablet Take 1 tablet by mouth Daily.      finasteride (PROSCAR) 5 MG tablet Take 1 tablet by mouth Daily.      HYDROcodone-acetaminophen (NORCO) 5-325 MG per tablet Take 1 tablet by mouth Every 4 (Four) Hours As Needed (Pain). 10 tablet 0    hydrOXYzine (ATARAX) 25 MG tablet Take 1 tablet by mouth 3 (Three) Times a Day As Needed for Itching. 30 tablet 0    Insulin Glargine (BASAGLAR KWIKPEN SC) Inject 40 Units under the skin into the appropriate area as directed Every Night.      lisinopril (PRINIVIL,ZESTRIL) 40 MG tablet Take 1 tablet by mouth Daily.           Current Facility-Administered Medications:     acetaminophen (TYLENOL) tablet 650 mg, 650 mg, Oral, Q4H PRN, Booker Putnam PA-C    amLODIPine (NORVASC) tablet 10 mg, 10 mg, Oral, Daily, Booker Putnam PA-C    cefepime 1000 mg IVPB in 100 mL NS (MBP), 1,000 mg, Intravenous, Q8H, Warren Pa PharmD    dextrose (D50W) (25 g/50 mL) IV injection 25 g, 25 g, Intravenous, Q15 Min PRN, Booker Putnam PA-C    dextrose (GLUTOSE) oral gel 15 g, 15 g, Oral,  Q15 Min PRN, Booker Putnam PA-C    glucagon (GLUCAGEN) injection 1 mg, 1 mg, Intramuscular, Q15 Min PRN, Booker Putnam PA-C    Insulin Lispro (humaLOG) injection 3-14 Units, 3-14 Units, Subcutaneous, TID With Meals, Booker Putnam PA-C    lisinopril (PRINIVIL,ZESTRIL) tablet 40 mg, 40 mg, Oral, Daily, Booker Putnam PA-C    melatonin tablet 5 mg, 5 mg, Oral, Nightly PRN, Booker Putnam PA-C    metroNIDAZOLE (FLAGYL) IVPB 500 mg, 500 mg, Intravenous, Q8H, Warren Pa, PharmD    ondansetron ODT (ZOFRAN-ODT) disintegrating tablet 4 mg, 4 mg, Oral, Q6H PRN **OR** ondansetron (ZOFRAN) injection 4 mg, 4 mg, Intravenous, Q6H PRN, Booker Putnam PA-C    Pharmacy to dose cefepime, , Does not apply, Continuous PRN, Booker Putnam PA-C    Pharmacy to dose metronidazole, , Does not apply, Continuous PRN, Booker Putnam PA-C    sodium chloride 0.9 % flush 10 mL, 10 mL, Intravenous, PRN, Bam Cline MD    sodium chloride 0.9 % flush 10 mL, 10 mL, Intravenous, Q12H, Booker Putnam PA-C    sodium chloride 0.9 % flush 10 mL, 10 mL, Intravenous, PRN, Booker Putnam PA-C    sodium chloride 0.9 % infusion 40 mL, 40 mL, Intravenous, PRN, Booker Putnam PA-C    sodium chloride 0.9 % infusion, 100 mL/hr, Intravenous, Continuous, Booker Putnam PA-C, Last Rate: 100 mL/hr at 02/14/24 0748, 100 mL/hr at 02/14/24 0748    Family History   Problem Relation Age of Onset    Diabetes Mother     Heart attack Mother     Hypertension Mother     Hyperlipidemia Mother     Stroke Mother     Hodgkin's lymphoma Father     Hypertension Father     Diabetes Brother     Hyperlipidemia Brother      Social History     Socioeconomic History    Marital status:     Number of children: 0   Tobacco Use    Smoking status: Former     Packs/day: 0.50     Years: 43.00     Additional pack years: 0.00     Total pack years: 21.50      "Types: Cigarettes     Quit date: 2023     Years since quittin.0     Passive exposure: Never    Smokeless tobacco: Never    Tobacco comments:     Pt states that he has been able to stop smoking this month - 2023   Vaping Use    Vaping Use: Never used   Substance and Sexual Activity    Alcohol use: Not Currently     Comment: 2 BEERS PER DAY; hx of ETOH use    Drug use: Yes     Types: Marijuana     Comment: ONCE A MONTH    Sexual activity: Defer       Review of Systems:  Per HPI, otherwise the 12 point review of systems is negative.    /73 (BP Location: Right arm, Patient Position: Lying)   Pulse 90   Temp 97.9 °F (36.6 °C) (Oral)   Resp 18   Ht 180.3 cm (71\")   Wt 70.8 kg (156 lb)   SpO2 96%   BMI 21.76 kg/m²   Body mass index is 21.76 kg/m².    General: alert, oriented x 3, appears fatigued, no acute distress  HEENT: normocephalic, atraumatic, sclerae anicteric, external ears normal   Cardiovascular: Regular rate and rhythm  Pulmonary: breathing comfortably on room air, no respiratory distress  Gastrointestinal: soft, nontender to palpation, laparoscopic incisions are well-healing  Lymph: No lymphadenopathy  Extremity: no clubbing, cyanosis, edema  Neuro: cognitively intact, CN grossly intact, no focal deficits  Psych: appropriate mood and affect    CBC  Results from last 7 days   Lab Units 24  0531   WBC 10*3/mm3 14.27*   HEMOGLOBIN g/dL 12.6*   HEMATOCRIT % 36.2*   PLATELETS 10*3/mm3 243       CMP  Results from last 7 days   Lab Units 24  0531 24  2043   SODIUM mmol/L 136 133*   POTASSIUM mmol/L 4.6 3.9   CHLORIDE mmol/L 102 94*   CO2 mmol/L 14.0* 20.0*   BUN mg/dL 15 12   CREATININE mg/dL 0.72* 1.02   CALCIUM mg/dL 9.8 11.3*   BILIRUBIN mg/dL  --  1.2   ALK PHOS U/L  --  182*   ALT (SGPT) U/L  --  31   AST (SGOT) U/L  --  15   GLUCOSE mg/dL 235* 310*       Radiology  Imaging Results (Last 72 Hours)       Procedure Component Value Units Date/Time    CT Abdomen Pelvis With " Contrast [[id number]] Collected: 02/13/24 2234     Updated: 02/13/24 2250    Narrative:      CT ABDOMEN PELVIS W CONTRAST    Date of Exam: 2/13/2024 10:25 PM EST    Indication: Worsening abdominal pain, known pancreatic mass.    Comparison: 1/26/2024    Technique: Axial CT images were obtained of the abdomen and pelvis following the uneventful intravenous administration of 85 cc Isovue-300. Reconstructed coronal and sagittal images were also obtained. Automated exposure control and iterative   construction methods were used.      Findings:  Visualized Chest: Mild patchy opacities in the posterior aspect of the lower lobes bilaterally. Otherwise unremarkable    Liver: Small hypodensity adjacent to the hepatic capsule in the lateral aspect of the right hepatic lobe containing small locules of gas, measuring approximately 2.9 x 1.1 x 3.0 cm  Additional scattered small hypodensities are noted in the right hepatic lobe. These are all new since prior study.    Gallbladder: Layering hyperdensity within the gallbladder most likely represents biliary sludge. No wall thickening or pericholecystic fluid.    Bile Ducts: A stent is noted within the common bile duct. There is associated pneumobilia.    Spleen: Spleen is normal in size and CT density.    Pancreas: Subtle hypoenhancement of the pancreatic head is once again noted concerning for an underlying pancreatic mass.    Adrenals: Small nodules are noted within the adrenal glands bilaterally, measuring up to 1.5 cm, grossly unchanged from prior study.    Kidneys: Presumed calyceal diverticulum is noted.  Scattered small nonobstructing renal stones are noted on the right. No obstructing stones or hydronephrosis.    Gastrointestinal: No dilated loops of bowel to suggest bowel obstruction. Mild wall thickening of the ascending colon and transverse colon. Mild to moderate stool burden noted in the descending colon and sigmoid colon. Diverticulosis of the descending   colon and  sigmoid colon without evidence of acute diverticulitis. Findings suggestive of partial sigmoid colon resection.    Bladder: The bladder is normal.    Pelvis:  No suspecious mass.    Peritoneum/Mesentery: No fluid collection, ascities, or free air.      Lymph Nodes: No lymphadenopathy.    Vasculature: Extensive calcified and noncalcified plaques of the abdominal aorta and iliac arteries. Near occlusion of the right common iliac artery. Patient is status post bifemoral bypass graft. The graft appears to be patent.    Abdominal Wall: Left upper anterior abdominal wall lipoma. Otherwise unremarkable.    Bony Structures: No acute osseous abnormality      Impression:      Impression:  Findings suggestive of pancreatic head mass status post stenting of the common bile duct.    Subtle, approximately 3 cm, hypodensity noted within the right lateral aspect of the liver adjacent to the hepatic capsule containing a small locule of gas. This may represent the site of prior biopsy. Given the presence of gas, this is concerning for   developing abscess.    Additionally new subtle hypodensities are noted within the right hepatic lobe, which are nonspecific but may represent new metastatic lesions.    Possible colitis of the ascending colon and proximal transverse colon.    Diverticulosis of the descending and sigmoid colon without evidence of acute diverticulitis.    Mild to moderate stool burden throughout the colon is concerning for constipation.    Nonspecific mild patchy opacities in the dependent portions of the lower lobes most likely represents atelectasis, although an infectious/inflammatory process is not excluded.        Electronically Signed: Nile De La Garza DO    2/13/2024 10:47 PM EST    Workstation ID: [id number]    XR Chest 1 View [[id number]] Collected: 02/13/24 2110     Updated: 02/13/24 2114    Narrative:      XR CHEST 1 VW    Date of Exam: 2/13/2024 8:51 PM EST    Indication: Weak/Dizzy/AMS triage  protocol    Comparison: Contrast-enhanced chest CT performed on January 28, 2024    Findings:  There are no consolidations.No pleural effusion. There is no evidence of pneumothorax. The pulmonary vasculature appears within normal limits. The cardiac and mediastinal silhouette appear unremarkable. No acute osseous abnormality identified. Old right   rib fractures are visualized.      Impression:      Impression:  No radiographic evidence of acute chest process.      Electronically Signed: Alphonso Duran MD    2/13/2024 9:11 PM EST    Workstation ID: [id number]              Assessment:  Mr. Costa Robbnis is a 68-year-old gentleman w/ a recently found pancreatic mass s/p ERCP with metallic stent placement and diagnostic laparoscopy with benign liver biopsies presenting with worsening of his chronic abdominal pain, nausea/vomiting, weakness, and fevers/chills.     He does not have a tissue diagnosis as brushings returned without malignant cells.  Liver biopsy on 2/7/2024 was also benign.  CT imaging on this admission shows an area of a possible abscess at the area of the liver biopsy, however, on my review I feel this represents normal postop changes.  Even if this represents an early infection, broad-spectrum antibiotics will adequately cover the size of liver abscess without need for drainage.  He also has some mild evidence of colitis without any associated symptoms.  Finally, there are multiple small hypodensities in the liver concerning for possible new metastatic disease.  Due to these new liver lesions, I recommend an MRI with contrast to better characterize.  If confirmed to be consistent with metastatic disease, we we will need to obtain a tissue biopsy of these areas if possible.    Regarding his symptoms, this is likely related to his pancreatic mass.  Given his history of a stent placement there could also be a component of subclinical cholangitis or intermittent obstruction of the cystic duct causing his  symptoms.This could also be the underlying etiology of the new liver lesions seen on CT.    Plan:  - MRI w/ and w/out contrast of the abdomen - assess new liver lesions concerning for metastasis  - Okay for diet from surgery standpoint  - Continue broad spectrum antibiotics   - No plans for acute surgical intervention  - Will continue to follow    Fabián Maya MD  02/14/24  08:45 EST        Electronically signed by Fabián Maya MD at 02/14/24 6269

## 2024-02-14 NOTE — PROGRESS NOTES
[facility] Medicine Services  PROGRESS NOTE    Patient Name: Costa Robbins  : 1955  MRN: [id number]    Date of Admission: 2024  Primary Care Physician: Jonathan Conner MD    Subjective   Subjective     CC: fever and abd pain     HPI:  Feels better than yesterday.  Able to eat but always has epigastric/back pain after eating.    Asks me to check his pedal pulses; is concerned that he has been off nad on Plavix recently.        Objective   Objective     Vital Signs:   Temp:  [97.6 °F (36.4 °C)-98.4 °F (36.9 °C)] 98 °F (36.7 °C)  Heart Rate:  [] 76  Resp:  [16-19] 16  BP: (117-142)/(60-75) 118/60     Physical Exam:  Gen:  NAD in bed.  Mild bitemp wasting   Neuro: alert and oriented, clear speech, follows commands, grossly nonfocal  HEENT:  NC/AT PERRL, OP benign  Neck:  Supple, no LAD  Heart RRR no murmur.  Pulses:  L foot not felt.  R foot trace   Abd:  Soft, nontender, no rebound or guarding, pos BS  Extrem:  No c/c/e      Results Reviewed:  LAB RESULTS:      Lab 24  0531 24  2326 24   WBC 14.27*  --  15.09*   HEMOGLOBIN 12.6*  --  15.5   HEMATOCRIT 36.2*  --  43.7   PLATELETS 243  --  312   NEUTROS ABS 13.10*  --  13.81*   IMMATURE GRANS (ABS) 0.08*  --  0.10*   LYMPHS ABS 0.40*  --  0.29*   MONOS ABS 0.67  --  0.85   EOS ABS 0.00  --  0.00   MCV 91.6  --  91.2   LACTATE  --  1.3  --          Lab 24  0531 24   SODIUM 136 133*   POTASSIUM 4.6 3.9   CHLORIDE 102 94*   CO2 14.0* 20.0*   ANION GAP 20.0* 19.0*   BUN 15 12   CREATININE 0.72* 1.02   EGFR 100.8 81.1   GLUCOSE 235* 310*   CALCIUM 9.8 11.3*   IONIZED CALCIUM 1.43*  --    MAGNESIUM  --  1.9   HEMOGLOBIN A1C 9.20*  --          Lab 24   TOTAL PROTEIN 7.2   ALBUMIN 4.2   GLOBULIN 3.0   ALT (SGPT) 31   AST (SGOT) 15   BILIRUBIN 1.2   ALK PHOS 182*   LIPASE 96*         Lab 24   HSTROP T 22*                 Lab 24  2158   PH, ARTERIAL 7.448    PCO2, ARTERIAL 26.8*   PO2 ART 83.0   FIO2 21   HCO3 ART 18.5*   BASE EXCESS ART -4.1*   CARBOXYHEMOGLOBIN 1.4     Brief Urine Lab Results  (Last result in the past 365 days)        Color   Clarity   Blood   Leuk Est   Nitrite   Protein   CREAT   Urine HCG        01/26/24 1639 Dark Yellow   Clear   Negative   Negative   Negative   Negative                   Microbiology Results Abnormal       None            CT Abdomen Pelvis With Contrast    Result Date: 2/13/2024  CT ABDOMEN PELVIS W CONTRAST Date of Exam: 2/13/2024 10:25 PM EST Indication: Worsening abdominal pain, known pancreatic mass. Comparison: 1/26/2024 Technique: Axial CT images were obtained of the abdomen and pelvis following the uneventful intravenous administration of 85 cc Isovue-300. Reconstructed coronal and sagittal images were also obtained. Automated exposure control and iterative construction methods were used. Findings: Visualized Chest: Mild patchy opacities in the posterior aspect of the lower lobes bilaterally. Otherwise unremarkable Liver: Small hypodensity adjacent to the hepatic capsule in the lateral aspect of the right hepatic lobe containing small locules of gas, measuring approximately 2.9 x 1.1 x 3.0 cm Additional scattered small hypodensities are noted in the right hepatic lobe. These are all new since prior study. Gallbladder: Layering hyperdensity within the gallbladder most likely represents biliary sludge. No wall thickening or pericholecystic fluid. Bile Ducts: A stent is noted within the common bile duct. There is associated pneumobilia. Spleen: Spleen is normal in size and CT density. Pancreas: Subtle hypoenhancement of the pancreatic head is once again noted concerning for an underlying pancreatic mass. Adrenals: Small nodules are noted within the adrenal glands bilaterally, measuring up to 1.5 cm, grossly unchanged from prior study. Kidneys: Presumed calyceal diverticulum is noted. Scattered small nonobstructing renal  stones are noted on the right. No obstructing stones or hydronephrosis. Gastrointestinal: No dilated loops of bowel to suggest bowel obstruction. Mild wall thickening of the ascending colon and transverse colon. Mild to moderate stool burden noted in the descending colon and sigmoid colon. Diverticulosis of the descending colon and sigmoid colon without evidence of acute diverticulitis. Findings suggestive of partial sigmoid colon resection. Bladder: The bladder is normal. Pelvis:  No suspecious mass. Peritoneum/Mesentery: No fluid collection, ascities, or free air.   Lymph Nodes: No lymphadenopathy. Vasculature: Extensive calcified and noncalcified plaques of the abdominal aorta and iliac arteries. Near occlusion of the right common iliac artery. Patient is status post bifemoral bypass graft. The graft appears to be patent. Abdominal Wall: Left upper anterior abdominal wall lipoma. Otherwise unremarkable. Bony Structures: No acute osseous abnormality     Impression: Impression: Findings suggestive of pancreatic head mass status post stenting of the common bile duct. Subtle, approximately 3 cm, hypodensity noted within the right lateral aspect of the liver adjacent to the hepatic capsule containing a small locule of gas. This may represent the site of prior biopsy. Given the presence of gas, this is concerning for developing abscess. Additionally new subtle hypodensities are noted within the right hepatic lobe, which are nonspecific but may represent new metastatic lesions. Possible colitis of the ascending colon and proximal transverse colon. Diverticulosis of the descending and sigmoid colon without evidence of acute diverticulitis. Mild to moderate stool burden throughout the colon is concerning for constipation. Nonspecific mild patchy opacities in the dependent portions of the lower lobes most likely represents atelectasis, although an infectious/inflammatory process is not excluded. Electronically Signed:  Nile Frederic, DO  2/13/2024 10:47 PM EST  Workstation ID: [id number]    XR Chest 1 View    Result Date: 2/13/2024  XR CHEST 1 VW Date of Exam: 2/13/2024 8:51 PM EST Indication: Weak/Dizzy/AMS triage protocol Comparison: Contrast-enhanced chest CT performed on January 28, 2024 Findings: There are no consolidations.No pleural effusion. There is no evidence of pneumothorax. The pulmonary vasculature appears within normal limits. The cardiac and mediastinal silhouette appear unremarkable. No acute osseous abnormality identified. Old right rib fractures are visualized.     Impression: Impression: No radiographic evidence of acute chest process. Electronically Signed: Alphonso Duran MD  2/13/2024 9:11 PM EST  Workstation ID: [id number]     Results for orders placed during the hospital encounter of 01/26/24    Adult Transthoracic Echo Complete W/ Cont if Necessary Per Protocol    Interpretation Summary    Left ventricular systolic function is hyperdynamic (EF > 70%). Left ventricular ejection fraction appears to be greater than 70%.    Left ventricular wall thickness is consistent with borderline concentric hypertrophy.    Provoked left ventricular mean pressure gradient of 10 mmHg.    Left ventricular diastolic function is consistent with (grade I) impaired relaxation.      Current medications:  Scheduled Meds:amLODIPine, 10 mg, Oral, Daily  cefepime, 1,000 mg, Intravenous, Q8H  insulin lispro, 3-14 Units, Subcutaneous, TID With Meals  lisinopril, 40 mg, Oral, Daily  metroNIDAZOLE, 500 mg, Intravenous, Q8H  sodium chloride, 10 mL, Intravenous, Q12H      Continuous Infusions:Pharmacy Consult - Pharmacy to dose,   Pharmacy Consult - Pharmacy to dose,   sodium chloride, 100 mL/hr, Last Rate: 100 mL/hr (02/14/24 0748)      PRN Meds:.  acetaminophen    dextrose    dextrose    glucagon (human recombinant)    melatonin    ondansetron ODT **OR** ondansetron    Pharmacy Consult - Pharmacy to dose    Pharmacy Consult - Pharmacy to  dose    sodium chloride    sodium chloride    sodium chloride    Assessment & Plan   Assessment & Plan     Active Hospital Problems    Diagnosis  POA    **Hepatic abscess [K75.0]  Yes    Hypercalcemia [E83.52]  Yes    Pancreatic mass [K86.89]  Yes    Mixed hyperlipidemia [E78.2]  Yes    T2DM (type 2 diabetes mellitus) [E11.9]  Yes    Essential hypertension [I10]  Yes      Resolved Hospital Problems   No resolved problems to display.        Brief Hospital Course to date:  Costa Robbins is a 68 y.o. male  with a history of pancreatic mass, HTN, HLD, T2DM, and alcohol abuse, admitted for several weeks of nausea vomiting and abd pain, then developed temp to 102.    New fever, progressive abd pain  Pancreatic mass, no tissue dx yet   ERCP/stent on 1/27/23  - pain is likely from progressive CA; r/o cholangitis or abscess or stent obstruction   - small fluid collection at liver bx site:  normal serous collection vs small abscess; continue empiric abx for now  - Gen Surg consult appreciated.    - MRCP to look at liver lesions.  Still in pursuit of tissue dx then decision on ?chemo/palliation or ?whipple   - repeat CA 19-9   - addendum:  surgeon has reviewed MRCP, suspects microabscesses rather than mets.  Will consult ID.      Metabolic acidosis, gap   Resp alkalosis   Dehydration   - nl lactate.  Suspect the ABG is not reliable, though he may have pain-induced hyperventilation .    - add D5 to fluids; likely has ketosis   - HR has normalized     Hypercalcemia   - improving with fluids ; continue fluids     PAD, stable.   - sees CT surg; history of LLE stent and RLE bypass  - doppler pulses for patient reassurance.      Expected Discharge Location and Transportation: home by car  Expected Discharge   Expected Discharge Date: 2/17/2024; Expected Discharge Time:      DVT prophylaxis:  Mechanical DVT prophylaxis orders are present.         AM-PAC 6 Clicks Score (PT): 24 (02/14/24 0150)    CODE STATUS:   Code Status and  Medical Interventions:   Ordered at: 02/14/24 0247     Medical Intervention Limits:    NO intubation (DNI)     Code Status (Patient has no pulse and is not breathing):    No CPR (Do Not Attempt to Resuscitate)     Medical Interventions (Patient has pulse or is breathing):    Limited Support       Ethel Camejo MD  02/14/24

## 2024-02-14 NOTE — PLAN OF CARE
Goal Outcome Evaluation:      Goals of care discussed with patient

## 2024-02-14 NOTE — ED NOTES
Costa Robbins    Nursing Report ED to Floor:  Mental status: ALERT AND ORIENTED  Ambulatory status: STAND BY  Oxygen Therapy:  ROOM AIR  Cardiac Rhythm: NORMAL SINUS RHYTHM  Admitted from: ED  Safety Concerns:  NONE NOTED  Social Issues: NONE NOTED  ED Room #:  11    ED Nurse Phone Extension - [phone] or may call [phone].      HPI:   Chief Complaint   Patient presents with    Dizziness    Fever    Vomiting       Past Medical History:  Past Medical History:   Diagnosis Date    Bile duct obstruction 01/27/2024    found during ERCP    COVID 2021    no hospitalization    Diabetes mellitus 2017    po meds and insulin    Diverticulitis 2009    surgically intervention    Enlarged prostate     recently started on Proscar    ETOH use 05/25/2023    Former smoker 05/25/2023    Hearing decreased     Hypertension     Melanoma     CHEST, BACK, NECK MULTI, HEAD    Pancreatic mass 01/27/2024    found on CT scan; ERCP confirmed    PVD (peripheral vascular disease)     Unintentional weight loss     Wears glasses         Past Surgical History:  Past Surgical History:   Procedure Laterality Date    AORTAGRAM Bilateral 04/12/2023    Procedure: AORTAGRAM WITH RUNOFFS  STENT OF THE LEFT ILIAC ARTERY;  Surgeon: Moses Escalante MD;  Location:  [facility];  Service: Vascular;  Laterality: Bilateral;    COLON RESECTION  05/2009    partial colectomy    COLONOSCOPY      DIAGNOSTIC LAPAROSCOPY N/A 2/7/2024    Procedure: DIAGNOSTIC LAPAROSCOPY WITH LIVER BIOPSY AND PERITONEAL WASHINGS;  Surgeon: Silas Maloney MD;  Location:  [facility];  Service: General;  Laterality: N/A;    ERCP N/A 01/27/2024    Procedure: ENDOSCOPIC RETROGRADE CHOLANGIOPANCREATOGRAPHY WITH STENT PLACEMENT;  Surgeon: Brunner, Mark I, MD;  Location:  [facility];  Service: Gastroenterology;  Laterality: N/A;    FEMORAL ENDARTERECTOMY Left 05/25/2023    Procedure: FEMORAL ENDARTERECTOMY WITH PROPATEN GRAFT 8MM X 40CM;  Surgeon: Moses Escalante MD;  Location:   [facility];  Service: Vascular;  Laterality: Left;    FEMORAL FEMORAL BYPASS N/A 05/25/2023    Procedure: FEMORAL FEMORAL BYPASS;  Surgeon: Moses Escalante MD;  Location:  [facility];  Service: Vascular;  Laterality: N/A;    ORIF ANKLE FRACTURE Left     SKIN BIOPSY      SKIN CANCER EXCISION      melanoma    WISDOM TOOTH EXTRACTION          Admitting Doctor:   Judd Boone III, DO    Consulting Provider(s):  Consults       Date and Time Order Name Status Description    1/27/2024  6:50 PM Inpatient General Surgery Consult Completed     1/27/2024 12:32 AM Inpatient Gastroenterology Consult Completed              Admitting Diagnosis:   The primary encounter diagnosis was Pancreatic mass. Diagnoses of Intra-abdominal abscess, Abnormal CT scan, liver, and History of biliary duct stent placement were also pertinent to this visit.    Most Recent Vitals:   Vitals:    02/13/24 2311 02/13/24 2321 02/14/24 0031 02/14/24 0036   BP:   142/71    BP Location:       Patient Position:       Pulse: 94 96 87 89   Resp:       Temp:       TempSrc:       SpO2: 95% 96% 94% 97%   Weight:       Height:           Active LDAs/IV Access:   Lines, Drains & Airways       Active LDAs       Name Placement date Placement time Site Days    Peripheral IV 02/07/24 1000 Posterior;Right Forearm 02/07/24  1000  Forearm  6    Peripheral IV Right Forearm --  --  Forearm  --                    Labs (abnormal labs have a star):   Labs Reviewed   COMPREHENSIVE METABOLIC PANEL - Abnormal; Notable for the following components:       Result Value    Glucose 310 (*)     Sodium 133 (*)     Chloride 94 (*)     CO2 20.0 (*)     Calcium 11.3 (*)     Alkaline Phosphatase 182 (*)     Anion Gap 19.0 (*)     All other components within normal limits    Narrative:     GFR Normal >60  Chronic Kidney Disease <60  Kidney Failure <15     SINGLE HSTROPONIN T - Abnormal; Notable for the following components:    HS Troponin T 22 (*)     All other components within  normal limits    Narrative:     High Sensitive Troponin T Reference Range:  <14.0 ng/L- Negative Female for AMI  <22.0 ng/L- Negative Male for AMI  >=14 - Abnormal Female indicating possible myocardial injury.  >=22 - Abnormal Male indicating possible myocardial injury.   Clinicians would have to utilize clinical acumen, EKG, Troponin, and serial changes to determine if it is an Acute Myocardial Infarction or myocardial injury due to an underlying chronic condition.        CBC WITH AUTO DIFFERENTIAL - Abnormal; Notable for the following components:    WBC 15.09 (*)     Neutrophil % 91.5 (*)     Lymphocyte % 1.9 (*)     Eosinophil % 0.0 (*)     Immature Grans % 0.7 (*)     Neutrophils, Absolute 13.81 (*)     Lymphocytes, Absolute 0.29 (*)     Immature Grans, Absolute 0.10 (*)     All other components within normal limits   LIPASE - Abnormal; Notable for the following components:    Lipase 96 (*)     All other components within normal limits   BETA HYDROXYBUTYRATE QUANTITATIVE - Abnormal; Notable for the following components:    Beta-Hydroxybutyrate Quant 4.660 (*)     All other components within normal limits    Narrative:     In the assessment of possible diabetic ketoacidosis, the test should be interpreted along with other clinical and laboratory findings.  A level greater than 1 mmol/L should require further evaluation and levels of more than 3 mmol/L require immediate medical review.   BLOOD GAS, ARTERIAL W/CO-OXIMETRY - Abnormal; Notable for the following components:    pCO2, Arterial 26.8 (*)     HCO3, Arterial 18.5 (*)     Base Excess, Arterial -4.1 (*)     CO2 Content 19.3 (*)     pCO2, Temperature Corrected 26.8 (*)     All other components within normal limits   MAGNESIUM - Normal   LACTIC ACID, PLASMA - Normal   BLOOD CULTURE   BLOOD CULTURE   RAINBOW DRAW    Narrative:     The following orders were created for panel order [place] Draw.  Procedure                               Abnormality         Status                      ---------                               -----------         ------                     Green Top (Gel)[[id number]]                                  Final result               Lavender Top[[id number]]                                     Final result               Gold Top - SST[[id number]]                                   Final result               Colvin Top[[id number]]                                         Final result               Light Blue Top[[id number]]                                   Final result                 Please view results for these tests on the individual orders.   BLOOD GAS, ARTERIAL   URINALYSIS W/ MICROSCOPIC IF INDICATED (NO CULTURE)   CBC AND DIFFERENTIAL    Narrative:     The following orders were created for panel order CBC & Differential.  Procedure                               Abnormality         Status                     ---------                               -----------         ------                     CBC Auto Differential[[id number]]        Abnormal            Final result                 Please view results for these tests on the individual orders.   GREEN TOP   LAVENDER TOP   GOLD TOP - SST   GRAY TOP   LIGHT BLUE TOP   KETONE BODIES SERUM    Narrative:     The following orders were created for panel order Ketone Bodies, Serum (Not performed at [facility]).  Procedure                               Abnormality         Status                     ---------                               -----------         ------                     Beta Hydroxybutyrate Terrence...[[id number]]  Abnormal            Final result                 Please view results for these tests on the individual orders.       Meds Given in ED:   Medications   sodium chloride 0.9 % flush 10 mL (has no administration in time range)   metroNIDAZOLE (FLAGYL) IVPB 500 mg (500 mg Intravenous New Bag 2/14/24 0054)   sodium chloride 0.9 % bolus 1,000 mL (0 mL Intravenous Stopped 2/13/24 8215)   iopamidol (ISOVUE-300)  61 % injection 100 mL (90 mL Intravenous Given 2/13/24 2231)   sodium chloride 0.9 % bolus 1,000 mL (0 mL Intravenous Stopped 2/14/24 0056)   cefepime 2000 mg IVPB in 100 mL NS (MBP) (0 mg Intravenous Stopped 2/14/24 0051)

## 2024-02-14 NOTE — CONSULTS
"General Surgery Consultation Note    Date of Service: 2/14/2024  Costa Robbins  [id number]  1955      Referring Provider: Ethel Camejo MD    Location of Consult: [facility]     Reason for Consultation: Abdominal pain, fever       History of Present Illness:  I am seeing, Costa Robbins, in consultation for Ethel Camejo MD regarding abdominal pain, fever in setting of pancreatic mass and recent stenting and diagnostic laparoscopy.      Mr. Costa Robbins is a 68-year-old gentleman with a history of high pretension, hyperlipidemia, type 2 diabetes, alcohol abuse, and recent finding of a pancreatic mass causing biliary obstruction status post ERCP and stent placement on 1/27/2024 and postoperative day 7 status post diagnostic laparoscopy and liver biopsy by my partner Dr. Maloney.  He now presents with acute on chronic abdominal pain, fever at home as high as 102 Fahrenheit, and weakness.  Patient states this abdominal pain started several months ago prior to his first admission for jaundice.  He describes the pain as a constant, dull, \"emptiness\" in his epigastrium.  This gets worse with fatty meals and improves slightly when fasting.  Over the past month,his pain is gotten increasingly worse.  He has also had a 40 pound weight loss despite tolerating a diet at home. Since his Dx laparoscopy patient was recovering well for 2-3 days and then declined over last few days with profound weakness, N/V, and fever developing yesterday as noted above. He was admitted to the hospitalist service early this AM.      Labs this morning are significant for a white count of 14.3, A1c of 9.2, normal creatinine of 0.72, and hyperglycemia.    CT abdomen pelvis shows a small area of gas containing fluid at the area of the prior liver biopsy.  Multiple small hypodensities in the liver concerning for possible new metastases.  And possible ascending and transverse colon colitis.     General surgery was consulted for further " recommendations given history of pancreatic mass and upcoming plans for pancreaticoduodenectomy.    Problems Addressed this Visit       Pancreatic mass - Primary     Other Visit Diagnoses       Intra-abdominal abscess        Abnormal CT scan, liver        History of biliary duct stent placement              Diagnoses         Codes Comments    Pancreatic mass    -  Primary ICD-10-CM: K86.89  ICD-9-CM: 577.8     Intra-abdominal abscess     ICD-10-CM: K65.1  ICD-9-CM: 567.22     Abnormal CT scan, liver     ICD-10-CM: R93.2  ICD-9-CM: 793.3     History of biliary duct stent placement     ICD-10-CM: Z98.890  ICD-9-CM: V45.89             Past Medical History:   Diagnosis Date    Bile duct obstruction 01/27/2024    found during ERCP    COVID 2021    no hospitalization    Diabetes mellitus 2017    po meds and insulin    Diverticulitis 2009    surgically intervention    Enlarged prostate     recently started on Proscar    ETOH use 05/25/2023    Former smoker 05/25/2023    Hearing decreased     Hypertension     Melanoma     CHEST, BACK, NECK MULTI, HEAD    Pancreatic mass 01/27/2024    found on CT scan; ERCP confirmed    PVD (peripheral vascular disease)     Unintentional weight loss     Wears glasses        Past Surgical History:    AORTAGRAM    Procedure: AORTAGRAM WITH RUNOFFS  STENT OF THE LEFT ILIAC ARTERY;  Surgeon: Moses Escalante MD;  Location: [facility];  Service: Vascular;  Laterality: Bilateral;    COLON RESECTION    partial colectomy    COLONOSCOPY    DIAGNOSTIC LAPAROSCOPY    Procedure: DIAGNOSTIC LAPAROSCOPY WITH LIVER BIOPSY AND PERITONEAL WASHINGS;  Surgeon: Silas Maloney MD;  Location: [facility];  Service: General;  Laterality: N/A;    ERCP    Procedure: ENDOSCOPIC RETROGRADE CHOLANGIOPANCREATOGRAPHY WITH STENT PLACEMENT;  Surgeon: Brunner, Mark I, MD;  Location: [facility];  Service: Gastroenterology;  Laterality: N/A;    FEMORAL ENDARTERECTOMY    Procedure: FEMORAL ENDARTERECTOMY  WITH PROPATEN GRAFT 8MM X 40CM;  Surgeon: Moses Escalante MD;  Location:  [facility];  Service: Vascular;  Laterality: Left;    FEMORAL FEMORAL BYPASS    Procedure: FEMORAL FEMORAL BYPASS;  Surgeon: Moses Escalante MD;  Location:  [facility];  Service: Vascular;  Laterality: N/A;    ORIF ANKLE FRACTURE    SKIN BIOPSY    SKIN CANCER EXCISION    melanoma    WISDOM TOOTH EXTRACTION       Allergies   Allergen Reactions    Atenolol Other (See Comments)     Bradycardia.    Morphine Itching    Penicillins Hives       No current facility-administered medications on file prior to encounter.     Current Outpatient Medications on File Prior to Encounter   Medication Sig Dispense Refill    amLODIPine (NORVASC) 10 MG tablet Take 1 tablet by mouth Daily. 30 tablet 5    clopidogrel (PLAVIX) 75 MG tablet Take 1 tablet by mouth Daily. (Patient taking differently: Take 1 tablet by mouth Daily. Stopped for surgery) 30 tablet 6    empagliflozin (Jardiance) 10 MG tablet tablet Take 1 tablet by mouth Daily.      finasteride (PROSCAR) 5 MG tablet Take 1 tablet by mouth Daily.      HYDROcodone-acetaminophen (NORCO) 5-325 MG per tablet Take 1 tablet by mouth Every 4 (Four) Hours As Needed (Pain). 10 tablet 0    hydrOXYzine (ATARAX) 25 MG tablet Take 1 tablet by mouth 3 (Three) Times a Day As Needed for Itching. 30 tablet 0    Insulin Glargine (BASAGLAR KWIKPEN SC) Inject 40 Units under the skin into the appropriate area as directed Every Night.      lisinopril (PRINIVIL,ZESTRIL) 40 MG tablet Take 1 tablet by mouth Daily.           Current Facility-Administered Medications:     acetaminophen (TYLENOL) tablet 650 mg, 650 mg, Oral, Q4H PRN, Booker Putnam PA-C    amLODIPine (NORVASC) tablet 10 mg, 10 mg, Oral, Daily, Booker Putnam PA-C    cefepime 1000 mg IVPB in 100 mL NS (MBP), 1,000 mg, Intravenous, Q8H, Warren Pa PharmD    dextrose (D50W) (25 g/50 mL) IV injection 25 g, 25 g, Intravenous, Q15 Min PRN,  Booker Putnam PA-C    dextrose (GLUTOSE) oral gel 15 g, 15 g, Oral, Q15 Min PRN, Booker Putnam PA-C    glucagon (GLUCAGEN) injection 1 mg, 1 mg, Intramuscular, Q15 Min PRN, Booker Putnam PA-C    Insulin Lispro (humaLOG) injection 3-14 Units, 3-14 Units, Subcutaneous, TID With Meals, Booker Putnam PA-C    lisinopril (PRINIVIL,ZESTRIL) tablet 40 mg, 40 mg, Oral, Daily, Booker Putnam PA-C    melatonin tablet 5 mg, 5 mg, Oral, Nightly PRN, Booker Putnam PA-C    metroNIDAZOLE (FLAGYL) IVPB 500 mg, 500 mg, Intravenous, Q8H, Warren Pa PharmD    ondansetron ODT (ZOFRAN-ODT) disintegrating tablet 4 mg, 4 mg, Oral, Q6H PRN **OR** ondansetron (ZOFRAN) injection 4 mg, 4 mg, Intravenous, Q6H PRN, Booker Putnam PA-C    Pharmacy to dose cefepime, , Does not apply, Continuous PRN, Booker Putnam PA-C    Pharmacy to dose metronidazole, , Does not apply, Continuous PRN, Booker Putnam PA-C    sodium chloride 0.9 % flush 10 mL, 10 mL, Intravenous, PRN, Bam Cline MD    sodium chloride 0.9 % flush 10 mL, 10 mL, Intravenous, Q12H, Booker Putnam PA-C    sodium chloride 0.9 % flush 10 mL, 10 mL, Intravenous, PRN, Booker Putnam PA-C    sodium chloride 0.9 % infusion 40 mL, 40 mL, Intravenous, PRN, Booker Putnam PA-C    sodium chloride 0.9 % infusion, 100 mL/hr, Intravenous, Continuous, Booker Putnam PA-C, Last Rate: 100 mL/hr at 02/14/24 0748, 100 mL/hr at 02/14/24 0748    Family History   Problem Relation Age of Onset    Diabetes Mother     Heart attack Mother     Hypertension Mother     Hyperlipidemia Mother     Stroke Mother     Hodgkin's lymphoma Father     Hypertension Father     Diabetes Brother     Hyperlipidemia Brother      Social History     Socioeconomic History    Marital status:     Number of children: 0   Tobacco Use    Smoking status: Former     Packs/day: 0.50  "    Years: 43.00     Additional pack years: 0.00     Total pack years: 21.50     Types: Cigarettes     Quit date: 2023     Years since quittin.0     Passive exposure: Never    Smokeless tobacco: Never    Tobacco comments:     Pt states that he has been able to stop smoking this month - 2023   Vaping Use    Vaping Use: Never used   Substance and Sexual Activity    Alcohol use: Not Currently     Comment: 2 BEERS PER DAY; hx of ETOH use    Drug use: Yes     Types: Marijuana     Comment: ONCE A MONTH    Sexual activity: Defer       Review of Systems:  Per HPI, otherwise the 12 point review of systems is negative.    /73 (BP Location: Right arm, Patient Position: Lying)   Pulse 90   Temp 97.9 °F (36.6 °C) (Oral)   Resp 18   Ht 180.3 cm (71\")   Wt 70.8 kg (156 lb)   SpO2 96%   BMI 21.76 kg/m²   Body mass index is 21.76 kg/m².    General: alert, oriented x 3, appears fatigued, no acute distress  HEENT: normocephalic, atraumatic, sclerae anicteric, external ears normal   Cardiovascular: Regular rate and rhythm  Pulmonary: breathing comfortably on room air, no respiratory distress  Gastrointestinal: soft, nontender to palpation, laparoscopic incisions are well-healing  Lymph: No lymphadenopathy  Extremity: no clubbing, cyanosis, edema  Neuro: cognitively intact, CN grossly intact, no focal deficits  Psych: appropriate mood and affect    CBC  Results from last 7 days   Lab Units 24  0531   WBC 10*3/mm3 14.27*   HEMOGLOBIN g/dL 12.6*   HEMATOCRIT % 36.2*   PLATELETS 10*3/mm3 243       CMP  Results from last 7 days   Lab Units 24  0531 24  2043   SODIUM mmol/L 136 133*   POTASSIUM mmol/L 4.6 3.9   CHLORIDE mmol/L 102 94*   CO2 mmol/L 14.0* 20.0*   BUN mg/dL 15 12   CREATININE mg/dL 0.72* 1.02   CALCIUM mg/dL 9.8 11.3*   BILIRUBIN mg/dL  --  1.2   ALK PHOS U/L  --  182*   ALT (SGPT) U/L  --  31   AST (SGOT) U/L  --  15   GLUCOSE mg/dL 235* 310*       Radiology  Imaging Results (Last 72 " Hours)       Procedure Component Value Units Date/Time    CT Abdomen Pelvis With Contrast [[id number]] Collected: 02/13/24 2234     Updated: 02/13/24 2250    Narrative:      CT ABDOMEN PELVIS W CONTRAST    Date of Exam: 2/13/2024 10:25 PM EST    Indication: Worsening abdominal pain, known pancreatic mass.    Comparison: 1/26/2024    Technique: Axial CT images were obtained of the abdomen and pelvis following the uneventful intravenous administration of 85 cc Isovue-300. Reconstructed coronal and sagittal images were also obtained. Automated exposure control and iterative   construction methods were used.      Findings:  Visualized Chest: Mild patchy opacities in the posterior aspect of the lower lobes bilaterally. Otherwise unremarkable    Liver: Small hypodensity adjacent to the hepatic capsule in the lateral aspect of the right hepatic lobe containing small locules of gas, measuring approximately 2.9 x 1.1 x 3.0 cm  Additional scattered small hypodensities are noted in the right hepatic lobe. These are all new since prior study.    Gallbladder: Layering hyperdensity within the gallbladder most likely represents biliary sludge. No wall thickening or pericholecystic fluid.    Bile Ducts: A stent is noted within the common bile duct. There is associated pneumobilia.    Spleen: Spleen is normal in size and CT density.    Pancreas: Subtle hypoenhancement of the pancreatic head is once again noted concerning for an underlying pancreatic mass.    Adrenals: Small nodules are noted within the adrenal glands bilaterally, measuring up to 1.5 cm, grossly unchanged from prior study.    Kidneys: Presumed calyceal diverticulum is noted.  Scattered small nonobstructing renal stones are noted on the right. No obstructing stones or hydronephrosis.    Gastrointestinal: No dilated loops of bowel to suggest bowel obstruction. Mild wall thickening of the ascending colon and transverse colon. Mild to moderate stool burden noted in the  descending colon and sigmoid colon. Diverticulosis of the descending   colon and sigmoid colon without evidence of acute diverticulitis. Findings suggestive of partial sigmoid colon resection.    Bladder: The bladder is normal.    Pelvis:  No suspecious mass.    Peritoneum/Mesentery: No fluid collection, ascities, or free air.      Lymph Nodes: No lymphadenopathy.    Vasculature: Extensive calcified and noncalcified plaques of the abdominal aorta and iliac arteries. Near occlusion of the right common iliac artery. Patient is status post bifemoral bypass graft. The graft appears to be patent.    Abdominal Wall: Left upper anterior abdominal wall lipoma. Otherwise unremarkable.    Bony Structures: No acute osseous abnormality      Impression:      Impression:  Findings suggestive of pancreatic head mass status post stenting of the common bile duct.    Subtle, approximately 3 cm, hypodensity noted within the right lateral aspect of the liver adjacent to the hepatic capsule containing a small locule of gas. This may represent the site of prior biopsy. Given the presence of gas, this is concerning for   developing abscess.    Additionally new subtle hypodensities are noted within the right hepatic lobe, which are nonspecific but may represent new metastatic lesions.    Possible colitis of the ascending colon and proximal transverse colon.    Diverticulosis of the descending and sigmoid colon without evidence of acute diverticulitis.    Mild to moderate stool burden throughout the colon is concerning for constipation.    Nonspecific mild patchy opacities in the dependent portions of the lower lobes most likely represents atelectasis, although an infectious/inflammatory process is not excluded.        Electronically Signed: Nile De La Garza DO    2/13/2024 10:47 PM EST    Workstation ID: [id number]    XR Chest 1 View [[id number]] Collected: 02/13/24 2110     Updated: 02/13/24 2114    Narrative:      XR CHEST 1 VW    Date of  Exam: 2/13/2024 8:51 PM EST    Indication: Weak/Dizzy/AMS triage protocol    Comparison: Contrast-enhanced chest CT performed on January 28, 2024    Findings:  There are no consolidations.No pleural effusion. There is no evidence of pneumothorax. The pulmonary vasculature appears within normal limits. The cardiac and mediastinal silhouette appear unremarkable. No acute osseous abnormality identified. Old right   rib fractures are visualized.      Impression:      Impression:  No radiographic evidence of acute chest process.      Electronically Signed: Alphonso Duran MD    2/13/2024 9:11 PM EST    Workstation ID: [id number]              Assessment:  Mr. Costa Robbins is a 68-year-old gentleman w/ a recently found pancreatic mass s/p ERCP with metallic stent placement and diagnostic laparoscopy with benign liver biopsies presenting with worsening of his chronic abdominal pain, nausea/vomiting, weakness, and fevers/chills.     He does not have a tissue diagnosis as brushings returned without malignant cells.  Liver biopsy on 2/7/2024 was also benign.  CT imaging on this admission shows an area of a possible abscess at the area of the liver biopsy, however, on my review I feel this represents normal postop changes.  Even if this represents an early infection, broad-spectrum antibiotics will adequately cover the size of liver abscess without need for drainage.  He also has some mild evidence of colitis without any associated symptoms.  Finally, there are multiple small hypodensities in the liver concerning for possible new metastatic disease.  Due to these new liver lesions, I recommend an MRI with contrast to better characterize.  If confirmed to be consistent with metastatic disease, we we will need to obtain a tissue biopsy of these areas if possible.    Regarding his symptoms, this is likely related to his pancreatic mass.  Given his history of a stent placement there could also be a component of subclinical  cholangitis or intermittent obstruction of the cystic duct causing his symptoms.This could also be the underlying etiology of the new liver lesions seen on CT.    Plan:  - MRI w/ and w/out contrast of the abdomen - assess new liver lesions concerning for metastasis  - Okay for diet from surgery standpoint  - Continue broad spectrum antibiotics   - No plans for acute surgical intervention  - Will continue to follow    Fabián Maya MD  02/14/24  08:45 EST

## 2024-02-14 NOTE — H&P
[facility] Medicine Services  HISTORY AND PHYSICAL    Patient Name: Costa Robbins  : 1955  MRN: [id number]  Primary Care Physician: Jonathan Conner MD  Date of admission: 2024    Subjective   Subjective     Chief Complaint:  Abdominal pain, N/V, Fever    HPI:  Costa Robbins is a 68 y.o. male with a history of pancreatic mass, HTN, HLD, T2DM, and alcohol abuse who presents to [facility] ED for complaint of abdominal pain, nausea and vomiting. He states that he has had pain for several weeks now, but reports that it acutely worsened beginning this morning. Fever today at home was as high as 102. He also reports being so weak that he had difficulty getting out of bed today. Patient has a known Pancreatic Mass concerning for malignancy. He is followed by Dr. Maloney and is reportedly in talks to undergo a Whipple procedure in the near future. He underwent laparoscopy with liver biopsy on 2024 with Dr. Maloney. He reports worsening abdominal pain ever since the procedure.         Review of Systems   Constitutional:  Positive for appetite change, chills, fatigue and fever. Negative for unexpected weight change.   HENT:  Negative for nosebleeds, sore throat and trouble swallowing.    Eyes:  Negative for photophobia and visual disturbance.   Respiratory:  Negative for cough, shortness of breath and wheezing.    Cardiovascular:  Negative for chest pain and palpitations.   Gastrointestinal:  Positive for abdominal pain, nausea and vomiting.   Genitourinary:  Negative for dysuria and hematuria.   Musculoskeletal:  Negative for arthralgias and myalgias.   Skin: Negative.    Neurological:  Positive for weakness (Generalized weakness). Negative for tremors and syncope.   Psychiatric/Behavioral:  Negative for confusion. The patient is not nervous/anxious.               Personal History     Past Medical History:   Diagnosis Date    Bile duct obstruction 2024    found  during ERCP    COVID 2021    no hospitalization    Diabetes mellitus 2017    po meds and insulin    Diverticulitis 2009    surgically intervention    Enlarged prostate     recently started on Proscar    ETOH use 05/25/2023    Former smoker 05/25/2023    Hearing decreased     Hypertension     Melanoma     CHEST, BACK, NECK MULTI, HEAD    Pancreatic mass 01/27/2024    found on CT scan; ERCP confirmed    PVD (peripheral vascular disease)     Unintentional weight loss     Wears glasses              Past Surgical History:   Procedure Laterality Date    AORTAGRAM Bilateral 04/12/2023    Procedure: AORTAGRAM WITH RUNOFFS  STENT OF THE LEFT ILIAC ARTERY;  Surgeon: Moses Escalante MD;  Location: [facility];  Service: Vascular;  Laterality: Bilateral;    COLON RESECTION  05/2009    partial colectomy    COLONOSCOPY      DIAGNOSTIC LAPAROSCOPY N/A 2/7/2024    Procedure: DIAGNOSTIC LAPAROSCOPY WITH LIVER BIOPSY AND PERITONEAL WASHINGS;  Surgeon: Silas Maloney MD;  Location:  [facility];  Service: General;  Laterality: N/A;    ERCP N/A 01/27/2024    Procedure: ENDOSCOPIC RETROGRADE CHOLANGIOPANCREATOGRAPHY WITH STENT PLACEMENT;  Surgeon: Brunner, Mark I, MD;  Location: [facility];  Service: Gastroenterology;  Laterality: N/A;    FEMORAL ENDARTERECTOMY Left 05/25/2023    Procedure: FEMORAL ENDARTERECTOMY WITH PROPATEN GRAFT 8MM X 40CM;  Surgeon: Moses Escalante MD;  Location:  [facility];  Service: Vascular;  Laterality: Left;    FEMORAL FEMORAL BYPASS N/A 05/25/2023    Procedure: FEMORAL FEMORAL BYPASS;  Surgeon: Moses Escalante MD;  Location:  [facility];  Service: Vascular;  Laterality: N/A;    ORIF ANKLE FRACTURE Left     SKIN BIOPSY      SKIN CANCER EXCISION      melanoma    WISDOM TOOTH EXTRACTION         Family History:  family history includes Diabetes in his brother and mother; Heart attack in his mother; Hodgkin's lymphoma in his father; Hyperlipidemia in his brother and mother; Hypertension in his  father and mother; Stroke in his mother.     Social History:  reports that he quit smoking about a year ago. His smoking use included cigarettes. He has a 21.50 pack-year smoking history. He has never been exposed to tobacco smoke. He has never used smokeless tobacco. He reports that he does not currently use alcohol. He reports current drug use. Drug: Marijuana.  Social History     Social History Narrative    L[facility] in [facility]        Medications:  HYDROcodone-acetaminophen, Insulin Glargine, amLODIPine, clopidogrel, empagliflozin, finasteride, hydrOXYzine, and lisinopril    Allergies   Allergen Reactions    Atenolol Other (See Comments)     Bradycardia.    Morphine Itching    Penicillins Hives       Objective   Objective     Vital Signs:   Temp:  [98.4 °F (36.9 °C)] 98.4 °F (36.9 °C)  Heart Rate:  [] 87  Resp:  [19] 19  BP: (133-142)/(65-71) 137/65    Physical Exam  Constitutional:       General: He is not in acute distress.     Appearance: Normal appearance.   HENT:      Head: Atraumatic.      Right Ear: External ear normal.      Left Ear: External ear normal.      Nose: Nose normal.   Eyes:      Extraocular Movements: Extraocular movements intact.      Conjunctiva/sclera: Conjunctivae normal.      Pupils: Pupils are equal, round, and reactive to light.   Cardiovascular:      Rate and Rhythm: Normal rate and regular rhythm.      Pulses: Normal pulses.      Heart sounds: Normal heart sounds. No murmur heard.  Pulmonary:      Effort: Pulmonary effort is normal. No respiratory distress.      Breath sounds: Normal breath sounds. No wheezing, rhonchi or rales.   Abdominal:      General: Bowel sounds are normal. There is no distension.      Tenderness: There is abdominal tenderness. There is no guarding or rebound.   Musculoskeletal:         General: Normal range of motion.      Cervical back: No rigidity.      Right lower leg: No edema.      Left lower leg: No edema.   Skin:     General: Skin is warm and dry.       Coloration: Skin is not jaundiced.      Findings: Lesion present. No rash.      Comments: Surgical wounds on abdomen. Covered with bandage.    Neurological:      General: No focal deficit present.      Mental Status: He is alert and oriented to person, place, and time.   Psychiatric:         Attention and Perception: Attention normal.         Mood and Affect: Mood normal.         Behavior: Behavior normal.         Thought Content: Thought content normal.          Result Review:  I have personally reviewed the results from the time of this admission to 2/14/2024 01:37 EST and agree with these findings:  [x]  Laboratory list / accordion  []  Microbiology  [x]  Radiology  []  EKG/Telemetry   []  Cardiology/Vascular   []  Pathology  [x]  Old records  []  Other:      LAB RESULTS:      Lab 02/13/24  2326 02/13/24 2043   WBC  --  15.09*   HEMOGLOBIN  --  15.5   HEMATOCRIT  --  43.7   PLATELETS  --  312   NEUTROS ABS  --  13.81*   IMMATURE GRANS (ABS)  --  0.10*   LYMPHS ABS  --  0.29*   MONOS ABS  --  0.85   EOS ABS  --  0.00   MCV  --  91.2   LACTATE 1.3  --          Lab 02/13/24 2043   SODIUM 133*   POTASSIUM 3.9   CHLORIDE 94*   CO2 20.0*   ANION GAP 19.0*   BUN 12   CREATININE 1.02   EGFR 81.1   GLUCOSE 310*   CALCIUM 11.3*   MAGNESIUM 1.9         Lab 02/13/24 2043   TOTAL PROTEIN 7.2   ALBUMIN 4.2   GLOBULIN 3.0   ALT (SGPT) 31   AST (SGOT) 15   BILIRUBIN 1.2   ALK PHOS 182*   LIPASE 96*         Lab 02/13/24 2043   HSTROP T 22*                 Lab 02/13/24  2158   PH, ARTERIAL 7.448   PCO2, ARTERIAL 26.8*   PO2 ART 83.0   FIO2 21   HCO3 ART 18.5*   BASE EXCESS ART -4.1*   CARBOXYHEMOGLOBIN 1.4     Brief Urine Lab Results  (Last result in the past 365 days)        Color   Clarity   Blood   Leuk Est   Nitrite   Protein   CREAT   Urine HCG        01/26/24 1639 Dark Yellow   Clear   Negative   Negative   Negative   Negative                 Microbiology Results (last 10 days)       ** No results found for the last  240 hours. **            CT Abdomen Pelvis With Contrast    Result Date: 2/13/2024  CT ABDOMEN PELVIS W CONTRAST Date of Exam: 2/13/2024 10:25 PM EST Indication: Worsening abdominal pain, known pancreatic mass. Comparison: 1/26/2024 Technique: Axial CT images were obtained of the abdomen and pelvis following the uneventful intravenous administration of 85 cc Isovue-300. Reconstructed coronal and sagittal images were also obtained. Automated exposure control and iterative construction methods were used. Findings: Visualized Chest: Mild patchy opacities in the posterior aspect of the lower lobes bilaterally. Otherwise unremarkable Liver: Small hypodensity adjacent to the hepatic capsule in the lateral aspect of the right hepatic lobe containing small locules of gas, measuring approximately 2.9 x 1.1 x 3.0 cm Additional scattered small hypodensities are noted in the right hepatic lobe. These are all new since prior study. Gallbladder: Layering hyperdensity within the gallbladder most likely represents biliary sludge. No wall thickening or pericholecystic fluid. Bile Ducts: A stent is noted within the common bile duct. There is associated pneumobilia. Spleen: Spleen is normal in size and CT density. Pancreas: Subtle hypoenhancement of the pancreatic head is once again noted concerning for an underlying pancreatic mass. Adrenals: Small nodules are noted within the adrenal glands bilaterally, measuring up to 1.5 cm, grossly unchanged from prior study. Kidneys: Presumed calyceal diverticulum is noted. Scattered small nonobstructing renal stones are noted on the right. No obstructing stones or hydronephrosis. Gastrointestinal: No dilated loops of bowel to suggest bowel obstruction. Mild wall thickening of the ascending colon and transverse colon. Mild to moderate stool burden noted in the descending colon and sigmoid colon. Diverticulosis of the descending colon and sigmoid colon without evidence of acute diverticulitis.  Findings suggestive of partial sigmoid colon resection. Bladder: The bladder is normal. Pelvis:  No suspecious mass. Peritoneum/Mesentery: No fluid collection, ascities, or free air.   Lymph Nodes: No lymphadenopathy. Vasculature: Extensive calcified and noncalcified plaques of the abdominal aorta and iliac arteries. Near occlusion of the right common iliac artery. Patient is status post bifemoral bypass graft. The graft appears to be patent. Abdominal Wall: Left upper anterior abdominal wall lipoma. Otherwise unremarkable. Bony Structures: No acute osseous abnormality     Impression: Impression: Findings suggestive of pancreatic head mass status post stenting of the common bile duct. Subtle, approximately 3 cm, hypodensity noted within the right lateral aspect of the liver adjacent to the hepatic capsule containing a small locule of gas. This may represent the site of prior biopsy. Given the presence of gas, this is concerning for developing abscess. Additionally new subtle hypodensities are noted within the right hepatic lobe, which are nonspecific but may represent new metastatic lesions. Possible colitis of the ascending colon and proximal transverse colon. Diverticulosis of the descending and sigmoid colon without evidence of acute diverticulitis. Mild to moderate stool burden throughout the colon is concerning for constipation. Nonspecific mild patchy opacities in the dependent portions of the lower lobes most likely represents atelectasis, although an infectious/inflammatory process is not excluded. Electronically Signed: Nile De La Garza DO  2/13/2024 10:47 PM EST  Workstation ID: [id number]    XR Chest 1 View    Result Date: 2/13/2024  XR CHEST 1 VW Date of Exam: 2/13/2024 8:51 PM EST Indication: Weak/Dizzy/AMS triage protocol Comparison: Contrast-enhanced chest CT performed on January 28, 2024 Findings: There are no consolidations.No pleural effusion. There is no evidence of pneumothorax. The pulmonary  vasculature appears within normal limits. The cardiac and mediastinal silhouette appear unremarkable. No acute osseous abnormality identified. Old right rib fractures are visualized.     Impression: Impression: No radiographic evidence of acute chest process. Electronically Signed: Alphonso Duran MD  2/13/2024 9:11 PM EST  Workstation ID: [id number]     Results for orders placed during the hospital encounter of 01/26/24    Adult Transthoracic Echo Complete W/ Cont if Necessary Per Protocol    Interpretation Summary    Left ventricular systolic function is hyperdynamic (EF > 70%). Left ventricular ejection fraction appears to be greater than 70%.    Left ventricular wall thickness is consistent with borderline concentric hypertrophy.    Provoked left ventricular mean pressure gradient of 10 mmHg.    Left ventricular diastolic function is consistent with (grade I) impaired relaxation.      Assessment & Plan   Assessment & Plan       Hepatic abscess    Pancreatic mass    T2DM (type 2 diabetes mellitus)    Essential hypertension    Mixed hyperlipidemia    Hypercalcemia      Costa Robbins is a 68 y.o. male with a history of pancreatic mass, HTN, HLD, T2DM, and alcohol abuse who presents to [facility] ED for complaint of abdominal pain, nausea and vomiting.     Hepatic Abscess  Pancreatic Mass  -CT abd/pelvis tonight shows subtle, approximately 3 cm, hypodensity noted within the right lateral aspect of the liver adjacent to the hepatic capsule containing a small locule of gas, concerning for abscess.  -Leukocytosis 15.09. Lactate normal  -ED provider spoke with Dr. Tripp (Gen Surgery). Recommends starting abx, and will see in the am  -Received Flagyl and Cefepime. Continue  -NPO  -IV fluids    HTN  HLD  -Takes Norvasc and Lisinopril    T2DM  -Blood glucose elevated at 310  -Check A1C  -Fingerstick achs. SSI  -Diabetes Educator to see    Hypercalcemia  -Ca2+ 11.3  -Check Ionized calcium          DVT prophylaxis:   "SCDS    CODE STATUS:  DNR/DNI per patient       Expected DischargeTBD     This note has been completed as part of a split-shared workflow.   Total APC time: 55 minutes  Signature: Electronically signed by Booker Putnam PA-C, 02/14/24, 2:42 AM EST      Attending   Admission Attestation       I have performed an independent face-to-face diagnostic evaluation including performing an independent physical examination.  I approve of the documented plan of care above that was reviewed and developed with the advanced practice clinician (APC) and take responsibility for that plan along with its associated risks.  I have updated the HPI as appropriate.    Brief HPI    68 M with abdominal pain, nausea, and emesis.  He states that his pain has been \"especially bad since this morning,\" but he states it has been present at a lower level for the last 3-4 weeks.  He also confirms increased generalized weakness, noting that he \"felt really weak just getting out of bed or walking around\" today (2/13 Tuesday).  He had fever at home today of 102 F.  The patient states he has a pancreatic mass that was recently biopsied, and he states that \"no cancer was found,\" but he also states he is due to have a Whipple procedure in the upcoming weeks.  He states that he also had a liver biopsy on 2/7, and states that his abdominal pain has gotten worse every day since that procedure was performed.  Workup in the ED included CT abdomen/pelvis, the radiology read which mentions small hypodensity noted in the right lateral aspect of the liver, which \"may represent the site of prior biopsy,\" presence of gas in this area, concerning for developing abscess.    Attending Physical Exam:  Temp:  [97.6 °F (36.4 °C)-98.4 °F (36.9 °C)] 97.9 °F (36.6 °C)  Heart Rate:  [] 90  Resp:  [18-19] 18  BP: (117-142)/(65-75) 117/73    Constitutional: Awake, alert, NAD.  Eyes: PERRLA, sclerae anicteric, no conjunctival injection  HENT: NCAT, mucous " membranes moist  Neck: Supple, no thyromegaly, no lymphadenopathy, trachea midline  Respiratory: Clear to auscultation bilaterally, nonlabored respirations   Cardiovascular: RRR, no murmurs, rubs, or gallops, palpable pedal pulses bilaterally  Gastrointestinal: Positive bowel sounds, soft, generalized TTP but worse at the epigastrium, nondistended  Musculoskeletal: No bilateral ankle edema, no clubbing or cyanosis to extremities  Psychiatric: Appropriate affect, cooperative  Neurologic: Oriented x 3, strength symmetric in all extremities, Cranial Nerves grossly intact to confrontation, speech clear  Skin: No rashes, normal turgor.  Surgical changes to the abdomen, dressed wounds, no acute drainage or surrounding erythema.    Result Review:  I have personally reviewed the results from the time of this admission to 2/14/2024 03:52 EST and agree with these findings:  [x]  Laboratory list / accordion  []  Microbiology  [x]  Radiology  [x]  EKG/Telemetry   []  Cardiology/Vascular   []  Pathology  []  Old records  []  Other:  Most notable findings include: I reviewed chest x-ray which is a single AP view and by my read shows no acute infiltrate/edema.  I reviewed EKG which by my read shows sinus tachycardia, ventricular rate approximately 110 bpm, normal axis, nonspecific ST/T wave changes.    Assessment and Plan:    See assessment and plan documented by APC above and updated/edited by me as appropriate.      Total time spent: 32 minutes  Time spent includes time reviewing chart, face-to-face time, counseling patient/family/caregiver, ordering medications/tests/procedures, communicating with other health care professionals, documenting clinical information in the electronic health record, and coordination of care.       Judd Boone III, DO  02/14/24

## 2024-02-14 NOTE — ED PROVIDER NOTES
EMERGENCY DEPARTMENT ENCOUNTER    Pt Name: Costa Robbins  MRN: [id number]  Pt :   1955  Room Number:    Date of encounter:  2024  PCP: Jonathan Conner MD  ED Provider: KALYN Silva    Historian: Patient    HPI:  Chief Complaint: Dizziness, Abdominal pain, nausea, vomiting fever and weakness     Context: Costa Robbins is a 68 y.o. male who presents to the ED c/o dizziness, abdominal pain, nausea, vomiting and a fever today. Patient reports feeling as if he is too weak to even walk. Patient has known pancreatic mass with concern for pancreatic malignancy. He was recently admitted to this facility on 2024 and discharged 2024 during which MRCP showed extensive intrahepatic and extrahepatic biliary ductal dilation, area concerning for pancreatic mass, also subtle enhancement in distal CBD. He is being followed by GI. Patient also follows with Dr. Maloney with general surgery who patient states is working on obtaining surgical OR time for a whipple procedure. Patient reports that he started running a fever today which is new.   HPI     REVIEW OF SYSTEMS  A chief complaint appropriate review of systems was completed and is negative except as noted in the HPI.     PAST MEDICAL HISTORY  Past Medical History:   Diagnosis Date    Bile duct obstruction 2024    found during ERCP    COVID     no hospitalization    Diabetes mellitus 2017    po meds and insulin    Diverticulitis 2009    surgically intervention    Enlarged prostate     recently started on Proscar    ETOH use 2023    Former smoker 2023    Hearing decreased     Hypertension     Melanoma     CHEST, BACK, NECK MULTI, HEAD    Pancreatic mass 2024    found on CT scan; ERCP confirmed    PVD (peripheral vascular disease)     Unintentional weight loss     Wears glasses        PAST SURGICAL HISTORY  Past Surgical History:   Procedure Laterality Date    AORTAGRAM Bilateral 2023    Procedure:  AORTAGRAM WITH RUNOFFS  STENT OF THE LEFT ILIAC ARTERY;  Surgeon: Moses Escalante MD;  Location:  [facility];  Service: Vascular;  Laterality: Bilateral;    COLON RESECTION  2009    partial colectomy    COLONOSCOPY      DIAGNOSTIC LAPAROSCOPY N/A 2024    Procedure: DIAGNOSTIC LAPAROSCOPY WITH LIVER BIOPSY AND PERITONEAL WASHINGS;  Surgeon: Silas Maloney MD;  Location:  [facility];  Service: General;  Laterality: N/A;    ERCP N/A 2024    Procedure: ENDOSCOPIC RETROGRADE CHOLANGIOPANCREATOGRAPHY WITH STENT PLACEMENT;  Surgeon: Brunner, Mark I, MD;  Location:  [facility];  Service: Gastroenterology;  Laterality: N/A;    FEMORAL ENDARTERECTOMY Left 2023    Procedure: FEMORAL ENDARTERECTOMY WITH PROPATEN GRAFT 8MM X 40CM;  Surgeon: Moses Escalante MD;  Location:  [facility];  Service: Vascular;  Laterality: Left;    FEMORAL FEMORAL BYPASS N/A 2023    Procedure: FEMORAL FEMORAL BYPASS;  Surgeon: Moses Escalante MD;  Location:  [facility];  Service: Vascular;  Laterality: N/A;    ORIF ANKLE FRACTURE Left     SKIN BIOPSY      SKIN CANCER EXCISION      melanoma    WISDOM TOOTH EXTRACTION         FAMILY HISTORY  Family History   Problem Relation Age of Onset    Diabetes Mother     Heart attack Mother     Hypertension Mother     Hyperlipidemia Mother     Stroke Mother     Hodgkin's lymphoma Father     Hypertension Father     Diabetes Brother     Hyperlipidemia Brother        SOCIAL HISTORY  Social History     Socioeconomic History    Marital status:     Number of children: 0   Tobacco Use    Smoking status: Former     Packs/day: 0.50     Years: 43.00     Additional pack years: 0.00     Total pack years: 21.50     Types: Cigarettes     Quit date: 2023     Years since quittin.0     Passive exposure: Never    Smokeless tobacco: Never    Tobacco comments:     Pt states that he has been able to stop smoking this month - 2023   Vaping Use    Vaping Use: Never used    Substance and Sexual Activity    Alcohol use: Not Currently     Comment: 2 BEERS PER DAY; hx of ETOH use    Drug use: Yes     Types: Marijuana     Comment: ONCE A MONTH    Sexual activity: Defer       ALLERGIES  Atenolol, Morphine, and Penicillins    PHYSICAL EXAM  Physical Exam  Vitals and nursing note reviewed.   Constitutional:       General: He is not in acute distress.     Appearance: Normal appearance. He is ill-appearing. He is not toxic-appearing.   HENT:      Head: Normocephalic and atraumatic.      Nose: Nose normal.      Mouth/Throat:      Mouth: Mucous membranes are dry.   Eyes:      Extraocular Movements: Extraocular movements intact.   Cardiovascular:      Rate and Rhythm: Regular rhythm. Tachycardia present.   Pulmonary:      Effort: Pulmonary effort is normal.   Abdominal:      General: There is no distension.      Palpations: Abdomen is soft.      Tenderness: There is abdominal tenderness. There is no guarding or rebound.   Musculoskeletal:         General: Normal range of motion.      Cervical back: Normal range of motion and neck supple.   Skin:     General: Skin is warm and dry.   Neurological:      General: No focal deficit present.      Mental Status: He is alert.   Psychiatric:         Mood and Affect: Affect is angry.         Behavior: Behavior is agitated.       LAB RESULTS  Results for orders placed or performed during the hospital encounter of 02/13/24   Comprehensive Metabolic Panel    Specimen: Blood   Result Value Ref Range    Glucose 310 (H) 65 - 99 mg/dL    BUN 12 8 - 23 mg/dL    Creatinine 1.02 0.76 - 1.27 mg/dL    Sodium 133 (L) 136 - 145 mmol/L    Potassium 3.9 3.5 - 5.2 mmol/L    Chloride 94 (L) 98 - 107 mmol/L    CO2 20.0 (L) 22.0 - 29.0 mmol/L    Calcium 11.3 (H) 8.6 - 10.5 mg/dL    Total Protein 7.2 6.0 - 8.5 g/dL    Albumin 4.2 3.5 - 5.2 g/dL    ALT (SGPT) 31 1 - 41 U/L    AST (SGOT) 15 1 - 40 U/L    Alkaline Phosphatase 182 (H) 39 - 117 U/L    Total Bilirubin 1.2 0.0 - 1.2  mg/dL    Globulin 3.0 gm/dL    A/G Ratio 1.4 g/dL    BUN/Creatinine Ratio 11.8 7.0 - 25.0    Anion Gap 19.0 (H) 5.0 - 15.0 mmol/L    eGFR 81.1 >60.0 mL/min/1.73   Single High Sensitivity Troponin T    Specimen: Blood   Result Value Ref Range    HS Troponin T 22 (H) <22 ng/L   Magnesium    Specimen: Blood   Result Value Ref Range    Magnesium 1.9 1.6 - 2.4 mg/dL   CBC Auto Differential    Specimen: Blood   Result Value Ref Range    WBC 15.09 (H) 3.40 - 10.80 10*3/mm3    RBC 4.79 4.14 - 5.80 10*6/mm3    Hemoglobin 15.5 13.0 - 17.7 g/dL    Hematocrit 43.7 37.5 - 51.0 %    MCV 91.2 79.0 - 97.0 fL    MCH 32.4 26.6 - 33.0 pg    MCHC 35.5 31.5 - 35.7 g/dL    RDW 14.0 12.3 - 15.4 %    RDW-SD 46.9 37.0 - 54.0 fl    MPV 10.9 6.0 - 12.0 fL    Platelets 312 140 - 450 10*3/mm3    Neutrophil % 91.5 (H) 42.7 - 76.0 %    Lymphocyte % 1.9 (L) 19.6 - 45.3 %    Monocyte % 5.6 5.0 - 12.0 %    Eosinophil % 0.0 (L) 0.3 - 6.2 %    Basophil % 0.3 0.0 - 1.5 %    Immature Grans % 0.7 (H) 0.0 - 0.5 %    Neutrophils, Absolute 13.81 (H) 1.70 - 7.00 10*3/mm3    Lymphocytes, Absolute 0.29 (L) 0.70 - 3.10 10*3/mm3    Monocytes, Absolute 0.85 0.10 - 0.90 10*3/mm3    Eosinophils, Absolute 0.00 0.00 - 0.40 10*3/mm3    Basophils, Absolute 0.04 0.00 - 0.20 10*3/mm3    Immature Grans, Absolute 0.10 (H) 0.00 - 0.05 10*3/mm3    nRBC 0.0 0.0 - 0.2 /100 WBC   Lipase    Specimen: Blood   Result Value Ref Range    Lipase 96 (H) 13 - 60 U/L   Beta Hydroxybutyrate Quantitative    Specimen: Blood   Result Value Ref Range    Beta-Hydroxybutyrate Quant 4.660 (H) 0.020 - 0.270 mmol/L   Blood Gas, Arterial With Co-Ox    Specimen: Arterial Blood   Result Value Ref Range    Site Right Brachial     Brandon's Test N/A     pH, Arterial 7.448 7.350 - 7.450 pH units    pCO2, Arterial 26.8 (L) 35.0 - 45.0 mm Hg    pO2, Arterial 83.0 83.0 - 108.0 mm Hg    HCO3, Arterial 18.5 (L) 20.0 - 26.0 mmol/L    Base Excess, Arterial -4.1 (L) 0.0 - 2.0 mmol/L    Hemoglobin, Blood Gas 13.5  13.5 - 17.5 g/dL    Hematocrit, Blood Gas 41.2 38.0 - 51.0 %    Oxyhemoglobin 96.0 94 - 99 %    Methemoglobin 0.30 0.00 - 1.50 %    Carboxyhemoglobin 1.4 0 - 2 %    CO2 Content 19.3 (L) 22 - 33 mmol/L    Temperature 37.0     Barometric Pressure for Blood Gas      Modality Room Air     FIO2 21 %    Rate 0 Breaths/minute    PIP 0 cmH2O    IPAP 0     EPAP 0     pH, Temp Corrected 7.448 pH Units    pCO2, Temperature Corrected 26.8 (L) 35 - 48 mm Hg    pO2, Temperature Corrected 83.0 83 - 108 mm Hg   Lactic Acid, Plasma    Specimen: Arm, Left; Blood   Result Value Ref Range    Lactate 1.3 0.5 - 2.0 mmol/L   ECG 12 Lead ED Triage Standing Order; Weak / Dizzy / AMS   Result Value Ref Range    QT Interval 282 ms    QTC Interval 395 ms   Green Top (Gel)   Result Value Ref Range    Extra Tube Hold for add-ons.    Lavender Top   Result Value Ref Range    Extra Tube hold for add-on    Gold Top - SST   Result Value Ref Range    Extra Tube Hold for add-ons.    Light Blue Top   Result Value Ref Range    Extra Tube Hold for add-ons.        If labs were ordered, I independently reviewed the results and considered them in treating the patient.    RADIOLOGY  CT Abdomen Pelvis With Contrast   Final Result   Impression:   Findings suggestive of pancreatic head mass status post stenting of the common bile duct.      Subtle, approximately 3 cm, hypodensity noted within the right lateral aspect of the liver adjacent to the hepatic capsule containing a small locule of gas. This may represent the site of prior biopsy. Given the presence of gas, this is concerning for    developing abscess.      Additionally new subtle hypodensities are noted within the right hepatic lobe, which are nonspecific but may represent new metastatic lesions.      Possible colitis of the ascending colon and proximal transverse colon.      Diverticulosis of the descending and sigmoid colon without evidence of acute diverticulitis.      Mild to moderate stool burden  throughout the colon is concerning for constipation.      Nonspecific mild patchy opacities in the dependent portions of the lower lobes most likely represents atelectasis, although an infectious/inflammatory process is not excluded.            Electronically Signed: Nile De La Garza DO     2/13/2024 10:47 PM EST     Workstation ID: [id number]      XR Chest 1 View   Final Result   Impression:   No radiographic evidence of acute chest process.         Electronically Signed: Alphonso Duran MD     2/13/2024 9:11 PM EST     Workstation ID: [id number]        [x] Radiologist's Report Reviewed:  I ordered and independently interpreted the above noted radiographic studies.  See radiologist's dictation for official interpretation.      PROCEDURES    Procedures    ECG 12 Lead ED Triage Standing Order; Weak / Dizzy / AMS   Preliminary Result   Test Reason : ED Triage Standing Order~   Blood Pressure :   */*   mmHG   Vent. Rate : 118 BPM     Atrial Rate : 118 BPM      P-R Int : 220 ms          QRS Dur :  92 ms       QT Int : 282 ms       P-R-T Axes :  44  48  81 degrees      QTc Int : 395 ms      Sinus tachycardia with 1st degree AV block   Nonspecific ST and T wave abnormality   Abnormal ECG   When compared with ECG of 26-JAN-2024 16:39,   ME interval has increased   ST now depressed in Anterior leads   Nonspecific T wave abnormality, worse in Inferior leads      Referred By: EDMD           Confirmed By:           MEDICATIONS GIVEN IN ER    Medications   sodium chloride 0.9 % flush 10 mL (has no administration in time range)   sodium chloride 0.9 % bolus 1,000 mL (1,000 mL Intravenous New Bag 2/14/24 0006)   metroNIDAZOLE (FLAGYL) IVPB 500 mg (has no administration in time range)   cefepime 2000 mg IVPB in 100 mL NS (MBP) (2,000 mg Intravenous New Bag 2/14/24 0005)   sodium chloride 0.9 % bolus 1,000 mL (0 mL Intravenous Stopped 2/13/24 2336)   iopamidol (ISOVUE-300) 61 % injection 100 mL (90 mL Intravenous Given 2/13/24 2231)        MEDICAL DECISION MAKING, PROGRESS, and CONSULTS   Medical Decision Making  Amount and/or Complexity of Data Reviewed  Labs: ordered. Decision-making details documented in ED Course.  Radiology: ordered.  ECG/medicine tests: ordered.    Risk  Prescription drug management.        All labs have been independently reviewed by me.  All radiology studies have been interpreted by me and the radiologist dictating the report.  All EKG's have been independently interpreted by me as well as and overseeing attending physician.    [] Discussed with radiology regarding test interpretation:    Discussion below represents my analysis of pertinent findings related to patient's condition, differential diagnosis, treatment plan and final disposition.    Differential diagnosis:  The differential diagnosis associated with the patient's presentation includes: Worsening metastatic disease, postbiopsy complication, biliary stent complication, constipation, abdominal wall pain,  DKA, bowel obstruction, colitis and others.      Additional sources  Discussed/ obtained information from independent historians:   [x] Spouse  [] Parent  [] Family member  [] Friend  [] EMS   [] Other:  External (non-ED) record review:   [x] Inpatient record: Personally reviewed inpatient admission as documented above   [] Office record:   [] Outpatient record:   [] Prior Outpatient labs:   [] Prior Outpatient radiology:   [] Primary Care record:   [] Outside ED record:   [] Other:   Patient's care impacted by:   [x] Diabetes  [x] Hypertension  [] Hyperlipidemia  [] Hypothyroidism   [] Coronary Artery Disease   [] COPD   [x] Cancer   [] Obesity  [] GERD   [] Tobacco Abuse   [x] Substance Abuse    [] Anxiety   [] Depression   [x] Other: Peripheral vascular disease  Care significantly affected by Social Determinants of Health (housing and economic circumstances, unemployment)    [] Yes     [x] No   If yes, Patient's care significantly limited by  Social  Determinants of Health including:   [] Inadequate housing   [] Low income   [] Alcoholism and drug addiction in family   [] Problems related to primary support group   [] Unemployment   [] Problems related to employment   [] Other Social Determinants of Health:     Shared decision making:  I reviewed workup performed in ED including labs and imaging. Based on findings, recommendation made for admission. Patient is agreeable to plan of care and hospital admission.      Orders placed during this visit:  Orders Placed This Encounter   Procedures    Blood Culture - Blood,    Blood Culture - Blood,    XR Chest 1 View    CT Abdomen Pelvis With Contrast    Walker Draw    Comprehensive Metabolic Panel    Single High Sensitivity Troponin T    Magnesium    Urinalysis With Microscopic If Indicated (No Culture) - Urine, Clean Catch    CBC Auto Differential    Lipase    Blood Gas, Arterial -With Co-Ox Panel: Yes    Beta Hydroxybutyrate Quantitative    Blood Gas, Arterial With Co-Ox    Lactic Acid, Plasma    NPO Diet NPO Type: Strict NPO    Undress & Gown    Continuous Pulse Oximetry    Vital Signs    Oxygen Therapy- Nasal Cannula; Titrate 1-6 LPM Per SpO2; 90 - 95%    ECG 12 Lead ED Triage Standing Order; Weak / Dizzy / AMS    Insert Peripheral IV    Fall Precautions    CBC & Differential    Green Top (Gel)    Lavender Top    Gold Top - SST    Gray Top    Light Blue Top    Ketone Bodies, Serum (Not performed at [place])       ED Course:    ED Course as of 02/14/24 0031 Tue Feb 13, 2024 2103 Vitals and Telemetry tracing was reviewed and directly interpreted by myself demonstrating blood pressure 133/71, afebrile, tachycardic at 133 with respirations of 19 breaths/min and maintain oxygen at 95% on room air [JG]   2761 Called patient bedside as he was unaware why he was going to scan again.  I explained to the patient that he was here in the emergency department because of the symptoms that happened today and that our role  was to rule out acute abnormalities.  He stated that he just needed his surgery and requested that I consult Dr. Maloney.  Dr. Maloney is not on-call this evening for general surgery.  I shared if there were significant findings on imaging that he would be admitted or that Dr. Maloney would have access to the workup this evening in the ER. [JG]   2201 pH, Arterial: 7.448 [JG]   2255 I have consulted with Dr. Tripp with general surgery with recommendation made for admission to hospitalist service and broad-spectrum antibiotics with consultation to Dr. Maya with general surgery as he works with Dr. Maloney on surgical oncology and Dr. Maloney is currently out of town. [JG]   2301 Pharmacy consulted as patient has hives reaction to Penicillins, recommendation made for cefepime/ Flagyl [JG]   2347 Labs studies were reviewed and directly interpreted by myself and demonstrated positive serum ketones, lipase of 96 trending downward from prior labs.  Initial blood glucose of 310, bicarb 20, gap of 19.  Leukocytosis with WBC 15.09. [JG]   2348 Imaging of chest personally interpreted by myself with official read provided by radiology demonstrated no acute cardiopulmonary process.   [JG]   2350 CT scan of abdomen and pelvis personally interpreted by myself with official read provided by radiology demonstrates findings suggestive of pancreatic head mass status post stenting of the common bile duct, questionable area of gas on the liver concerning for developing abscess, new hypodensities in right hepatic lobe concerning for new metastatic lesions, possible colitis of ascending and transverse colon, constipation. [JG]   2354 Hospitalist messaged for admission [JG]   Wed Feb 14, 2024   0023 Hospitalist agreeable to accept patient for admission [JG]   0028 In summary this is a 68-year-old male with history of pancreatic mass, status post stenting of common bile duct who presents to the ED with complaints of fever, nausea,  vomiting and abdominal pain.  Labs concerning for trending towards DKA.  Patient can given IV fluids in ED.  Leukocytosis also present.  Lactate within normal limits.  With findings concerning for possible abscess blood cultures obtained and patient initiated on broad-spectrum antibiotics.  General surgery consulted with recommendations implemented.  Patient admitted to hospital medicine for further evaluation and treatment. [JG]      ED Course User Index  [JG] Jomar Fajardo PA            DIAGNOSIS  Final diagnoses:   Pancreatic mass   Intra-abdominal abscess   Abnormal CT scan, liver   History of biliary duct stent placement       DISPOSITION    ED Disposition       ED Disposition   Decision to Admit    Condition   --    Comment   --               Please note that portions of this document were completed with voice recognition software.        Jomar Fajardo PA  02/14/24 0031

## 2024-02-14 NOTE — PROGRESS NOTES
Pharmacokinetic Dose Consult Cefepime Extended Infusion Dosing  Pharmacy has been consulted to dose cefepime for Costa Robbins to match new extended infusion (4 hour infusion) protocol dosing (see chart below for dosing recommendations).    Diagnosis: intra-abdominal infection (hepatic abscess)    Estimated Creatinine Clearance: 71.3 mL/min (by C-G formula based on SCr of 1.02 mg/dL).  70.8 kg (156 lb)    Cefepime (4-hour infusion)        Assessment/Plan  1. cefepime 1 gm q8h (infused over 4 hours)  2. Pharmacy will continue to monitor patient's renal function for further potential dose adjustments.    Warren Pa, PharmD, BCPS  2/14/2024  03:52 EST

## 2024-02-14 NOTE — CASE MANAGEMENT/SOCIAL WORK
Discharge Planning Assessment  [facility]     Patient Name: Costa Robbins  MRN: [id number]  Today's Date: 2/14/2024    Admit Date: 2/13/2024    Plan: IDP   Discharge Needs Assessment       Row Name 02/14/24 1337       Living Environment    People in Home spouse    Name(s) of People in Home Prisca    Current Living Arrangements home    Potentially Unsafe Housing Conditions none    In the past 12 months has the electric, gas, oil, or water company threatened to shut off services in your home? No    Primary Care Provided by self    Provides Primary Care For spouse;no one    Family Caregiver if Needed spouse    Family Caregiver Names Prisca    Quality of Family Relationships unable to assess    Able to Return to Prior Arrangements yes       Resource/Environmental Concerns    Resource/Environmental Concerns none    Transportation Concerns none       Transportation Needs    In the past 12 months, has lack of transportation kept you from medical appointments or from getting medications? no    In the past 12 months, has lack of transportation kept you from meetings, work, or from getting things needed for daily living? No       Food Insecurity    Within the past 12 months, you worried that your food would run out before you got the money to buy more. Never true    Within the past 12 months, the food you bought just didn't last and you didn't have money to get more. Never true       Transition Planning    Patient/Family Anticipates Transition to home    Patient/Family Anticipated Services at Transition none    Transportation Anticipated family or friend will provide       Discharge Needs Assessment    Readmission Within the Last 30 Days no previous admission in last 30 days    Equipment Currently Used at Home none    Concerns to be Addressed no discharge needs identified    Anticipated Changes Related to Illness none    Equipment Needed After Discharge none                   Discharge Plan       Row Name 02/14/24 1742        Plan    Plan IDP    Patient/Family in Agreement with Plan yes    Plan Comments Spoke with patient at bedside for IDP. Lives in [place] [facility] with spouse. Ind. No HH or DME. PCP Griffin Conner. [employer] Replacement with scripts filled at [facility] in [place]. Plan is home with transport. CM will cont to follow.    Final Discharge Disposition Code 01 - home or self-care                  Continued Care and Services - Admitted Since 2/13/2024    Coordination has not been started for this encounter.       Expected Discharge Date and Time       Expected Discharge Date Expected Discharge Time    Feb 17, 2024            Demographic Summary       Row Name 02/14/24 1329       General Information    Admission Type inpatient    Arrived From emergency department    Referral Source admission list    Reason for Consult discharge planning    Preferred Language English                   Functional Status       Row Name 02/14/24 1330       Functional Status    Usual Activity Tolerance moderate    Current Activity Tolerance moderate       Physical Activity    On average, how many days per week do you engage in moderate to strenuous exercise (like a brisk walk)? 0 days    On average, how many minutes do you engage in exercise at this level? 0 min    Number of minutes of exercise per week 0       Functional Status, IADL    Medications independent    Meal Preparation independent    Housekeeping independent    Laundry independent    Shopping independent                   Psychosocial    No documentation.                  Abuse/Neglect    No documentation.                  Legal    No documentation.                  Substance Abuse    No documentation.                  Patient Forms    No documentation.                     Bertha Ulloa RN

## 2024-02-15 LAB
ALBUMIN SERPL-MCNC: 2.9 G/DL (ref 3.5–5.2)
ALBUMIN/GLOB SERPL: 1.3 G/DL
ALP SERPL-CCNC: 109 U/L (ref 39–117)
ALT SERPL W P-5'-P-CCNC: 22 U/L (ref 1–41)
ANION GAP SERPL CALCULATED.3IONS-SCNC: 8 MMOL/L (ref 5–15)
AST SERPL-CCNC: 12 U/L (ref 1–40)
BASOPHILS # BLD AUTO: 0.03 10*3/MM3 (ref 0–0.2)
BASOPHILS NFR BLD AUTO: 0.4 % (ref 0–1.5)
BILIRUB SERPL-MCNC: 0.7 MG/DL (ref 0–1.2)
BUN SERPL-MCNC: 8 MG/DL (ref 8–23)
BUN/CREAT SERPL: 11.8 (ref 7–25)
CALCIUM SPEC-SCNC: 9 MG/DL (ref 8.6–10.5)
CHLORIDE SERPL-SCNC: 102 MMOL/L (ref 98–107)
CO2 SERPL-SCNC: 23 MMOL/L (ref 22–29)
CREAT SERPL-MCNC: 0.68 MG/DL (ref 0.76–1.27)
DEPRECATED RDW RBC AUTO: 44.8 FL (ref 37–54)
EGFRCR SERPLBLD CKD-EPI 2021: 102.5 ML/MIN/1.73
EOSINOPHIL # BLD AUTO: 0.05 10*3/MM3 (ref 0–0.4)
EOSINOPHIL NFR BLD AUTO: 0.7 % (ref 0.3–6.2)
ERYTHROCYTE [DISTWIDTH] IN BLOOD BY AUTOMATED COUNT: 13.8 % (ref 12.3–15.4)
GLOBULIN UR ELPH-MCNC: 2.3 GM/DL
GLUCOSE BLDC GLUCOMTR-MCNC: 199 MG/DL (ref 70–130)
GLUCOSE BLDC GLUCOMTR-MCNC: 224 MG/DL (ref 70–130)
GLUCOSE BLDC GLUCOMTR-MCNC: 291 MG/DL (ref 70–130)
GLUCOSE BLDC GLUCOMTR-MCNC: 376 MG/DL (ref 70–130)
GLUCOSE SERPL-MCNC: 370 MG/DL (ref 65–99)
HCT VFR BLD AUTO: 35.6 % (ref 37.5–51)
HGB BLD-MCNC: 12.5 G/DL (ref 13–17.7)
IMM GRANULOCYTES # BLD AUTO: 0.04 10*3/MM3 (ref 0–0.05)
IMM GRANULOCYTES NFR BLD AUTO: 0.6 % (ref 0–0.5)
LYMPHOCYTES # BLD AUTO: 0.8 10*3/MM3 (ref 0.7–3.1)
LYMPHOCYTES NFR BLD AUTO: 11 % (ref 19.6–45.3)
MCH RBC QN AUTO: 30.8 PG (ref 26.6–33)
MCHC RBC AUTO-ENTMCNC: 35.1 G/DL (ref 31.5–35.7)
MCV RBC AUTO: 87.7 FL (ref 79–97)
MONOCYTES # BLD AUTO: 0.74 10*3/MM3 (ref 0.1–0.9)
MONOCYTES NFR BLD AUTO: 10.2 % (ref 5–12)
NEUTROPHILS NFR BLD AUTO: 5.59 10*3/MM3 (ref 1.7–7)
NEUTROPHILS NFR BLD AUTO: 77.1 % (ref 42.7–76)
NRBC BLD AUTO-RTO: 0 /100 WBC (ref 0–0.2)
PLATELET # BLD AUTO: 207 10*3/MM3 (ref 140–450)
PMV BLD AUTO: 11 FL (ref 6–12)
POTASSIUM SERPL-SCNC: 3 MMOL/L (ref 3.5–5.2)
PROT SERPL-MCNC: 5.2 G/DL (ref 6–8.5)
RBC # BLD AUTO: 4.06 10*6/MM3 (ref 4.14–5.8)
SODIUM SERPL-SCNC: 133 MMOL/L (ref 136–145)
WBC NRBC COR # BLD AUTO: 7.25 10*3/MM3 (ref 3.4–10.8)

## 2024-02-15 PROCEDURE — 82948 REAGENT STRIP/BLOOD GLUCOSE: CPT

## 2024-02-15 PROCEDURE — 25010000002 CEFEPIME PER 500 MG

## 2024-02-15 PROCEDURE — 25010000002 METRONIDAZOLE 500 MG/100ML SOLUTION

## 2024-02-15 PROCEDURE — 63710000001 INSULIN LISPRO (HUMAN) PER 5 UNITS: Performed by: NURSE PRACTITIONER

## 2024-02-15 PROCEDURE — 80053 COMPREHEN METABOLIC PANEL: CPT | Performed by: INTERNAL MEDICINE

## 2024-02-15 PROCEDURE — 25010000002 CEFEPIME PER 500 MG: Performed by: INTERNAL MEDICINE

## 2024-02-15 PROCEDURE — 85025 COMPLETE CBC W/AUTO DIFF WBC: CPT | Performed by: PHYSICIAN ASSISTANT

## 2024-02-15 PROCEDURE — 25810000003 DEXTROSE-NACL PER 500 ML: Performed by: INTERNAL MEDICINE

## 2024-02-15 PROCEDURE — 25010000002 HYDROMORPHONE PER 4 MG: Performed by: INTERNAL MEDICINE

## 2024-02-15 PROCEDURE — 99232 SBSQ HOSP IP/OBS MODERATE 35: CPT | Performed by: HOSPITALIST

## 2024-02-15 RX ORDER — HYDROMORPHONE HYDROCHLORIDE 1 MG/ML
0.5 INJECTION, SOLUTION INTRAMUSCULAR; INTRAVENOUS; SUBCUTANEOUS ONCE
Status: COMPLETED | OUTPATIENT
Start: 2024-02-15 | End: 2024-02-15

## 2024-02-15 RX ORDER — HYDROCODONE BITARTRATE AND ACETAMINOPHEN 5; 325 MG/1; MG/1
1 TABLET ORAL EVERY 6 HOURS PRN
Status: DISCONTINUED | OUTPATIENT
Start: 2024-02-15 | End: 2024-02-16

## 2024-02-15 RX ORDER — METRONIDAZOLE 500 MG/1
500 TABLET ORAL EVERY 8 HOURS SCHEDULED
Status: DISCONTINUED | OUTPATIENT
Start: 2024-02-15 | End: 2024-02-18

## 2024-02-15 RX ORDER — POTASSIUM CHLORIDE 20 MEQ/1
40 TABLET, EXTENDED RELEASE ORAL EVERY 4 HOURS
Status: COMPLETED | OUTPATIENT
Start: 2024-02-15 | End: 2024-02-16

## 2024-02-15 RX ORDER — SIMETHICONE 80 MG
80 TABLET,CHEWABLE ORAL 2 TIMES DAILY PRN
Status: DISCONTINUED | OUTPATIENT
Start: 2024-02-15 | End: 2024-02-19 | Stop reason: HOSPADM

## 2024-02-15 RX ORDER — HYDROMORPHONE HYDROCHLORIDE 1 MG/ML
0.5 INJECTION, SOLUTION INTRAMUSCULAR; INTRAVENOUS; SUBCUTANEOUS
Status: DISCONTINUED | OUTPATIENT
Start: 2024-02-15 | End: 2024-02-16

## 2024-02-15 RX ADMIN — HYDROCODONE BITARTRATE AND ACETAMINOPHEN 1 TABLET: 5; 325 TABLET ORAL at 04:21

## 2024-02-15 RX ADMIN — AMLODIPINE BESYLATE 10 MG: 10 TABLET ORAL at 08:21

## 2024-02-15 RX ADMIN — POTASSIUM CHLORIDE 40 MEQ: 1500 TABLET, EXTENDED RELEASE ORAL at 17:10

## 2024-02-15 RX ADMIN — Medication 10 ML: at 21:07

## 2024-02-15 RX ADMIN — CEFEPIME 2000 MG: 2 INJECTION, POWDER, FOR SOLUTION INTRAVENOUS at 17:10

## 2024-02-15 RX ADMIN — CEFEPIME 1000 MG: 1 INJECTION, POWDER, FOR SOLUTION INTRAMUSCULAR; INTRAVENOUS at 09:53

## 2024-02-15 RX ADMIN — INSULIN LISPRO 12 UNITS: 100 INJECTION, SOLUTION INTRAVENOUS; SUBCUTANEOUS at 17:10

## 2024-02-15 RX ADMIN — INSULIN LISPRO 5 UNITS: 100 INJECTION, SOLUTION INTRAVENOUS; SUBCUTANEOUS at 11:37

## 2024-02-15 RX ADMIN — INSULIN LISPRO 8 UNITS: 100 INJECTION, SOLUTION INTRAVENOUS; SUBCUTANEOUS at 08:21

## 2024-02-15 RX ADMIN — HYDROCODONE BITARTRATE AND ACETAMINOPHEN 1 TABLET: 5; 325 TABLET ORAL at 17:19

## 2024-02-15 RX ADMIN — POTASSIUM CHLORIDE 40 MEQ: 1500 TABLET, EXTENDED RELEASE ORAL at 21:07

## 2024-02-15 RX ADMIN — METRONIDAZOLE 500 MG: 500 INJECTION, SOLUTION INTRAVENOUS at 08:21

## 2024-02-15 RX ADMIN — METRONIDAZOLE 500 MG: 500 INJECTION, SOLUTION INTRAVENOUS at 16:06

## 2024-02-15 RX ADMIN — METRONIDAZOLE 500 MG: 500 TABLET ORAL at 23:26

## 2024-02-15 RX ADMIN — LISINOPRIL 40 MG: 40 TABLET ORAL at 08:21

## 2024-02-15 RX ADMIN — HYDROCODONE BITARTRATE AND ACETAMINOPHEN 1 TABLET: 5; 325 TABLET ORAL at 22:45

## 2024-02-15 RX ADMIN — SIMETHICONE 80 MG: 80 TABLET, CHEWABLE ORAL at 21:07

## 2024-02-15 RX ADMIN — DEXTROSE AND SODIUM CHLORIDE 100 ML/HR: 5; 900 INJECTION, SOLUTION INTRAVENOUS at 09:53

## 2024-02-15 RX ADMIN — HYDROMORPHONE HYDROCHLORIDE 0.5 MG: 1 INJECTION, SOLUTION INTRAMUSCULAR; INTRAVENOUS; SUBCUTANEOUS at 21:07

## 2024-02-15 RX ADMIN — HYDROCODONE BITARTRATE AND ACETAMINOPHEN 1 TABLET: 5; 325 TABLET ORAL at 09:59

## 2024-02-15 NOTE — CONSULTS
INFECTIOUS DISEASE CONSULT/INITIAL HOSPITAL VISIT    Costa Robbins  1955  [id number]    Date of Consult: 2/15/2024    Admission Date: 2/13/2024      Requesting Provider: No Known Provider  Evaluating Physician: Hans Lo MD    Reason for Consultation: hepatic abscess    History of present illness:    Costa Robbins is a 68 y.o. male, with PMH DM, diverticulitis, etoh use, BPH, pancreatic mass, PVD, seen today for a hepatic abscess. Admitted 1/26-1/28/24 with abdominal pain, undergoing an MRCP with extensive intrahepatic and extrahepatic biliary ductal dilation, concerning for pancreatic mass. ERCP with malignant appearing 2cm biliary stricture in the head of pancreas s/p spincterotomy and stent. Cytology negative for malignancy.  CT with several subcentimeter left lung nodules indeterminate.  Recent diagnostic laparoscopy and liver biopsy, 2/7/24,  pathology negative for malignancy. Plans for [facility] by Dr. Maloney.  Presented to the ED with complaints of fever up to 102 degrees, Right and left upper quadrant abdominal pain, and nausea. CT with pancreatic head mass s/p stenting of CBD, 3mm hypodensity right lateral aspect of the liver adjacent to the hepatic capsule containing a small locule of gas, right hepatic lobe with new subtle hypodensities, possible colitis ascending colon, and proximal transverse colon. MRI with small peripheral 2.4x 0.9cm capsular lesion along anterior margin of right hepatic lobe as well as 3 subcentimeter lesions right hepatic lobe- new, suspicious for hepatic abscesses. Admitting labs with a leukocytosis of 15.09, plt 312, Scr 1.02, ALT 31, AST 15. Alk phos 182, LAC 1.3, hgbA1c 9.2, CA 19-9 456.0.  Started on Cefepime and Flagyl and we were consulted for evaluation and treatment.     Past Medical History:   Diagnosis Date    Bile duct obstruction 01/27/2024    found during ERCP    COVID 2021    no hospitalization    Diabetes mellitus 2017    po meds and insulin     Diverticulitis 2009    surgically intervention    Enlarged prostate     recently started on Proscar    ETOH use 05/25/2023    Former smoker 05/25/2023    Hearing decreased     Hypertension     Melanoma     CHEST, BACK, NECK MULTI, HEAD    Pancreatic mass 01/27/2024    found on CT scan; ERCP confirmed    PVD (peripheral vascular disease)     Unintentional weight loss     Wears glasses        Past Surgical History:   Procedure Laterality Date    AORTAGRAM Bilateral 04/12/2023    Procedure: AORTAGRAM WITH RUNOFFS  STENT OF THE LEFT ILIAC ARTERY;  Surgeon: Moses Escalante MD;  Location:  [facility];  Service: Vascular;  Laterality: Bilateral;    COLON RESECTION  05/2009    partial colectomy    COLONOSCOPY      DIAGNOSTIC LAPAROSCOPY N/A 2/7/2024    Procedure: DIAGNOSTIC LAPAROSCOPY WITH LIVER BIOPSY AND PERITONEAL WASHINGS;  Surgeon: Silas Maloney MD;  Location:  [facility];  Service: General;  Laterality: N/A;    ERCP N/A 01/27/2024    Procedure: ENDOSCOPIC RETROGRADE CHOLANGIOPANCREATOGRAPHY WITH STENT PLACEMENT;  Surgeon: Brunner, Mark I, MD;  Location:  [facility];  Service: Gastroenterology;  Laterality: N/A;    FEMORAL ENDARTERECTOMY Left 05/25/2023    Procedure: FEMORAL ENDARTERECTOMY WITH PROPATEN GRAFT 8MM X 40CM;  Surgeon: Moses Escalante MD;  Location:  [facility];  Service: Vascular;  Laterality: Left;    FEMORAL FEMORAL BYPASS N/A 05/25/2023    Procedure: FEMORAL FEMORAL BYPASS;  Surgeon: Moses Escalante MD;  Location:  [facility];  Service: Vascular;  Laterality: N/A;    ORIF ANKLE FRACTURE Left     SKIN BIOPSY      SKIN CANCER EXCISION      melanoma    WISDOM TOOTH EXTRACTION         Family History   Problem Relation Age of Onset    Diabetes Mother     Heart attack Mother     Hypertension Mother     Hyperlipidemia Mother     Stroke Mother     Hodgkin's lymphoma Father     Hypertension Father     Diabetes Brother     Hyperlipidemia Brother        Social History     Socioeconomic  History    Marital status:     Number of children: 0   Tobacco Use    Smoking status: Former     Packs/day: 0.50     Years: 43.00     Additional pack years: 0.00     Total pack years: 21.50     Types: Cigarettes     Quit date: 2023     Years since quittin.0     Passive exposure: Never    Smokeless tobacco: Never    Tobacco comments:     Pt states that he has been able to stop smoking this month - 2023   Vaping Use    Vaping Use: Never used   Substance and Sexual Activity    Alcohol use: Not Currently     Comment: 2 BEERS PER DAY; hx of ETOH use    Drug use: Yes     Types: Marijuana     Comment: ONCE A MONTH    Sexual activity: Defer       Allergies   Allergen Reactions    Atenolol Other (See Comments)     Bradycardia.    Morphine Itching    Penicillins Hives         Medication:    Current Facility-Administered Medications:     acetaminophen (TYLENOL) tablet 650 mg, 650 mg, Oral, Q4H PRN, Booker Putnam PA-C, 650 mg at 24    amLODIPine (NORVASC) tablet 10 mg, 10 mg, Oral, Daily, Booker Putnam PA-C, 10 mg at 02/15/24 0821    cefepime 1000 mg IVPB in 100 mL NS (MBP), 1,000 mg, Intravenous, Q8H, Warren Pa PharmD, 1,000 mg at 02/15/24 0953    dextrose (D50W) (25 g/50 mL) IV injection 25 g, 25 g, Intravenous, Q15 Min PRN, Booker Putnam PA-C    dextrose (GLUTOSE) oral gel 15 g, 15 g, Oral, Q15 Min PRN, Booker Putnam PA-C    glucagon (GLUCAGEN) injection 1 mg, 1 mg, Intramuscular, Q15 Min PRN, Booker Putnam PA-C    HYDROcodone-acetaminophen (NORCO) 5-325 MG per tablet 1 tablet, 1 tablet, Oral, Q6H PRN, Kate Long APRN, 1 tablet at 02/15/24 0959    insulin detemir (LEVEMIR) injection 5 Units, 5 Units, Subcutaneous, Nightly, Kate Long APRN, 5 Units at 24    Insulin Lispro (humaLOG) injection 3-14 Units, 3-14 Units, Subcutaneous, 4x Daily With Meals & Nightly, Kate Long, APRN, 5 Units at 02/15/24 1138     lisinopril (PRINIVIL,ZESTRIL) tablet 40 mg, 40 mg, Oral, Daily, Booker Putnam PA-C, 40 mg at 02/15/24 0821    melatonin tablet 5 mg, 5 mg, Oral, Nightly PRN, Booker Putnam PA-C    metroNIDAZOLE (FLAGYL) IVPB 500 mg, 500 mg, Intravenous, Q8H, Warren Pa, PharmD, Last Rate: 200 mL/hr at 02/15/24 1606, 500 mg at 02/15/24 1606    ondansetron ODT (ZOFRAN-ODT) disintegrating tablet 4 mg, 4 mg, Oral, Q6H PRN **OR** ondansetron (ZOFRAN) injection 4 mg, 4 mg, Intravenous, Q6H PRN, Booker Putnam PA-C    Pharmacy to dose cefepime, , Does not apply, Continuous PRN, Booker Putnam PA-C    Pharmacy to dose metronidazole, , Does not apply, Continuous PRN, Booker Putnam PA-C    potassium chloride (K-DUR,KLOR-CON) CR tablet 40 mEq, 40 mEq, Oral, Q4H, Kathryn Acosta, DO    Potassium Replacement - Follow Nurse / BPA Driven Protocol, , Does not apply, PRN, Kathryn Acosta P, DO    sodium chloride 0.9 % flush 10 mL, 10 mL, Intravenous, PRN, Bam Cline MD    sodium chloride 0.9 % flush 10 mL, 10 mL, Intravenous, Q12H, Booker Putnam PA-C, 10 mL at 02/14/24 2035    sodium chloride 0.9 % flush 10 mL, 10 mL, Intravenous, PRN, Booker Putnam PA-C    sodium chloride 0.9 % infusion 40 mL, 40 mL, Intravenous, PRN, Booker Putnam PA-C    Antibiotics:  Anti-Infectives (From admission, onward)      Ordered     Dose/Rate Route Frequency Start Stop    02/14/24 0324  metroNIDAZOLE (FLAGYL) IVPB 500 mg        Ordering Provider: BurkybWarren small PharmD    500 mg  200 mL/hr over 30 Minutes Intravenous Every 8 Hours 02/14/24 0800 02/18/24 2359    02/14/24 0351  cefepime 1000 mg IVPB in 100 mL NS (MBP)        Ordering Provider: Warren Pa PharmD    1,000 mg  over 4 Hours Intravenous Every 8 Hours 02/14/24 0800 02/18/24 2359    02/13/24 2307  metroNIDAZOLE (FLAGYL) IVPB 500 mg        Ordering Provider: Jomar Fajardo PA    500 mg  200  mL/hr over 30 Minutes Intravenous Once 24 0127    24 2307  cefepime 2000 mg IVPB in 100 mL NS (MBP)        Ordering Provider: Jomar Fajardo PA    2,000 mg  over 30 Minutes Intravenous Once 24 0051              Review of Systems:  Constitutional-- +  Fever, chills or sweats. Anorexia,  fatigue, weakness.  Unintentional Weight loss since 2023.   HEENT-- No new vision, hearing or throat complaints.  No epistaxis or oral sores.  Denies odynophagia or dysphagia. No headache, photophobia or neck stiffness.  CV-- No chest pain, palpitation or syncope  Resp-- No SOB/cough/Hemoptysis  GI- + nausea, vomiting,  No hematochezia, melena, or hematemesis. Denies jaundice or chronic liver disease.+ abdominal pain radiating across his upper abdomen  -- No dysuria, hematuria, or flank pain.  Denies hesitancy, urgency or flank pain.  Lymph- no swollen lymph nodes in neck/axilla or groin.   Heme- No active bruising or bleeding; no Hx of DVT or PE.  MS-- no swelling or pain in the bones or joints of arms/legs.  No new back pain.  Neuro-- No acute focal weakness or numbness in the arms or legs.  No seizures.  Skin--No rashes or lesions      Physical Exam:   Vital Signs  Temp (24hrs), Av.2 °F (36.8 °C), Min:97.8 °F (36.6 °C), Max:98.7 °F (37.1 °C)    Temp  Min: 97.8 °F (36.6 °C)  Max: 98.7 °F (37.1 °C)  BP  Min: 119/74  Max: 159/79  Pulse  Min: 75  Max: 87  Resp  Min: 16  Max: 18  SpO2  Min: 97 %  Max: 98 %    GENERAL: Awake and alert, in no acute distress. Frail.  Resting in bed  HEENT: Normocephalic, atraumatic.  PERRL. EOMI. No conjunctival injection. No icterus. Oropharynx clear without evidence of thrush or exudate. No evidence of periodontal disease.    NECK: Supple   HEART: RRR; No murmur, rubs, gallops.   LUNGS: Clear to auscultation bilaterally without wheezing, rales, rhonchi. Normal respiratory effort. Nonlabored  ABDOMEN: Soft, Tenderness to palpation over his  entire upper abdomen, nondistended. Positive bowel sounds. No rebound or guarding. NO mass or HSM. Lap sites well healed   EXT:  No cyanosis, clubbing or edema. No cord.  :  Without Villatoro catheter.  MSK: No joint effusions or erythema  SKIN: No generalized rashes noted.  No peripheral stigmata of infective endocarditis noted.  NEURO: Oriented to PPT.  Normal speech and cognition.  Moving all of his extremities well  PSYCHIATRIC: Normal insight and judgment. Cooperative with PE    Laboratory Data    Results from last 7 days   Lab Units 02/15/24  1054 02/14/24  0531 02/13/24  2043   WBC 10*3/mm3 7.25 14.27* 15.09*   HEMOGLOBIN g/dL 12.5* 12.6* 15.5   HEMATOCRIT % 35.6* 36.2* 43.7   PLATELETS 10*3/mm3 207 243 312     Results from last 7 days   Lab Units 02/15/24  1501   SODIUM mmol/L 133*   POTASSIUM mmol/L 3.0*   CHLORIDE mmol/L 102   CO2 mmol/L 23.0   BUN mg/dL 8   CREATININE mg/dL 0.68*   GLUCOSE mg/dL 370*   CALCIUM mg/dL 9.0     Results from last 7 days   Lab Units 02/15/24  1501   ALK PHOS U/L 109   BILIRUBIN mg/dL 0.7   ALT (SGPT) U/L 22   AST (SGOT) U/L 12             Results from last 7 days   Lab Units 02/13/24  2326   LACTATE mmol/L 1.3             Estimated Creatinine Clearance: 107 mL/min (A) (by C-G formula based on SCr of 0.68 mg/dL (L)).      Microbiology:  Blood Culture   Date Value Ref Range Status   02/13/2024 No growth at 24 hours  Preliminary       Radiology:  Imaging Results (Last 72 Hours)       Procedure Component Value Units Date/Time    MRI Abdomen With & Without Contrast [[id number]] Collected: 02/14/24 1344     Updated: 02/14/24 1409    Narrative:      MRI ABDOMEN W WO CONTRAST    Date of Exam: 2/14/2024 12:46 PM EST    Indication: Metastatic disease evaluation, Pancreatic cancer, staging.     Comparison: CT the abdomen and pelvis dated 2/13/2024, 1/26/2024. Prior MRI abdomen dated 1/26/2024    Technique:  Routine multiplanar/multisequence images of the abdomen were obtained before and  after the uneventful administration of 14 mL Multihance.    Findings:  The liver is normal in size and contour. There is no evidence of hepatic steatosis or iron deposition. There is a peripherally enhancing capsular lesion along the anterior aspect of the right hepatic lobe near the junction of segment IVB and V. This   measures 2.4 x 0.9 cm. There is a small focus of gas centrally seen on the prior CT. There are approximately 3 new small subcentimeter liver lesions within the right hepatic lobe of the liver predominately involving segment VI, also with peripheral   enhancement. All of these lesions are new from patient's prior CT and MRI from 1/26/2024. In the setting of interval biliary intervention as well as fever, these are concerning for small hepatic microabscesses. Metastatic disease is possible although   rapid development of lesions these size is felt less likely.    The gallbladder is present without wall thickening. There has been interval decompression of the biliary system compared to the prior MRI. There is a metallic common bile duct stent extending through the common bile duct and into the proximal aspect of   the second portion of the duodenum.    The spleen is normal in size. There is nodularity of the bilateral adrenal glands which demonstrates loss of signal on the out of phase sequence relative to the in phase sequence. These likely represent benign lipid rich adrenal adenomas.    There is an ill-defined hypoenhancing mass within the uncinate process of the pancreas measuring 2.2 x 1.8 cm. This abuts the common bile duct and common bile duct stent. There is no significant pancreatic duct dilation. There is no arterial encasement   or abutment. There is questionable abutment along the inferior margin of the portal splenic confluence.    The kidneys are symmetric in size and enhancement. There is a right upper pole parapelvic cyst which herniates into the renal sinus fat. Multiple additional  tiny punctate cystic foci are present within both kidneys. There is no hydronephrosis. Visualized   portions of the gastrointestinal tract appear normal in caliber. There is trace bilateral pleural effusions. Trace perisplenic ascites.      Impression:      Impression:  1. Small peripheral 2.4 x 0.9 cm capsular lesion along the anterior margin of the right hepatic lobe as well as 3 additional subcentimeter peripherally enhancing lesions within the more central aspect of the right hepatic lobe which are new from prior   imaging dated 1/26/2024. Given the interval rapid development, interval biliary intervention and current elevated white count, these are suspicious for small hepatic microabscesses. Hepatic metastatic disease is a consideration but felt less likely,   mainly due to the relatively rapid development within 2-3 weeks. Recommend short-term imaging follow-up after antibiotic therapy to assess for decrease/resolution.  2. Ill-defined hypoenhancing pancreatic head mass measuring approximately 2.2 cm abutting the main pancreatic duct. No evidence of vascular encasement. Questionable abutment along the undersurface of the portal splenic confluence.  3. Multiple bilateral adrenal nodules which appear to demonstrate loss of signal on the out of phase sequence suggesting lipid rich adrenal adenomas.        Electronically Signed: Miguel Betts    2/14/2024 2:06 PM EST    Workstation ID: [id number]    CT Abdomen Pelvis With Contrast [[id number]] Collected: 02/13/24 2234     Updated: 02/13/24 2250    Narrative:      CT ABDOMEN PELVIS W CONTRAST    Date of Exam: 2/13/2024 10:25 PM EST    Indication: Worsening abdominal pain, known pancreatic mass.    Comparison: 1/26/2024    Technique: Axial CT images were obtained of the abdomen and pelvis following the uneventful intravenous administration of 85 cc Isovue-300. Reconstructed coronal and sagittal images were also obtained. Automated exposure control and iterative    construction methods were used.      Findings:  Visualized Chest: Mild patchy opacities in the posterior aspect of the lower lobes bilaterally. Otherwise unremarkable    Liver: Small hypodensity adjacent to the hepatic capsule in the lateral aspect of the right hepatic lobe containing small locules of gas, measuring approximately 2.9 x 1.1 x 3.0 cm  Additional scattered small hypodensities are noted in the right hepatic lobe. These are all new since prior study.    Gallbladder: Layering hyperdensity within the gallbladder most likely represents biliary sludge. No wall thickening or pericholecystic fluid.    Bile Ducts: A stent is noted within the common bile duct. There is associated pneumobilia.    Spleen: Spleen is normal in size and CT density.    Pancreas: Subtle hypoenhancement of the pancreatic head is once again noted concerning for an underlying pancreatic mass.    Adrenals: Small nodules are noted within the adrenal glands bilaterally, measuring up to 1.5 cm, grossly unchanged from prior study.    Kidneys: Presumed calyceal diverticulum is noted.  Scattered small nonobstructing renal stones are noted on the right. No obstructing stones or hydronephrosis.    Gastrointestinal: No dilated loops of bowel to suggest bowel obstruction. Mild wall thickening of the ascending colon and transverse colon. Mild to moderate stool burden noted in the descending colon and sigmoid colon. Diverticulosis of the descending   colon and sigmoid colon without evidence of acute diverticulitis. Findings suggestive of partial sigmoid colon resection.    Bladder: The bladder is normal.    Pelvis:  No suspecious mass.    Peritoneum/Mesentery: No fluid collection, ascities, or free air.      Lymph Nodes: No lymphadenopathy.    Vasculature: Extensive calcified and noncalcified plaques of the abdominal aorta and iliac arteries. Near occlusion of the right common iliac artery. Patient is status post bifemoral bypass graft. The graft  appears to be patent.    Abdominal Wall: Left upper anterior abdominal wall lipoma. Otherwise unremarkable.    Bony Structures: No acute osseous abnormality      Impression:      Impression:  Findings suggestive of pancreatic head mass status post stenting of the common bile duct.    Subtle, approximately 3 cm, hypodensity noted within the right lateral aspect of the liver adjacent to the hepatic capsule containing a small locule of gas. This may represent the site of prior biopsy. Given the presence of gas, this is concerning for   developing abscess.    Additionally new subtle hypodensities are noted within the right hepatic lobe, which are nonspecific but may represent new metastatic lesions.    Possible colitis of the ascending colon and proximal transverse colon.    Diverticulosis of the descending and sigmoid colon without evidence of acute diverticulitis.    Mild to moderate stool burden throughout the colon is concerning for constipation.    Nonspecific mild patchy opacities in the dependent portions of the lower lobes most likely represents atelectasis, although an infectious/inflammatory process is not excluded.        Electronically Signed: Nile De La Garza DO    2/13/2024 10:47 PM EST    Workstation ID: [id number]    XR Chest 1 View [[id number]] Collected: 02/13/24 2110     Updated: 02/13/24 2114    Narrative:      XR CHEST 1 VW    Date of Exam: 2/13/2024 8:51 PM EST    Indication: Weak/Dizzy/AMS triage protocol    Comparison: Contrast-enhanced chest CT performed on January 28, 2024    Findings:  There are no consolidations.No pleural effusion. There is no evidence of pneumothorax. The pulmonary vasculature appears within normal limits. The cardiac and mediastinal silhouette appear unremarkable. No acute osseous abnormality identified. Old right   rib fractures are visualized.      Impression:      Impression:  No radiographic evidence of acute chest process.      Electronically Signed: Alphonso Duran MD     2/13/2024 9:11 PM EST    Workstation ID: [id number]        02/08/2024  pathology    DIAGNOSIS:  PERITONEAL FLUID:  Negative for malignant cells.    MICROSCOPIC DESCRIPTION:  Scattered benign mesothelial cells and mixed inflammatory cells are present  within a sanguineous background.    2/7/24:   Final Diagnosis   1) LIVER LESION, BIOPSY:  Subcapsular liver parenchyma with mild cholestasis otherwise no significant pathologic abnormality, see comment  No evidence of malignancy identified     I (Dr. Lo) Independently read the patient's radiographs    Impression:   Hepatic microabscesses, noted on MRI right hepatic lobe, Multiple peripheral lesions in the right hepatic lobe that are new since 1/26/24.  Less likely metastases given the rapid development in 2-3 weeks. Especially concerning for abscesses in the setting of his recent reported fever up to 102F at home, leukocytosis, and chills.  Leukocytosis with neutrophilia-improving  Pancreatic mass s/p ERCP with stent placement  Diabetes Mellitus, type 2, poorly controlled with A1C 9  History of alcohol abuse   History of penicillin allergy-hives      PLAN/RECOMMENDATIONS:   Thank you for asking us to see Costa Robbins, I recommend the following:  - Continue Cefepime but increased dosing to 2g IV q8h  - Continue flagyl but changed from IV formulation to 500 mg PO TID   - Continue to monitor CBC and temperature curve  - Abscesses likely not amenable to drainage    I will tentatively plan for at least 2-3 weeks of IV/PO antibiotic therapy with repeat CT A/P with IV contrast in about 2 weeks to reassess hepatic abscesses. The patient will think about whether he want like to infuse at home with a PICC line or present to the infusion center once a day with peripheral IV.     Tentative antibiotic plan below provided that he infuses at home through a PICC line.    UM/REINA:  Cefepime 2g IV q8h. Anticipate continuing this antibiotic at least until 2/29/24 and then  reassess  Flagyl 500 mg PO TID. Please give a 2 week supply at the time of discharge  Weekly cbc, cmp, crp  Change the PICC line dressing weekly with a Biopatch or Tegaderm CHG gel dressing   Attn [facility] staff: please schedule the patient for a repeat CT A/P with IV contrast at [facility] in about 2 weeks (can be just prior to his appointment on 2/29/24)  Follow up in my clinic on 2/29/24. Clinic to call with the appointment time  Please fax a copy of my orders to [facility] at [phone] and call [phone] with final discharge plans     Dr. Lo has obtained the history, performed the PE, formulated the above treatment plan       Hans Lo MD  2/15/2024  16:21 EST

## 2024-02-15 NOTE — PROGRESS NOTES
"Patient Name:  Costa Robbins  YOB: 1955  [id number]    Surgery Progress Note    Date of visit: 2/15/2024    Subjective   Having increased left upper quadrant abdominal pain today.  He describes his pain as crampy and rating to the right side.  He continues to have his previous although epigastric abdominal pain.    MRCP obtained yesterday showed that lesions were more consistent with microabscesses from subclinical cholangitis.    No other acute changes     Objective       /81 (BP Location: Right arm, Patient Position: Lying)   Pulse 76   Temp 98.1 °F (36.7 °C) (Oral)   Resp 18   Ht 180.3 cm (71\")   Wt 70.8 kg (156 lb)   SpO2 97%   BMI 21.76 kg/m²     Intake/Output Summary (Last 24 hours) at 2/15/2024 1051  Last data filed at 2/15/2024 0948  Gross per 24 hour   Intake 1495 ml   Output --   Net 1495 ml       CV:  Rhythm regular and rate regular  L:  Clear to auscultation bilaterally  Abd:  Soft, nondistended, mildly tenderSoft, non-distended, non-tender  Ext:  No cyanosis, clubbing, edema    Recent labs and imaging that are back at this time have been reviewed.     Labs:    Results from last 7 days   Lab Units 02/14/24  0531   WBC 10*3/mm3 14.27*   HEMOGLOBIN g/dL 12.6*   HEMATOCRIT % 36.2*   PLATELETS 10*3/mm3 243     Results from last 7 days   Lab Units 02/14/24  0531 02/13/24  2043   SODIUM mmol/L 136 133*   POTASSIUM mmol/L 4.6 3.9   CHLORIDE mmol/L 102 94*   CO2 mmol/L 14.0* 20.0*   BUN mg/dL 15 12   CREATININE mg/dL 0.72* 1.02   CALCIUM mg/dL 9.8 11.3*   BILIRUBIN mg/dL  --  1.2   ALK PHOS U/L  --  182*   ALT (SGPT) U/L  --  31   AST (SGOT) U/L  --  15   GLUCOSE mg/dL 235* 310*     Results from last 7 days   Lab Units 02/14/24  0531   SODIUM mmol/L 136   POTASSIUM mmol/L 4.6   CHLORIDE mmol/L 102   CO2 mmol/L 14.0*   BUN mg/dL 15   CREATININE mg/dL 0.72*   GLUCOSE mg/dL 235*   CALCIUM mg/dL 9.8     Lab Results   Lab Value Date/Time    LIPASE 96 (H) 02/13/2024 2043    LIPASE 268 " (H) 01/26/2024 1632    LIPASE 70 (H) 05/16/2022 0957            Assessment & Plan     Problem List Items Addressed This Visit       Pancreatic mass - Primary     Other Visit Diagnoses       Intra-abdominal abscess        Abnormal CT scan, liver        History of biliary duct stent placement                Mr. Costa Robbins is a 68-year-old gentleman w/ a recently found pancreatic mass s/p ERCP with metallic stent placement and diagnostic laparoscopy with benign liver biopsies presenting with cholangitis and microabscesses within the right lobe of the liver.    - MRCP suggests sequelae of infection rather than metastatic disease  - Plan will be treatment of cholangitis followed by [facility], likely in early March  - Will follow-up CT scan outpatient after treatment to ensure liver lesions are resolved  - Okay for diet  - Continue antibiotics per ID  - Pain control and medical management per hospitalist team, appreciate assistance in management  - Will continue to follow peripherally, please call with questions or concerns    Fabián Maya MD  2/15/2024  10:51 EST

## 2024-02-15 NOTE — PLAN OF CARE
Goal Outcome Evaluation:  Plan of Care Reviewed With: patient   VSS, A&Ox4  PRN medication given for pain  IV abx infusing  D5 stopped due to increased blood glucose     Progress: no change

## 2024-02-15 NOTE — PROGRESS NOTES
[facility]  PROGRESS NOTE    Patient Name: Costa Robbins  : 1955  MRN: [id number]    Date of Admission: 2024  Primary Care Physician: Jonathan Conner MD    Subjective   Subjective     CC: fever and abd pain     HPI:    Patient resting in bed with ongoing LLQ pain. He states this has been ongoing for the last few weeks. Patient states he has a good pain tolerance but thinks he may need to take something for pain.      Objective   Objective     Vital Signs:   Temp:  [97.8 °F (36.6 °C)-98.7 °F (37.1 °C)] 98.1 °F (36.7 °C)  Heart Rate:  [75-84] 76  Resp:  [16-18] 18  BP: (119-169)/(68-87) 142/81     Physical Exam:  Constitutional: No acute distress, awake, alert  HENT: NCAT, mucous membranes moist  Respiratory: Clear to auscultation bilaterally, respiratory effort normal   Cardiovascular: RRR, no murmurs, rubs, or gallops  Gastrointestinal: Positive bowel sounds, soft, LLQ pain, nondistended  Musculoskeletal: No bilateral ankle edema  Psychiatric: Appropriate affect, cooperative  Neurologic: Oriented x 3, strength symmetric in all extremities, Cranial Nerves grossly intact to confrontation, speech clear  Skin: No rashes      Results Reviewed:  LAB RESULTS:      Lab 24  0531 24  2326 24   WBC 14.27*  --  15.09*   HEMOGLOBIN 12.6*  --  15.5   HEMATOCRIT 36.2*  --  43.7   PLATELETS 243  --  312   NEUTROS ABS 13.10*  --  13.81*   IMMATURE GRANS (ABS) 0.08*  --  0.10*   LYMPHS ABS 0.40*  --  0.29*   MONOS ABS 0.67  --  0.85   EOS ABS 0.00  --  0.00   MCV 91.6  --  91.2   LACTATE  --  1.3  --          Lab 24  0531 24   SODIUM 136 133*   POTASSIUM 4.6 3.9   CHLORIDE 102 94*   CO2 14.0* 20.0*   ANION GAP 20.0* 19.0*   BUN 15 12   CREATININE 0.72* 1.02   EGFR 100.8 81.1   GLUCOSE 235* 310*   CALCIUM 9.8 11.3*   IONIZED CALCIUM 1.43*  --    MAGNESIUM  --  1.9   HEMOGLOBIN A1C 9.20*  --          Lab 24   TOTAL  PROTEIN 7.2   ALBUMIN 4.2   GLOBULIN 3.0   ALT (SGPT) 31   AST (SGOT) 15   BILIRUBIN 1.2   ALK PHOS 182*   LIPASE 96*         Lab 02/13/24 2043   HSTROP T 22*                 Lab 02/13/24 2158   PH, ARTERIAL 7.448   PCO2, ARTERIAL 26.8*   PO2 ART 83.0   FIO2 21   HCO3 ART 18.5*   BASE EXCESS ART -4.1*   CARBOXYHEMOGLOBIN 1.4     Brief Urine Lab Results  (Last result in the past 365 days)        Color   Clarity   Blood   Leuk Est   Nitrite   Protein   CREAT   Urine HCG        01/26/24 1639 Dark Yellow   Clear   Negative   Negative   Negative   Negative                   Microbiology Results Abnormal       Procedure Component Value - Date/Time    Blood Culture - Blood, Arm, Left [[id number]]  (Normal) Collected: 02/13/24 2337    Lab Status: Preliminary result Specimen: Blood from Arm, Left Updated: 02/15/24 0046     Blood Culture No growth at 24 hours    Narrative:      Less than seven (7) mL's of blood was collected.  Insufficient quantity may yield false negative results.    Blood Culture - Blood, Arm, Left [[id number]]  (Normal) Collected: 02/13/24 2326    Lab Status: Preliminary result Specimen: Blood from Arm, Left Updated: 02/14/24 2346     Blood Culture No growth at 24 hours    Narrative:      Less than seven (7) mL's of blood was collected.  Insufficient quantity may yield false negative results.            MRI Abdomen With & Without Contrast    Result Date: 2/14/2024  MRI ABDOMEN W WO CONTRAST Date of Exam: 2/14/2024 12:46 PM EST Indication: Metastatic disease evaluation, Pancreatic cancer, staging.  Comparison: CT the abdomen and pelvis dated 2/13/2024, 1/26/2024. Prior MRI abdomen dated 1/26/2024 Technique:  Routine multiplanar/multisequence images of the abdomen were obtained before and after the uneventful administration of 14 mL Multihance. Findings: The liver is normal in size and contour. There is no evidence of hepatic steatosis or iron deposition. There is a peripherally enhancing capsular lesion  along the anterior aspect of the right hepatic lobe near the junction of segment IVB and V. This measures 2.4 x 0.9 cm. There is a small focus of gas centrally seen on the prior CT. There are approximately 3 new small subcentimeter liver lesions within the right hepatic lobe of the liver predominately involving segment VI, also with peripheral enhancement. All of these lesions are new from patient's prior CT and MRI from 1/26/2024. In the setting of interval biliary intervention as well as fever, these are concerning for small hepatic microabscesses. Metastatic disease is possible although rapid development of lesions these size is felt less likely. The gallbladder is present without wall thickening. There has been interval decompression of the biliary system compared to the prior MRI. There is a metallic common bile duct stent extending through the common bile duct and into the proximal aspect of the second portion of the duodenum. The spleen is normal in size. There is nodularity of the bilateral adrenal glands which demonstrates loss of signal on the out of phase sequence relative to the in phase sequence. These likely represent benign lipid rich adrenal adenomas. There is an ill-defined hypoenhancing mass within the uncinate process of the pancreas measuring 2.2 x 1.8 cm. This abuts the common bile duct and common bile duct stent. There is no significant pancreatic duct dilation. There is no arterial encasement or abutment. There is questionable abutment along the inferior margin of the portal splenic confluence. The kidneys are symmetric in size and enhancement. There is a right upper pole parapelvic cyst which herniates into the renal sinus fat. Multiple additional tiny punctate cystic foci are present within both kidneys. There is no hydronephrosis. Visualized  portions of the gastrointestinal tract appear normal in caliber. There is trace bilateral pleural effusions. Trace perisplenic ascites.      Impression: Impression: 1. Small peripheral 2.4 x 0.9 cm capsular lesion along the anterior margin of the right hepatic lobe as well as 3 additional subcentimeter peripherally enhancing lesions within the more central aspect of the right hepatic lobe which are new from prior imaging dated 1/26/2024. Given the interval rapid development, interval biliary intervention and current elevated white count, these are suspicious for small hepatic microabscesses. Hepatic metastatic disease is a consideration but felt less likely, mainly due to the relatively rapid development within 2-3 weeks. Recommend short-term imaging follow-up after antibiotic therapy to assess for decrease/resolution. 2. Ill-defined hypoenhancing pancreatic head mass measuring approximately 2.2 cm abutting the main pancreatic duct. No evidence of vascular encasement. Questionable abutment along the undersurface of the portal splenic confluence. 3. Multiple bilateral adrenal nodules which appear to demonstrate loss of signal on the out of phase sequence suggesting lipid rich adrenal adenomas. Electronically Signed: Miguel Betts  2/14/2024 2:06 PM EST  Workstation ID: [id number]    CT Abdomen Pelvis With Contrast    Result Date: 2/13/2024  CT ABDOMEN PELVIS W CONTRAST Date of Exam: 2/13/2024 10:25 PM EST Indication: Worsening abdominal pain, known pancreatic mass. Comparison: 1/26/2024 Technique: Axial CT images were obtained of the abdomen and pelvis following the uneventful intravenous administration of 85 cc Isovue-300. Reconstructed coronal and sagittal images were also obtained. Automated exposure control and iterative construction methods were used. Findings: Visualized Chest: Mild patchy opacities in the posterior aspect of the lower lobes bilaterally. Otherwise unremarkable Liver: Small hypodensity adjacent to the hepatic capsule in the lateral aspect of the right hepatic lobe containing small locules of gas, measuring approximately 2.9 x 1.1  x 3.0 cm Additional scattered small hypodensities are noted in the right hepatic lobe. These are all new since prior study. Gallbladder: Layering hyperdensity within the gallbladder most likely represents biliary sludge. No wall thickening or pericholecystic fluid. Bile Ducts: A stent is noted within the common bile duct. There is associated pneumobilia. Spleen: Spleen is normal in size and CT density. Pancreas: Subtle hypoenhancement of the pancreatic head is once again noted concerning for an underlying pancreatic mass. Adrenals: Small nodules are noted within the adrenal glands bilaterally, measuring up to 1.5 cm, grossly unchanged from prior study. Kidneys: Presumed calyceal diverticulum is noted. Scattered small nonobstructing renal stones are noted on the right. No obstructing stones or hydronephrosis. Gastrointestinal: No dilated loops of bowel to suggest bowel obstruction. Mild wall thickening of the ascending colon and transverse colon. Mild to moderate stool burden noted in the descending colon and sigmoid colon. Diverticulosis of the descending colon and sigmoid colon without evidence of acute diverticulitis. Findings suggestive of partial sigmoid colon resection. Bladder: The bladder is normal. Pelvis:  No suspecious mass. Peritoneum/Mesentery: No fluid collection, ascities, or free air.   Lymph Nodes: No lymphadenopathy. Vasculature: Extensive calcified and noncalcified plaques of the abdominal aorta and iliac arteries. Near occlusion of the right common iliac artery. Patient is status post bifemoral bypass graft. The graft appears to be patent. Abdominal Wall: Left upper anterior abdominal wall lipoma. Otherwise unremarkable. Bony Structures: No acute osseous abnormality     Impression: Impression: Findings suggestive of pancreatic head mass status post stenting of the common bile duct. Subtle, approximately 3 cm, hypodensity noted within the right lateral aspect of the liver adjacent to the hepatic  capsule containing a small locule of gas. This may represent the site of prior biopsy. Given the presence of gas, this is concerning for developing abscess. Additionally new subtle hypodensities are noted within the right hepatic lobe, which are nonspecific but may represent new metastatic lesions. Possible colitis of the ascending colon and proximal transverse colon. Diverticulosis of the descending and sigmoid colon without evidence of acute diverticulitis. Mild to moderate stool burden throughout the colon is concerning for constipation. Nonspecific mild patchy opacities in the dependent portions of the lower lobes most likely represents atelectasis, although an infectious/inflammatory process is not excluded. Electronically Signed: Nile De La Garza DO  2/13/2024 10:47 PM EST  Workstation ID: [id number]    XR Chest 1 View    Result Date: 2/13/2024  XR CHEST 1 VW Date of Exam: 2/13/2024 8:51 PM EST Indication: Weak/Dizzy/AMS triage protocol Comparison: Contrast-enhanced chest CT performed on January 28, 2024 Findings: There are no consolidations.No pleural effusion. There is no evidence of pneumothorax. The pulmonary vasculature appears within normal limits. The cardiac and mediastinal silhouette appear unremarkable. No acute osseous abnormality identified. Old right rib fractures are visualized.     Impression: Impression: No radiographic evidence of acute chest process. Electronically Signed: Alphonso Duran MD  2/13/2024 9:11 PM EST  Workstation ID: [id number]     Results for orders placed during the hospital encounter of 01/26/24    Adult Transthoracic Echo Complete W/ Cont if Necessary Per Protocol    Interpretation Summary    Left ventricular systolic function is hyperdynamic (EF > 70%). Left ventricular ejection fraction appears to be greater than 70%.    Left ventricular wall thickness is consistent with borderline concentric hypertrophy.    Provoked left ventricular mean pressure gradient of 10 mmHg.    Left  ventricular diastolic function is consistent with (grade I) impaired relaxation.      Current medications:  Scheduled Meds:amLODIPine, 10 mg, Oral, Daily  cefepime, 1,000 mg, Intravenous, Q8H  insulin detemir, 5 Units, Subcutaneous, Nightly  insulin lispro, 3-14 Units, Subcutaneous, 4x Daily With Meals & Nightly  lisinopril, 40 mg, Oral, Daily  metroNIDAZOLE, 500 mg, Intravenous, Q8H  sodium chloride, 10 mL, Intravenous, Q12H      Continuous Infusions:dextrose 5 % and sodium chloride 0.9 %, 100 mL/hr, Last Rate: 100 mL/hr (02/15/24 0953)  Pharmacy Consult - Pharmacy to dose,   Pharmacy Consult - Pharmacy to dose,       PRN Meds:.  acetaminophen    dextrose    dextrose    glucagon (human recombinant)    HYDROcodone-acetaminophen    melatonin    ondansetron ODT **OR** ondansetron    Pharmacy Consult - Pharmacy to dose    Pharmacy Consult - Pharmacy to dose    sodium chloride    sodium chloride    sodium chloride    Assessment & Plan   Assessment & Plan     Active Hospital Problems    Diagnosis  POA    **Hepatic abscess [K75.0]  Yes    Hypercalcemia [E83.52]  Yes    Pancreatic mass [K86.89]  Yes    Mixed hyperlipidemia [E78.2]  Yes    T2DM (type 2 diabetes mellitus) [E11.9]  Yes    Essential hypertension [I10]  Yes      Resolved Hospital Problems   No resolved problems to display.        Brief Hospital Course to date:  Costa Robbins is a 68 y.o. male  with a history of pancreatic mass, HTN, HLD, T2DM, and alcohol abuse, admitted for several weeks of nausea vomiting and abd pain, then developed temp to 102F.    This patient's problems and plans were partially entered by my partner and updated as appropriate by me 02/15/24.    All problems are new to me today.    New fever, progressive abd pain  Pancreatic mass, no tissue dx yet   ERCP/stent on 1/27/23  - pain is likely from progressive CA and newly found abscesses.  - small fluid collection at liver bx site:  normal serous collection vs small abscess; continue  empiric abx for now  - Dr. Maay following  -MRCP suggests sequelae of infection rather than metastatic disease  - Plan will be treatment of cholangitis followed by [facility], likely in early March  - Will follow-up CT scan outpatient after treatment to ensure liver lesions are resolved  - ID consulted and following- continue Cefepime and Flagyl    Metabolic acidosis, gap   Resp alkalosis   Dehydration   - nl lactate.  Suspect the ABG is not reliable, though he may have pain-induced hyperventilation .    - HR has normalized     Hypercalcemia   - improving with fluids ; continue fluids     PAD, stable.   - sees CT surg; history of LLE stent and RLE bypass    Expected Discharge Location and Transportation: home by car  Expected Discharge   Expected Discharge Date: 2/17/2024; Expected Discharge Time:      DVT prophylaxis:  Mechanical DVT prophylaxis orders are present.         AM-PAC 6 Clicks Score (PT): 24 (02/15/24 0821)    CODE STATUS:   Code Status and Medical Interventions:   Ordered at: 02/14/24 0247     Medical Intervention Limits:    NO intubation (DNI)     Code Status (Patient has no pulse and is not breathing):    No CPR (Do Not Attempt to Resuscitate)     Medical Interventions (Patient has pulse or is breathing):    Limited Support       Kathryn Acosta, DO  02/15/24

## 2024-02-15 NOTE — PROGRESS NOTES
Clinical Nutrition     Reason for Visit: Identified at risk by screening criteria, Malnutrition Severity Assessment, Physician consult      Patient Name: Costa Robbins  YOB: 1955  MRN: [id number]  Date of Encounter: 02/15/24 16:26 EST  Admission date: 2/13/2024    Nutrition Assessment   Admission Diagnosis:  Hepatic abscess [K75.0]      Problem List:    Hepatic abscess    T2DM (type 2 diabetes mellitus)    Essential hypertension    Mixed hyperlipidemia    Pancreatic mass    Hypercalcemia        PMH:   He  has a past medical history of Bile duct obstruction (01/27/2024), COVID (2021), Diabetes mellitus (2017), Diverticulitis (2009), Enlarged prostate, ETOH use (05/25/2023), Former smoker (05/25/2023), Hearing decreased, Hypertension, Melanoma, Pancreatic mass (01/27/2024), PVD (peripheral vascular disease), Unintentional weight loss, and Wears glasses.    PSH:  He  has a past surgical history that includes Skin cancer excision; Colectomy (05/2009); ORIF ankle fracture (Left); Colonoscopy; Aortagram (Bilateral, 04/12/2023); Femoral Femoral Bypass (N/A, 05/25/2023); Femoral Endarterectomy (Left, 05/25/2023); ERCP (N/A, 01/27/2024); Skin biopsy; [place] tooth extraction; and Laparoscopy (N/A, 2/7/2024).      Applicable Nutrition Concerns:   Skin:wnl  GI:abdominal pain      Reported/Observed/Food/Nutrition Related History:     Pt resting in bed, c/o abdominal pain, reports UBW was ~195-198lb's , started noticing wt loss in November of last year during deer season, around [place] he was down to 185lb's. really noticed drastic wt loss after New Year's, now down to 156lb's, has been trying to eat 5-6 little meals per day, his wife makes milkshakes for him using protein powder      Labs    Labs Reviewed: Yes     Results from last 7 days   Lab Units 02/15/24  1501 02/14/24  0531 02/13/24  2326 02/13/24  2043   GLUCOSE mg/dL 370* 235*  --  310*   BUN mg/dL 8 15  --  12   CREATININE mg/dL  "0.68* 0.72*  --  1.02   SODIUM mmol/L 133* 136  --  133*   CHLORIDE mmol/L 102 102  --  94*   POTASSIUM mmol/L 3.0* 4.6  --  3.9   MAGNESIUM mg/dL  --   --   --  1.9   ALT (SGPT) U/L 22  --   --  31   LACTATE mmol/L  --   --  1.3  --        Results from last 7 days   Lab Units 02/15/24  1501 02/14/24  0531 02/13/24  2043   ALBUMIN g/dL 2.9*  --  4.2   IONIZED CALCIUM mmol/L  --  1.43*  --        Results from last 7 days   Lab Units 02/15/24  1122 02/15/24  0809 02/14/24  1943 02/14/24  1709 02/14/24  1157 02/14/24  0751   GLUCOSE mg/dL 224* 291* 520* 322* 196* 223*     Lab Results   Lab Value Date/Time    HGBA1C 9.20 (H) 02/14/2024 0531    HGBA1C 10.10 (H) 01/27/2024 0542                 Medications    Medications Reviewed: Yes  Pertinent: abx, insulin, flagyl  Infusion:D5%NS@100ml      Intake/Ouptut 24 hrs (0701 - 0700)   I&O's Reviewed: Yes     Intake & Output (last day)         02/14 0701  02/15 0700 02/15 0701 02/16 0700    P.O. 480 480    I.V. (mL/kg) 775 (10.9)     IV Piggyback      Total Intake(mL/kg) 1255 (17.7) 480 (6.8)    Net +1255 +480          Urine Unmeasured Occurrence 1 x 2 x    Stool Unmeasured Occurrence 1 x               Anthropometrics     Flowsheet Rows      Flowsheet Row First Filed Value   Admission Height 180.3 cm (71\") Documented at 02/13/2024 2024   Admission Weight 70.3 kg (155 lb) Documented at 02/13/2024 2024          Height: Height: 180.3 cm (71\")  Last Filed Weight: Weight: 70.8 kg (156 lb) (02/14/24 0245)  Method: Weight Method: Stated  BMI: BMI (Calculated): 21.8  BMI classification: Normal: 18.5-24.9kg/m2      UBW: 195-198lb    Weight change: 42lb wt loss over 8 months, 21% wt loss     Weight      Weight (kg) Weight (lbs) Weight Method   7/28/2021 87.998 kg  194 lb     8/6/2021 89.54 kg  197 lb 6.4 oz     1/28/2022 96.616 kg  213 lb     5/16/2022 88.678 kg  195 lb 8 oz     8/5/2022 89.359 kg  197 lb     9/1/2022 90.084 kg  198 lb 9.6 oz     9/8/2022 91.082 kg  200 lb 12.8 oz   "   9/9/2022 90.719 kg  200 lb      90.719 kg  200 lb     9/28/2022 92.171 kg  203 lb 3.2 oz     10/27/2022 91.082 kg  200 lb 12.8 oz     1/18/2023 90.266 kg  199 lb     2/20/2023 87.907 kg  193 lb 12.8 oz     3/6/2023 88.905 kg  196 lb     3/20/2023 92.08 kg  203 lb     4/7/2023 90 kg  198 lb 6.6 oz  Standing scale    4/12/2023 90 kg  198 lb 6.6 oz     5/15/2023 88.35 kg  194 lb 12.4 oz  Standing scale    5/25/2023 88.35 kg  194 lb 12.4 oz     5/26/2023 92 kg  202 lb 13.2 oz  Bed scale    5/27/2023 92 kg  202 lb 13.2 oz     6/21/2023 89.812 kg  198 lb     7/19/2023 86.637 kg  191 lb     1/26/2024 72.576 kg  160 lb     1/27/2024 76.25 kg  168 lb 1.6 oz  Bed scale     76.25 kg  168 lb 1.6 oz  Bed scale    2/6/2024 72.122 kg  159 lb  Stated    2/7/2024 72.122 kg  159 lb  Stated    2/13/2024 70.308 kg  155 lb  Stated    2/14/2024 70.761 kg  156 lb           Nutrition Focused Physical Exam     Date: 2-15-24    Unable to perform exam due to: Patient/family declined    Pt meets criteria for severe chronic malnutrition with po intake < 75%, 42lb wt loss over 8 months, 21% wt loss    Current Nutrition Prescription     PO: Diet: Gastrointestinal Diets; Fat-Restricted; Texture: Regular Texture (IDDSI 7); Fluid Consistency: Thin (IDDSI 0)  Oral Nutrition Supplement:   Intake:  31% of 4 meals      Nutrition Diagnosis   Date: 2-16-24 Updated:   Problem Malnutrition    Etiology Pancreatic Mass   Signs/Symptoms Po intake < 75%, 42lb wt loss over 8 months, 21% wt loss     Goal:   General: Nutrition to support treatment  PO: Tolerate PO, Increase intake  EN/PN: N/A    Nutrition Intervention      Follow treatment progress, Care plan reviewed, Advised available snacks, Interview for preferences, Menu adjusted, Supplement provided    Yogurt each am    Boost GC 2x/day    RD to f/u for DM Education when appropriate    Suspect glucose control will be especially difficult 2nd pancreatic dysfunction    Pt meets criteria for severe chronic  malnutrition with po intake < 50%, 42lb wt loss over 8 months, 21% wt loss      Monitoring/Evaluation:   Per protocol, I&O, PO intake, Supplement intake, Pertinent labs, Weight, Skin status, GI status, Symptoms      Heavenly Kerns, CARMEN  Time Spent: 45min

## 2024-02-15 NOTE — PROGRESS NOTES
Malnutrition Severity Assessment    Patient Name:  Costa Robbins  YOB: 1955  MRN: [id number]  Admit Date:  2/13/2024    Patient meets criteria for : Severe Malnutrition      Malnutrition Severity Assessment  Malnutrition Type: Chronic Disease - Related Malnutrition  Malnutrition Type (last 8 hours)       Malnutrition Severity Assessment       Row Name 02/15/24 1638       Malnutrition Severity Assessment    Malnutrition Type Chronic Disease - Related Malnutrition      Row Name 02/15/24 1638       Insufficient Energy Intake     Insufficient Energy Intake Findings Severe    Insufficient Energy Intake  <75% of est. energy requirement for > or equal to 3 months      Row Name 02/15/24 1638       Unintentional Weight Loss     Unintentional Weight Loss Findings Severe  42lb wt loss over 8 months, 21% wt loss      Row Name 02/15/24 1638       Criteria Met (Must meet criteria for severity in at least 2 of these categories: M Wasting, Fat Loss, Fluid, Secondary Signs, Wt. Status, Intake)    Patient meets criteria for  Severe Malnutrition                    Electronically signed by:  Heavenly Kerns RD  02/15/24 16:40 EST

## 2024-02-16 LAB
ANION GAP SERPL CALCULATED.3IONS-SCNC: 9 MMOL/L (ref 5–15)
BASOPHILS # BLD AUTO: 0.02 10*3/MM3 (ref 0–0.2)
BASOPHILS NFR BLD AUTO: 0.3 % (ref 0–1.5)
BUN SERPL-MCNC: 10 MG/DL (ref 8–23)
BUN/CREAT SERPL: 16.7 (ref 7–25)
CALCIUM SPEC-SCNC: 9.8 MG/DL (ref 8.6–10.5)
CHLORIDE SERPL-SCNC: 105 MMOL/L (ref 98–107)
CO2 SERPL-SCNC: 23 MMOL/L (ref 22–29)
CREAT SERPL-MCNC: 0.6 MG/DL (ref 0.76–1.27)
DEPRECATED RDW RBC AUTO: 46.5 FL (ref 37–54)
EGFRCR SERPLBLD CKD-EPI 2021: 106.5 ML/MIN/1.73
EOSINOPHIL # BLD AUTO: 0.06 10*3/MM3 (ref 0–0.4)
EOSINOPHIL NFR BLD AUTO: 0.8 % (ref 0.3–6.2)
ERYTHROCYTE [DISTWIDTH] IN BLOOD BY AUTOMATED COUNT: 14.1 % (ref 12.3–15.4)
GLUCOSE BLDC GLUCOMTR-MCNC: 146 MG/DL (ref 70–130)
GLUCOSE BLDC GLUCOMTR-MCNC: 229 MG/DL (ref 70–130)
GLUCOSE BLDC GLUCOMTR-MCNC: 255 MG/DL (ref 70–130)
GLUCOSE BLDC GLUCOMTR-MCNC: 257 MG/DL (ref 70–130)
GLUCOSE SERPL-MCNC: 232 MG/DL (ref 65–99)
HCT VFR BLD AUTO: 36.8 % (ref 37.5–51)
HGB BLD-MCNC: 12.7 G/DL (ref 13–17.7)
IMM GRANULOCYTES # BLD AUTO: 0.03 10*3/MM3 (ref 0–0.05)
IMM GRANULOCYTES NFR BLD AUTO: 0.4 % (ref 0–0.5)
LYMPHOCYTES # BLD AUTO: 0.63 10*3/MM3 (ref 0.7–3.1)
LYMPHOCYTES NFR BLD AUTO: 8.5 % (ref 19.6–45.3)
MCH RBC QN AUTO: 31 PG (ref 26.6–33)
MCHC RBC AUTO-ENTMCNC: 34.5 G/DL (ref 31.5–35.7)
MCV RBC AUTO: 89.8 FL (ref 79–97)
MONOCYTES # BLD AUTO: 0.64 10*3/MM3 (ref 0.1–0.9)
MONOCYTES NFR BLD AUTO: 8.7 % (ref 5–12)
NEUTROPHILS NFR BLD AUTO: 6 10*3/MM3 (ref 1.7–7)
NEUTROPHILS NFR BLD AUTO: 81.3 % (ref 42.7–76)
NRBC BLD AUTO-RTO: 0 /100 WBC (ref 0–0.2)
PLATELET # BLD AUTO: 193 10*3/MM3 (ref 140–450)
PMV BLD AUTO: 10.9 FL (ref 6–12)
POTASSIUM SERPL-SCNC: 4.4 MMOL/L (ref 3.5–5.2)
QT INTERVAL: 282 MS
QTC INTERVAL: 395 MS
RBC # BLD AUTO: 4.1 10*6/MM3 (ref 4.14–5.8)
SODIUM SERPL-SCNC: 137 MMOL/L (ref 136–145)
WBC NRBC COR # BLD AUTO: 7.38 10*3/MM3 (ref 3.4–10.8)

## 2024-02-16 PROCEDURE — 63710000001 INSULIN LISPRO (HUMAN) PER 5 UNITS: Performed by: NURSE PRACTITIONER

## 2024-02-16 PROCEDURE — 85025 COMPLETE CBC W/AUTO DIFF WBC: CPT | Performed by: PHYSICIAN ASSISTANT

## 2024-02-16 PROCEDURE — 25010000002 CEFEPIME PER 500 MG: Performed by: INTERNAL MEDICINE

## 2024-02-16 PROCEDURE — 82948 REAGENT STRIP/BLOOD GLUCOSE: CPT

## 2024-02-16 PROCEDURE — 99232 SBSQ HOSP IP/OBS MODERATE 35: CPT | Performed by: NURSE PRACTITIONER

## 2024-02-16 PROCEDURE — 25010000002 HYDROMORPHONE PER 4 MG: Performed by: INTERNAL MEDICINE

## 2024-02-16 PROCEDURE — 80048 BASIC METABOLIC PNL TOTAL CA: CPT | Performed by: PHYSICIAN ASSISTANT

## 2024-02-16 RX ORDER — HYDROCODONE BITARTRATE AND ACETAMINOPHEN 5; 325 MG/1; MG/1
1 TABLET ORAL EVERY 4 HOURS PRN
Status: DISCONTINUED | OUTPATIENT
Start: 2024-02-16 | End: 2024-02-18

## 2024-02-16 RX ORDER — BISACODYL 5 MG/1
5 TABLET, DELAYED RELEASE ORAL DAILY PRN
Status: DISCONTINUED | OUTPATIENT
Start: 2024-02-16 | End: 2024-02-19 | Stop reason: HOSPADM

## 2024-02-16 RX ORDER — AMOXICILLIN 250 MG
2 CAPSULE ORAL 2 TIMES DAILY
Status: DISCONTINUED | OUTPATIENT
Start: 2024-02-16 | End: 2024-02-19 | Stop reason: HOSPADM

## 2024-02-16 RX ORDER — POLYETHYLENE GLYCOL 3350 17 G/17G
17 POWDER, FOR SOLUTION ORAL DAILY PRN
Status: DISCONTINUED | OUTPATIENT
Start: 2024-02-16 | End: 2024-02-19 | Stop reason: HOSPADM

## 2024-02-16 RX ORDER — BISACODYL 10 MG
10 SUPPOSITORY, RECTAL RECTAL DAILY PRN
Status: DISCONTINUED | OUTPATIENT
Start: 2024-02-16 | End: 2024-02-19 | Stop reason: HOSPADM

## 2024-02-16 RX ADMIN — CEFEPIME 2000 MG: 2 INJECTION, POWDER, FOR SOLUTION INTRAVENOUS at 16:06

## 2024-02-16 RX ADMIN — HYDROMORPHONE HYDROCHLORIDE 0.5 MG: 1 INJECTION, SOLUTION INTRAMUSCULAR; INTRAVENOUS; SUBCUTANEOUS at 09:11

## 2024-02-16 RX ADMIN — METRONIDAZOLE 500 MG: 500 TABLET ORAL at 14:47

## 2024-02-16 RX ADMIN — HYDROMORPHONE HYDROCHLORIDE 0.5 MG: 1 INJECTION, SOLUTION INTRAMUSCULAR; INTRAVENOUS; SUBCUTANEOUS at 00:40

## 2024-02-16 RX ADMIN — AMLODIPINE BESYLATE 10 MG: 10 TABLET ORAL at 09:11

## 2024-02-16 RX ADMIN — METRONIDAZOLE 500 MG: 500 TABLET ORAL at 20:35

## 2024-02-16 RX ADMIN — METRONIDAZOLE 500 MG: 500 TABLET ORAL at 05:55

## 2024-02-16 RX ADMIN — CEFEPIME 2000 MG: 2 INJECTION, POWDER, FOR SOLUTION INTRAVENOUS at 09:12

## 2024-02-16 RX ADMIN — INSULIN LISPRO 5 UNITS: 100 INJECTION, SOLUTION INTRAVENOUS; SUBCUTANEOUS at 12:46

## 2024-02-16 RX ADMIN — INSULIN LISPRO 8 UNITS: 100 INJECTION, SOLUTION INTRAVENOUS; SUBCUTANEOUS at 09:11

## 2024-02-16 RX ADMIN — HYDROMORPHONE HYDROCHLORIDE 0.5 MG: 1 INJECTION, SOLUTION INTRAMUSCULAR; INTRAVENOUS; SUBCUTANEOUS at 05:55

## 2024-02-16 RX ADMIN — LISINOPRIL 40 MG: 40 TABLET ORAL at 09:11

## 2024-02-16 RX ADMIN — HYDROCODONE BITARTRATE AND ACETAMINOPHEN 1 TABLET: 5; 325 TABLET ORAL at 12:47

## 2024-02-16 RX ADMIN — POTASSIUM CHLORIDE 40 MEQ: 1500 TABLET, EXTENDED RELEASE ORAL at 00:35

## 2024-02-16 RX ADMIN — HYDROCODONE BITARTRATE AND ACETAMINOPHEN 1 TABLET: 5; 325 TABLET ORAL at 20:34

## 2024-02-16 RX ADMIN — INSULIN LISPRO 8 UNITS: 100 INJECTION, SOLUTION INTRAVENOUS; SUBCUTANEOUS at 17:41

## 2024-02-16 RX ADMIN — HYDROCODONE BITARTRATE AND ACETAMINOPHEN 1 TABLET: 5; 325 TABLET ORAL at 17:41

## 2024-02-16 RX ADMIN — CEFEPIME 2000 MG: 2 INJECTION, POWDER, FOR SOLUTION INTRAVENOUS at 00:35

## 2024-02-16 RX ADMIN — SIMETHICONE 80 MG: 80 TABLET, CHEWABLE ORAL at 20:44

## 2024-02-16 NOTE — CASE MANAGEMENT/SOCIAL WORK
Continued Stay Note  [facility]     Patient Name: Costa Robbins  MRN: [id number]  Today's Date: 2/16/2024    Admit Date: 2/13/2024    Plan: home   Discharge Plan       Row Name 02/16/24 1333       Plan    Plan home    Plan Comments This patient's discharge plan is home, and he has transport. He remains on IV antibiotics, possibly until 2/29 per ID. Palliative is following. CM will continue to follow.    Final Discharge Disposition Code 01 - home or self-care                   Discharge Codes    No documentation.                 Expected Discharge Date and Time       Expected Discharge Date Expected Discharge Time    Feb 17, 2024               Carri Lynch RN

## 2024-02-16 NOTE — PLAN OF CARE
"Goal Outcome Evaluation:  Plan of Care Reviewed With: patient        Progress: no change  Outcome Evaluation: New palliaitve consult for pain management by Dr. Galvin.  CHERISE Mancilla and palliative RN saw pt for initial consult.  Pt. endorsed constant 4/10 abdominal pain onoing for hours.  At one point he described the pain \"like someone was blowing air into his intestines and they were about to explode\".  He stated that eating anything at all makes the pain much worse but that he can sometimes alleviate the pain by lying on his side .  Current pain regimen effective.  He does not want any changes.  He endorsed mild nausea ongoing \"all the time\" that he is \"pretty much used to\" and he is able to ignore.  He stated that no matter how much he is able to eat he continues to loose weight noting a 42lb weight loss since [place].  He denied dyspnea on RA.  He stated he never experiences shortness of breath.  Pt. stated his appetite is pretty poor.  He lives at home with his wife. he used to be a  at a [employer].  Pt stated that a tenative plan is in place for a whipple on 3/8.  Palliative care to continue to follow for support, POC and ongoing GOC.    1300 Palliative IDT meeting: CESAR Soriano RN, ALBERTA; CHERISE Villanueva ; Dr. MISTI Cardozo; SHELIA Lugo, RN; SHELIA Rai, RN, CHPN; CHERISE Mancilla; CARLOS Li RN, ALBERTA; MARIN Taylor MDiv, [facility]       Problem: Palliative Care  Goal: Enhanced Quality of Life  Intervention: Promote Advance Care Planning  Flowsheets (Taken 2/16/2024 1350)  Life Transition/Adjustment:   palliative care initiated   palliative care discussed                         "

## 2024-02-16 NOTE — CONSULTS
"Palliative Care Initial Consult   Attending Physician: Kathryn Acosta DO  Referring Provider: Krystina Galvin    Reason for Referral:  pain    Code Status:   Code Status and Medical Interventions:   Ordered at: 02/14/24 0247     Medical Intervention Limits:    NO intubation (DNI)     Code Status (Patient has no pulse and is not breathing):    No CPR (Do Not Attempt to Resuscitate)     Medical Interventions (Patient has pulse or is breathing):    Limited Support      Advanced Directives: Advance Directive Status: Patient does not have advance directive   Family/Support: wife   Goals of Care: TBD.    HPI: 68y.o. male with PMH DM, diverticulitis, etoh use, BPH, pancreatic mass s/p stenting of common bile duct 1/27/24- presented to ED with increasing weakness, N/V, fever and pain to right and left upper quadrant abdomen. States that he was so weak he couldn't get out of the bed and his wife called a neighbor for assistance getting him to the hospital. Overnight pain medication- used norco PO 5/325 x3 and dilaudid 0.5mg IV x2- did not tolerate dilaudid - states that it increases his pain. Pain is in his left upper abdominal area, \"like someone was blowing up a balloon\" Pain started approximately 36 hours ago and is relieved by rolling on left side \"pressure makes it feel better\"- feels that mindful breathing exercises are effective in decreasing his pain. Has been taking Norco 5/325 tabs 1 tab PO q6h PRN at home and states that it is very effective. 3 bowel movements yesterday, \"came out like greased lightening.\" Complains of no energy, expects to be d/c home on IV antibiotics for 3 weeks- is trying to decide whether to administer at home or come to hospital daily. [place] in [place], [place]. 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 Retired 5 years ago, wife is supportive.       ROS: +left and right " abdominal pain, fatigue, weight loss (42 pounds since [place]), nausea, constipation      Past Medical History:   Diagnosis Date    Bile duct obstruction 01/27/2024    found during ERCP    COVID 2021    no hospitalization    Diabetes mellitus 2017    po meds and insulin    Diverticulitis 2009    surgically intervention    Enlarged prostate     recently started on Proscar    ETOH use 05/25/2023    Former smoker 05/25/2023    Hearing decreased     Hypertension     Melanoma     CHEST, BACK, NECK MULTI, HEAD    Pancreatic mass 01/27/2024    found on CT scan; ERCP confirmed    PVD (peripheral vascular disease)     Unintentional weight loss     Wears glasses      Past Surgical History:   Procedure Laterality Date    AORTAGRAM Bilateral 04/12/2023    Procedure: AORTAGRAM WITH RUNOFFS  STENT OF THE LEFT ILIAC ARTERY;  Surgeon: Moses Escalante MD;  Location: [facility];  Service: Vascular;  Laterality: Bilateral;    COLON RESECTION  05/2009    partial colectomy    COLONOSCOPY      DIAGNOSTIC LAPAROSCOPY N/A 2/7/2024    Procedure: DIAGNOSTIC LAPAROSCOPY WITH LIVER BIOPSY AND PERITONEAL WASHINGS;  Surgeon: Silas Maloney MD;  Location: [facility] OR;  Service: General;  Laterality: N/A;    ERCP N/A 01/27/2024    Procedure: ENDOSCOPIC RETROGRADE CHOLANGIOPANCREATOGRAPHY WITH STENT PLACEMENT;  Surgeon: Brunner, Mark I, MD;  Location: [facility] ENDOSCOPY;  Service: Gastroenterology;  Laterality: N/A;    FEMORAL ENDARTERECTOMY Left 05/25/2023    Procedure: FEMORAL ENDARTERECTOMY WITH PROPATEN GRAFT 8MM X 40CM;  Surgeon: Moses Escalante MD;  Location:  [facility] OR;  Service: Vascular;  Laterality: Left;    FEMORAL FEMORAL BYPASS N/A 05/25/2023    Procedure: FEMORAL FEMORAL BYPASS;  Surgeon: Moses Escalante MD;  Location:  [facility] OR;  Service: Vascular;  Laterality: N/A;    ORIF ANKLE FRACTURE Left     SKIN BIOPSY      SKIN CANCER EXCISION      melanoma    WISDOM TOOTH EXTRACTION       Social History     Socioeconomic  "History    Marital status:     Number of children: 0   Tobacco Use    Smoking status: Former     Packs/day: 0.50     Years: 43.00     Additional pack years: 0.00     Total pack years: 21.50     Types: Cigarettes     Quit date: 2023     Years since quittin.0     Passive exposure: Never    Smokeless tobacco: Never    Tobacco comments:     Pt states that he has been able to stop smoking this month - 2023   Vaping Use    Vaping Use: Never used   Substance and Sexual Activity    Alcohol use: Not Currently     Comment: 2 BEERS PER DAY; hx of ETOH use    Drug use: Yes     Types: Marijuana     Comment: ONCE A MONTH    Sexual activity: Defer     Family History   Problem Relation Age of Onset    Diabetes Mother     Heart attack Mother     Hypertension Mother     Hyperlipidemia Mother     Stroke Mother     Hodgkin's lymphoma Father     Hypertension Father     Diabetes Brother     Hyperlipidemia Brother        Allergies   Allergen Reactions    Atenolol Other (See Comments)     Bradycardia.    Morphine Itching    Penicillins Hives       Current medication reviewed for route, type, dose and frequency and are current per MAR at time of dictation.    Palliative Performance Scale Score: 60%    /71 (BP Location: Right arm, Patient Position: Lying)   Pulse 78   Temp 98.8 °F (37.1 °C) (Oral)   Resp 16   Ht 180.3 cm (71\")   Wt 70.8 kg (156 lb)   SpO2 95%   BMI 21.76 kg/m²     Intake/Output Summary (Last 24 hours) at 2024 1133  Last data filed at 2024 0035  Gross per 24 hour   Intake 680 ml   Output --   Net 680 ml       Physical Exam:    General Appearance:    Alert, cooperative, NAD   HEENT:    NC/AT, EOMI, anicteric, MMM, face relaxed   Neck:   supple, trachea midline, no JVD   Lungs:     CTA bilat, diminished in bases; respirations regular, even and unlabored; RR on exam    Heart:    RRR, normal S1 and S2, no M/R/G   Abdomen:     Normal bowel sounds, soft, nontender, nondistended   G/U:   " Deferred   MSK/Extremities:   Wasting, no edema   Pulses:   Pulses palpable and equal bilaterally   Skin:   Warm, dry   Neurologic:   A/Ox3, cooperative, AVILES   Psych:   Calm, appropriate         Labs:   Results from last 7 days   Lab Units 02/16/24  0536   WBC 10*3/mm3 7.38   HEMOGLOBIN g/dL 12.7*   HEMATOCRIT % 36.8*   PLATELETS 10*3/mm3 193     Results from last 7 days   Lab Units 02/16/24  0536   SODIUM mmol/L 137   POTASSIUM mmol/L 4.4   CHLORIDE mmol/L 105   CO2 mmol/L 23.0   BUN mg/dL 10   CREATININE mg/dL 0.60*   GLUCOSE mg/dL 232*   CALCIUM mg/dL 9.8     Results from last 7 days   Lab Units 02/16/24  0536 02/15/24  1501   SODIUM mmol/L 137 133*   POTASSIUM mmol/L 4.4 3.0*   CHLORIDE mmol/L 105 102   CO2 mmol/L 23.0 23.0   BUN mg/dL 10 8   CREATININE mg/dL 0.60* 0.68*   CALCIUM mg/dL 9.8 9.0   BILIRUBIN mg/dL  --  0.7   ALK PHOS U/L  --  109   ALT (SGPT) U/L  --  22   AST (SGOT) U/L  --  12   GLUCOSE mg/dL 232* 370*     Imaging Results (Last 72 Hours)       Procedure Component Value Units Date/Time    MRI Abdomen With & Without Contrast [[id number]] Collected: 02/14/24 1344     Updated: 02/14/24 1409    Narrative:      MRI ABDOMEN W WO CONTRAST    Date of Exam: 2/14/2024 12:46 PM EST    Indication: Metastatic disease evaluation, Pancreatic cancer, staging.     Comparison: CT the abdomen and pelvis dated 2/13/2024, 1/26/2024. Prior MRI abdomen dated 1/26/2024    Technique:  Routine multiplanar/multisequence images of the abdomen were obtained before and after the uneventful administration of 14 mL Multihance.    Findings:  The liver is normal in size and contour. There is no evidence of hepatic steatosis or iron deposition. There is a peripherally enhancing capsular lesion along the anterior aspect of the right hepatic lobe near the junction of segment IVB and V. This   measures 2.4 x 0.9 cm. There is a small focus of gas centrally seen on the prior CT. There are approximately 3 new small subcentimeter liver  lesions within the right hepatic lobe of the liver predominately involving segment VI, also with peripheral   enhancement. All of these lesions are new from patient's prior CT and MRI from 1/26/2024. In the setting of interval biliary intervention as well as fever, these are concerning for small hepatic microabscesses. Metastatic disease is possible although   rapid development of lesions these size is felt less likely.    The gallbladder is present without wall thickening. There has been interval decompression of the biliary system compared to the prior MRI. There is a metallic common bile duct stent extending through the common bile duct and into the proximal aspect of   the second portion of the duodenum.    The spleen is normal in size. There is nodularity of the bilateral adrenal glands which demonstrates loss of signal on the out of phase sequence relative to the in phase sequence. These likely represent benign lipid rich adrenal adenomas.    There is an ill-defined hypoenhancing mass within the uncinate process of the pancreas measuring 2.2 x 1.8 cm. This abuts the common bile duct and common bile duct stent. There is no significant pancreatic duct dilation. There is no arterial encasement   or abutment. There is questionable abutment along the inferior margin of the portal splenic confluence.    The kidneys are symmetric in size and enhancement. There is a right upper pole parapelvic cyst which herniates into the renal sinus fat. Multiple additional tiny punctate cystic foci are present within both kidneys. There is no hydronephrosis. Visualized   portions of the gastrointestinal tract appear normal in caliber. There is trace bilateral pleural effusions. Trace perisplenic ascites.      Impression:      Impression:  1. Small peripheral 2.4 x 0.9 cm capsular lesion along the anterior margin of the right hepatic lobe as well as 3 additional subcentimeter peripherally enhancing lesions within the more central  aspect of the right hepatic lobe which are new from prior   imaging dated 1/26/2024. Given the interval rapid development, interval biliary intervention and current elevated white count, these are suspicious for small hepatic microabscesses. Hepatic metastatic disease is a consideration but felt less likely,   mainly due to the relatively rapid development within 2-3 weeks. Recommend short-term imaging follow-up after antibiotic therapy to assess for decrease/resolution.  2. Ill-defined hypoenhancing pancreatic head mass measuring approximately 2.2 cm abutting the main pancreatic duct. No evidence of vascular encasement. Questionable abutment along the undersurface of the portal splenic confluence.  3. Multiple bilateral adrenal nodules which appear to demonstrate loss of signal on the out of phase sequence suggesting lipid rich adrenal adenomas.        Electronically Signed: Miguel Sahnielor    2/14/2024 2:06 PM EST    Workstation ID: [id number]    CT Abdomen Pelvis With Contrast [[id number]] Collected: 02/13/24 2234     Updated: 02/13/24 2250    Narrative:      CT ABDOMEN PELVIS W CONTRAST    Date of Exam: 2/13/2024 10:25 PM EST    Indication: Worsening abdominal pain, known pancreatic mass.    Comparison: 1/26/2024    Technique: Axial CT images were obtained of the abdomen and pelvis following the uneventful intravenous administration of 85 cc Isovue-300. Reconstructed coronal and sagittal images were also obtained. Automated exposure control and iterative   construction methods were used.      Findings:  Visualized Chest: Mild patchy opacities in the posterior aspect of the lower lobes bilaterally. Otherwise unremarkable    Liver: Small hypodensity adjacent to the hepatic capsule in the lateral aspect of the right hepatic lobe containing small locules of gas, measuring approximately 2.9 x 1.1 x 3.0 cm  Additional scattered small hypodensities are noted in the right hepatic lobe. These are all new since prior  study.    Gallbladder: Layering hyperdensity within the gallbladder most likely represents biliary sludge. No wall thickening or pericholecystic fluid.    Bile Ducts: A stent is noted within the common bile duct. There is associated pneumobilia.    Spleen: Spleen is normal in size and CT density.    Pancreas: Subtle hypoenhancement of the pancreatic head is once again noted concerning for an underlying pancreatic mass.    Adrenals: Small nodules are noted within the adrenal glands bilaterally, measuring up to 1.5 cm, grossly unchanged from prior study.    Kidneys: Presumed calyceal diverticulum is noted.  Scattered small nonobstructing renal stones are noted on the right. No obstructing stones or hydronephrosis.    Gastrointestinal: No dilated loops of bowel to suggest bowel obstruction. Mild wall thickening of the ascending colon and transverse colon. Mild to moderate stool burden noted in the descending colon and sigmoid colon. Diverticulosis of the descending   colon and sigmoid colon without evidence of acute diverticulitis. Findings suggestive of partial sigmoid colon resection.    Bladder: The bladder is normal.    Pelvis:  No suspecious mass.    Peritoneum/Mesentery: No fluid collection, ascities, or free air.      Lymph Nodes: No lymphadenopathy.    Vasculature: Extensive calcified and noncalcified plaques of the abdominal aorta and iliac arteries. Near occlusion of the right common iliac artery. Patient is status post bifemoral bypass graft. The graft appears to be patent.    Abdominal Wall: Left upper anterior abdominal wall lipoma. Otherwise unremarkable.    Bony Structures: No acute osseous abnormality      Impression:      Impression:  Findings suggestive of pancreatic head mass status post stenting of the common bile duct.    Subtle, approximately 3 cm, hypodensity noted within the right lateral aspect of the liver adjacent to the hepatic capsule containing a small locule of gas. This may represent the  site of prior biopsy. Given the presence of gas, this is concerning for   developing abscess.    Additionally new subtle hypodensities are noted within the right hepatic lobe, which are nonspecific but may represent new metastatic lesions.    Possible colitis of the ascending colon and proximal transverse colon.    Diverticulosis of the descending and sigmoid colon without evidence of acute diverticulitis.    Mild to moderate stool burden throughout the colon is concerning for constipation.    Nonspecific mild patchy opacities in the dependent portions of the lower lobes most likely represents atelectasis, although an infectious/inflammatory process is not excluded.        Electronically Signed: Nile De La Garza DO    2/13/2024 10:47 PM EST    Workstation ID: [id number]    XR Chest 1 View [[id number]] Collected: 02/13/24 2110     Updated: 02/13/24 2114    Narrative:      XR CHEST 1 VW    Date of Exam: 2/13/2024 8:51 PM EST    Indication: Weak/Dizzy/AMS triage protocol    Comparison: Contrast-enhanced chest CT performed on January 28, 2024    Findings:  There are no consolidations.No pleural effusion. There is no evidence of pneumothorax. The pulmonary vasculature appears within normal limits. The cardiac and mediastinal silhouette appear unremarkable. No acute osseous abnormality identified. Old right   rib fractures are visualized.      Impression:      Impression:  No radiographic evidence of acute chest process.      Electronically Signed: Alphonso Duran MD    2/13/2024 9:11 PM EST    Workstation ID: [id number]              Lab 02/14/24  0531   HEMOGLOBIN A1C 9.20*         Diagnostics: Reviewed    A:   Hepatic abscess    T2DM (type 2 diabetes mellitus)    Essential hypertension    Mixed hyperlipidemia    Pancreatic mass    Hypercalcemia       S/S:   Pain, abdominal, pancreatic mass  -Continue Norco 5-325mg  tabs- take 1 PO q6h PRN, patient declined pain medication adjustments or changes at this time, states that he  wants to go home with what he came in with and that he will use position changes and focused breathing exercises to control exacerbations  -D/C Dilaudid because it aggravates pain  2. Nausea- pretty constant, 1/10, patient says sometimes he forgets about it  -ondansetron available, no PRN use noted  3. Fatigue  -ongoing workup, may benefit from PT/OT  4. Decreased PO intake/weight loss - 42lbs since [place]  -Nutrition consult  5. Constipation  - bowel management regimen as needed- patient reports occasional constipation         P:    Thank you for this consult and allowing us to participate in patient's plan of care. Palliative Care Team will continue to follow patient. Please do not hesitate to contact us regarding further symptom management or goals of care needs.  Time: 35 minutes spent reviewing medical and medication records, assessing and examining patient, discussing with family, answering questions, providing some guidance about a plan and documentation of care, and coordinating care with other healthcare members, with > 50% time spent face to face.         Lucila Mayes, APRN  2/16/2024

## 2024-02-16 NOTE — PROGRESS NOTES
[facility] Medicine Services  PROGRESS NOTE    Patient Name: Costa Robbins  : 1955  MRN: [id number]    Date of Admission: 2024  Primary Care Physician: Jonathan Conner MD    Subjective   Subjective     CC:  Follow-up fever, abdominal pain    HPI:  Patient seen resting in bed no apparent distress.  No acute events overnight per nursing.  He continues to have acute episodes of left lower quadrant pain.  He is concerned that receiving IV Dilaudid may be causing increased pain.  He asked for the Dilaudid to be DC'd.  Will continue on p.o. Norco for pain control.      Objective   Objective     Vital Signs:   Temp:  [98.1 °F (36.7 °C)-99.1 °F (37.3 °C)] 98.8 °F (37.1 °C)  Heart Rate:  [69-87] 77  Resp:  [16-18] 16  BP: (120-133)/(66-78) 130/73     Physical Exam:  Constitutional: No acute distress, awake, alert  HENT: NCAT, mucous membranes moist  Respiratory: Clear to auscultation bilaterally, respiratory effort normal   Cardiovascular: RRR, no murmurs, palpable pedal pulses bilaterally, cap refill brisk   Gastrointestinal: Positive bowel sounds, soft, RUQ/LUQ pain, TTP nondistended  Musculoskeletal: No BLE edema   Psychiatric: Appropriate affect, cooperative  Neurologic: Oriented x 3, moves all extremities, speech clear  Skin: warm, dry, no visible rash       Results Reviewed:  LAB RESULTS:      Lab 24  0536 02/15/24  1054 24  0531 24  2326 24  2043   WBC 7.38 7.25 14.27*  --  15.09*   HEMOGLOBIN 12.7* 12.5* 12.6*  --  15.5   HEMATOCRIT 36.8* 35.6* 36.2*  --  43.7   PLATELETS 193 207 243  --  312   NEUTROS ABS 6.00 5.59 13.10*  --  13.81*   IMMATURE GRANS (ABS) 0.03 0.04 0.08*  --  0.10*   LYMPHS ABS 0.63* 0.80 0.40*  --  0.29*   MONOS ABS 0.64 0.74 0.67  --  0.85   EOS ABS 0.06 0.05 0.00  --  0.00   MCV 89.8 87.7 91.6  --  91.2   LACTATE  --   --   --  1.3  --          Lab 24  0536 02/15/24  1501 24  0531 243   SODIUM 137 133*  136 133*   POTASSIUM 4.4 3.0* 4.6 3.9   CHLORIDE 105 102 102 94*   CO2 23.0 23.0 14.0* 20.0*   ANION GAP 9.0 8.0 20.0* 19.0*   BUN 10 8 15 12   CREATININE 0.60* 0.68* 0.72* 1.02   EGFR 106.5 102.5 100.8 81.1   GLUCOSE 232* 370* 235* 310*   CALCIUM 9.8 9.0 9.8 11.3*   IONIZED CALCIUM  --   --  1.43*  --    MAGNESIUM  --   --   --  1.9   HEMOGLOBIN A1C  --   --  9.20*  --          Lab 02/15/24  1501 02/13/24  2043   TOTAL PROTEIN 5.2* 7.2   ALBUMIN 2.9* 4.2   GLOBULIN 2.3 3.0   ALT (SGPT) 22 31   AST (SGOT) 12 15   BILIRUBIN 0.7 1.2   ALK PHOS 109 182*   LIPASE  --  96*         Lab 02/13/24 2043   HSTROP T 22*                 Lab 02/13/24  2158   PH, ARTERIAL 7.448   PCO2, ARTERIAL 26.8*   PO2 ART 83.0   FIO2 21   HCO3 ART 18.5*   BASE EXCESS ART -4.1*   CARBOXYHEMOGLOBIN 1.4     Brief Urine Lab Results  (Last result in the past 365 days)        Color   Clarity   Blood   Leuk Est   Nitrite   Protein   CREAT   Urine HCG        01/26/24 1639 Dark Yellow   Clear   Negative   Negative   Negative   Negative                   Microbiology Results Abnormal       Procedure Component Value - Date/Time    Blood Culture - Blood, Arm, Left [[id number]]  (Normal) Collected: 02/13/24 2337    Lab Status: Preliminary result Specimen: Blood from Arm, Left Updated: 02/16/24 0045     Blood Culture No growth at 2 days    Narrative:      Less than seven (7) mL's of blood was collected.  Insufficient quantity may yield false negative results.    Blood Culture - Blood, Arm, Left [[id number]]  (Normal) Collected: 02/13/24 2326    Lab Status: Preliminary result Specimen: Blood from Arm, Left Updated: 02/15/24 2345     Blood Culture No growth at 2 days    Narrative:      Less than seven (7) mL's of blood was collected.  Insufficient quantity may yield false negative results.            MRI Abdomen With & Without Contrast    Result Date: 2/14/2024  MRI ABDOMEN W WO CONTRAST Date of Exam: 2/14/2024 12:46 PM EST Indication: Metastatic disease  evaluation, Pancreatic cancer, staging.  Comparison: CT the abdomen and pelvis dated 2/13/2024, 1/26/2024. Prior MRI abdomen dated 1/26/2024 Technique:  Routine multiplanar/multisequence images of the abdomen were obtained before and after the uneventful administration of 14 mL Multihance. Findings: The liver is normal in size and contour. There is no evidence of hepatic steatosis or iron deposition. There is a peripherally enhancing capsular lesion along the anterior aspect of the right hepatic lobe near the junction of segment IVB and V. This measures 2.4 x 0.9 cm. There is a small focus of gas centrally seen on the prior CT. There are approximately 3 new small subcentimeter liver lesions within the right hepatic lobe of the liver predominately involving segment VI, also with peripheral enhancement. All of these lesions are new from patient's prior CT and MRI from 1/26/2024. In the setting of interval biliary intervention as well as fever, these are concerning for small hepatic microabscesses. Metastatic disease is possible although rapid development of lesions these size is felt less likely. The gallbladder is present without wall thickening. There has been interval decompression of the biliary system compared to the prior MRI. There is a metallic common bile duct stent extending through the common bile duct and into the proximal aspect of the second portion of the duodenum. The spleen is normal in size. There is nodularity of the bilateral adrenal glands which demonstrates loss of signal on the out of phase sequence relative to the in phase sequence. These likely represent benign lipid rich adrenal adenomas. There is an ill-defined hypoenhancing mass within the uncinate process of the pancreas measuring 2.2 x 1.8 cm. This abuts the common bile duct and common bile duct stent. There is no significant pancreatic duct dilation. There is no arterial encasement or abutment. There is questionable abutment along the  inferior margin of the portal splenic confluence. The kidneys are symmetric in size and enhancement. There is a right upper pole parapelvic cyst which herniates into the renal sinus fat. Multiple additional tiny punctate cystic foci are present within both kidneys. There is no hydronephrosis. Visualized  portions of the gastrointestinal tract appear normal in caliber. There is trace bilateral pleural effusions. Trace perisplenic ascites.     Impression: Impression: 1. Small peripheral 2.4 x 0.9 cm capsular lesion along the anterior margin of the right hepatic lobe as well as 3 additional subcentimeter peripherally enhancing lesions within the more central aspect of the right hepatic lobe which are new from prior imaging dated 1/26/2024. Given the interval rapid development, interval biliary intervention and current elevated white count, these are suspicious for small hepatic microabscesses. Hepatic metastatic disease is a consideration but felt less likely, mainly due to the relatively rapid development within 2-3 weeks. Recommend short-term imaging follow-up after antibiotic therapy to assess for decrease/resolution. 2. Ill-defined hypoenhancing pancreatic head mass measuring approximately 2.2 cm abutting the main pancreatic duct. No evidence of vascular encasement. Questionable abutment along the undersurface of the portal splenic confluence. 3. Multiple bilateral adrenal nodules which appear to demonstrate loss of signal on the out of phase sequence suggesting lipid rich adrenal adenomas. Electronically Signed: Miguel Betts  2/14/2024 2:06 PM EST  Workstation ID: [id number]     Results for orders placed during the hospital encounter of 01/26/24    Adult Transthoracic Echo Complete W/ Cont if Necessary Per Protocol    Interpretation Summary    Left ventricular systolic function is hyperdynamic (EF > 70%). Left ventricular ejection fraction appears to be greater than 70%.    Left ventricular wall thickness is  consistent with borderline concentric hypertrophy.    Provoked left ventricular mean pressure gradient of 10 mmHg.    Left ventricular diastolic function is consistent with (grade I) impaired relaxation.      Current medications:  Scheduled Meds:amLODIPine, 10 mg, Oral, Daily  cefepime, 2,000 mg, Intravenous, Q8H  insulin detemir, 5 Units, Subcutaneous, Nightly  insulin lispro, 3-14 Units, Subcutaneous, 4x Daily With Meals & Nightly  lisinopril, 40 mg, Oral, Daily  metroNIDAZOLE, 500 mg, Oral, Q8H  sodium chloride, 10 mL, Intravenous, Q12H      Continuous Infusions:Pharmacy Consult - Pharmacy to dose,   Pharmacy Consult - Pharmacy to dose,       PRN Meds:.  acetaminophen    dextrose    dextrose    glucagon (human recombinant)    HYDROcodone-acetaminophen    melatonin    ondansetron ODT **OR** ondansetron    Pharmacy Consult - Pharmacy to dose    Pharmacy Consult - Pharmacy to dose    Potassium Replacement - Follow Nurse / BPA Driven Protocol    simethicone    sodium chloride    sodium chloride    sodium chloride    Assessment & Plan   Assessment & Plan     Active Hospital Problems    Diagnosis  POA    **Hepatic abscess [K75.0]  Yes    Hypercalcemia [E83.52]  Yes    Pancreatic mass [K86.89]  Yes    Mixed hyperlipidemia [E78.2]  Yes    T2DM (type 2 diabetes mellitus) [E11.9]  Yes    Essential hypertension [I10]  Yes      Resolved Hospital Problems   No resolved problems to display.        Brief Hospital Course to date:  Costa Robbins is a 68 y.o. male with a history of pancreatic mass, HTN, HLD, T2DM, and alcohol abuse who was admitted for several weeks of nausea, vomiting, and abd pain on 2/13/2024. He subsequently developed temp to 102F. Imaging was concerning for a small fluid collection at liver biopsy site. MRCP revealed findings suggestive of infection rather than metastatic disease. Liver Biopsy was negative for malignancy. Pleural fluid cytology was negative for malignancy. General surgery recommended  treatment of cholangitis followed by consideration of Whipple procedure in early March. ID Dr. Lo was consulted and is managing IV antibiotics.      This patient's problems and plans were partially entered by my partner and updated as appropriate by me 02/16/24.    New fever, progressive abd pain  Pancreatic mass, no tissue dx yet   ERCP/stent on 1/27/23  - pain is likely from progressive CA and newly found abscesses.  - small fluid collection at liver bx site:  normal serous collection vs small abscess; continue empiric abx for now  - Dr. Maya following  -MRCP suggests sequelae of infection rather than metastatic disease  - Plan will be treatment of cholangitis followed by Whipple, likely in early March  - Will follow-up CT scan outpatient after treatment to ensure liver lesions are resolved  - ID consulted and following- continue Cefepime and Flagyl  -AM labs      Metabolic acidosis, gap   Resp alkalosis   Dehydration   - nl lactate.  Suspect the ABG is not reliable, though he may have pain-induced hyperventilation .    - HR has normalized      Hypercalcemia   - improving with fluids ; continue fluids      PAD, stable.   - sees CT surg; history of LLE stent and RLE bypass    T2DM  -BG remain uncontrolled  -Increase Levemir to 10 units at HS  -continue SSI      Expected Discharge Location and Transportation: home by car  Expected Discharge   Expected Discharge Date: 2/17/2024; Expected Discharge Time:       DVT prophylaxis:  Mechanical DVT prophylaxis orders are present.    AM-PAC 6 Clicks Score (PT): 24 (02/16/24 0815)    CODE STATUS:   Code Status and Medical Interventions:   Ordered at: 02/14/24 0247     Medical Intervention Limits:    NO intubation (DNI)     Code Status (Patient has no pulse and is not breathing):    No CPR (Do Not Attempt to Resuscitate)     Medical Interventions (Patient has pulse or is breathing):    Limited Support       Jam Ivory, CHERISE  02/16/24

## 2024-02-16 NOTE — CONSULTS
"Nutrition Services    Patient Name:  Costa Robbins  YOB: 1955  MRN: [id number]  Admit Date:  2/13/2024    RD received consults for \"unintended wt loss\". Pt seen by RD yesterday and pt met criteria for malnutrition. Please see note from 2/15/24 for full nutrition assessment. RD following per protocol.     Electronically signed by:  Judy Muhammad MS,CARMEN,LD  02/16/24 13:19 EST   "

## 2024-02-16 NOTE — PROGRESS NOTES
I visited this patient per palliative care protocol. I assessed spiritual need and learned that the patient's [facility]/ are actively seeing and caring for this patient. The patient was engaged in conversation so I offered active listening and offered spiritual support. I have no immediate plans to follow this patient closely since his [facility] community is caring for him, but I am certainly available if a need arises.

## 2024-02-17 LAB
ANION GAP SERPL CALCULATED.3IONS-SCNC: 11 MMOL/L (ref 5–15)
BUN SERPL-MCNC: 9 MG/DL (ref 8–23)
BUN/CREAT SERPL: 18 (ref 7–25)
CALCIUM SPEC-SCNC: 9.3 MG/DL (ref 8.6–10.5)
CHLORIDE SERPL-SCNC: 101 MMOL/L (ref 98–107)
CO2 SERPL-SCNC: 20 MMOL/L (ref 22–29)
CREAT SERPL-MCNC: 0.5 MG/DL (ref 0.76–1.27)
DEPRECATED RDW RBC AUTO: 44.6 FL (ref 37–54)
EGFRCR SERPLBLD CKD-EPI 2021: 112.5 ML/MIN/1.73
ERYTHROCYTE [DISTWIDTH] IN BLOOD BY AUTOMATED COUNT: 13.8 % (ref 12.3–15.4)
GLUCOSE BLDC GLUCOMTR-MCNC: 231 MG/DL (ref 70–130)
GLUCOSE BLDC GLUCOMTR-MCNC: 308 MG/DL (ref 70–130)
GLUCOSE BLDC GLUCOMTR-MCNC: 340 MG/DL (ref 70–130)
GLUCOSE SERPL-MCNC: 229 MG/DL (ref 65–99)
HCT VFR BLD AUTO: 34.7 % (ref 37.5–51)
HGB BLD-MCNC: 12.1 G/DL (ref 13–17.7)
MCH RBC QN AUTO: 30.6 PG (ref 26.6–33)
MCHC RBC AUTO-ENTMCNC: 34.9 G/DL (ref 31.5–35.7)
MCV RBC AUTO: 87.8 FL (ref 79–97)
PLATELET # BLD AUTO: 180 10*3/MM3 (ref 140–450)
PMV BLD AUTO: 10.8 FL (ref 6–12)
POTASSIUM SERPL-SCNC: 3.4 MMOL/L (ref 3.5–5.2)
POTASSIUM SERPL-SCNC: 3.6 MMOL/L (ref 3.5–5.2)
RBC # BLD AUTO: 3.95 10*6/MM3 (ref 4.14–5.8)
SODIUM SERPL-SCNC: 132 MMOL/L (ref 136–145)
WBC NRBC COR # BLD AUTO: 5.53 10*3/MM3 (ref 3.4–10.8)

## 2024-02-17 PROCEDURE — 80048 BASIC METABOLIC PNL TOTAL CA: CPT | Performed by: NURSE PRACTITIONER

## 2024-02-17 PROCEDURE — 25010000002 CEFEPIME PER 500 MG: Performed by: INTERNAL MEDICINE

## 2024-02-17 PROCEDURE — 85027 COMPLETE CBC AUTOMATED: CPT | Performed by: INTERNAL MEDICINE

## 2024-02-17 PROCEDURE — 99232 SBSQ HOSP IP/OBS MODERATE 35: CPT | Performed by: HOSPITALIST

## 2024-02-17 PROCEDURE — 63710000001 INSULIN LISPRO (HUMAN) PER 5 UNITS: Performed by: NURSE PRACTITIONER

## 2024-02-17 PROCEDURE — 63710000001 INSULIN DETEMIR PER 5 UNITS: Performed by: NURSE PRACTITIONER

## 2024-02-17 PROCEDURE — 84132 ASSAY OF SERUM POTASSIUM: CPT | Performed by: HOSPITALIST

## 2024-02-17 PROCEDURE — 82948 REAGENT STRIP/BLOOD GLUCOSE: CPT

## 2024-02-17 RX ORDER — POTASSIUM CHLORIDE 20 MEQ/1
40 TABLET, EXTENDED RELEASE ORAL EVERY 4 HOURS
Qty: 4 TABLET | Refills: 0 | Status: COMPLETED | OUTPATIENT
Start: 2024-02-17 | End: 2024-02-17

## 2024-02-17 RX ORDER — POTASSIUM CHLORIDE 20 MEQ/1
40 TABLET, EXTENDED RELEASE ORAL EVERY 4 HOURS
Status: DISCONTINUED | OUTPATIENT
Start: 2024-02-17 | End: 2024-02-17

## 2024-02-17 RX ORDER — KETOROLAC TROMETHAMINE 15 MG/ML
15 INJECTION, SOLUTION INTRAMUSCULAR; INTRAVENOUS EVERY 8 HOURS PRN
Status: DISCONTINUED | OUTPATIENT
Start: 2024-02-17 | End: 2024-02-18

## 2024-02-17 RX ADMIN — HYDROCODONE BITARTRATE AND ACETAMINOPHEN 1 TABLET: 5; 325 TABLET ORAL at 00:45

## 2024-02-17 RX ADMIN — CEFEPIME 2000 MG: 2 INJECTION, POWDER, FOR SOLUTION INTRAVENOUS at 08:23

## 2024-02-17 RX ADMIN — INSULIN LISPRO 10 UNITS: 100 INJECTION, SOLUTION INTRAVENOUS; SUBCUTANEOUS at 20:47

## 2024-02-17 RX ADMIN — AMLODIPINE BESYLATE 10 MG: 10 TABLET ORAL at 08:24

## 2024-02-17 RX ADMIN — HYDROCODONE BITARTRATE AND ACETAMINOPHEN 1 TABLET: 5; 325 TABLET ORAL at 16:46

## 2024-02-17 RX ADMIN — HYDROCODONE BITARTRATE AND ACETAMINOPHEN 1 TABLET: 5; 325 TABLET ORAL at 12:56

## 2024-02-17 RX ADMIN — INSULIN DETEMIR 10 UNITS: 100 INJECTION, SOLUTION SUBCUTANEOUS at 20:47

## 2024-02-17 RX ADMIN — INSULIN LISPRO 10 UNITS: 100 INJECTION, SOLUTION INTRAVENOUS; SUBCUTANEOUS at 16:24

## 2024-02-17 RX ADMIN — POTASSIUM CHLORIDE 40 MEQ: 1500 TABLET, EXTENDED RELEASE ORAL at 23:04

## 2024-02-17 RX ADMIN — POTASSIUM CHLORIDE 40 MEQ: 1500 TABLET, EXTENDED RELEASE ORAL at 08:24

## 2024-02-17 RX ADMIN — METRONIDAZOLE 500 MG: 500 TABLET ORAL at 04:21

## 2024-02-17 RX ADMIN — METRONIDAZOLE 500 MG: 500 TABLET ORAL at 20:47

## 2024-02-17 RX ADMIN — HYDROCODONE BITARTRATE AND ACETAMINOPHEN 1 TABLET: 5; 325 TABLET ORAL at 08:24

## 2024-02-17 RX ADMIN — INSULIN LISPRO 5 UNITS: 100 INJECTION, SOLUTION INTRAVENOUS; SUBCUTANEOUS at 08:28

## 2024-02-17 RX ADMIN — POTASSIUM CHLORIDE 40 MEQ: 1500 TABLET, EXTENDED RELEASE ORAL at 20:46

## 2024-02-17 RX ADMIN — CEFEPIME 2000 MG: 2 INJECTION, POWDER, FOR SOLUTION INTRAVENOUS at 16:24

## 2024-02-17 RX ADMIN — HYDROCODONE BITARTRATE AND ACETAMINOPHEN 1 TABLET: 5; 325 TABLET ORAL at 20:47

## 2024-02-17 RX ADMIN — LISINOPRIL 40 MG: 40 TABLET ORAL at 08:24

## 2024-02-17 RX ADMIN — CEFEPIME 2000 MG: 2 INJECTION, POWDER, FOR SOLUTION INTRAVENOUS at 00:43

## 2024-02-17 RX ADMIN — METRONIDAZOLE 500 MG: 500 TABLET ORAL at 12:55

## 2024-02-17 NOTE — PROGRESS NOTES
[facility] Medicine Services  PROGRESS NOTE    Patient Name: Costa Robbins  : 1955  MRN: [id number]    Date of Admission: 2024  Primary Care Physician: Jonathan Conner MD    Subjective   Subjective     CC:  Follow-up fever, abdominal pain    HPI:  Patient resting in bed, complaining of ongoing LLQ pain. States he doesn't have an appetite and was only able to eat yogurt this morning. States he didn't sleep much and wants to try to take a nap this morning. He states he has been dealing with this pain for months and is tired of hurting.      Objective   Objective     Vital Signs:   Temp:  [97.4 °F (36.3 °C)-99 °F (37.2 °C)] 97.4 °F (36.3 °C)  Heart Rate:  [71-82] 81  Resp:  [16] 16  BP: (118-155)/(70-79) 155/78     Physical Exam:  Constitutional: No acute distress, awake, alert  HENT: NCAT, mucous membranes moist  Respiratory: Clear to auscultation bilaterally, respiratory effort normal   Cardiovascular: RRR, no murmurs, palpable pedal pulses bilaterally  Gastrointestinal: Positive bowel sounds, soft, RUQ/LUQ pain, TTP nondistended  Musculoskeletal: No BLE edema   Psychiatric: Appropriate affect, cooperative  Neurologic: Oriented x 3, moves all extremities, speech clear  Skin: warm, dry, no visible rash       Results Reviewed:  LAB RESULTS:      Lab 24  0411 24  0536 02/15/24  1054 24  0531 24  2326 24  2043   WBC 5.53 7.38 7.25 14.27*  --  15.09*   HEMOGLOBIN 12.1* 12.7* 12.5* 12.6*  --  15.5   HEMATOCRIT 34.7* 36.8* 35.6* 36.2*  --  43.7   PLATELETS 180 193 207 243  --  312   NEUTROS ABS  --  6.00 5.59 13.10*  --  13.81*   IMMATURE GRANS (ABS)  --  0.03 0.04 0.08*  --  0.10*   LYMPHS ABS  --  0.63* 0.80 0.40*  --  0.29*   MONOS ABS  --  0.64 0.74 0.67  --  0.85   EOS ABS  --  0.06 0.05 0.00  --  0.00   MCV 87.8 89.8 87.7 91.6  --  91.2   LACTATE  --   --   --   --  1.3  --          Lab 24  0411 24  0536 02/15/24  1501  02/14/24  0531 02/13/24  2043   SODIUM 132* 137 133* 136 133*   POTASSIUM 3.6 4.4 3.0* 4.6 3.9   CHLORIDE 101 105 102 102 94*   CO2 20.0* 23.0 23.0 14.0* 20.0*   ANION GAP 11.0 9.0 8.0 20.0* 19.0*   BUN 9 10 8 15 12   CREATININE 0.50* 0.60* 0.68* 0.72* 1.02   EGFR 112.5 106.5 102.5 100.8 81.1   GLUCOSE 229* 232* 370* 235* 310*   CALCIUM 9.3 9.8 9.0 9.8 11.3*   IONIZED CALCIUM  --   --   --  1.43*  --    MAGNESIUM  --   --   --   --  1.9   HEMOGLOBIN A1C  --   --   --  9.20*  --          Lab 02/15/24  1501 02/13/24  2043   TOTAL PROTEIN 5.2* 7.2   ALBUMIN 2.9* 4.2   GLOBULIN 2.3 3.0   ALT (SGPT) 22 31   AST (SGOT) 12 15   BILIRUBIN 0.7 1.2   ALK PHOS 109 182*   LIPASE  --  96*         Lab 02/13/24 2043   HSTROP T 22*                 Lab 02/13/24 2158   PH, ARTERIAL 7.448   PCO2, ARTERIAL 26.8*   PO2 ART 83.0   FIO2 21   HCO3 ART 18.5*   BASE EXCESS ART -4.1*   CARBOXYHEMOGLOBIN 1.4     Brief Urine Lab Results  (Last result in the past 365 days)        Color   Clarity   Blood   Leuk Est   Nitrite   Protein   CREAT   Urine HCG        01/26/24 1639 Dark Yellow   Clear   Negative   Negative   Negative   Negative                   Microbiology Results Abnormal       Procedure Component Value - Date/Time    Blood Culture - Blood, Arm, Left [[id number]]  (Normal) Collected: 02/13/24 2337    Lab Status: Preliminary result Specimen: Blood from Arm, Left Updated: 02/17/24 0045     Blood Culture No growth at 3 days    Narrative:      Less than seven (7) mL's of blood was collected.  Insufficient quantity may yield false negative results.    Blood Culture - Blood, Arm, Left [[id number]]  (Normal) Collected: 02/13/24 2326    Lab Status: Preliminary result Specimen: Blood from Arm, Left Updated: 02/16/24 2345     Blood Culture No growth at 3 days    Narrative:      Less than seven (7) mL's of blood was collected.  Insufficient quantity may yield false negative results.            No radiology results from the last 24  hrs    Results for orders placed during the hospital encounter of 01/26/24    Adult Transthoracic Echo Complete W/ Cont if Necessary Per Protocol    Interpretation Summary    Left ventricular systolic function is hyperdynamic (EF > 70%). Left ventricular ejection fraction appears to be greater than 70%.    Left ventricular wall thickness is consistent with borderline concentric hypertrophy.    Provoked left ventricular mean pressure gradient of 10 mmHg.    Left ventricular diastolic function is consistent with (grade I) impaired relaxation.      Current medications:  Scheduled Meds:amLODIPine, 10 mg, Oral, Daily  cefepime, 2,000 mg, Intravenous, Q8H  insulin detemir, 10 Units, Subcutaneous, Nightly  insulin lispro, 3-14 Units, Subcutaneous, 4x Daily With Meals & Nightly  lisinopril, 40 mg, Oral, Daily  metroNIDAZOLE, 500 mg, Oral, Q8H  potassium chloride ER, 40 mEq, Oral, Q4H  senna-docusate sodium, 2 tablet, Oral, BID  sodium chloride, 10 mL, Intravenous, Q12H      Continuous Infusions:Pharmacy Consult - Pharmacy to dose,   Pharmacy Consult - Pharmacy to dose,       PRN Meds:.  acetaminophen    senna-docusate sodium **AND** polyethylene glycol **AND** bisacodyl **AND** bisacodyl    dextrose    dextrose    glucagon (human recombinant)    HYDROcodone-acetaminophen    ketorolac    melatonin    ondansetron ODT **OR** ondansetron    Pharmacy Consult - Pharmacy to dose    Pharmacy Consult - Pharmacy to dose    Potassium Replacement - Follow Nurse / BPA Driven Protocol    simethicone    sodium chloride    sodium chloride    sodium chloride    Assessment & Plan   Assessment & Plan     Active Hospital Problems    Diagnosis  POA    **Hepatic abscess [K75.0]  Yes    Hypercalcemia [E83.52]  Yes    Pancreatic mass [K86.89]  Yes    Mixed hyperlipidemia [E78.2]  Yes    T2DM (type 2 diabetes mellitus) [E11.9]  Yes    Essential hypertension [I10]  Yes      Resolved Hospital Problems   No resolved problems to display.        Brief  Hospital Course to date:  Costa Robbins is a 68 y.o. male with a history of pancreatic mass, HTN, HLD, T2DM, and alcohol abuse who was admitted for several weeks of nausea, vomiting, and abd pain on 2/13/2024. He subsequently developed temp to 102F. Imaging was concerning for a small fluid collection at liver biopsy site. MRCP revealed findings suggestive of infection rather than metastatic disease. Liver Biopsy was negative for malignancy. Pleural fluid cytology was negative for malignancy. General surgery recommended treatment of cholangitis followed by consideration of Whipple procedure in early March. ID Dr. Lo was consulted and is managing IV antibiotics.      This patient's problems and plans were partially entered by my partner and updated as appropriate by me 02/17/24.    New fever, progressive abd pain  Pancreatic mass, no tissue dx yet   ERCP/stent on 1/27/23  - pain is likely from progressive CA and newly found abscesses.  - small fluid collection at liver bx site:  normal serous collection vs small abscess; continue empiric abx for now  - Dr. Maya following  -MRCP suggests sequelae of infection rather than metastatic disease  - Plan will be treatment of cholangitis followed by Whipple, likely in early March  - Will follow-up CT scan outpatient after treatment to ensure liver lesions are resolved  - ID consulted and following- continue Cefepime and Flagyl  - add Toradol IV     Metabolic acidosis, gap   Resp alkalosis   Dehydration   - nl lactate.  Suspect the ABG is not reliable, though he may have pain-induced hyperventilation .    - HR has normalized      Hypercalcemia   - improving with fluids ; continue fluids      PAD, stable.   - sees CT surg; history of LLE stent and RLE bypass    T2DM  -BG remain uncontrolled  -Increase Levemir to 10 units at HS  -continue SSI      Expected Discharge Location and Transportation: home by car  Expected Discharge   Expected Discharge Date: 2/19/2024;  Expected Discharge Time:       DVT prophylaxis:  Mechanical DVT prophylaxis orders are present.    AM-PAC 6 Clicks Score (PT): 24 (02/17/24 0300)    CODE STATUS:   Code Status and Medical Interventions:   Ordered at: 02/14/24 0247     Medical Intervention Limits:    NO intubation (DNI)     Code Status (Patient has no pulse and is not breathing):    No CPR (Do Not Attempt to Resuscitate)     Medical Interventions (Patient has pulse or is breathing):    Limited Support       Kathryn Acosta,   02/17/24

## 2024-02-17 NOTE — PLAN OF CARE
Goal Outcome Evaluation:     Problem: Fall Injury Risk  Goal: Absence of Fall and Fall-Related Injury  Outcome: Ongoing, Progressing  Intervention: Promote Injury-Free Environment  Recent Flowsheet Documentation  Taken 2/17/2024 0200 by Ten Griffin RN  Safety Promotion/Fall Prevention:   activity supervised   assistive device/personal items within reach   clutter free environment maintained   nonskid shoes/slippers when out of bed  Taken 2/16/2024 2200 by Ten Griffin RN  Safety Promotion/Fall Prevention:   activity supervised   assistive device/personal items within reach   clutter free environment maintained   nonskid shoes/slippers when out of bed  Taken 2/16/2024 2034 by Ten Griffin RN  Safety Promotion/Fall Prevention:   activity supervised   assistive device/personal items within reach   clutter free environment maintained   nonskid shoes/slippers when out of bed  Taken 2/16/2024 2000 by Ten Griffin RN  Safety Promotion/Fall Prevention:   activity supervised   assistive device/personal items within reach   clutter free environment maintained   nonskid shoes/slippers when out of bed     Problem: Adult Inpatient Plan of Care  Goal: Plan of Care Review  Outcome: Ongoing, Progressing  Goal: Patient-Specific Goal (Individualized)  Outcome: Ongoing, Progressing  Goal: Absence of Hospital-Acquired Illness or Injury  Outcome: Ongoing, Progressing  Intervention: Identify and Manage Fall Risk  Recent Flowsheet Documentation  Taken 2/17/2024 0200 by Ten Griffin RN  Safety Promotion/Fall Prevention:   activity supervised   assistive device/personal items within reach   clutter free environment maintained   nonskid shoes/slippers when out of bed  Taken 2/16/2024 2200 by Ten Griffin RN  Safety Promotion/Fall Prevention:   activity supervised   assistive device/personal items within reach   clutter free environment maintained   nonskid shoes/slippers when out of bed  Taken 2/16/2024 2034 by  Griffin, Ten, RN  Safety Promotion/Fall Prevention:   activity supervised   assistive device/personal items within reach   clutter free environment maintained   nonskid shoes/slippers when out of bed  Taken 2/16/2024 2000 by Ten Griffin RN  Safety Promotion/Fall Prevention:   activity supervised   assistive device/personal items within reach   clutter free environment maintained   nonskid shoes/slippers when out of bed  Intervention: Prevent Skin Injury  Recent Flowsheet Documentation  Taken 2/17/2024 0200 by Ten Griffin RN  Body Position: position changed independently  Taken 2/17/2024 0000 by Ten Griffin RN  Body Position: position changed independently  Taken 2/16/2024 2200 by Ten Griffin RN  Body Position: position changed independently  Taken 2/16/2024 2034 by Ten Griffin RN  Body Position: position changed independently  Taken 2/16/2024 2000 by Ten Griffin RN  Body Position: position changed independently  Intervention: Prevent and Manage VTE (Venous Thromboembolism) Risk  Recent Flowsheet Documentation  Taken 2/17/2024 0200 by Ten Griffin RN  Activity Management: activity encouraged  Taken 2/17/2024 0000 by Ten Griffin RN  Activity Management: activity encouraged  Taken 2/16/2024 2200 by Ten Griffin RN  Activity Management: activity encouraged  Taken 2/16/2024 2034 by Ten Griffin RN  Activity Management: up ad jon  Taken 2/16/2024 2000 by Ten Griffin RN  Activity Management: up ad jon  Goal: Optimal Comfort and Wellbeing  Outcome: Ongoing, Progressing  Intervention: Monitor Pain and Promote Comfort  Recent Flowsheet Documentation  Taken 2/17/2024 0045 by Ten Griffin RN  Pain Management Interventions: see MAR  Taken 2/16/2024 2034 by Ten Griffin RN  Pain Management Interventions: see MAR  Goal: Readiness for Transition of Care  Outcome: Ongoing, Progressing     Problem: Palliative Care  Goal: Enhanced Quality of Life  Outcome: Ongoing,  Progressing  Intervention: Maximize Comfort  Recent Flowsheet Documentation  Taken 2/17/2024 0045 by Ten Griffin, RN  Pain Management Interventions: see MAR  Taken 2/16/2024 2034 by Ten Griffin, RN  Pain Management Interventions: see MAR

## 2024-02-17 NOTE — PLAN OF CARE
Problem: Fall Injury Risk  Goal: Absence of Fall and Fall-Related Injury  Outcome: Ongoing, Progressing  Intervention: Identify and Manage Contributors  Recent Flowsheet Documentation  Taken 2/17/2024 1209 by Saqib Ellison RN  Medication Review/Management: medications reviewed  Taken 2/17/2024 1000 by Saqib Ellison RN  Medication Review/Management: medications reviewed  Taken 2/17/2024 0854 by Saqib Ellison RN  Medication Review/Management: medications reviewed  Intervention: Promote Injury-Free Environment  Recent Flowsheet Documentation  Taken 2/17/2024 1209 by Saqib Ellison RN  Safety Promotion/Fall Prevention:   assistive device/personal items within reach   activity supervised   clutter free environment maintained   lighting adjusted   nonskid shoes/slippers when out of bed   room organization consistent   safety round/check completed  Taken 2/17/2024 1000 by Saqib Ellison RN  Safety Promotion/Fall Prevention:   activity supervised   assistive device/personal items within reach   clutter free environment maintained   lighting adjusted   nonskid shoes/slippers when out of bed   safety round/check completed   room organization consistent  Taken 2/17/2024 0854 by Saqib Ellison RN  Safety Promotion/Fall Prevention:   assistive device/personal items within reach   activity supervised   clutter free environment maintained   safety round/check completed   nonskid shoes/slippers when out of bed     Problem: Adult Inpatient Plan of Care  Goal: Plan of Care Review  Outcome: Ongoing, Progressing  Goal: Patient-Specific Goal (Individualized)  Outcome: Ongoing, Progressing  Goal: Absence of Hospital-Acquired Illness or Injury  Outcome: Ongoing, Progressing  Intervention: Identify and Manage Fall Risk  Recent Flowsheet Documentation  Taken 2/17/2024 1209 by Saqib Ellison RN  Safety Promotion/Fall Prevention:   assistive device/personal items within reach   activity supervised   clutter free environment maintained   lighting  adjusted   nonskid shoes/slippers when out of bed   room organization consistent   safety round/check completed  Taken 2/17/2024 1000 by Saqib Ellison RN  Safety Promotion/Fall Prevention:   activity supervised   assistive device/personal items within reach   clutter free environment maintained   lighting adjusted   nonskid shoes/slippers when out of bed   safety round/check completed   room organization consistent  Taken 2/17/2024 0854 by Saqib Ellison RN  Safety Promotion/Fall Prevention:   assistive device/personal items within reach   activity supervised   clutter free environment maintained   safety round/check completed   nonskid shoes/slippers when out of bed  Intervention: Prevent Skin Injury  Recent Flowsheet Documentation  Taken 2/17/2024 1209 by Saqib Ellison RN  Skin Protection:   adhesive use limited   tubing/devices free from skin contact   transparent dressing maintained   skin-to-skin areas padded   skin-to-device areas padded   incontinence pads utilized  Taken 2/17/2024 1000 by Saqib Ellison RN  Skin Protection:   adhesive use limited   tubing/devices free from skin contact   transparent dressing maintained   skin-to-skin areas padded   skin-to-device areas padded   incontinence pads utilized  Taken 2/17/2024 0854 by Saqib Ellison RN  Body Position: position changed independently  Skin Protection:   adhesive use limited   tubing/devices free from skin contact   transparent dressing maintained   skin-to-skin areas padded   skin-to-device areas padded   incontinence pads utilized  Intervention: Prevent Infection  Recent Flowsheet Documentation  Taken 2/17/2024 1209 by Saqib Ellison RN  Infection Prevention:   environmental surveillance performed   equipment surfaces disinfected   personal protective equipment utilized   hand hygiene promoted   rest/sleep promoted   single patient room provided  Taken 2/17/2024 1000 by Saqib Ellison RN  Infection Prevention:   environmental surveillance performed   equipment  surfaces disinfected   hand hygiene promoted   personal protective equipment utilized   rest/sleep promoted   single patient room provided  Taken 2/17/2024 0854 by Saqib Ellison RN  Infection Prevention:   environmental surveillance performed   equipment surfaces disinfected   hand hygiene promoted   personal protective equipment utilized   rest/sleep promoted   single patient room provided  Goal: Optimal Comfort and Wellbeing  Outcome: Ongoing, Progressing  Intervention: Monitor Pain and Promote Comfort  Recent Flowsheet Documentation  Taken 2/17/2024 1209 by Saqib Ellison RN  Pain Management Interventions:   quiet environment facilitated   see MAR  Taken 2/17/2024 1000 by Saqib Ellison RN  Pain Management Interventions:   see MAR   quiet environment facilitated  Taken 2/17/2024 0854 by Saqib Ellison RN  Pain Management Interventions: see MAR  Intervention: Provide Person-Centered Care  Recent Flowsheet Documentation  Taken 2/17/2024 1209 by Saqib Ellison RN  Trust Relationship/Rapport:   care explained   questions encouraged   questions answered  Taken 2/17/2024 1000 by Saqib Ellison RN  Trust Relationship/Rapport:   care explained   questions answered   questions encouraged  Taken 2/17/2024 0854 by Saqib Ellison RN  Trust Relationship/Rapport:   care explained   questions encouraged   questions answered  Goal: Readiness for Transition of Care  Outcome: Ongoing, Progressing     Problem: Palliative Care  Goal: Enhanced Quality of Life  Outcome: Ongoing, Progressing  Intervention: Maximize Comfort  Recent Flowsheet Documentation  Taken 2/17/2024 1209 by Saqib Ellison RN  Pain Management Interventions:   quiet environment facilitated   see MAR  Taken 2/17/2024 1000 by Saqib Ellison RN  Pain Management Interventions:   see MAR   quiet environment facilitated  Taken 2/17/2024 0854 by Saqib Ellison RN  Pain Management Interventions: see MAR   Goal Outcome Evaluation:

## 2024-02-17 NOTE — PROGRESS NOTES
INFECTIOUS DISEASE Progress note    Costa Robbins  1955  [id number]    Date of Consult: 2/15/24    Admission Date: 2/13/2024      Requesting Provider: No Known Provider  Evaluating Physician: Hans Lo MD    Reason for Consultation: hepatic abscess    History of present illness:    Costa Robbins is a 68 y.o. male, with PMH DM, diverticulitis, etoh use, BPH, pancreatic mass, PVD, seen today for a hepatic abscess. Admitted 1/26-1/28/24 with abdominal pain, undergoing an MRCP with extensive intrahepatic and extrahepatic biliary ductal dilation, concerning for pancreatic mass. ERCP with malignant appearing 2cm biliary stricture in the head of pancreas s/p spincterotomy and stent. Cytology negative for malignancy.  CT with several subcentimeter left lung nodules indeterminate.  Recent diagnostic laparoscopy and liver biopsy, 2/7/24,  pathology negative for malignancy. Plans for Whipple by Dr. Maloney.  Presented to the ED with complaints of fever up to 102 degrees, Right and left upper quadrant abdominal pain, and nausea. CT with pancreatic head mass s/p stenting of CBD, 3mm hypodensity right lateral aspect of the liver adjacent to the hepatic capsule containing a small locule of gas, right hepatic lobe with new subtle hypodensities, possible colitis ascending colon, and proximal transverse colon. MRI with small peripheral 2.4x 0.9cm capsular lesion along anterior margin of right hepatic lobe as well as 3 subcentimeter lesions right hepatic lobe- new, suspicious for hepatic abscesses. Admitting labs with a leukocytosis of 15.09, plt 312, Scr 1.02, ALT 31, AST 15. Alk phos 182, LAC 1.3, hgbA1c 9.2, CA 19-9 456.0.  Started on Cefepime and Flagyl and we were consulted for evaluation and treatment.     Subjective:    2/16/24: The patient is on having ongoing significant abdominal pain that is mostly in the epigastric region and worse on the left side than on the right side.  Leukocytosis has resolved.   He does have a bad taste in his mouth from the Flagyl but no severe nausea or vomiting.  No severe diarrhea.  No fevers. Palliative care has been consult to for assistance with pain management and other symptoms. He is frustrated about his ongoing pain and he would like to talk to surgery again about whether or not there are plans for a Whipple procedure.    2/17/24: The patient states that his pain is still not under very good control and he is still having significant epigastric pain but also having some left greater than right upper abdominal pain.  Still not eating much.  No severe nausea or vomiting.  No fevers.  Leukocytosis is resolved.  No severe watery diarrhea.    Past Medical History:   Diagnosis Date    Bile duct obstruction 01/27/2024    found during ERCP    COVID 2021    no hospitalization    Diabetes mellitus 2017    po meds and insulin    Diverticulitis 2009    surgically intervention    Enlarged prostate     recently started on Proscar    ETOH use 05/25/2023    Former smoker 05/25/2023    Hearing decreased     Hypertension     Melanoma     CHEST, BACK, NECK MULTI, HEAD    Pancreatic mass 01/27/2024    found on CT scan; ERCP confirmed    PVD (peripheral vascular disease)     Unintentional weight loss     Wears glasses        Past Surgical History:   Procedure Laterality Date    AORTAGRAM Bilateral 04/12/2023    Procedure: AORTAGRAM WITH RUNOFFS  STENT OF THE LEFT ILIAC ARTERY;  Surgeon: Moses Escalante MD;  Location: [facility];  Service: Vascular;  Laterality: Bilateral;    COLON RESECTION  05/2009    partial colectomy    COLONOSCOPY      DIAGNOSTIC LAPAROSCOPY N/A 2/7/2024    Procedure: DIAGNOSTIC LAPAROSCOPY WITH LIVER BIOPSY AND PERITONEAL WASHINGS;  Surgeon: Silas Maloney MD;  Location: [facility];  Service: General;  Laterality: N/A;    ERCP N/A 01/27/2024    Procedure: ENDOSCOPIC RETROGRADE CHOLANGIOPANCREATOGRAPHY WITH STENT PLACEMENT;  Surgeon: Brunner, Mark I, MD;  Location:   [facility];  Service: Gastroenterology;  Laterality: N/A;    FEMORAL ENDARTERECTOMY Left 2023    Procedure: FEMORAL ENDARTERECTOMY WITH PROPATEN GRAFT 8MM X 40CM;  Surgeon: Moses Escalante MD;  Location:  [facility];  Service: Vascular;  Laterality: Left;    FEMORAL FEMORAL BYPASS N/A 2023    Procedure: FEMORAL FEMORAL BYPASS;  Surgeon: Moses Escalante MD;  Location:  [facility];  Service: Vascular;  Laterality: N/A;    ORIF ANKLE FRACTURE Left     SKIN BIOPSY      SKIN CANCER EXCISION      melanoma    WISDOM TOOTH EXTRACTION         Family History   Problem Relation Age of Onset    Diabetes Mother     Heart attack Mother     Hypertension Mother     Hyperlipidemia Mother     Stroke Mother     Hodgkin's lymphoma Father     Hypertension Father     Diabetes Brother     Hyperlipidemia Brother        Social History     Socioeconomic History    Marital status:     Number of children: 0   Tobacco Use    Smoking status: Former     Packs/day: 0.50     Years: 43.00     Additional pack years: 0.00     Total pack years: 21.50     Types: Cigarettes     Quit date: 2023     Years since quittin.0     Passive exposure: Never    Smokeless tobacco: Never    Tobacco comments:     Pt states that he has been able to stop smoking this month - 2023   Vaping Use    Vaping Use: Never used   Substance and Sexual Activity    Alcohol use: Not Currently     Comment: 2 BEERS PER DAY; hx of ETOH use    Drug use: Yes     Types: Marijuana     Comment: ONCE A MONTH    Sexual activity: Defer       Allergies   Allergen Reactions    Atenolol Other (See Comments)     Bradycardia.    Morphine Itching    Penicillins Hives         Medication:    Current Facility-Administered Medications:     acetaminophen (TYLENOL) tablet 650 mg, 650 mg, Oral, Q4H PRN, Booker Putnam PA-C, 650 mg at 24    amLODIPine (NORVASC) tablet 10 mg, 10 mg, Oral, Daily, Booker Putnam PA-C, 10 mg at 24 0824     sennosides-docusate (PERICOLACE) 8.6-50 MG per tablet 2 tablet, 2 tablet, Oral, BID **AND** polyethylene glycol (MIRALAX) packet 17 g, 17 g, Oral, Daily PRN **AND** bisacodyl (DULCOLAX) EC tablet 5 mg, 5 mg, Oral, Daily PRN **AND** bisacodyl (DULCOLAX) suppository 10 mg, 10 mg, Rectal, Daily PRN, Lucila Mayes, APRN    cefepime 2000 mg IVPB in 100 mL NS (MBP), 2,000 mg, Intravenous, Q8H, Hans Lo MD, 2,000 mg at 02/17/24 0823    dextrose (D50W) (25 g/50 mL) IV injection 25 g, 25 g, Intravenous, Q15 Min PRN, Booker Putnam PA-C    dextrose (GLUTOSE) oral gel 15 g, 15 g, Oral, Q15 Min PRN, Booker Putnam PA-C    glucagon (GLUCAGEN) injection 1 mg, 1 mg, Intramuscular, Q15 Min PRN, Booker Putnam PA-C    HYDROcodone-acetaminophen (NORCO) 5-325 MG per tablet 1 tablet, 1 tablet, Oral, Q4H PRN, Kathryn Acosta, , 1 tablet at 02/17/24 1256    insulin detemir (LEVEMIR) injection 10 Units, 10 Units, Subcutaneous, Nightly, Jam Ivory, APRN    Insulin Lispro (humaLOG) injection 3-14 Units, 3-14 Units, Subcutaneous, 4x Daily With Meals & Nightly, Kate Long, APRN, 5 Units at 02/17/24 0828    ketorolac (TORADOL) injection 15 mg, 15 mg, Intravenous, Q8H PRN, Kathryn Acosta P, DO    lisinopril (PRINIVIL,ZESTRIL) tablet 40 mg, 40 mg, Oral, Daily, Booker Putnam PA-C, 40 mg at 02/17/24 0824    melatonin tablet 5 mg, 5 mg, Oral, Nightly PRN, Booker Putnam PA-C    metroNIDAZOLE (FLAGYL) tablet 500 mg, 500 mg, Oral, Q8H, Hans Lo MD, 500 mg at 02/17/24 1255    ondansetron ODT (ZOFRAN-ODT) disintegrating tablet 4 mg, 4 mg, Oral, Q6H PRN **OR** ondansetron (ZOFRAN) injection 4 mg, 4 mg, Intravenous, Q6H PRN, Booker Putnam PA-C    Pharmacy to dose cefepime, , Does not apply, Continuous PRN, Booker Putnam PA-C    Pharmacy to dose metronidazole, , Does not apply, Continuous PRN, Booker Putnam PA-C    Potassium  Replacement - Follow Nurse / BPA Driven Protocol, , Does not apply, PRN, Kathryn Acosta,     simethicone (MYLICON) chewable tablet 80 mg, 80 mg, Oral, BID PRN, Marga Turk APRN, 80 mg at 24    sodium chloride 0.9 % flush 10 mL, 10 mL, Intravenous, Q12H, Booker Putnam PA-C, 10 mL at 02/15/24 2107    sodium chloride 0.9 % flush 10 mL, 10 mL, Intravenous, PRN, Booker Putnam PA-C    sodium chloride 0.9 % infusion 40 mL, 40 mL, Intravenous, PRN, Booker Putnam PA-C    Antibiotics:  Anti-Infectives (From admission, onward)      Ordered     Dose/Rate Route Frequency Start Stop    02/15/24 1632  metroNIDAZOLE (FLAGYL) tablet 500 mg        Ordering Provider: Hans Lo MD    500 mg Oral Every 8 Hours Scheduled 02/15/24 2200 24 2159    02/15/24 1631  cefepime 2000 mg IVPB in 100 mL NS (MBP)        Ordering Provider: Hans Lo MD    2,000 mg  over 4 Hours Intravenous Every 8 Hours 02/15/24 1700 24 1659    24 2307  metroNIDAZOLE (FLAGYL) IVPB 500 mg        Ordering Provider: Jomar Fajardo PA    500 mg  200 mL/hr over 30 Minutes Intravenous Once 24 2323 24 0127    24 2307  cefepime 2000 mg IVPB in 100 mL NS (MBP)        Ordering Provider: Jomar Fajardo PA    2,000 mg  over 30 Minutes Intravenous Once 24 2323 24 0051              Review of Systems:  As above      Physical Exam:   Vital Signs  Temp (24hrs), Av.1 °F (36.7 °C), Min:97.4 °F (36.3 °C), Max:98.7 °F (37.1 °C)    Temp  Min: 97.4 °F (36.3 °C)  Max: 98.7 °F (37.1 °C)  BP  Min: 118/79  Max: 155/78  Pulse  Min: 71  Max: 82  Resp  Min: 16  Max: 16  SpO2  Min: 94 %  Max: 94 %    GENERAL: Awake and alert, No acute distress  HENT: Normocephalic, atraumatic.  No external oral lesions noted.  Eyes: Anicteric.  No conjunctival injection.  HEART: RRR; No murmur, rubs, gallops.   LUNGS: CTA B.  Nonlabored breathing on room air  ABDOMEN: Soft, Nondistended.  Has some tenderness in the epigastric region although seems better on exam than it did yesterday.  No voluntary guarding or rebound tenderness.  EXT:  No cyanosis, clubbing or edema. No cord.  :  Without Villatoro catheter.  MSK: No joint effusions or erythema  SKIN: No generalized rashes noted.  No jaundice noted.  NEURO: Oriented to PPT.  Normal speech and cognition.   PSYCHIATRIC: Mood is somewhat agitated.  Cooperative with exam.    Laboratory Data    Results from last 7 days   Lab Units 02/17/24  0411 02/16/24  0536 02/15/24  1054   WBC 10*3/mm3 5.53 7.38 7.25   HEMOGLOBIN g/dL 12.1* 12.7* 12.5*   HEMATOCRIT % 34.7* 36.8* 35.6*   PLATELETS 10*3/mm3 180 193 207     Results from last 7 days   Lab Units 02/17/24  0411   SODIUM mmol/L 132*   POTASSIUM mmol/L 3.6   CHLORIDE mmol/L 101   CO2 mmol/L 20.0*   BUN mg/dL 9   CREATININE mg/dL 0.50*   GLUCOSE mg/dL 229*   CALCIUM mg/dL 9.3     Results from last 7 days   Lab Units 02/15/24  1501   ALK PHOS U/L 109   BILIRUBIN mg/dL 0.7   ALT (SGPT) U/L 22   AST (SGOT) U/L 12             Results from last 7 days   Lab Units 02/13/24  2326   LACTATE mmol/L 1.3             Estimated Creatinine Clearance: 145.5 mL/min (A) (by C-G formula based on SCr of 0.5 mg/dL (L)).      Microbiology:  Blood Culture   Date Value Ref Range Status   02/13/2024 No growth at 24 hours  Preliminary       Radiology:  Imaging Results (Last 72 Hours)       ** No results found for the last 72 hours. **        02/08/2024  pathology    DIAGNOSIS:  PERITONEAL FLUID:  Negative for malignant cells.    MICROSCOPIC DESCRIPTION:  Scattered benign mesothelial cells and mixed inflammatory cells are present  within a sanguineous background.    2/7/24:   Final Diagnosis   1) LIVER LESION, BIOPSY:  Subcapsular liver parenchyma with mild cholestasis otherwise no significant pathologic abnormality, see comment  No evidence of malignancy identified         Impression:   Hepatic microabscesses, noted on MRI right hepatic  lobe, Multiple peripheral lesions in the right hepatic lobe that are new since 1/26/24.  Less likely metastases given the rapid development in 2-3 weeks. Especially concerning for abscesses in the setting of his recent reported fever up to 102F at home, leukocytosis, and chills.  Leukocytosis with neutrophilia-Improved  Pancreatic mass s/p ERCP with stent placement  Diabetes Mellitus, type 2, poorly controlled with A1C 9  History of alcohol abuse   History of penicillin allergy-hives      PLAN/RECOMMENDATIONS:   Thank you for asking us to see Costa Robbins, I recommend the following:  - Continue Cefepime 2g IV q8h  - Continue Flagyl 500 mg by mouth 3 times a day  - Continue to monitor CBC and temperature curve    I will tentatively plan for at least 2-3 weeks of IV/PO antibiotic therapy with repeat CT A/P with IV contrast in about 2 weeks to reassess hepatic abscesses. The patient will think about whether he want like to infuse at home with a PICC line or present to the infusion center once a day with peripheral IV.     Tentative antibiotic plan below provided that he infuses at home through a PICC line. He is currently unsure if he wants to infuse at home through a PICC line and he may want to infuse daily in our infusion center.  I will hold off on PICC line placement for now.  Will tentatively plan to switch him to Invanz if he does elect to Infuse INPAT at [facility] through a PIV.    UM/REINA:  Cefepime 2g IV q8h. Anticipate continuing this antibiotic at least until 2/29/24 and then reassess  Flagyl 500 mg PO TID. Please give a 2 week supply at the time of discharge  Weekly cbc, cmp, crp  Change the PICC line dressing weekly with a Biopatch or Tegaderm CHG gel dressing   Attn [facility] staff: please schedule the patient for a repeat CT A/P with IV contrast at [facility] in about 2 weeks (can be just prior to his appointment on 2/29/24)  Follow up in my clinic on 2/29/24. Clinic to call with the appointment time  Please fax a  copy of my orders to [facility] at [phone] and call [phone] with final discharge plans     I discussed the patient's case with Dr. Acosta today       Hans Lo MD  2/17/2024  15:55 EST

## 2024-02-17 NOTE — CONSULTS
INFECTIOUS DISEASE Progress note    Costa Robbins  1955  [id number]    Date of Consult: 2/15/24    Admission Date: 2/13/2024      Requesting Provider: No Known Provider  Evaluating Physician: Hans Lo MD    Reason for Consultation: hepatic abscess    History of present illness:    Costa Robbins is a 68 y.o. male, with PMH DM, diverticulitis, etoh use, BPH, pancreatic mass, PVD, seen today for a hepatic abscess. Admitted 1/26-1/28/24 with abdominal pain, undergoing an MRCP with extensive intrahepatic and extrahepatic biliary ductal dilation, concerning for pancreatic mass. ERCP with malignant appearing 2cm biliary stricture in the head of pancreas s/p spincterotomy and stent. Cytology negative for malignancy.  CT with several subcentimeter left lung nodules indeterminate.  Recent diagnostic laparoscopy and liver biopsy, 2/7/24,  pathology negative for malignancy. Plans for Whipple by Dr. Maloney.  Presented to the ED with complaints of fever up to 102 degrees, Right and left upper quadrant abdominal pain, and nausea. CT with pancreatic head mass s/p stenting of CBD, 3mm hypodensity right lateral aspect of the liver adjacent to the hepatic capsule containing a small locule of gas, right hepatic lobe with new subtle hypodensities, possible colitis ascending colon, and proximal transverse colon. MRI with small peripheral 2.4x 0.9cm capsular lesion along anterior margin of right hepatic lobe as well as 3 subcentimeter lesions right hepatic lobe- new, suspicious for hepatic abscesses. Admitting labs with a leukocytosis of 15.09, plt 312, Scr 1.02, ALT 31, AST 15. Alk phos 182, LAC 1.3, hgbA1c 9.2, CA 19-9 456.0.  Started on Cefepime and Flagyl and we were consulted for evaluation and treatment.     Subjective:    2/16/24: The patient is on having ongoing significant abdominal pain that is mostly in the epigastric region and worse on the left side than on the right side.  Leukocytosis has resolved.   He does have a bad taste in his mouth from the Flagyl but no severe nausea or vomiting.  No severe diarrhea.  No fevers. Palliative care has been consult to for assistance with pain management and other symptoms. He is frustrated about his ongoing pain and he would like to talk to surgery again about whether or not there are plans for a Whipple procedure.    Past Medical History:   Diagnosis Date    Bile duct obstruction 01/27/2024    found during ERCP    COVID 2021    no hospitalization    Diabetes mellitus 2017    po meds and insulin    Diverticulitis 2009    surgically intervention    Enlarged prostate     recently started on Proscar    ETOH use 05/25/2023    Former smoker 05/25/2023    Hearing decreased     Hypertension     Melanoma     CHEST, BACK, NECK MULTI, HEAD    Pancreatic mass 01/27/2024    found on CT scan; ERCP confirmed    PVD (peripheral vascular disease)     Unintentional weight loss     Wears glasses        Past Surgical History:   Procedure Laterality Date    AORTAGRAM Bilateral 04/12/2023    Procedure: AORTAGRAM WITH RUNOFFS  STENT OF THE LEFT ILIAC ARTERY;  Surgeon: Moses Escalante MD;  Location:  [facility];  Service: Vascular;  Laterality: Bilateral;    COLON RESECTION  05/2009    partial colectomy    COLONOSCOPY      DIAGNOSTIC LAPAROSCOPY N/A 2/7/2024    Procedure: DIAGNOSTIC LAPAROSCOPY WITH LIVER BIOPSY AND PERITONEAL WASHINGS;  Surgeon: Silas Maloney MD;  Location:  [facility];  Service: General;  Laterality: N/A;    ERCP N/A 01/27/2024    Procedure: ENDOSCOPIC RETROGRADE CHOLANGIOPANCREATOGRAPHY WITH STENT PLACEMENT;  Surgeon: Brunner, Mark I, MD;  Location:  [facility];  Service: Gastroenterology;  Laterality: N/A;    FEMORAL ENDARTERECTOMY Left 05/25/2023    Procedure: FEMORAL ENDARTERECTOMY WITH PROPATEN GRAFT 8MM X 40CM;  Surgeon: Moses Escalante MD;  Location:  [facility];  Service: Vascular;  Laterality: Left;    FEMORAL FEMORAL BYPASS N/A 05/25/2023     Procedure: FEMORAL FEMORAL BYPASS;  Surgeon: Moses Escalante MD;  Location: [facility];  Service: Vascular;  Laterality: N/A;    ORIF ANKLE FRACTURE Left     SKIN BIOPSY      SKIN CANCER EXCISION      melanoma    WISDOM TOOTH EXTRACTION         Family History   Problem Relation Age of Onset    Diabetes Mother     Heart attack Mother     Hypertension Mother     Hyperlipidemia Mother     Stroke Mother     Hodgkin's lymphoma Father     Hypertension Father     Diabetes Brother     Hyperlipidemia Brother        Social History     Socioeconomic History    Marital status:     Number of children: 0   Tobacco Use    Smoking status: Former     Packs/day: 0.50     Years: 43.00     Additional pack years: 0.00     Total pack years: 21.50     Types: Cigarettes     Quit date: 2023     Years since quittin.0     Passive exposure: Never    Smokeless tobacco: Never    Tobacco comments:     Pt states that he has been able to stop smoking this month - 2023   Vaping Use    Vaping Use: Never used   Substance and Sexual Activity    Alcohol use: Not Currently     Comment: 2 BEERS PER DAY; hx of ETOH use    Drug use: Yes     Types: Marijuana     Comment: ONCE A MONTH    Sexual activity: Defer       Allergies   Allergen Reactions    Atenolol Other (See Comments)     Bradycardia.    Morphine Itching    Penicillins Hives         Medication:    Current Facility-Administered Medications:     acetaminophen (TYLENOL) tablet 650 mg, 650 mg, Oral, Q4H PRN, Booker Putnam PA-C, 650 mg at 24    amLODIPine (NORVASC) tablet 10 mg, 10 mg, Oral, Daily, Booker Putnam PA-C, 10 mg at 24 0911    sennosides-docusate (PERICOLACE) 8.6-50 MG per tablet 2 tablet, 2 tablet, Oral, BID **AND** polyethylene glycol (MIRALAX) packet 17 g, 17 g, Oral, Daily PRN **AND** bisacodyl (DULCOLAX) EC tablet 5 mg, 5 mg, Oral, Daily PRN **AND** bisacodyl (DULCOLAX) suppository 10 mg, 10 mg, Rectal, Daily PRN, Lucila Mayes  [place], APRN    cefepime 2000 mg IVPB in 100 mL NS (MBP), 2,000 mg, Intravenous, Q8H, Hans Lo MD, 2,000 mg at 02/16/24 1606    dextrose (D50W) (25 g/50 mL) IV injection 25 g, 25 g, Intravenous, Q15 Min PRN, Booker Putnam PA-C    dextrose (GLUTOSE) oral gel 15 g, 15 g, Oral, Q15 Min PRN, Booker Putnam PA-C    glucagon (GLUCAGEN) injection 1 mg, 1 mg, Intramuscular, Q15 Min PRN, Booker Putnam PA-C    HYDROcodone-acetaminophen (NORCO) 5-325 MG per tablet 1 tablet, 1 tablet, Oral, Q4H PRN, Kathryn Acosta DO, 1 tablet at 02/16/24 2034    insulin detemir (LEVEMIR) injection 10 Units, 10 Units, Subcutaneous, Nightly, aJm Ivory, CHERISE    Insulin Lispro (humaLOG) injection 3-14 Units, 3-14 Units, Subcutaneous, 4x Daily With Meals & Nightly, Kate Long, APRN, 8 Units at 02/16/24 1741    lisinopril (PRINIVIL,ZESTRIL) tablet 40 mg, 40 mg, Oral, Daily, Booker Putnam PA-C, 40 mg at 02/16/24 0911    melatonin tablet 5 mg, 5 mg, Oral, Nightly PRN, Booker Putnam PA-C    metroNIDAZOLE (FLAGYL) tablet 500 mg, 500 mg, Oral, Q8H, Hans Lo MD, 500 mg at 02/16/24 2035    ondansetron ODT (ZOFRAN-ODT) disintegrating tablet 4 mg, 4 mg, Oral, Q6H PRN **OR** ondansetron (ZOFRAN) injection 4 mg, 4 mg, Intravenous, Q6H PRN, Booker Putnam PA-C    Pharmacy to dose cefepime, , Does not apply, Continuous PRN, Booker Putnam PA-C    Pharmacy to dose metronidazole, , Does not apply, Continuous PRN, Booker Putnam PA-C    Potassium Replacement - Follow Nurse / BPA Driven Protocol, , Does not apply, PRN, Kathryn Acosta,     simethicone (MYLICON) chewable tablet 80 mg, 80 mg, Oral, BID PRN, Marga Turk APRN, 80 mg at 02/16/24 2044    sodium chloride 0.9 % flush 10 mL, 10 mL, Intravenous, PRN, Bam Cline MD    sodium chloride 0.9 % flush 10 mL, 10 mL, Intravenous, Q12H, Booker Putnam PA-C, 10 mL at 02/15/24      sodium chloride 0.9 % flush 10 mL, 10 mL, Intravenous, PRN, Booker Putnam PA-C    sodium chloride 0.9 % infusion 40 mL, 40 mL, Intravenous, PRN, Booker Putnam PA-C    Antibiotics:  Anti-Infectives (From admission, onward)      Ordered     Dose/Rate Route Frequency Start Stop    02/15/24 1632  metroNIDAZOLE (FLAGYL) tablet 500 mg        Ordering Provider: Hans Lo MD    500 mg Oral Every 8 Hours Scheduled 02/15/24 2200 24 2159    02/15/24 1631  cefepime 2000 mg IVPB in 100 mL NS (MBP)        Ordering Provider: Hans Lo MD    2,000 mg  over 4 Hours Intravenous Every 8 Hours 02/15/24 1700 24 1659    24 2307  metroNIDAZOLE (FLAGYL) IVPB 500 mg        Ordering Provider: Jomar Fajardo PA    500 mg  200 mL/hr over 30 Minutes Intravenous Once 24 2323 24 0127    24 2307  cefepime 2000 mg IVPB in 100 mL NS (MBP)        Ordering Provider: Jomar Fajardo PA    2,000 mg  over 30 Minutes Intravenous Once 243 24 0051              Review of Systems:  As above      Physical Exam:   Vital Signs  Temp (24hrs), Av.7 °F (37.1 °C), Min:98.4 °F (36.9 °C), Max:99 °F (37.2 °C)    Temp  Min: 98.4 °F (36.9 °C)  Max: 99 °F (37.2 °C)  BP  Min: 120/66  Max: 141/70  Pulse  Min: 69  Max: 80  Resp  Min: 16  Max: 16  SpO2  Min: 94 %  Max: 95 %    GENERAL: Awake and alert, in Moderate distress  HENT: Normocephalic, atraumatic.  No external oral lesions noted.  Eyes: Anicteric.  No conjunctival injection.  HEART: RRR; No murmur, rubs, gallops.   LUNGS: Clear to auscultation bilaterally without wheezing, rales, rhonchi. Normal respiratory effort. Nonlabored  ABDOMEN: Soft, Nondistended.  Significant tenderness to palpation mostly in the epigastric region.  Has some Voluntary guarding but no rebound tenderness.  EXT:  No cyanosis, clubbing or edema. No cord.  :  Without Villatoro catheter.  MSK: No joint effusions or erythema  SKIN: No  generalized rashes noted.  No jaundice noted.  NEURO: Oriented to PPT.  Normal speech and cognition.   PSYCHIATRIC: Normal insight and judgment. Cooperative with PE    Laboratory Data    Results from last 7 days   Lab Units 02/16/24  0536 02/15/24  1054 02/14/24  0531   WBC 10*3/mm3 7.38 7.25 14.27*   HEMOGLOBIN g/dL 12.7* 12.5* 12.6*   HEMATOCRIT % 36.8* 35.6* 36.2*   PLATELETS 10*3/mm3 193 207 243     Results from last 7 days   Lab Units 02/16/24  0536   SODIUM mmol/L 137   POTASSIUM mmol/L 4.4   CHLORIDE mmol/L 105   CO2 mmol/L 23.0   BUN mg/dL 10   CREATININE mg/dL 0.60*   GLUCOSE mg/dL 232*   CALCIUM mg/dL 9.8     Results from last 7 days   Lab Units 02/15/24  1501   ALK PHOS U/L 109   BILIRUBIN mg/dL 0.7   ALT (SGPT) U/L 22   AST (SGOT) U/L 12             Results from last 7 days   Lab Units 02/13/24  2326   LACTATE mmol/L 1.3             Estimated Creatinine Clearance: 121.3 mL/min (A) (by C-G formula based on SCr of 0.6 mg/dL (L)).      Microbiology:  Blood Culture   Date Value Ref Range Status   02/13/2024 No growth at 24 hours  Preliminary       Radiology:  Imaging Results (Last 72 Hours)       Procedure Component Value Units Date/Time    MRI Abdomen With & Without Contrast [[id number]] Collected: 02/14/24 1344     Updated: 02/14/24 1409    Narrative:      MRI ABDOMEN W WO CONTRAST    Date of Exam: 2/14/2024 12:46 PM EST    Indication: Metastatic disease evaluation, Pancreatic cancer, staging.     Comparison: CT the abdomen and pelvis dated 2/13/2024, 1/26/2024. Prior MRI abdomen dated 1/26/2024    Technique:  Routine multiplanar/multisequence images of the abdomen were obtained before and after the uneventful administration of 14 mL Multihance.    Findings:  The liver is normal in size and contour. There is no evidence of hepatic steatosis or iron deposition. There is a peripherally enhancing capsular lesion along the anterior aspect of the right hepatic lobe near the junction of segment IVB and V. This    measures 2.4 x 0.9 cm. There is a small focus of gas centrally seen on the prior CT. There are approximately 3 new small subcentimeter liver lesions within the right hepatic lobe of the liver predominately involving segment VI, also with peripheral   enhancement. All of these lesions are new from patient's prior CT and MRI from 1/26/2024. In the setting of interval biliary intervention as well as fever, these are concerning for small hepatic microabscesses. Metastatic disease is possible although   rapid development of lesions these size is felt less likely.    The gallbladder is present without wall thickening. There has been interval decompression of the biliary system compared to the prior MRI. There is a metallic common bile duct stent extending through the common bile duct and into the proximal aspect of   the second portion of the duodenum.    The spleen is normal in size. There is nodularity of the bilateral adrenal glands which demonstrates loss of signal on the out of phase sequence relative to the in phase sequence. These likely represent benign lipid rich adrenal adenomas.    There is an ill-defined hypoenhancing mass within the uncinate process of the pancreas measuring 2.2 x 1.8 cm. This abuts the common bile duct and common bile duct stent. There is no significant pancreatic duct dilation. There is no arterial encasement   or abutment. There is questionable abutment along the inferior margin of the portal splenic confluence.    The kidneys are symmetric in size and enhancement. There is a right upper pole parapelvic cyst which herniates into the renal sinus fat. Multiple additional tiny punctate cystic foci are present within both kidneys. There is no hydronephrosis. Visualized   portions of the gastrointestinal tract appear normal in caliber. There is trace bilateral pleural effusions. Trace perisplenic ascites.      Impression:      Impression:  1. Small peripheral 2.4 x 0.9 cm capsular lesion  along the anterior margin of the right hepatic lobe as well as 3 additional subcentimeter peripherally enhancing lesions within the more central aspect of the right hepatic lobe which are new from prior   imaging dated 1/26/2024. Given the interval rapid development, interval biliary intervention and current elevated white count, these are suspicious for small hepatic microabscesses. Hepatic metastatic disease is a consideration but felt less likely,   mainly due to the relatively rapid development within 2-3 weeks. Recommend short-term imaging follow-up after antibiotic therapy to assess for decrease/resolution.  2. Ill-defined hypoenhancing pancreatic head mass measuring approximately 2.2 cm abutting the main pancreatic duct. No evidence of vascular encasement. Questionable abutment along the undersurface of the portal splenic confluence.  3. Multiple bilateral adrenal nodules which appear to demonstrate loss of signal on the out of phase sequence suggesting lipid rich adrenal adenomas.        Electronically Signed: Miguel Sahnielor    2/14/2024 2:06 PM EST    Workstation ID: [id number]    CT Abdomen Pelvis With Contrast [[id number]] Collected: 02/13/24 2234     Updated: 02/13/24 2250    Narrative:      CT ABDOMEN PELVIS W CONTRAST    Date of Exam: 2/13/2024 10:25 PM EST    Indication: Worsening abdominal pain, known pancreatic mass.    Comparison: 1/26/2024    Technique: Axial CT images were obtained of the abdomen and pelvis following the uneventful intravenous administration of 85 cc Isovue-300. Reconstructed coronal and sagittal images were also obtained. Automated exposure control and iterative   construction methods were used.      Findings:  Visualized Chest: Mild patchy opacities in the posterior aspect of the lower lobes bilaterally. Otherwise unremarkable    Liver: Small hypodensity adjacent to the hepatic capsule in the lateral aspect of the right hepatic lobe containing small locules of gas, measuring  approximately 2.9 x 1.1 x 3.0 cm  Additional scattered small hypodensities are noted in the right hepatic lobe. These are all new since prior study.    Gallbladder: Layering hyperdensity within the gallbladder most likely represents biliary sludge. No wall thickening or pericholecystic fluid.    Bile Ducts: A stent is noted within the common bile duct. There is associated pneumobilia.    Spleen: Spleen is normal in size and CT density.    Pancreas: Subtle hypoenhancement of the pancreatic head is once again noted concerning for an underlying pancreatic mass.    Adrenals: Small nodules are noted within the adrenal glands bilaterally, measuring up to 1.5 cm, grossly unchanged from prior study.    Kidneys: Presumed calyceal diverticulum is noted.  Scattered small nonobstructing renal stones are noted on the right. No obstructing stones or hydronephrosis.    Gastrointestinal: No dilated loops of bowel to suggest bowel obstruction. Mild wall thickening of the ascending colon and transverse colon. Mild to moderate stool burden noted in the descending colon and sigmoid colon. Diverticulosis of the descending   colon and sigmoid colon without evidence of acute diverticulitis. Findings suggestive of partial sigmoid colon resection.    Bladder: The bladder is normal.    Pelvis:  No suspecious mass.    Peritoneum/Mesentery: No fluid collection, ascities, or free air.      Lymph Nodes: No lymphadenopathy.    Vasculature: Extensive calcified and noncalcified plaques of the abdominal aorta and iliac arteries. Near occlusion of the right common iliac artery. Patient is status post bifemoral bypass graft. The graft appears to be patent.    Abdominal Wall: Left upper anterior abdominal wall lipoma. Otherwise unremarkable.    Bony Structures: No acute osseous abnormality      Impression:      Impression:  Findings suggestive of pancreatic head mass status post stenting of the common bile duct.    Subtle, approximately 3 cm,  hypodensity noted within the right lateral aspect of the liver adjacent to the hepatic capsule containing a small locule of gas. This may represent the site of prior biopsy. Given the presence of gas, this is concerning for   developing abscess.    Additionally new subtle hypodensities are noted within the right hepatic lobe, which are nonspecific but may represent new metastatic lesions.    Possible colitis of the ascending colon and proximal transverse colon.    Diverticulosis of the descending and sigmoid colon without evidence of acute diverticulitis.    Mild to moderate stool burden throughout the colon is concerning for constipation.    Nonspecific mild patchy opacities in the dependent portions of the lower lobes most likely represents atelectasis, although an infectious/inflammatory process is not excluded.        Electronically Signed: Nile De La Garza DO    2/13/2024 10:47 PM EST    Workstation ID: [id number]        02/08/2024  pathology    DIAGNOSIS:  PERITONEAL FLUID:  Negative for malignant cells.    MICROSCOPIC DESCRIPTION:  Scattered benign mesothelial cells and mixed inflammatory cells are present  within a sanguineous background.    2/7/24:   Final Diagnosis   1) LIVER LESION, BIOPSY:  Subcapsular liver parenchyma with mild cholestasis otherwise no significant pathologic abnormality, see comment  No evidence of malignancy identified         Impression:   Hepatic microabscesses, noted on MRI right hepatic lobe, Multiple peripheral lesions in the right hepatic lobe that are new since 1/26/24.  Less likely metastases given the rapid development in 2-3 weeks. Especially concerning for abscesses in the setting of his recent reported fever up to 102F at home, leukocytosis, and chills.  Leukocytosis with neutrophilia-improving  Pancreatic mass s/p ERCP with stent placement  Diabetes Mellitus, type 2, poorly controlled with A1C 9  History of alcohol abuse   History of penicillin  allergy-hives      PLAN/RECOMMENDATIONS:   Thank you for asking us to see Costa Robbins, I recommend the following:  - Continue Cefepime 2g IV q8h  - Continue Flagyl 500 mg by mouth 3 times a day  - Continue to monitor CBC and temperature curve  - Abscesses likely not amenable to drainage    I will tentatively plan for at least 2-3 weeks of IV/PO antibiotic therapy with repeat CT A/P with IV contrast in about 2 weeks to reassess hepatic abscesses. The patient will think about whether he want like to infuse at home with a PICC line or present to the infusion center once a day with peripheral IV.     Tentative antibiotic plan below provided that he infuses at home through a PICC line. He is currently unsure if he wants to infuse at home through a PICC line and he may want to infuse daily in our infusion center.  I will hold off on PICC line placement for now.  Will tentatively plan to switch him to Invanz if he does elect to Infuse INPAT at [facility] through a PIV.    UM/REINA:  Cefepime 2g IV q8h. Anticipate continuing this antibiotic at least until 2/29/24 and then reassess  Flagyl 500 mg PO TID. Please give a 2 week supply at the time of discharge  Weekly cbc, cmp, crp  Change the PICC line dressing weekly with a Biopatch or Tegaderm CHG gel dressing   Attn [facility] staff: please schedule the patient for a repeat CT A/P with IV contrast at [facility] in about 2 weeks (can be just prior to his appointment on 2/29/24)  Follow up in my clinic on 2/29/24. Clinic to call with the appointment time  Please fax a copy of my orders to [facility] at [phone] and call [phone] with final discharge plans            Hans Lo MD  2/16/2024  21:14 EST

## 2024-02-18 LAB
BACTERIA SPEC AEROBE CULT: NORMAL
DEPRECATED RDW RBC AUTO: 44.5 FL (ref 37–54)
ERYTHROCYTE [DISTWIDTH] IN BLOOD BY AUTOMATED COUNT: 13.6 % (ref 12.3–15.4)
GLUCOSE BLDC GLUCOMTR-MCNC: 181 MG/DL (ref 70–130)
HCT VFR BLD AUTO: 35.5 % (ref 37.5–51)
HGB BLD-MCNC: 12.8 G/DL (ref 13–17.7)
MCH RBC QN AUTO: 31.8 PG (ref 26.6–33)
MCHC RBC AUTO-ENTMCNC: 36.1 G/DL (ref 31.5–35.7)
MCV RBC AUTO: 88.1 FL (ref 79–97)
PLATELET # BLD AUTO: 237 10*3/MM3 (ref 140–450)
PMV BLD AUTO: 11.1 FL (ref 6–12)
POTASSIUM SERPL-SCNC: 3.5 MMOL/L (ref 3.5–5.2)
RBC # BLD AUTO: 4.03 10*6/MM3 (ref 4.14–5.8)
WBC NRBC COR # BLD AUTO: 8.6 10*3/MM3 (ref 3.4–10.8)

## 2024-02-18 PROCEDURE — 82948 REAGENT STRIP/BLOOD GLUCOSE: CPT

## 2024-02-18 PROCEDURE — 25010000002 ERTAPENEM PER 500 MG: Performed by: INTERNAL MEDICINE

## 2024-02-18 PROCEDURE — 25010000002 KETOROLAC TROMETHAMINE PER 15 MG: Performed by: HOSPITALIST

## 2024-02-18 PROCEDURE — 85027 COMPLETE CBC AUTOMATED: CPT | Performed by: INTERNAL MEDICINE

## 2024-02-18 PROCEDURE — 25010000002 CEFEPIME PER 500 MG: Performed by: INTERNAL MEDICINE

## 2024-02-18 PROCEDURE — 84132 ASSAY OF SERUM POTASSIUM: CPT | Performed by: HOSPITALIST

## 2024-02-18 PROCEDURE — 99232 SBSQ HOSP IP/OBS MODERATE 35: CPT | Performed by: HOSPITALIST

## 2024-02-18 RX ORDER — HYDROCODONE BITARTRATE AND ACETAMINOPHEN 5; 325 MG/1; MG/1
2 TABLET ORAL EVERY 4 HOURS PRN
Status: DISCONTINUED | OUTPATIENT
Start: 2024-02-18 | End: 2024-02-19 | Stop reason: HOSPADM

## 2024-02-18 RX ADMIN — HYDROCODONE BITARTRATE AND ACETAMINOPHEN 2 TABLET: 5; 325 TABLET ORAL at 20:20

## 2024-02-18 RX ADMIN — METRONIDAZOLE 500 MG: 500 TABLET ORAL at 05:50

## 2024-02-18 RX ADMIN — Medication 10 ML: at 20:21

## 2024-02-18 RX ADMIN — AMLODIPINE BESYLATE 10 MG: 10 TABLET ORAL at 09:01

## 2024-02-18 RX ADMIN — CEFEPIME 2000 MG: 2 INJECTION, POWDER, FOR SOLUTION INTRAVENOUS at 09:01

## 2024-02-18 RX ADMIN — ERTAPENEM 1000 MG: 1 INJECTION INTRAMUSCULAR; INTRAVENOUS at 14:45

## 2024-02-18 RX ADMIN — KETOROLAC TROMETHAMINE 15 MG: 15 INJECTION, SOLUTION INTRAMUSCULAR; INTRAVENOUS at 09:01

## 2024-02-18 RX ADMIN — HYDROCODONE BITARTRATE AND ACETAMINOPHEN 1 TABLET: 5; 325 TABLET ORAL at 05:50

## 2024-02-18 RX ADMIN — HYDROCODONE BITARTRATE AND ACETAMINOPHEN 1 TABLET: 5; 325 TABLET ORAL at 10:05

## 2024-02-18 RX ADMIN — CEFEPIME 2000 MG: 2 INJECTION, POWDER, FOR SOLUTION INTRAVENOUS at 01:03

## 2024-02-18 RX ADMIN — HYDROCODONE BITARTRATE AND ACETAMINOPHEN 2 TABLET: 5; 325 TABLET ORAL at 14:44

## 2024-02-18 RX ADMIN — DOCUSATE SODIUM 50MG AND SENNOSIDES 8.6MG 2 TABLET: 8.6; 5 TABLET, FILM COATED ORAL at 09:01

## 2024-02-18 RX ADMIN — HYDROCODONE BITARTRATE AND ACETAMINOPHEN 1 TABLET: 5; 325 TABLET ORAL at 01:04

## 2024-02-18 RX ADMIN — LISINOPRIL 40 MG: 40 TABLET ORAL at 09:01

## 2024-02-18 NOTE — PROGRESS NOTES
[facility] Medicine Services  PROGRESS NOTE    Patient Name: Costa Robbins  : 1955  MRN: [id number]    Date of Admission: 2024  Primary Care Physician: Jonathan Conner MD    Subjective   Subjective     CC:  Follow-up fever, abdominal pain    HPI:  Patient resting in bed, complaining of ongoing LLQ pain. Patient refused labs this morning. Patient willing to try Toradol for pain. Patient wants to do daily infusions at [facility] vs home infusions now.      Objective   Objective     Vital Signs:   Temp:  [97.8 °F (36.6 °C)] 97.8 °F (36.6 °C)  Heart Rate:  [72-80] 78  Resp:  [16] 16  BP: (141-145)/(70-80) 145/70     Physical Exam:  Constitutional: No acute distress, awake, alert  HENT: NCAT, mucous membranes moist  Respiratory: Clear to auscultation bilaterally, respiratory effort normal   Cardiovascular: RRR, no murmurs, palpable pedal pulses bilaterally  Gastrointestinal: Positive bowel sounds, soft, LUQ pain, TTP, nondistended  Musculoskeletal: No BLE edema   Psychiatric: Appropriate affect, cooperative  Neurologic: Oriented x 3, moves all extremities, speech clear  Skin: warm, dry, no visible rash       Results Reviewed:  LAB RESULTS:      Lab 24  0411 24  0536 02/15/24  1054 24  0531 24  2326 24  2043   WBC 5.53 7.38 7.25 14.27*  --  15.09*   HEMOGLOBIN 12.1* 12.7* 12.5* 12.6*  --  15.5   HEMATOCRIT 34.7* 36.8* 35.6* 36.2*  --  43.7   PLATELETS 180 193 207 243  --  312   NEUTROS ABS  --  6.00 5.59 13.10*  --  13.81*   IMMATURE GRANS (ABS)  --  0.03 0.04 0.08*  --  0.10*   LYMPHS ABS  --  0.63* 0.80 0.40*  --  0.29*   MONOS ABS  --  0.64 0.74 0.67  --  0.85   EOS ABS  --  0.06 0.05 0.00  --  0.00   MCV 87.8 89.8 87.7 91.6  --  91.2   LACTATE  --   --   --   --  1.3  --          Lab 24  1746 24  0411 24  0536 02/15/24  1501 24  0531 24   SODIUM  --  132* 137 133* 136 133*   POTASSIUM 3.4* 3.6 4.4 3.0* 4.6 3.9    CHLORIDE  --  101 105 102 102 94*   CO2  --  20.0* 23.0 23.0 14.0* 20.0*   ANION GAP  --  11.0 9.0 8.0 20.0* 19.0*   BUN  --  9 10 8 15 12   CREATININE  --  0.50* 0.60* 0.68* 0.72* 1.02   EGFR  --  112.5 106.5 102.5 100.8 81.1   GLUCOSE  --  229* 232* 370* 235* 310*   CALCIUM  --  9.3 9.8 9.0 9.8 11.3*   IONIZED CALCIUM  --   --   --   --  1.43*  --    MAGNESIUM  --   --   --   --   --  1.9   HEMOGLOBIN A1C  --   --   --   --  9.20*  --          Lab 02/15/24  1501 02/13/24  2043   TOTAL PROTEIN 5.2* 7.2   ALBUMIN 2.9* 4.2   GLOBULIN 2.3 3.0   ALT (SGPT) 22 31   AST (SGOT) 12 15   BILIRUBIN 0.7 1.2   ALK PHOS 109 182*   LIPASE  --  96*         Lab 02/13/24 2043   HSTROP T 22*                 Lab 02/13/24  2158   PH, ARTERIAL 7.448   PCO2, ARTERIAL 26.8*   PO2 ART 83.0   FIO2 21   HCO3 ART 18.5*   BASE EXCESS ART -4.1*   CARBOXYHEMOGLOBIN 1.4     Brief Urine Lab Results  (Last result in the past 365 days)        Color   Clarity   Blood   Leuk Est   Nitrite   Protein   CREAT   Urine HCG        01/26/24 1639 Dark Yellow   Clear   Negative   Negative   Negative   Negative                   Microbiology Results Abnormal       Procedure Component Value - Date/Time    Blood Culture - Blood, Arm, Left [[id number]]  (Normal) Collected: 02/13/24 2337    Lab Status: Preliminary result Specimen: Blood from Arm, Left Updated: 02/18/24 0045     Blood Culture No growth at 4 days    Narrative:      Less than seven (7) mL's of blood was collected.  Insufficient quantity may yield false negative results.    Blood Culture - Blood, Arm, Left [[id number]]  (Normal) Collected: 02/13/24 2326    Lab Status: Preliminary result Specimen: Blood from Arm, Left Updated: 02/17/24 2345     Blood Culture No growth at 4 days    Narrative:      Less than seven (7) mL's of blood was collected.  Insufficient quantity may yield false negative results.            No radiology results from the last 24 hrs    Results for orders placed during the hospital  encounter of 01/26/24    Adult Transthoracic Echo Complete W/ Cont if Necessary Per Protocol    Interpretation Summary    Left ventricular systolic function is hyperdynamic (EF > 70%). Left ventricular ejection fraction appears to be greater than 70%.    Left ventricular wall thickness is consistent with borderline concentric hypertrophy.    Provoked left ventricular mean pressure gradient of 10 mmHg.    Left ventricular diastolic function is consistent with (grade I) impaired relaxation.      Current medications:  Scheduled Meds:amLODIPine, 10 mg, Oral, Daily  cefepime, 2,000 mg, Intravenous, Q8H  insulin detemir, 10 Units, Subcutaneous, Nightly  insulin lispro, 3-14 Units, Subcutaneous, 4x Daily With Meals & Nightly  lisinopril, 40 mg, Oral, Daily  metroNIDAZOLE, 500 mg, Oral, Q8H  senna-docusate sodium, 2 tablet, Oral, BID  sodium chloride, 10 mL, Intravenous, Q12H      Continuous Infusions:Pharmacy Consult - Pharmacy to dose,   Pharmacy Consult - Pharmacy to dose,       PRN Meds:.  acetaminophen    senna-docusate sodium **AND** polyethylene glycol **AND** bisacodyl **AND** bisacodyl    dextrose    dextrose    glucagon (human recombinant)    HYDROcodone-acetaminophen    ketorolac    melatonin    ondansetron ODT **OR** ondansetron    Pharmacy Consult - Pharmacy to dose    Pharmacy Consult - Pharmacy to dose    Potassium Replacement - Follow Nurse / BPA Driven Protocol    simethicone    sodium chloride    sodium chloride    Assessment & Plan   Assessment & Plan     Active Hospital Problems    Diagnosis  POA    **Hepatic abscess [K75.0]  Yes    Hypercalcemia [E83.52]  Yes    Pancreatic mass [K86.89]  Yes    Mixed hyperlipidemia [E78.2]  Yes    T2DM (type 2 diabetes mellitus) [E11.9]  Yes    Essential hypertension [I10]  Yes      Resolved Hospital Problems   No resolved problems to display.        Brief Hospital Course to date:  Costa Robbins is a 68 y.o. male with a history of pancreatic mass, HTN, HLD, T2DM,  and alcohol abuse who was admitted for several weeks of nausea, vomiting, and abd pain on 2/13/2024. He subsequently developed temp to 102F. Imaging was concerning for a small fluid collection at liver biopsy site. MRCP revealed findings suggestive of infection rather than metastatic disease. Liver Biopsy was negative for malignancy. Pleural fluid cytology was negative for malignancy. General surgery recommended treatment of cholangitis followed by consideration of Whipple procedure in early March. ID Dr. Lo was consulted and is managing IV antibiotics.      This patient's problems and plans were partially entered by my partner and updated as appropriate by me 02/18/24.    New fever, progressive abd pain  Pancreatic mass, no tissue dx yet   ERCP/stent on 1/27/23  - pain is likely from progressive CA and newly found abscesses.  - small fluid collection at liver bx site:  normal serous collection vs small abscess; continue empiric abx for now  - Dr. Maya following  -MRCP suggests sequelae of infection rather than metastatic disease  - Plan will be treatment of cholangitis followed by Whipple, likely in early March  - Will follow-up CT scan outpatient after treatment to ensure liver lesions are resolved  - ID consulted and following- continue Cefepime and Flagyl  - patient wants to do daily infusions at [facility] vs home infusions now  - add Toradol IV     Metabolic acidosis, gap   Resp alkalosis   Dehydration   - nl lactate.  Suspect the ABG is not reliable, though he may have pain-induced hyperventilation .    - HR has normalized      Hypercalcemia   - improving with fluids ; continue fluids      PAD, stable.   - sees CT surg; history of LLE stent and RLE bypass    T2DM  -BG remain uncontrolled  -Increase Levemir to 10 units at HS  -continue SSI      Expected Discharge Location and Transportation: home by car  Expected Discharge   Expected Discharge Date: 2/19/2024; Expected Discharge Time:       DVT  prophylaxis:  Mechanical DVT prophylaxis orders are present.    AM-PAC 6 Clicks Score (PT): 24 (02/18/24 0300)    CODE STATUS:   Code Status and Medical Interventions:   Ordered at: 02/14/24 0247     Medical Intervention Limits:    NO intubation (DNI)     Code Status (Patient has no pulse and is not breathing):    No CPR (Do Not Attempt to Resuscitate)     Medical Interventions (Patient has pulse or is breathing):    Limited Support       Kathryn Acosta, DO  02/18/24

## 2024-02-18 NOTE — PROGRESS NOTES
INFECTIOUS DISEASE Progress note    Costa Robbins  1955  [id number]    Date of Consult: 2/15/24    Admission Date: 2/13/2024      Requesting Provider: No Known Provider  Evaluating Physician: Hans Lo MD    Reason for Consultation: hepatic abscess    History of present illness:    Costa Robbins is a 68 y.o. male, with PMH DM, diverticulitis, etoh use, BPH, pancreatic mass, PVD, seen today for a hepatic abscess. Admitted 1/26-1/28/24 with abdominal pain, undergoing an MRCP with extensive intrahepatic and extrahepatic biliary ductal dilation, concerning for pancreatic mass. ERCP with malignant appearing 2cm biliary stricture in the head of pancreas s/p spincterotomy and stent. Cytology negative for malignancy.  CT with several subcentimeter left lung nodules indeterminate.  Recent diagnostic laparoscopy and liver biopsy, 2/7/24,  pathology negative for malignancy. Plans for Whipple by Dr. Maloney.  Presented to the ED with complaints of fever up to 102 degrees, Right and left upper quadrant abdominal pain, and nausea. CT with pancreatic head mass s/p stenting of CBD, 3mm hypodensity right lateral aspect of the liver adjacent to the hepatic capsule containing a small locule of gas, right hepatic lobe with new subtle hypodensities, possible colitis ascending colon, and proximal transverse colon. MRI with small peripheral 2.4x 0.9cm capsular lesion along anterior margin of right hepatic lobe as well as 3 subcentimeter lesions right hepatic lobe- new, suspicious for hepatic abscesses. Admitting labs with a leukocytosis of 15.09, plt 312, Scr 1.02, ALT 31, AST 15. Alk phos 182, LAC 1.3, hgbA1c 9.2, CA 19-9 456.0.  Started on Cefepime and Flagyl and we were consulted for evaluation and treatment.     Subjective:    2/16/24: The patient is on having ongoing significant abdominal pain that is mostly in the epigastric region and worse on the left side than on the right side.  Leukocytosis has resolved.   He does have a bad taste in his mouth from the Flagyl but no severe nausea or vomiting.  No severe diarrhea.  No fevers. Palliative care has been consult to for assistance with pain management and other symptoms. He is frustrated about his ongoing pain and he would like to talk to surgery again about whether or not there are plans for a Whipple procedure.    2/17/24: The patient states that his pain is still not under very good control and he is still having significant epigastric pain but also having some left greater than right upper abdominal pain.  Still not eating much.  No severe nausea or vomiting.  No fevers.  Leukocytosis is resolved.  No severe watery diarrhea.    2/18/24: Epigastric abdominal pain has significantly improved today although he is noticing some right upper quadrant pain today.  No fevers.  No nausea, vomiting, or diarrhea.  No new rashes reported.    Past Medical History:   Diagnosis Date    Bile duct obstruction 01/27/2024    found during ERCP    COVID 2021    no hospitalization    Diabetes mellitus 2017    po meds and insulin    Diverticulitis 2009    surgically intervention    Enlarged prostate     recently started on Proscar    ETOH use 05/25/2023    Former smoker 05/25/2023    Hearing decreased     Hypertension     Melanoma     CHEST, BACK, NECK MULTI, HEAD    Pancreatic mass 01/27/2024    found on CT scan; ERCP confirmed    PVD (peripheral vascular disease)     Unintentional weight loss     Wears glasses        Past Surgical History:   Procedure Laterality Date    AORTAGRAM Bilateral 04/12/2023    Procedure: AORTAGRAM WITH RUNOFFS  STENT OF THE LEFT ILIAC ARTERY;  Surgeon: Moses Escalante MD;  Location: [facility];  Service: Vascular;  Laterality: Bilateral;    COLON RESECTION  05/2009    partial colectomy    COLONOSCOPY      DIAGNOSTIC LAPAROSCOPY N/A 2/7/2024    Procedure: DIAGNOSTIC LAPAROSCOPY WITH LIVER BIOPSY AND PERITONEAL WASHINGS;  Surgeon: Silas Maloney MD;   Location:  [facility];  Service: General;  Laterality: N/A;    ERCP N/A 2024    Procedure: ENDOSCOPIC RETROGRADE CHOLANGIOPANCREATOGRAPHY WITH STENT PLACEMENT;  Surgeon: Brunner, Mark I, MD;  Location: [facility] ENDOSCOPY;  Service: Gastroenterology;  Laterality: N/A;    FEMORAL ENDARTERECTOMY Left 2023    Procedure: FEMORAL ENDARTERECTOMY WITH PROPATEN GRAFT 8MM X 40CM;  Surgeon: Moses Escalante MD;  Location:  [facility];  Service: Vascular;  Laterality: Left;    FEMORAL FEMORAL BYPASS N/A 2023    Procedure: FEMORAL FEMORAL BYPASS;  Surgeon: Moses Escalante MD;  Location:  [facility];  Service: Vascular;  Laterality: N/A;    ORIF ANKLE FRACTURE Left     SKIN BIOPSY      SKIN CANCER EXCISION      melanoma    WISDOM TOOTH EXTRACTION         Family History   Problem Relation Age of Onset    Diabetes Mother     Heart attack Mother     Hypertension Mother     Hyperlipidemia Mother     Stroke Mother     Hodgkin's lymphoma Father     Hypertension Father     Diabetes Brother     Hyperlipidemia Brother        Social History     Socioeconomic History    Marital status:     Number of children: 0   Tobacco Use    Smoking status: Former     Packs/day: 0.50     Years: 43.00     Additional pack years: 0.00     Total pack years: 21.50     Types: Cigarettes     Quit date: 2023     Years since quittin.0     Passive exposure: Never    Smokeless tobacco: Never    Tobacco comments:     Pt states that he has been able to stop smoking this month - 2023   Vaping Use    Vaping Use: Never used   Substance and Sexual Activity    Alcohol use: Not Currently     Comment: 2 BEERS PER DAY; hx of ETOH use    Drug use: Yes     Types: Marijuana     Comment: ONCE A MONTH    Sexual activity: Defer       Allergies   Allergen Reactions    Atenolol Other (See Comments)     Bradycardia.    Morphine Itching    Penicillins Hives         Medication:    Current Facility-Administered Medications:     acetaminophen (TYLENOL)  tablet 650 mg, 650 mg, Oral, Q4H PRN, Booker Putnam PA-C, 650 mg at 02/14/24 2038    amLODIPine (NORVASC) tablet 10 mg, 10 mg, Oral, Daily, Booker Putnam PA-C, 10 mg at 02/18/24 0901    sennosides-docusate (PERICOLACE) 8.6-50 MG per tablet 2 tablet, 2 tablet, Oral, BID, 2 tablet at 02/18/24 0901 **AND** polyethylene glycol (MIRALAX) packet 17 g, 17 g, Oral, Daily PRN **AND** bisacodyl (DULCOLAX) EC tablet 5 mg, 5 mg, Oral, Daily PRN **AND** bisacodyl (DULCOLAX) suppository 10 mg, 10 mg, Rectal, Daily PRN, Lucila Mayes, CHERISE    dextrose (D50W) (25 g/50 mL) IV injection 25 g, 25 g, Intravenous, Q15 Min PRN, Booker Putnam PA-C    dextrose (GLUTOSE) oral gel 15 g, 15 g, Oral, Q15 Min PRN, Booker Putnam PA-C    ertapenem (INVanz) 1,000 mg in sodium chloride 0.9 % 100 mL IVPB, 1,000 mg, Intravenous, Q24H, Hans Lo MD, Last Rate: 200 mL/hr at 02/18/24 1445, 1,000 mg at 02/18/24 1445    glucagon (GLUCAGEN) injection 1 mg, 1 mg, Intramuscular, Q15 Min PRN, Booker Putnam PA-C    HYDROcodone-acetaminophen (NORCO) 5-325 MG per tablet 2 tablet, 2 tablet, Oral, Q4H PRN, Kathryn Acosta, DO, 2 tablet at 02/18/24 1444    insulin detemir (LEVEMIR) injection 10 Units, 10 Units, Subcutaneous, Nightly, Jam Ivory, APRN, 10 Units at 02/17/24 2047    Insulin Lispro (humaLOG) injection 3-14 Units, 3-14 Units, Subcutaneous, 4x Daily With Meals & Nightly, Kate Long, APRN, 10 Units at 02/17/24 2047    lisinopril (PRINIVIL,ZESTRIL) tablet 40 mg, 40 mg, Oral, Daily, Booker Putnam PA-C, 40 mg at 02/18/24 0901    melatonin tablet 5 mg, 5 mg, Oral, Nightly PRN, Booker Putnam PA-C    ondansetron ODT (ZOFRAN-ODT) disintegrating tablet 4 mg, 4 mg, Oral, Q6H PRN **OR** ondansetron (ZOFRAN) injection 4 mg, 4 mg, Intravenous, Q6H PRN, Booker Putnam PA-C    Potassium Replacement - Follow Nurse / BPA Driven Protocol, , Does not apply,  PRN, Kathryn Acosta P,     simethicone (MYLICON) chewable tablet 80 mg, 80 mg, Oral, BID PRN, Marga Turk APRN, 80 mg at 24    sodium chloride 0.9 % flush 10 mL, 10 mL, Intravenous, Q12H, Booker Putnam PA-C, 10 mL at 02/15/24 2107    sodium chloride 0.9 % flush 10 mL, 10 mL, Intravenous, PRN, Booker Putnam PA-C    sodium chloride 0.9 % infusion 40 mL, 40 mL, Intravenous, PRN, Booker Putnam PA-C    Antibiotics:  Anti-Infectives (From admission, onward)      Ordered     Dose/Rate Route Frequency Start Stop    24 1043  ertapenem (INVanz) 1,000 mg in sodium chloride 0.9 % 100 mL IVPB        Ordering Provider: Hans Lo MD    1,000 mg  200 mL/hr over 30 Minutes Intravenous Every 24 Hours 24 1130 24 1459    24 2307  metroNIDAZOLE (FLAGYL) IVPB 500 mg        Ordering Provider: Jomar Fajardo PA    500 mg  200 mL/hr over 30 Minutes Intravenous Once 24 2323 24 0127    24 2307  cefepime 2000 mg IVPB in 100 mL NS (MBP)        Ordering Provider: Jomar Fajardo PA    2,000 mg  over 30 Minutes Intravenous Once 24 2323 24 0051              Review of Systems:  As above      Physical Exam:   Vital Signs  Temp (24hrs), Av.8 °F (36.6 °C), Min:97.8 °F (36.6 °C), Max:97.8 °F (36.6 °C)    Temp  Min: 97.8 °F (36.6 °C)  Max: 97.8 °F (36.6 °C)  BP  Min: 141/79  Max: 145/70  Pulse  Min: 72  Max: 80  Resp  Min: 16  Max: 16  SpO2  Min: 95 %  Max: 95 %    GENERAL: Awake and alert, No acute distress  HENT: Normocephalic, atraumatic.  No external oral lesions noted.  Eyes: Anicteric.  No conjunctival injection.  HEART: RRR; No murmur, rubs, gallops.   LUNGS: CTA B.  Nonlabored breathing on room air  ABDOMEN: Soft, Nondistended. Mild right upper quadrant tenderness to palpation.  Epigastric tenderness has improved.  :  Without Villatoro catheter.  MSK: No joint effusions or erythema  SKIN: No generalized rashes noted.  No  jaundice noted.  NEURO: Oriented to PPT.  Normal speech and cognition.   PSYCHIATRIC: Cooperative.  Appropriate mood    Laboratory Data    Results from last 7 days   Lab Units 02/18/24  1209 02/17/24  0411 02/16/24  0536   WBC 10*3/mm3 8.60 5.53 7.38   HEMOGLOBIN g/dL 12.8* 12.1* 12.7*   HEMATOCRIT % 35.5* 34.7* 36.8*   PLATELETS 10*3/mm3 237 180 193     Results from last 7 days   Lab Units 02/18/24  1209 02/17/24  1746 02/17/24  0411   SODIUM mmol/L  --   --  132*   POTASSIUM mmol/L 3.5   < > 3.6   CHLORIDE mmol/L  --   --  101   CO2 mmol/L  --   --  20.0*   BUN mg/dL  --   --  9   CREATININE mg/dL  --   --  0.50*   GLUCOSE mg/dL  --   --  229*   CALCIUM mg/dL  --   --  9.3    < > = values in this interval not displayed.     Results from last 7 days   Lab Units 02/15/24  1501   ALK PHOS U/L 109   BILIRUBIN mg/dL 0.7   ALT (SGPT) U/L 22   AST (SGOT) U/L 12             Results from last 7 days   Lab Units 02/13/24  2326   LACTATE mmol/L 1.3             Estimated Creatinine Clearance: 145.5 mL/min (A) (by C-G formula based on SCr of 0.5 mg/dL (L)).      Microbiology:  Blood Culture   Date Value Ref Range Status   02/13/2024 No growth at 24 hours  Preliminary       Radiology:  Imaging Results (Last 72 Hours)       ** No results found for the last 72 hours. **        02/08/2024  pathology    DIAGNOSIS:  PERITONEAL FLUID:  Negative for malignant cells.    MICROSCOPIC DESCRIPTION:  Scattered benign mesothelial cells and mixed inflammatory cells are present  within a sanguineous background.    2/7/24:   Final Diagnosis   1) LIVER LESION, BIOPSY:  Subcapsular liver parenchyma with mild cholestasis otherwise no significant pathologic abnormality, see comment  No evidence of malignancy identified         Impression:   Hepatic microabscesses, noted on MRI right hepatic lobe, Multiple peripheral lesions in the right hepatic lobe that are new since 1/26/24.  Less likely metastases given the rapid development in 2-3 weeks.  Especially concerning for abscesses in the setting of his recent reported fever up to 102F at home, leukocytosis, and chills.  Leukocytosis with neutrophilia-Improved  Pancreatic mass s/p ERCP with stent placement  Diabetes Mellitus, type 2, poorly controlled with A1C 9  History of alcohol abuse   History of penicillin allergy-hives      PLAN/RECOMMENDATIONS:   Thank you for asking us to see Costa Robbins, I recommend the following:  - Stop cefepime and Flagyl  - Start Ertapenem 1g IV q24h  - Continue to monitor CBC and temperature curve    Patient would like to infuse daily at [facility] through a peripheral IV at the time of discharge.    UM/REINA:  Ertapenem 1g IV q24h. Infuse INPAT at [facility] through PIV at least until 2/29/24 and then reassess  Weekly cbc, cmp, crp  Attn [facility] staff: please schedule the patient for a repeat CT A/P with IV contrast at [facility] in about 2 weeks (can be just prior to his appointment on 2/29/24)  Follow up in my clinic on 2/29/24. Clinic to call with the appointment time  Please fax a copy of my orders to [facility] at [phone] and call [phone] with final discharge plans     I discussed the patient's case with Dr. Acosta again today       Hans Lo MD  2/18/2024  15:09 EST

## 2024-02-18 NOTE — PLAN OF CARE
Goal Outcome Evaluation:     Problem: Fall Injury Risk  Goal: Absence of Fall and Fall-Related Injury  Outcome: Ongoing, Progressing  Intervention: Promote Injury-Free Environment  Recent Flowsheet Documentation  Taken 2/18/2024 0000 by Ten Griffin RN  Safety Promotion/Fall Prevention: nonskid shoes/slippers when out of bed  Taken 2/17/2024 2200 by Ten Griffin RN  Safety Promotion/Fall Prevention:   activity supervised   assistive device/personal items within reach   clutter free environment maintained   nonskid shoes/slippers when out of bed  Taken 2/17/2024 2000 by Ten Griffin RN  Safety Promotion/Fall Prevention: safety round/check completed     Problem: Adult Inpatient Plan of Care  Goal: Plan of Care Review  Outcome: Ongoing, Progressing  Goal: Patient-Specific Goal (Individualized)  Outcome: Ongoing, Progressing  Goal: Absence of Hospital-Acquired Illness or Injury  Outcome: Ongoing, Progressing  Intervention: Identify and Manage Fall Risk  Recent Flowsheet Documentation  Taken 2/18/2024 0000 by Ten Griffin RN  Safety Promotion/Fall Prevention: nonskid shoes/slippers when out of bed  Taken 2/17/2024 2200 by Ten Griffin RN  Safety Promotion/Fall Prevention:   activity supervised   assistive device/personal items within reach   clutter free environment maintained   nonskid shoes/slippers when out of bed  Taken 2/17/2024 2000 by Ten Griffin RN  Safety Promotion/Fall Prevention: safety round/check completed  Intervention: Prevent Skin Injury  Recent Flowsheet Documentation  Taken 2/18/2024 0000 by Ten Griffin RN  Body Position: position changed independently  Taken 2/17/2024 2200 by Ten Griffin RN  Body Position: position changed independently  Taken 2/17/2024 2000 by Ten Griffin RN  Body Position: position changed independently  Intervention: Prevent and Manage VTE (Venous Thromboembolism) Risk  Recent Flowsheet Documentation  Taken 2/18/2024 0000 by Ten Griffin  RN  Activity Management: up ad jon  Taken 2/17/2024 2200 by Ten Griffin RN  Activity Management: up ad jon  Taken 2/17/2024 2000 by Ten Griffin RN  Activity Management:   ambulated to bathroom   up ad jon  Goal: Optimal Comfort and Wellbeing  Outcome: Ongoing, Progressing  Intervention: Monitor Pain and Promote Comfort  Recent Flowsheet Documentation  Taken 2/17/2024 2000 by Ten Griffin RN  Pain Management Interventions: see MAR  Intervention: Provide Person-Centered Care  Recent Flowsheet Documentation  Taken 2/17/2024 2000 by Ten Griffin RN  Trust Relationship/Rapport:   care explained   choices provided   questions answered  Goal: Readiness for Transition of Care  Outcome: Ongoing, Progressing     Problem: Palliative Care  Goal: Enhanced Quality of Life  Outcome: Ongoing, Progressing  Intervention: Maximize Comfort  Recent Flowsheet Documentation  Taken 2/17/2024 2000 by Ten Griffin RN  Pain Management Interventions: see MAR

## 2024-02-19 ENCOUNTER — READMISSION MANAGEMENT (OUTPATIENT)
Dept: CALL CENTER | Facility: HOSPITAL | Age: 69
End: 2024-02-19
Payer: MEDICARE

## 2024-02-19 VITALS
DIASTOLIC BLOOD PRESSURE: 84 MMHG | RESPIRATION RATE: 20 BRPM | WEIGHT: 156 LBS | OXYGEN SATURATION: 99 % | SYSTOLIC BLOOD PRESSURE: 128 MMHG | TEMPERATURE: 97.6 F | HEART RATE: 71 BPM | BODY MASS INDEX: 21.84 KG/M2 | HEIGHT: 71 IN

## 2024-02-19 PROBLEM — E83.52 HYPERCALCEMIA: Status: RESOLVED | Noted: 2024-02-14 | Resolved: 2024-02-19

## 2024-02-19 LAB
ALBUMIN SERPL-MCNC: 3.6 G/DL (ref 3.5–5.2)
ALBUMIN/GLOB SERPL: 1.6 G/DL
ALP SERPL-CCNC: 144 U/L (ref 39–117)
ALT SERPL W P-5'-P-CCNC: 21 U/L (ref 1–41)
ANION GAP SERPL CALCULATED.3IONS-SCNC: 10 MMOL/L (ref 5–15)
AST SERPL-CCNC: 13 U/L (ref 1–40)
BACTERIA SPEC AEROBE CULT: NORMAL
BILIRUB SERPL-MCNC: 0.8 MG/DL (ref 0–1.2)
BUN SERPL-MCNC: 8 MG/DL (ref 8–23)
BUN/CREAT SERPL: 11.9 (ref 7–25)
CALCIUM SPEC-SCNC: 10.3 MG/DL (ref 8.6–10.5)
CHLORIDE SERPL-SCNC: 100 MMOL/L (ref 98–107)
CO2 SERPL-SCNC: 24 MMOL/L (ref 22–29)
CREAT SERPL-MCNC: 0.67 MG/DL (ref 0.76–1.27)
DEPRECATED RDW RBC AUTO: 45.7 FL (ref 37–54)
EGFRCR SERPLBLD CKD-EPI 2021: 103 ML/MIN/1.73
ERYTHROCYTE [DISTWIDTH] IN BLOOD BY AUTOMATED COUNT: 13.7 % (ref 12.3–15.4)
GLOBULIN UR ELPH-MCNC: 2.3 GM/DL
GLUCOSE SERPL-MCNC: 425 MG/DL (ref 65–99)
HCT VFR BLD AUTO: 39 % (ref 37.5–51)
HGB BLD-MCNC: 13.6 G/DL (ref 13–17.7)
MCH RBC QN AUTO: 31.6 PG (ref 26.6–33)
MCHC RBC AUTO-ENTMCNC: 34.9 G/DL (ref 31.5–35.7)
MCV RBC AUTO: 90.7 FL (ref 79–97)
PLATELET # BLD AUTO: 258 10*3/MM3 (ref 140–450)
PMV BLD AUTO: 11 FL (ref 6–12)
POTASSIUM SERPL-SCNC: 4 MMOL/L (ref 3.5–5.2)
PROT SERPL-MCNC: 5.9 G/DL (ref 6–8.5)
RBC # BLD AUTO: 4.3 10*6/MM3 (ref 4.14–5.8)
SODIUM SERPL-SCNC: 134 MMOL/L (ref 136–145)
WBC NRBC COR # BLD AUTO: 7.84 10*3/MM3 (ref 3.4–10.8)

## 2024-02-19 PROCEDURE — 85027 COMPLETE CBC AUTOMATED: CPT | Performed by: INTERNAL MEDICINE

## 2024-02-19 PROCEDURE — 99239 HOSP IP/OBS DSCHRG MGMT >30: CPT | Performed by: FAMILY MEDICINE

## 2024-02-19 PROCEDURE — 80053 COMPREHEN METABOLIC PANEL: CPT | Performed by: HOSPITALIST

## 2024-02-19 PROCEDURE — 25010000002 ERTAPENEM PER 500 MG: Performed by: INTERNAL MEDICINE

## 2024-02-19 RX ORDER — NICOTINE POLACRILEX 4 MG
15 LOZENGE BUCCAL
Status: DISCONTINUED | OUTPATIENT
Start: 2024-02-19 | End: 2024-02-19 | Stop reason: HOSPADM

## 2024-02-19 RX ORDER — SIMETHICONE 80 MG
80 TABLET,CHEWABLE ORAL 2 TIMES DAILY PRN
Qty: 20 TABLET | Refills: 0 | Status: SHIPPED | OUTPATIENT
Start: 2024-02-19 | End: 2024-03-01

## 2024-02-19 RX ORDER — HYDROCODONE BITARTRATE AND ACETAMINOPHEN 5; 325 MG/1; MG/1
2 TABLET ORAL EVERY 6 HOURS PRN
Qty: 24 TABLET | Refills: 0 | Status: SHIPPED | OUTPATIENT
Start: 2024-02-19 | End: 2024-02-22

## 2024-02-19 RX ORDER — INSULIN LISPRO 100 [IU]/ML
3-14 INJECTION, SOLUTION INTRAVENOUS; SUBCUTANEOUS
Status: DISCONTINUED | OUTPATIENT
Start: 2024-02-19 | End: 2024-02-19 | Stop reason: HOSPADM

## 2024-02-19 RX ORDER — IBUPROFEN 600 MG/1
1 TABLET ORAL
Status: DISCONTINUED | OUTPATIENT
Start: 2024-02-19 | End: 2024-02-19 | Stop reason: HOSPADM

## 2024-02-19 RX ORDER — DEXTROSE MONOHYDRATE 25 G/50ML
25 INJECTION, SOLUTION INTRAVENOUS
Status: DISCONTINUED | OUTPATIENT
Start: 2024-02-19 | End: 2024-02-19 | Stop reason: HOSPADM

## 2024-02-19 RX ORDER — NALOXONE HYDROCHLORIDE 4 MG/.1ML
SPRAY NASAL
Qty: 2 EACH | Refills: 0 | Status: SHIPPED | OUTPATIENT
Start: 2024-02-19

## 2024-02-19 RX ADMIN — Medication 10 ML: at 08:55

## 2024-02-19 RX ADMIN — AMLODIPINE BESYLATE 10 MG: 10 TABLET ORAL at 08:53

## 2024-02-19 RX ADMIN — HYDROCODONE BITARTRATE AND ACETAMINOPHEN 2 TABLET: 5; 325 TABLET ORAL at 05:53

## 2024-02-19 RX ADMIN — ERTAPENEM 1000 MG: 1 INJECTION INTRAMUSCULAR; INTRAVENOUS at 11:31

## 2024-02-19 RX ADMIN — HYDROCODONE BITARTRATE AND ACETAMINOPHEN 2 TABLET: 5; 325 TABLET ORAL at 01:55

## 2024-02-19 RX ADMIN — LISINOPRIL 40 MG: 40 TABLET ORAL at 08:53

## 2024-02-19 RX ADMIN — HYDROCODONE BITARTRATE AND ACETAMINOPHEN 2 TABLET: 5; 325 TABLET ORAL at 09:55

## 2024-02-19 NOTE — CASE MANAGEMENT/SOCIAL WORK
Continued Stay Note  [facility]     Patient Name: Costa Robbins  MRN: [id number]  Today's Date: 2/19/2024    Admit Date: 2/13/2024    Plan: home   Discharge Plan       Row Name 02/19/24 0830       Plan    Plan home    Patient/Family in Agreement with Plan yes    Plan Comments This patient states to the CM that upon discharge from the hospital, he will need to go to [facility] office daily for IV antibiotic infusions. He has a follow up [facility] appointment on 2/29/24. He verbalized understanding. He has family to transport.    Final Discharge Disposition Code 01 - home or self-care                   Discharge Codes    No documentation.                 Expected Discharge Date and Time       Expected Discharge Date Expected Discharge Time    Feb 19, 2024               Carri Lynch RN

## 2024-02-19 NOTE — OUTREACH NOTE
Prep Survey      Flowsheet Row Responses   [facility] facility patient discharged from? [facility]   Is LACE score < 7 ? No   Eligibility Readm Mgmt   Discharge diagnosis Hepatic abscess   Does the patient have one of the following disease processes/diagnoses(primary or secondary)? Other   Does the patient have Home health ordered? No   Is there a DME ordered? No   Comments regarding appointments [facility] for IV abx   Prep survey completed? Yes            Emily SYKES - Registered Nurse

## 2024-02-19 NOTE — DISCHARGE INSTR - APPOINTMENTS
[facility] IV antibiotic infusions on Tuesday 2/20/24 at 1100  At [facility] office 6th floor

## 2024-02-19 NOTE — NURSING NOTE
" Lab called RN for critical blood glucose 425. MD notified. Patient refused accu checks and  insulin. He stated \"I will take insulin at home rather than hanging in hospital\". Tried to educate but patient argumentative.  "

## 2024-02-19 NOTE — CASE MANAGEMENT/SOCIAL WORK
Case Management Discharge Note      Final Note: I spoke with this patient at the bedside regarding his discharge home today. He is agreeable with this plan. He has decided to go to the [facility] office daily for IV antibiotic infusions. CM confirmed with [facility] RN that he will need to be at the [facility] office tomorrow 2/20/24 at 1100. CM discussed with the patient, and he verbalized understanding. His family can transport. He denies having any further discharge planning needs at this time.         Selected Continued Care - Admitted Since 2/13/2024       Destination    No services have been selected for the patient.                Durable Medical Equipment    No services have been selected for the patient.                Dialysis/Infusion Coordination complete.      Service Provider Selected Services Address Phone Fax Patient Preferred    [facility] IV [facility] [address] [place] [address] [address], [facility] [address] [phone] --              Home Medical Care    No services have been selected for the patient.                Therapy    No services have been selected for the patient.                Community Resources    No services have been selected for the patient.                Community & DME    No services have been selected for the patient.                         Final Discharge Disposition Code: 01 - home or self-care

## 2024-02-19 NOTE — DISCHARGE SUMMARY
[facility] Medicine Services  DISCHARGE SUMMARY    Patient Name: oCsta Robbins  : 1955  MRN: [id number]    Date of Admission: 2024  9:10 PM  Date of Discharge:  2024  Primary Care Physician: Jonathan Conner MD    Consults       Date and Time Order Name Status Description    2/15/2024 10:18 PM Inpatient Palliative Care MD Consult Completed     2024  3:01 PM Inpatient Infectious Diseases Consult Completed     2024  4:02 AM Inpatient General Surgery Consult Completed     2024  6:50 PM Inpatient General Surgery Consult Completed     2024 12:32 AM Inpatient Gastroenterology Consult Completed             Hospital Course     Presenting Problem: Abdominal pain, N/V, Fever     Active Hospital Problems    Diagnosis  POA    **Hepatic abscess [K75.0]  Yes    Pancreatic mass [K86.89]  Yes    Mixed hyperlipidemia [E78.2]  Yes    T2DM (type 2 diabetes mellitus) [E11.9]  Yes    Essential hypertension [I10]  Yes      Resolved Hospital Problems    Diagnosis Date Resolved POA    Hypercalcemia [E83.52] 2024 Yes          Hospital Course:  Costa Robbins is a 68 y.o. male with a history of pancreatic mass, HTN, HLD, T2DM, and alcohol abuse who was admitted for several weeks of nausea, vomiting, and abd pain on 2024. He subsequently developed temp to 102F. Imaging was concerning for a small fluid collection at liver biopsy site. MRCP revealed findings suggestive of infection rather than metastatic disease. Liver Biopsy was negative for malignancy. Pleural fluid cytology was negative for malignancy. General surgery recommended treatment of cholangitis followed by consideration of Whipple procedure in early March. ID Dr. Lo was consulted and is managing IV antibiotics. Patient has been following with ID and continuing on IV Invanz at this time, discussed case with Dr. Lo on the AM of  also first day while under my care and plans are for  discharge to home today with continued IV Invanz infusions beginning outpatient on 2/20. Patient agreeable to this plan and patient will be discharge to home later today with continued IV antibiotics through 2/29. He denies any new acute complaints or problems at this time. Outpatient follow up with Dr. Lo beginning on 2/20. Patient should also follow up with his regular PCP as well within the next 1 week.      This patient's problems and plans were partially entered by my partner and updated as appropriate by me 02/18/24.     New fever, progressive abd pain  Pancreatic mass, no tissue dx yet   ERCP/stent on 1/27/23  - small fluid collection at liver bx site:  normal serous collection vs small abscess; continue empiric abx for now, plans are for continued IV Invanz at [facility] through 2/29 with continued follow up with Dr. Lo   - Dr. Maya following, continued outpatient follow up with surgery as well.   -MRCP suggests sequelae of infection rather than metastatic disease, pathology appears to be benign upon review  of previous biopsies, continued outpatient follow up   - Plan will be treatment of cholangitis followed by Whipple, likely in early March  - patient wants to do daily infusions at [facility]     Metabolic acidosis, gap  Resp alkalosis   Dehydration   -resolved      Hypercalcemia   - improving with fluids ; continue fluids  - Resolved       PAD, stable.   - sees CT surg; history of LLE stent and RLE bypass     T2DM  -BG remain uncontrolled  -Increase Levemir to 10 units at HS  -continue SSI   - Patient consistently has refused insulin per nursing staff          Discharge Follow Up Recommendations for outpatient labs/diagnostics:   Follow up with Dr. Lo beginning on 2/20 for continued daily antibiotic infusions through 2/29 at [facility]  Follow up with PCP in the next 1 month and Dr. Maya with Surgery in the next 1 month     Day of Discharge     HPI:   Patient is a 67 yo M new to me this AM, he  reports abdominal pain is overall much improved, reviewed case and discussed with ID, plans for likely d/c to home with continued antibiotics infusions at [facility] beginning on 2/20. He denies any new acute complaints this AM.     Vital Signs:   Temp:  [97.6 °F (36.4 °C)-98.1 °F (36.7 °C)] 97.6 °F (36.4 °C)  Heart Rate:  [71-91] 71  Resp:  [20] 20  BP: (128-170)/(84-91) 128/84      Physical Exam:  Constitutional: No acute distress, awake, alert, resting comfortably in bed, currently on RA   HENT: NCAT, nares patent, mucous membranes moist  Respiratory: Clear to auscultation bilaterally, respiratory effort normal   Cardiovascular: RRR, no murmurs, rubs, or gallops  Gastrointestinal: Positive bowel sounds, soft, minor LUQ tenderness on palpation but improved per patient, nondistended  Musculoskeletal: No bilateral ankle edema  Psychiatric: Appropriate affect, cooperative  Neurologic: Oriented x 3, strength symmetric in all extremities, Cranial Nerves grossly intact to confrontation, speech clear  Skin: No rashes      Pertinent  and/or Most Recent Results     LAB RESULTS:      Lab 02/19/24  0904 02/18/24  1209 02/17/24  0411 02/16/24  0536 02/15/24  1054 02/14/24  0531 02/13/24  2326 02/13/24  2043   WBC 7.84 8.60 5.53 7.38 7.25 14.27*  --  15.09*   HEMOGLOBIN 13.6 12.8* 12.1* 12.7* 12.5* 12.6*  --  15.5   HEMATOCRIT 39.0 35.5* 34.7* 36.8* 35.6* 36.2*  --  43.7   PLATELETS 258 237 180 193 207 243  --  312   NEUTROS ABS  --   --   --  6.00 5.59 13.10*  --  13.81*   IMMATURE GRANS (ABS)  --   --   --  0.03 0.04 0.08*  --  0.10*   LYMPHS ABS  --   --   --  0.63* 0.80 0.40*  --  0.29*   MONOS ABS  --   --   --  0.64 0.74 0.67  --  0.85   EOS ABS  --   --   --  0.06 0.05 0.00  --  0.00   MCV 90.7 88.1 87.8 89.8 87.7 91.6  --  91.2   LACTATE  --   --   --   --   --   --  1.3  --          Lab 02/19/24  0904 02/18/24  1209 02/17/24  1746 02/17/24  0411 02/16/24  0536 02/15/24  1501 02/14/24  0531 02/13/24  2043   SODIUM 134*  --    --  132* 137 133* 136 133*   POTASSIUM 4.0 3.5 3.4* 3.6 4.4 3.0* 4.6 3.9   CHLORIDE 100  --   --  101 105 102 102 94*   CO2 24.0  --   --  20.0* 23.0 23.0 14.0* 20.0*   ANION GAP 10.0  --   --  11.0 9.0 8.0 20.0* 19.0*   BUN 8  --   --  9 10 8 15 12   CREATININE 0.67*  --   --  0.50* 0.60* 0.68* 0.72* 1.02   EGFR 103.0  --   --  112.5 106.5 102.5 100.8 81.1   GLUCOSE 425*  --   --  229* 232* 370* 235* 310*   CALCIUM 10.3  --   --  9.3 9.8 9.0 9.8 11.3*   IONIZED CALCIUM  --   --   --   --   --   --  1.43*  --    MAGNESIUM  --   --   --   --   --   --   --  1.9   HEMOGLOBIN A1C  --   --   --   --   --   --  9.20*  --          Lab 02/19/24  0904 02/15/24  1501 02/13/24 2043   TOTAL PROTEIN 5.9* 5.2* 7.2   ALBUMIN 3.6 2.9* 4.2   GLOBULIN 2.3 2.3 3.0   ALT (SGPT) 21 22 31   AST (SGOT) 13 12 15   BILIRUBIN 0.8 0.7 1.2   ALK PHOS 144* 109 182*   LIPASE  --   --  96*         Lab 02/13/24 2043   HSTROP T 22*                 Lab 02/13/24 2158   PH, ARTERIAL 7.448   PCO2, ARTERIAL 26.8*   PO2 ART 83.0   FIO2 21   HCO3 ART 18.5*   BASE EXCESS ART -4.1*   CARBOXYHEMOGLOBIN 1.4     Brief Urine Lab Results  (Last result in the past 365 days)        Color   Clarity   Blood   Leuk Est   Nitrite   Protein   CREAT   Urine HCG        01/26/24 1639 Dark Yellow   Clear   Negative   Negative   Negative   Negative                 Microbiology Results (last 10 days)       Procedure Component Value - Date/Time    Blood Culture - Blood, Arm, Left [[id number]]  (Normal) Collected: 02/13/24 2337    Lab Status: Final result Specimen: Blood from Arm, Left Updated: 02/19/24 0046     Blood Culture No growth at 5 days    Narrative:      Less than seven (7) mL's of blood was collected.  Insufficient quantity may yield false negative results.    Blood Culture - Blood, Arm, Left [[id number]]  (Normal) Collected: 02/13/24 2326    Lab Status: Final result Specimen: Blood from Arm, Left Updated: 02/18/24 2345     Blood Culture No growth at 5 days     Narrative:      Less than seven (7) mL's of blood was collected.  Insufficient quantity may yield false negative results.            MRI Abdomen With & Without Contrast    Result Date: 2/14/2024  MRI ABDOMEN W WO CONTRAST Date of Exam: 2/14/2024 12:46 PM EST Indication: Metastatic disease evaluation, Pancreatic cancer, staging.  Comparison: CT the abdomen and pelvis dated 2/13/2024, 1/26/2024. Prior MRI abdomen dated 1/26/2024 Technique:  Routine multiplanar/multisequence images of the abdomen were obtained before and after the uneventful administration of 14 mL Multihance. Findings: The liver is normal in size and contour. There is no evidence of hepatic steatosis or iron deposition. There is a peripherally enhancing capsular lesion along the anterior aspect of the right hepatic lobe near the junction of segment IVB and V. This measures 2.4 x 0.9 cm. There is a small focus of gas centrally seen on the prior CT. There are approximately 3 new small subcentimeter liver lesions within the right hepatic lobe of the liver predominately involving segment VI, also with peripheral enhancement. All of these lesions are new from patient's prior CT and MRI from 1/26/2024. In the setting of interval biliary intervention as well as fever, these are concerning for small hepatic microabscesses. Metastatic disease is possible although rapid development of lesions these size is felt less likely. The gallbladder is present without wall thickening. There has been interval decompression of the biliary system compared to the prior MRI. There is a metallic common bile duct stent extending through the common bile duct and into the proximal aspect of the second portion of the duodenum. The spleen is normal in size. There is nodularity of the bilateral adrenal glands which demonstrates loss of signal on the out of phase sequence relative to the in phase sequence. These likely represent benign lipid rich adrenal adenomas. There is an  ill-defined hypoenhancing mass within the uncinate process of the pancreas measuring 2.2 x 1.8 cm. This abuts the common bile duct and common bile duct stent. There is no significant pancreatic duct dilation. There is no arterial encasement or abutment. There is questionable abutment along the inferior margin of the portal splenic confluence. The kidneys are symmetric in size and enhancement. There is a right upper pole parapelvic cyst which herniates into the renal sinus fat. Multiple additional tiny punctate cystic foci are present within both kidneys. There is no hydronephrosis. Visualized  portions of the gastrointestinal tract appear normal in caliber. There is trace bilateral pleural effusions. Trace perisplenic ascites.     Impression: 1. Small peripheral 2.4 x 0.9 cm capsular lesion along the anterior margin of the right hepatic lobe as well as 3 additional subcentimeter peripherally enhancing lesions within the more central aspect of the right hepatic lobe which are new from prior imaging dated 1/26/2024. Given the interval rapid development, interval biliary intervention and current elevated white count, these are suspicious for small hepatic microabscesses. Hepatic metastatic disease is a consideration but felt less likely, mainly due to the relatively rapid development within 2-3 weeks. Recommend short-term imaging follow-up after antibiotic therapy to assess for decrease/resolution. 2. Ill-defined hypoenhancing pancreatic head mass measuring approximately 2.2 cm abutting the main pancreatic duct. No evidence of vascular encasement. Questionable abutment along the undersurface of the portal splenic confluence. 3. Multiple bilateral adrenal nodules which appear to demonstrate loss of signal on the out of phase sequence suggesting lipid rich adrenal adenomas. Electronically Signed: iMguel Betts  2/14/2024 2:06 PM EST  Workstation ID: [id number]    CT Abdomen Pelvis With Contrast    Result Date:  2/13/2024  CT ABDOMEN PELVIS W CONTRAST Date of Exam: 2/13/2024 10:25 PM EST Indication: Worsening abdominal pain, known pancreatic mass. Comparison: 1/26/2024 Technique: Axial CT images were obtained of the abdomen and pelvis following the uneventful intravenous administration of 85 cc Isovue-300. Reconstructed coronal and sagittal images were also obtained. Automated exposure control and iterative construction methods were used. Findings: Visualized Chest: Mild patchy opacities in the posterior aspect of the lower lobes bilaterally. Otherwise unremarkable Liver: Small hypodensity adjacent to the hepatic capsule in the lateral aspect of the right hepatic lobe containing small locules of gas, measuring approximately 2.9 x 1.1 x 3.0 cm Additional scattered small hypodensities are noted in the right hepatic lobe. These are all new since prior study. Gallbladder: Layering hyperdensity within the gallbladder most likely represents biliary sludge. No wall thickening or pericholecystic fluid. Bile Ducts: A stent is noted within the common bile duct. There is associated pneumobilia. Spleen: Spleen is normal in size and CT density. Pancreas: Subtle hypoenhancement of the pancreatic head is once again noted concerning for an underlying pancreatic mass. Adrenals: Small nodules are noted within the adrenal glands bilaterally, measuring up to 1.5 cm, grossly unchanged from prior study. Kidneys: Presumed calyceal diverticulum is noted. Scattered small nonobstructing renal stones are noted on the right. No obstructing stones or hydronephrosis. Gastrointestinal: No dilated loops of bowel to suggest bowel obstruction. Mild wall thickening of the ascending colon and transverse colon. Mild to moderate stool burden noted in the descending colon and sigmoid colon. Diverticulosis of the descending colon and sigmoid colon without evidence of acute diverticulitis. Findings suggestive of partial sigmoid colon resection. Bladder: The  bladder is normal. Pelvis:  No suspecious mass. Peritoneum/Mesentery: No fluid collection, ascities, or free air.   Lymph Nodes: No lymphadenopathy. Vasculature: Extensive calcified and noncalcified plaques of the abdominal aorta and iliac arteries. Near occlusion of the right common iliac artery. Patient is status post bifemoral bypass graft. The graft appears to be patent. Abdominal Wall: Left upper anterior abdominal wall lipoma. Otherwise unremarkable. Bony Structures: No acute osseous abnormality     Impression: Findings suggestive of pancreatic head mass status post stenting of the common bile duct. Subtle, approximately 3 cm, hypodensity noted within the right lateral aspect of the liver adjacent to the hepatic capsule containing a small locule of gas. This may represent the site of prior biopsy. Given the presence of gas, this is concerning for developing abscess. Additionally new subtle hypodensities are noted within the right hepatic lobe, which are nonspecific but may represent new metastatic lesions. Possible colitis of the ascending colon and proximal transverse colon. Diverticulosis of the descending and sigmoid colon without evidence of acute diverticulitis. Mild to moderate stool burden throughout the colon is concerning for constipation. Nonspecific mild patchy opacities in the dependent portions of the lower lobes most likely represents atelectasis, although an infectious/inflammatory process is not excluded. Electronically Signed: Nile De La Garza DO  2/13/2024 10:47 PM EST  Workstation ID: [id number]    XR Chest 1 View    Result Date: 2/13/2024  XR CHEST 1 VW Date of Exam: 2/13/2024 8:51 PM EST Indication: Weak/Dizzy/AMS triage protocol Comparison: Contrast-enhanced chest CT performed on January 28, 2024 Findings: There are no consolidations.No pleural effusion. There is no evidence of pneumothorax. The pulmonary vasculature appears within normal limits. The cardiac and mediastinal silhouette  appear unremarkable. No acute osseous abnormality identified. Old right rib fractures are visualized.     Impression: No radiographic evidence of acute chest process. Electronically Signed: Alphonso Duran MD  2/13/2024 9:11 PM EST  Workstation ID: [id number]             Results for orders placed during the hospital encounter of 01/26/24    Adult Transthoracic Echo Complete W/ Cont if Necessary Per Protocol    Interpretation Summary    Left ventricular systolic function is hyperdynamic (EF > 70%). Left ventricular ejection fraction appears to be greater than 70%.    Left ventricular wall thickness is consistent with borderline concentric hypertrophy.    Provoked left ventricular mean pressure gradient of 10 mmHg.    Left ventricular diastolic function is consistent with (grade I) impaired relaxation.      Plan for Follow-up of Pending Labs/Results: N/A     Discharge Details        Discharge Medications        New Medications        Instructions Start Date   naloxone 4 MG/0.1ML nasal spray  Commonly known as: NARCAN   Call 911. Don't prime. [place] in 1 nostril for overdose. Repeat in 2-3 minutes in other nostril if no or minimal breathing/responsiveness.      simethicone 80 MG chewable tablet  Commonly known as: MYLICON   80 mg, Oral, 2 Times Daily PRN             Changes to Medications        Instructions Start Date   HYDROcodone-acetaminophen 5-325 MG per tablet  Commonly known as: NORCO  What changed:   how much to take  when to take this  reasons to take this   2 tablets, Oral, Every 6 Hours PRN             Continue These Medications        Instructions Start Date   amLODIPine 10 MG tablet  Commonly known as: NORVASC   10 mg, Oral, Daily      BASAGLAR KWIKPEN SC   40 Units, Subcutaneous, Nightly      finasteride 5 MG tablet  Commonly known as: PROSCAR   1 tablet, Oral, Daily      hydrOXYzine 25 MG tablet  Commonly known as: ATARAX   25 mg, Oral, 3 Times Daily PRN      Jardiance 10 MG tablet tablet  Generic drug:  empagliflozin   10 mg, Oral, Daily      lisinopril 40 MG tablet  Commonly known as: PRINIVIL,ZESTRIL   40 mg, Oral, Daily             Stop These Medications      clopidogrel 75 MG tablet  Commonly known as: PLAVIX              Allergies   Allergen Reactions    Atenolol Other (See Comments)     Bradycardia.    Morphine Itching    Penicillins Hives     Has tolerated cefazolin, cefepime         Discharge Disposition:  Home or Self Care    Diet:  Hospital:  No active diet order           Activity:  Resume previous as tolerated     Restrictions or Other Recommendations:  Continued outpatient antibiotics at [facility] beginning on 2/20  Outpatient follow up with GS and PCP        CODE STATUS:    Code Status and Medical Interventions:   Ordered at: 02/14/24 0247     Medical Intervention Limits:    NO intubation (DNI)     Code Status (Patient has no pulse and is not breathing):    No CPR (Do Not Attempt to Resuscitate)     Medical Interventions (Patient has pulse or is breathing):    Limited Support       Future Appointments   Date Time Provider Department Center   3/1/2024 10:30 AM [facility]   7/17/2024 12:30 PM Mariana Benavides APRN [facility]       Additional Instructions for the Follow-ups that You Need to Schedule       Discharge Follow-up with PCP   As directed       Currently Documented PCP:    Jonatahn Conner MD    PCP Phone Number:    [phone]     Follow Up Details: Follow up with PCP this week        Discharge Follow-up with Specified Provider: Follow up with Dr. Maya in 1 month   As directed      To: Follow up with Dr. Maya in 1 month        Discharge Follow-up with Specified Provider: Follow up with [facility] for continued antibiotic infusions on 2/20   As directed      To: Follow up with [facility] for continued antibiotic infusions on 2/20                      MISTI Moise DO  02/19/24      Time Spent on Discharge:  I spent  35  minutes on this discharge activity which included:  face-to-face encounter with the patient, reviewing the data in the system, coordination of the care with the nursing staff as well as consultants, documentation, and entering orders.

## 2024-02-19 NOTE — PROGRESS NOTES
INFECTIOUS DISEASE Progress note    Costa Robbins  1955  [id number]    Date of Consult: 2/15/24    Admission Date: 2/13/2024      Requesting Provider: No Known Provider  Evaluating Physician: Hans Lo MD    Reason for Consultation: hepatic abscess    History of present illness:    Costa Robbins is a 68 y.o. male, with PMH DM, diverticulitis, etoh use, BPH, pancreatic mass, PVD, seen today for a hepatic abscess. Admitted 1/26-1/28/24 with abdominal pain, undergoing an MRCP with extensive intrahepatic and extrahepatic biliary ductal dilation, concerning for pancreatic mass. ERCP with malignant appearing 2cm biliary stricture in the head of pancreas s/p spincterotomy and stent. Cytology negative for malignancy.  CT with several subcentimeter left lung nodules indeterminate.  Recent diagnostic laparoscopy and liver biopsy, 2/7/24,  pathology negative for malignancy. Plans for Whipple by Dr. Maloney.  Presented to the ED with complaints of fever up to 102 degrees, Right and left upper quadrant abdominal pain, and nausea. CT with pancreatic head mass s/p stenting of CBD, 3mm hypodensity right lateral aspect of the liver adjacent to the hepatic capsule containing a small locule of gas, right hepatic lobe with new subtle hypodensities, possible colitis ascending colon, and proximal transverse colon. MRI with small peripheral 2.4x 0.9cm capsular lesion along anterior margin of right hepatic lobe as well as 3 subcentimeter lesions right hepatic lobe- new, suspicious for hepatic abscesses. Admitting labs with a leukocytosis of 15.09, plt 312, Scr 1.02, ALT 31, AST 15. Alk phos 182, LAC 1.3, hgbA1c 9.2, CA 19-9 456.0.  Started on Cefepime and Flagyl and we were consulted for evaluation and treatment.     Subjective:    2/16/24: The patient is on having ongoing significant abdominal pain that is mostly in the epigastric region and worse on the left side than on the right side.  Leukocytosis has resolved.   He does have a bad taste in his mouth from the Flagyl but no severe nausea or vomiting.  No severe diarrhea.  No fevers. Palliative care has been consult to for assistance with pain management and other symptoms. He is frustrated about his ongoing pain and he would like to talk to surgery again about whether or not there are plans for a Whipple procedure.    2/17/24: The patient states that his pain is still not under very good control and he is still having significant epigastric pain but also having some left greater than right upper abdominal pain.  Still not eating much.  No severe nausea or vomiting.  No fevers.  Leukocytosis is resolved.  No severe watery diarrhea.    2/18/24: Epigastric abdominal pain has significantly improved today although he is noticing some right upper quadrant pain today.  No fevers.  No nausea, vomiting, or diarrhea.  No new rashes reported.    2/19/24: Still having some right upper quadrant pain but no worse.  Epigastric pain has improved and is under better control with pain medicine.  He is not having any fevers.  No nausea, vomiting, or diarrhea.  No new rashes reported.    Past Medical History:   Diagnosis Date    Bile duct obstruction 01/27/2024    found during ERCP    COVID 2021    no hospitalization    Diabetes mellitus 2017    po meds and insulin    Diverticulitis 2009    surgically intervention    Enlarged prostate     recently started on Proscar    ETOH use 05/25/2023    Former smoker 05/25/2023    Hearing decreased     Hypertension     Melanoma     CHEST, BACK, NECK MULTI, HEAD    Pancreatic mass 01/27/2024    found on CT scan; ERCP confirmed    PVD (peripheral vascular disease)     Unintentional weight loss     Wears glasses        Past Surgical History:   Procedure Laterality Date    AORTAGRAM Bilateral 04/12/2023    Procedure: AORTAGRAM WITH RUNOFFS  STENT OF THE LEFT ILIAC ARTERY;  Surgeon: Moses Escalante MD;  Location: [facility];  Service: Vascular;   Laterality: Bilateral;    COLON RESECTION  2009    partial colectomy    COLONOSCOPY      DIAGNOSTIC LAPAROSCOPY N/A 2024    Procedure: DIAGNOSTIC LAPAROSCOPY WITH LIVER BIOPSY AND PERITONEAL WASHINGS;  Surgeon: Silas Maloney MD;  Location:  [facility];  Service: General;  Laterality: N/A;    ERCP N/A 2024    Procedure: ENDOSCOPIC RETROGRADE CHOLANGIOPANCREATOGRAPHY WITH STENT PLACEMENT;  Surgeon: Brunner, Mark I, MD;  Location:  [facility];  Service: Gastroenterology;  Laterality: N/A;    FEMORAL ENDARTERECTOMY Left 2023    Procedure: FEMORAL ENDARTERECTOMY WITH PROPATEN GRAFT 8MM X 40CM;  Surgeon: Moses Escalante MD;  Location:  [facility];  Service: Vascular;  Laterality: Left;    FEMORAL FEMORAL BYPASS N/A 2023    Procedure: FEMORAL FEMORAL BYPASS;  Surgeon: Moses Escalante MD;  Location:  [facility];  Service: Vascular;  Laterality: N/A;    ORIF ANKLE FRACTURE Left     SKIN BIOPSY      SKIN CANCER EXCISION      melanoma    WISDOM TOOTH EXTRACTION         Family History   Problem Relation Age of Onset    Diabetes Mother     Heart attack Mother     Hypertension Mother     Hyperlipidemia Mother     Stroke Mother     Hodgkin's lymphoma Father     Hypertension Father     Diabetes Brother     Hyperlipidemia Brother        Social History     Socioeconomic History    Marital status:     Number of children: 0   Tobacco Use    Smoking status: Former     Packs/day: 0.50     Years: 43.00     Additional pack years: 0.00     Total pack years: 21.50     Types: Cigarettes     Quit date: 2023     Years since quittin.0     Passive exposure: Never    Smokeless tobacco: Never    Tobacco comments:     Pt states that he has been able to stop smoking this month - 2023   Vaping Use    Vaping Use: Never used   Substance and Sexual Activity    Alcohol use: Not Currently     Comment: 2 BEERS PER DAY; hx of ETOH use    Drug use: Yes     Types: Marijuana     Comment: ONCE A MONTH     Sexual activity: Defer       Allergies   Allergen Reactions    Atenolol Other (See Comments)     Bradycardia.    Morphine Itching    Penicillins Hives     Has tolerated cefazolin, cefepime         Medication:  No current facility-administered medications for this encounter.    Current Outpatient Medications:     HYDROcodone-acetaminophen (NORCO) 5-325 MG per tablet, Take 2 tablets by mouth Every 6 (Six) Hours As Needed for Moderate Pain for up to 3 days., Disp: 24 tablet, Rfl: 0    simethicone (MYLICON) 80 MG chewable tablet, Chew 1 tablet 2 (Two) Times a Day As Needed for Flatulence for up to 10 days., Disp: 20 tablet, Rfl: 0    amLODIPine (NORVASC) 10 MG tablet, Take 1 tablet by mouth Daily., Disp: 30 tablet, Rfl: 5    empagliflozin (Jardiance) 10 MG tablet tablet, Take 1 tablet by mouth Daily., Disp: , Rfl:     finasteride (PROSCAR) 5 MG tablet, Take 1 tablet by mouth Daily., Disp: , Rfl:     hydrOXYzine (ATARAX) 25 MG tablet, Take 1 tablet by mouth 3 (Three) Times a Day As Needed for Itching., Disp: 30 tablet, Rfl: 0    Insulin Glargine (BASAGLAR KWIKPEN SC), Inject 40 Units under the skin into the appropriate area as directed Every Night., Disp: , Rfl:     lisinopril (PRINIVIL,ZESTRIL) 40 MG tablet, Take 1 tablet by mouth Daily., Disp: , Rfl:     naloxone (NARCAN) 4 MG/0.1ML nasal spray, Call 911. Don't prime. Drury in 1 nostril for overdose. Repeat in 2-3 minutes in other nostril if no or minimal breathing/responsiveness., Disp: 2 each, Rfl: 0    Antibiotics:  Anti-Infectives (From admission, onward)      Ordered     Dose/Rate Route Frequency Start Stop    02/13/24 2307  metroNIDAZOLE (FLAGYL) IVPB 500 mg        Ordering Provider: Jomar Faajrdo PA    500 mg  200 mL/hr over 30 Minutes Intravenous Once 02/13/24 2323 02/14/24 0127 02/13/24 2307  cefepime 2000 mg IVPB in 100 mL NS (MBP)        Ordering Provider: Jomar aFjardo PA    2,000 mg  over 30 Minutes Intravenous Once 02/13/24 2323 02/14/24 0051               Review of Systems:  As above      Physical Exam:   Vital Signs  Temp (24hrs), Av.9 °F (36.6 °C), Min:97.6 °F (36.4 °C), Max:98.1 °F (36.7 °C)    Temp  Min: 97.6 °F (36.4 °C)  Max: 98.1 °F (36.7 °C)  BP  Min: 128/84  Max: 170/89  Pulse  Min: 71  Max: 91  Resp  Min: 20  Max: 20  SpO2  Min: 95 %  Max: 99 %    GENERAL: Awake and alert, No acute distress.  Sitting up in bed.  HENT: Normocephalic, atraumatic.  No external oral lesions noted.  Eyes: Anicteric.  No conjunctival injection.  HEART: RRR; No murmur, rubs, gallops.   LUNGS: CTA B.  Nonlabored breathing on room air  ABDOMEN: Soft, Nondistended. Mild right upper quadrant tenderness to palpation.  No significant epigastric tenderness noted today.  MSK: No joint effusions or erythema  SKIN: No new rashes  NEURO: Oriented to PPT.  Normal speech and cognition.   PSYCHIATRIC: Cooperative.  Appropriate mood    Laboratory Data    Results from last 7 days   Lab Units 24  0904 24  1209 24  0411   WBC 10*3/mm3 7.84 8.60 5.53   HEMOGLOBIN g/dL 13.6 12.8* 12.1*   HEMATOCRIT % 39.0 35.5* 34.7*   PLATELETS 10*3/mm3 258 237 180     Results from last 7 days   Lab Units 24  0904   SODIUM mmol/L 134*   POTASSIUM mmol/L 4.0   CHLORIDE mmol/L 100   CO2 mmol/L 24.0   BUN mg/dL 8   CREATININE mg/dL 0.67*   GLUCOSE mg/dL 425*   CALCIUM mg/dL 10.3     Results from last 7 days   Lab Units 24  0904   ALK PHOS U/L 144*   BILIRUBIN mg/dL 0.8   ALT (SGPT) U/L 21   AST (SGOT) U/L 13             Results from last 7 days   Lab Units 24  2326   LACTATE mmol/L 1.3             Estimated Creatinine Clearance: 108.6 mL/min (A) (by C-G formula based on SCr of 0.67 mg/dL (L)).      Microbiology:  Blood Culture   Date Value Ref Range Status   2024 No growth at 24 hours  Preliminary       Radiology:  Imaging Results (Last 72 Hours)       ** No results found for the last 72 hours. **        2024  pathology    DIAGNOSIS:  PERITONEAL FLUID:   Negative for malignant cells.    MICROSCOPIC DESCRIPTION:  Scattered benign mesothelial cells and mixed inflammatory cells are present  within a sanguineous background.    2/7/24:   Final Diagnosis   1) LIVER LESION, BIOPSY:  Subcapsular liver parenchyma with mild cholestasis otherwise no significant pathologic abnormality, see comment  No evidence of malignancy identified         Impression:   Hepatic microabscesses, noted on MRI right hepatic lobe, Multiple peripheral lesions in the right hepatic lobe that are new since 1/26/24.  Less likely metastases given the rapid development in 2-3 weeks. Especially concerning for abscesses in the setting of his recent reported fever up to 102F at home, leukocytosis, and chills.  Leukocytosis with neutrophilia-Improved  Pancreatic mass s/p ERCP with stent placement  Diabetes Mellitus, type 2, poorly controlled with A1C 9  History of alcohol abuse   History of penicillin allergy-hives      PLAN/RECOMMENDATIONS:   Thank you for asking us to see Costa Robbins, I recommend the following:    -  Ertapenem 1g IV q24h      Patient would like to infuse daily at [facility] through a peripheral IV at the time of discharge. Okay for discharge today from my standpoint after his dose of ertapenem.    UM/REINA:  Ertapenem 1g IV q24h. Infuse INPAT at [facility] through PIV at least until 2/29/24 and then reassess  Weekly cbc, cmp, crp  Attn [facility] staff: please schedule the patient for a repeat CT A/P with IV contrast at [facility] in about 2 weeks (can be just prior to his appointment on 2/29/24)  Follow up in my clinic on 2/29/24. Clinic to call with the appointment time  Please fax a copy of my orders to [facility] at [phone] and call [phone] with final discharge plans     I discussed the patient's case with Dr. Moise today       Hans Lo MD  2/19/2024  18:38 EST

## 2024-02-20 ENCOUNTER — TRANSCRIBE ORDERS (OUTPATIENT)
Dept: ADMINISTRATIVE | Facility: HOSPITAL | Age: 69
End: 2024-02-20
Payer: MEDICARE

## 2024-02-20 DIAGNOSIS — K75.0 LIVER ABSCESS: Primary | ICD-10-CM

## 2024-02-21 ENCOUNTER — READMISSION MANAGEMENT (OUTPATIENT)
Dept: CALL CENTER | Facility: HOSPITAL | Age: 69
End: 2024-02-21
Payer: MEDICARE

## 2024-02-21 NOTE — OUTREACH NOTE
Medical Week 1 Survey      Flowsheet Row Responses   [facility] patient discharged from? [place]   Does the patient have one of the following disease processes/diagnoses(primary or secondary)? Other   Week 1 attempt successful? Yes   Call start time 0855   Call end time 0856   Discharge diagnosis Hepatic abscess   Person spoke with today (if not patient) and relationship Patient   Meds reviewed with patient/caregiver? Yes   Is the patient having any side effects they believe may be caused by any medication additions or changes? No   Does the patient have all medications ordered at discharge? Yes   Is the patient taking all medications as directed (includes completed medication regime)? Yes   Does the patient have a primary care provider?  Yes   Does the patient have an appointment with their PCP within 7 days of discharge? Yes   Has the patient kept scheduled appointments due by today? N/A   Psychosocial issues? No   Did the patient receive a copy of their discharge instructions? Yes   Nursing interventions Reviewed instructions with patient   What is the patient's perception of their health status since discharge? Improving   Is the patient/caregiver able to teach back signs and symptoms related to disease process for when to call PCP? Yes   Is the patient/caregiver able to teach back signs and symptoms related to disease process for when to call 911? Yes   Is the patient/caregiver able to teach back the hierarchy of who to call/visit for symptoms/problems? PCP, Specialist, Home health nurse, Urgent Care, ED, 911 Yes   If the patient is a current smoker, are they able to teach back resources for cessation? Not a smoker   Week 1 call completed? Yes   Graduated Yes   Would this patient benefit from a Referral to Amb Social Work? No   Is the patient interested in additional calls from an ambulatory ? No   Graduated/Revoked comments Quick call. Patient reports he is doing ok. No needs or questions  at this time.   Call end time [id number]            Ortiz BEASLEY - Registered Nurse

## 2024-02-26 ENCOUNTER — LAB (OUTPATIENT)
Dept: LAB | Facility: HOSPITAL | Age: 69
End: 2024-02-26
Payer: MEDICARE

## 2024-02-26 ENCOUNTER — TRANSCRIBE ORDERS (OUTPATIENT)
Dept: LAB | Facility: HOSPITAL | Age: 69
End: 2024-02-26
Payer: MEDICARE

## 2024-02-26 DIAGNOSIS — K75.0 ABSCESS, LIVER: ICD-10-CM

## 2024-02-26 DIAGNOSIS — K75.0 ABSCESS, LIVER: Primary | ICD-10-CM

## 2024-02-28 ENCOUNTER — LAB (OUTPATIENT)
Dept: LAB | Facility: HOSPITAL | Age: 69
End: 2024-02-28
Payer: MEDICARE

## 2024-02-28 ENCOUNTER — TRANSCRIBE ORDERS (OUTPATIENT)
Dept: LAB | Facility: HOSPITAL | Age: 69
End: 2024-02-28
Payer: MEDICARE

## 2024-02-28 DIAGNOSIS — K75.0 PYOGENIC HEPATIC ABSCESS: Primary | ICD-10-CM

## 2024-02-28 DIAGNOSIS — K75.0 PYOGENIC HEPATIC ABSCESS: ICD-10-CM

## 2024-02-28 LAB
BASOPHILS # BLD AUTO: 0.05 10*3/MM3 (ref 0–0.2)
BASOPHILS NFR BLD AUTO: 0.5 % (ref 0–1.5)
DEPRECATED RDW RBC AUTO: 43.9 FL (ref 37–54)
EOSINOPHIL # BLD AUTO: 0.05 10*3/MM3 (ref 0–0.4)
EOSINOPHIL NFR BLD AUTO: 0.5 % (ref 0.3–6.2)
ERYTHROCYTE [DISTWIDTH] IN BLOOD BY AUTOMATED COUNT: 13.2 % (ref 12.3–15.4)
ERYTHROCYTE [SEDIMENTATION RATE] IN BLOOD: 54 MM/HR (ref 0–20)
HCT VFR BLD AUTO: 42.5 % (ref 37.5–51)
HGB BLD-MCNC: 14.7 G/DL (ref 13–17.7)
IMM GRANULOCYTES # BLD AUTO: 0.05 10*3/MM3 (ref 0–0.05)
IMM GRANULOCYTES NFR BLD AUTO: 0.5 % (ref 0–0.5)
LYMPHOCYTES # BLD AUTO: 1.35 10*3/MM3 (ref 0.7–3.1)
LYMPHOCYTES NFR BLD AUTO: 13.9 % (ref 19.6–45.3)
MCH RBC QN AUTO: 31.6 PG (ref 26.6–33)
MCHC RBC AUTO-ENTMCNC: 34.6 G/DL (ref 31.5–35.7)
MCV RBC AUTO: 91.4 FL (ref 79–97)
MONOCYTES # BLD AUTO: 0.57 10*3/MM3 (ref 0.1–0.9)
MONOCYTES NFR BLD AUTO: 5.9 % (ref 5–12)
NEUTROPHILS NFR BLD AUTO: 7.62 10*3/MM3 (ref 1.7–7)
NEUTROPHILS NFR BLD AUTO: 78.7 % (ref 42.7–76)
NRBC BLD AUTO-RTO: 0 /100 WBC (ref 0–0.2)
PLATELET # BLD AUTO: 393 10*3/MM3 (ref 140–450)
PMV BLD AUTO: 11.2 FL (ref 6–12)
RBC # BLD AUTO: 4.65 10*6/MM3 (ref 4.14–5.8)
WBC NRBC COR # BLD AUTO: 9.69 10*3/MM3 (ref 3.4–10.8)

## 2024-02-28 PROCEDURE — 85652 RBC SED RATE AUTOMATED: CPT

## 2024-02-28 PROCEDURE — 85025 COMPLETE CBC W/AUTO DIFF WBC: CPT | Performed by: INTERNAL MEDICINE

## 2024-02-28 PROCEDURE — 36415 COLL VENOUS BLD VENIPUNCTURE: CPT | Performed by: INTERNAL MEDICINE

## 2024-02-29 ENCOUNTER — HOSPITAL ENCOUNTER (OUTPATIENT)
Dept: CT IMAGING | Facility: HOSPITAL | Age: 69
Discharge: HOME | End: 2024-02-29
Admitting: INTERNAL MEDICINE
Payer: MEDICARE

## 2024-02-29 DIAGNOSIS — K75.0 LIVER ABSCESS: ICD-10-CM

## 2024-02-29 PROCEDURE — 25510000001 IOPAMIDOL 61 % SOLUTION: Performed by: INTERNAL MEDICINE

## 2024-02-29 PROCEDURE — 74177 CT ABD & PELVIS W/CONTRAST: CPT

## 2024-02-29 RX ADMIN — IOPAMIDOL 85 ML: 612 INJECTION, SOLUTION INTRAVENOUS at 10:04

## 2024-03-03 ENCOUNTER — APPOINTMENT (OUTPATIENT)
Dept: CT IMAGING | Facility: HOSPITAL | Age: 69
End: 2024-03-03
Payer: MEDICARE

## 2024-03-03 ENCOUNTER — APPOINTMENT (OUTPATIENT)
Dept: MRI IMAGING | Facility: HOSPITAL | Age: 69
End: 2024-03-03
Payer: MEDICARE

## 2024-03-03 ENCOUNTER — HOSPITAL ENCOUNTER (OUTPATIENT)
Facility: HOSPITAL | Age: 69
Discharge: HOME | End: 2024-03-04
Attending: EMERGENCY MEDICINE | Admitting: INTERNAL MEDICINE
Payer: MEDICARE

## 2024-03-03 ENCOUNTER — APPOINTMENT (OUTPATIENT)
Dept: GENERAL RADIOLOGY | Facility: HOSPITAL | Age: 69
End: 2024-03-03
Payer: MEDICARE

## 2024-03-03 DIAGNOSIS — E11.69 TYPE 2 DIABETES MELLITUS WITH OTHER SPECIFIED COMPLICATION, WITH LONG-TERM CURRENT USE OF INSULIN: ICD-10-CM

## 2024-03-03 DIAGNOSIS — Z86.79 HISTORY OF HYPERTENSION: ICD-10-CM

## 2024-03-03 DIAGNOSIS — Z79.4 TYPE 2 DIABETES MELLITUS WITH OTHER SPECIFIED COMPLICATION, WITH LONG-TERM CURRENT USE OF INSULIN: ICD-10-CM

## 2024-03-03 DIAGNOSIS — K86.89 PANCREATIC MASS: ICD-10-CM

## 2024-03-03 DIAGNOSIS — R73.9 HYPERGLYCEMIA: ICD-10-CM

## 2024-03-03 DIAGNOSIS — R29.898 RIGHT LEG WEAKNESS: Primary | ICD-10-CM

## 2024-03-03 DIAGNOSIS — Z86.39 HISTORY OF DIABETES MELLITUS: ICD-10-CM

## 2024-03-03 LAB
ALBUMIN SERPL-MCNC: 4 G/DL (ref 3.5–5.2)
ALBUMIN/GLOB SERPL: 1.3 G/DL
ALP SERPL-CCNC: 145 U/L (ref 39–117)
ALT SERPL W P-5'-P-CCNC: 15 U/L (ref 1–41)
ALT SERPL W P-5'-P-CCNC: 15 U/L (ref 1–41)
ANION GAP SERPL CALCULATED.3IONS-SCNC: 16 MMOL/L (ref 5–15)
APTT PPP: 26.5 SECONDS (ref 22–39)
AST SERPL-CCNC: 12 U/L (ref 1–40)
AST SERPL-CCNC: 12 U/L (ref 1–40)
BASOPHILS # BLD AUTO: 0.07 10*3/MM3 (ref 0–0.2)
BASOPHILS NFR BLD AUTO: 0.7 % (ref 0–1.5)
BILIRUB SERPL-MCNC: 0.9 MG/DL (ref 0–1.2)
BUN BLDA-MCNC: 17 MG/DL
BUN BLDA-MCNC: 17 MG/DL (ref 8–26)
BUN SERPL-MCNC: 17 MG/DL (ref 8–23)
BUN/CREAT SERPL: 17.7 (ref 7–25)
CA-I BLDA-SCNC: 1.46 MMOL/L (ref 1.2–1.32)
CALCIUM BLD QL: 1.46 MG/DL
CALCIUM SPEC-SCNC: 11.2 MG/DL (ref 8.6–10.5)
CHLORIDE BLDA-SCNC: 90 MMOL/L (ref 98–109)
CHLORIDE BLDA-SCNC: 90 MMOL/L (ref 98–109)
CHLORIDE SERPL-SCNC: 84 MMOL/L (ref 98–107)
CO2 BLDA-SCNC: 24 MMOL/L (ref 24–29)
CO2 SERPL-SCNC: 20 MMOL/L (ref 22–29)
CREAT BLDA-MCNC: 0.7 MG/DL (ref 0.6–1.3)
CREAT BLDA-MCNC: 0.7 MG/DL (ref 0.6–1.3)
CREAT SERPL-MCNC: 0.96 MG/DL (ref 0.76–1.27)
D DIMER PPP FEU-MCNC: 0.67 MCGFEU/ML (ref 0–0.66)
D-LACTATE SERPL-SCNC: 1.9 MMOL/L (ref 0.5–2)
DEPRECATED RDW RBC AUTO: 43.2 FL (ref 37–54)
EGFRCR SERPLBLD CKD-EPI 2021: 101.6 ML/MIN/1.73
EGFRCR SERPLBLD CKD-EPI 2021: 87.2 ML/MIN/1.73
EOSINOPHIL # BLD AUTO: 0.1 10*3/MM3 (ref 0–0.4)
EOSINOPHIL NFR BLD AUTO: 1 % (ref 0.3–6.2)
ERYTHROCYTE [DISTWIDTH] IN BLOOD BY AUTOMATED COUNT: 13 % (ref 12.3–15.4)
GLOBULIN UR ELPH-MCNC: 3.2 GM/DL
GLUCOSE BLDC GLUCOMTR-MCNC: 268 MG/DL (ref 70–130)
GLUCOSE BLDC GLUCOMTR-MCNC: 417 MG/DL (ref 70–130)
GLUCOSE BLDC GLUCOMTR-MCNC: 573 MG/DL (ref 70–130)
GLUCOSE BLDC GLUCOMTR-MCNC: 632 MG/DL (ref 70–130)
GLUCOSE BLDC GLUCOMTR-MCNC: >599 MG/DL (ref 70–130)
GLUCOSE SERPL-MCNC: 677 MG/DL (ref 65–99)
HBA1C MFR BLD: 10.6 % (ref 4.8–5.6)
HCT VFR BLD AUTO: 45.1 % (ref 37.5–51)
HCT VFR BLDA CALC: 46 % (ref 38–51)
HCT VFR BLDA CALC: 46 % (ref 38–51)
HGB BLD-MCNC: 15.7 G/DL (ref 13–17.7)
HGB BLDA-MCNC: 15.6 G/DL (ref 12–17)
HGB BLDA-MCNC: 15.6 G/DL (ref 12–17)
HOLD SPECIMEN: NORMAL
IMM GRANULOCYTES # BLD AUTO: 0.07 10*3/MM3 (ref 0–0.05)
IMM GRANULOCYTES NFR BLD AUTO: 0.7 % (ref 0–0.5)
INR PPP: 1 (ref 0.9–1.1)
INR PPP: 1.1 (ref 0.8–1.2)
LYMPHOCYTES # BLD AUTO: 2.06 10*3/MM3 (ref 0.7–3.1)
LYMPHOCYTES NFR BLD AUTO: 20.2 % (ref 19.6–45.3)
MAGNESIUM SERPL-MCNC: 2.3 MG/DL (ref 1.6–2.4)
MCH RBC QN AUTO: 31.6 PG (ref 26.6–33)
MCHC RBC AUTO-ENTMCNC: 34.8 G/DL (ref 31.5–35.7)
MCV RBC AUTO: 90.7 FL (ref 79–97)
MONOCYTES # BLD AUTO: 0.78 10*3/MM3 (ref 0.1–0.9)
MONOCYTES NFR BLD AUTO: 7.7 % (ref 5–12)
NEUTROPHILS NFR BLD AUTO: 69.7 % (ref 42.7–76)
NEUTROPHILS NFR BLD AUTO: 7.11 10*3/MM3 (ref 1.7–7)
NRBC BLD AUTO-RTO: 0 /100 WBC (ref 0–0.2)
PHOSPHATE SERPL-MCNC: 2.4 MG/DL (ref 2.5–4.5)
PLATELET # BLD AUTO: 394 10*3/MM3 (ref 140–450)
PMV BLD AUTO: 11.2 FL (ref 6–12)
POTASSIUM BLDA-SCNC: 4.7 MMOL/L (ref 3.5–4.9)
POTASSIUM BLDA-SCNC: 4.7 MMOL/L (ref 3.5–4.9)
POTASSIUM SERPL-SCNC: 4.7 MMOL/L (ref 3.5–5.2)
PROT SERPL-MCNC: 7.2 G/DL (ref 6–8.5)
PROTHROMBIN TIME: 12.8 SECONDS
PROTHROMBIN TIME: 12.8 SECONDS (ref 12.8–15.2)
RBC # BLD AUTO: 4.97 10*6/MM3 (ref 4.14–5.8)
SODIUM BLD-SCNC: 124 MMOL/L (ref 138–146)
SODIUM BLD-SCNC: 124 MMOL/L (ref 138–146)
SODIUM SERPL-SCNC: 120 MMOL/L (ref 136–145)
TROPONIN T SERPL HS-MCNC: 18 NG/L
WBC NRBC COR # BLD AUTO: 10.19 10*3/MM3 (ref 3.4–10.8)
WHOLE BLOOD HOLD COAG: NORMAL
WHOLE BLOOD HOLD SPECIMEN: NORMAL

## 2024-03-03 PROCEDURE — 25510000001 IOPAMIDOL PER 1 ML: Performed by: EMERGENCY MEDICINE

## 2024-03-03 PROCEDURE — 99223 1ST HOSP IP/OBS HIGH 75: CPT | Performed by: NURSE PRACTITIONER

## 2024-03-03 PROCEDURE — 83036 HEMOGLOBIN GLYCOSYLATED A1C: CPT | Performed by: INTERNAL MEDICINE

## 2024-03-03 PROCEDURE — 70498 CT ANGIOGRAPHY NECK: CPT

## 2024-03-03 PROCEDURE — 70496 CT ANGIOGRAPHY HEAD: CPT

## 2024-03-03 PROCEDURE — 0042T HC CT CEREBRAL PERFUSION W/WO CONTRAST: CPT

## 2024-03-03 PROCEDURE — G0378 HOSPITAL OBSERVATION PER HR: HCPCS

## 2024-03-03 PROCEDURE — 85730 THROMBOPLASTIN TIME PARTIAL: CPT | Performed by: EMERGENCY MEDICINE

## 2024-03-03 PROCEDURE — 36415 COLL VENOUS BLD VENIPUNCTURE: CPT

## 2024-03-03 PROCEDURE — 93005 ELECTROCARDIOGRAM TRACING: CPT | Performed by: EMERGENCY MEDICINE

## 2024-03-03 PROCEDURE — 25810000003 SODIUM CHLORIDE 0.9 % SOLUTION: Performed by: EMERGENCY MEDICINE

## 2024-03-03 PROCEDURE — 85025 COMPLETE CBC W/AUTO DIFF WBC: CPT | Performed by: EMERGENCY MEDICINE

## 2024-03-03 PROCEDURE — 85014 HEMATOCRIT: CPT

## 2024-03-03 PROCEDURE — 99285 EMERGENCY DEPT VISIT HI MDM: CPT

## 2024-03-03 PROCEDURE — 80053 COMPREHEN METABOLIC PANEL: CPT | Performed by: EMERGENCY MEDICINE

## 2024-03-03 PROCEDURE — 25810000003 SODIUM CHLORIDE 0.9 % SOLUTION: Performed by: INTERNAL MEDICINE

## 2024-03-03 PROCEDURE — 83735 ASSAY OF MAGNESIUM: CPT | Performed by: INTERNAL MEDICINE

## 2024-03-03 PROCEDURE — 83605 ASSAY OF LACTIC ACID: CPT

## 2024-03-03 PROCEDURE — 25010000002 SODIUM CHLORIDE 0.9 % WITH KCL 20 MEQ 20-0.9 MEQ/L-% SOLUTION: Performed by: INTERNAL MEDICINE

## 2024-03-03 PROCEDURE — 71045 X-RAY EXAM CHEST 1 VIEW: CPT

## 2024-03-03 PROCEDURE — 84100 ASSAY OF PHOSPHORUS: CPT | Performed by: INTERNAL MEDICINE

## 2024-03-03 PROCEDURE — 85379 FIBRIN DEGRADATION QUANT: CPT | Performed by: INTERNAL MEDICINE

## 2024-03-03 PROCEDURE — 99214 OFFICE O/P EST MOD 30 MIN: CPT

## 2024-03-03 PROCEDURE — 85610 PROTHROMBIN TIME: CPT

## 2024-03-03 PROCEDURE — 82948 REAGENT STRIP/BLOOD GLUCOSE: CPT

## 2024-03-03 PROCEDURE — 80047 BASIC METABLC PNL IONIZED CA: CPT

## 2024-03-03 PROCEDURE — 0 GADOBENATE DIMEGLUMINE 529 MG/ML SOLUTION: Performed by: EMERGENCY MEDICINE

## 2024-03-03 PROCEDURE — 63710000001 INSULIN REGULAR HUMAN PER 5 UNITS: Performed by: EMERGENCY MEDICINE

## 2024-03-03 PROCEDURE — 70450 CT HEAD/BRAIN W/O DYE: CPT

## 2024-03-03 PROCEDURE — 84484 ASSAY OF TROPONIN QUANT: CPT | Performed by: EMERGENCY MEDICINE

## 2024-03-03 PROCEDURE — A9577 INJ MULTIHANCE: HCPCS | Performed by: EMERGENCY MEDICINE

## 2024-03-03 PROCEDURE — 70553 MRI BRAIN STEM W/O & W/DYE: CPT

## 2024-03-03 RX ORDER — SODIUM CHLORIDE AND POTASSIUM CHLORIDE 150; 900 MG/100ML; MG/100ML
250 INJECTION, SOLUTION INTRAVENOUS CONTINUOUS PRN
Status: DISCONTINUED | OUTPATIENT
Start: 2024-03-03 | End: 2024-03-04

## 2024-03-03 RX ORDER — SODIUM CHLORIDE AND POTASSIUM CHLORIDE 150; 450 MG/100ML; MG/100ML
250 INJECTION, SOLUTION INTRAVENOUS CONTINUOUS PRN
Status: DISCONTINUED | OUTPATIENT
Start: 2024-03-03 | End: 2024-03-04

## 2024-03-03 RX ORDER — SODIUM CHLORIDE 0.9 % (FLUSH) 0.9 %
10 SYRINGE (ML) INJECTION AS NEEDED
Status: DISCONTINUED | OUTPATIENT
Start: 2024-03-03 | End: 2024-03-04

## 2024-03-03 RX ORDER — DEXTROSE AND SODIUM CHLORIDE 5; .9 G/100ML; G/100ML
150 INJECTION, SOLUTION INTRAVENOUS CONTINUOUS PRN
Status: DISCONTINUED | OUTPATIENT
Start: 2024-03-03 | End: 2024-03-04

## 2024-03-03 RX ORDER — IBUPROFEN 600 MG/1
1 TABLET ORAL
Status: DISCONTINUED | OUTPATIENT
Start: 2024-03-03 | End: 2024-03-04 | Stop reason: HOSPADM

## 2024-03-03 RX ORDER — SODIUM CHLORIDE 9 MG/ML
250 INJECTION, SOLUTION INTRAVENOUS CONTINUOUS PRN
Status: DISCONTINUED | OUTPATIENT
Start: 2024-03-03 | End: 2024-03-04

## 2024-03-03 RX ORDER — SODIUM CHLORIDE 0.9 % (FLUSH) 0.9 %
10 SYRINGE (ML) INJECTION EVERY 12 HOURS SCHEDULED
Status: DISCONTINUED | OUTPATIENT
Start: 2024-03-03 | End: 2024-03-04 | Stop reason: HOSPADM

## 2024-03-03 RX ORDER — SODIUM CHLORIDE 9 MG/ML
40 INJECTION, SOLUTION INTRAVENOUS AS NEEDED
Status: DISCONTINUED | OUTPATIENT
Start: 2024-03-03 | End: 2024-03-04 | Stop reason: HOSPADM

## 2024-03-03 RX ORDER — ATORVASTATIN CALCIUM 40 MG/1
80 TABLET, FILM COATED ORAL NIGHTLY
Status: CANCELLED | OUTPATIENT
Start: 2024-03-03

## 2024-03-03 RX ORDER — SODIUM CHLORIDE 450 MG/100ML
250 INJECTION, SOLUTION INTRAVENOUS CONTINUOUS PRN
Status: DISCONTINUED | OUTPATIENT
Start: 2024-03-03 | End: 2024-03-04

## 2024-03-03 RX ORDER — DEXTROSE MONOHYDRATE, SODIUM CHLORIDE, AND POTASSIUM CHLORIDE 50; 1.49; 4.5 G/1000ML; G/1000ML; G/1000ML
150 INJECTION, SOLUTION INTRAVENOUS CONTINUOUS PRN
Status: DISCONTINUED | OUTPATIENT
Start: 2024-03-03 | End: 2024-03-04

## 2024-03-03 RX ORDER — NICOTINE POLACRILEX 4 MG
15 LOZENGE BUCCAL
Status: DISCONTINUED | OUTPATIENT
Start: 2024-03-03 | End: 2024-03-04 | Stop reason: SDUPTHER

## 2024-03-03 RX ORDER — DEXTROSE MONOHYDRATE 25 G/50ML
10-50 INJECTION, SOLUTION INTRAVENOUS
Status: DISCONTINUED | OUTPATIENT
Start: 2024-03-03 | End: 2024-03-04 | Stop reason: SDUPTHER

## 2024-03-03 RX ORDER — DEXTROSE MONOHYDRATE, SODIUM CHLORIDE, AND POTASSIUM CHLORIDE 50; 2.98; 9 G/1000ML; G/1000ML; G/1000ML
150 INJECTION, SOLUTION INTRAVENOUS CONTINUOUS PRN
Status: DISCONTINUED | OUTPATIENT
Start: 2024-03-03 | End: 2024-03-04

## 2024-03-03 RX ORDER — SODIUM CHLORIDE 0.9 % (FLUSH) 0.9 %
10 SYRINGE (ML) INJECTION AS NEEDED
Status: CANCELLED | OUTPATIENT
Start: 2024-03-03

## 2024-03-03 RX ORDER — SODIUM CHLORIDE 0.9 % (FLUSH) 0.9 %
10 SYRINGE (ML) INJECTION AS NEEDED
Status: DISCONTINUED | OUTPATIENT
Start: 2024-03-03 | End: 2024-03-04 | Stop reason: HOSPADM

## 2024-03-03 RX ORDER — SODIUM CHLORIDE AND POTASSIUM CHLORIDE 300; 900 MG/100ML; MG/100ML
250 INJECTION, SOLUTION INTRAVENOUS CONTINUOUS PRN
Status: DISCONTINUED | OUTPATIENT
Start: 2024-03-03 | End: 2024-03-04

## 2024-03-03 RX ORDER — SODIUM CHLORIDE 9 MG/ML
40 INJECTION, SOLUTION INTRAVENOUS AS NEEDED
Status: CANCELLED | OUTPATIENT
Start: 2024-03-03

## 2024-03-03 RX ORDER — DEXTROSE MONOHYDRATE, SODIUM CHLORIDE, AND POTASSIUM CHLORIDE 50; 1.49; 9 G/1000ML; G/1000ML; G/1000ML
150 INJECTION, SOLUTION INTRAVENOUS CONTINUOUS PRN
Status: DISCONTINUED | OUTPATIENT
Start: 2024-03-03 | End: 2024-03-04

## 2024-03-03 RX ORDER — SODIUM CHLORIDE 0.9 % (FLUSH) 0.9 %
10 SYRINGE (ML) INJECTION EVERY 12 HOURS SCHEDULED
Status: CANCELLED | OUTPATIENT
Start: 2024-03-03

## 2024-03-03 RX ORDER — DEXTROSE AND SODIUM CHLORIDE 5; .45 G/100ML; G/100ML
150 INJECTION, SOLUTION INTRAVENOUS CONTINUOUS PRN
Status: DISCONTINUED | OUTPATIENT
Start: 2024-03-03 | End: 2024-03-04

## 2024-03-03 RX ORDER — DEXTROSE MONOHYDRATE, SODIUM CHLORIDE, AND POTASSIUM CHLORIDE 50; 2.98; 4.5 G/1000ML; G/1000ML; G/1000ML
150 INJECTION, SOLUTION INTRAVENOUS CONTINUOUS PRN
Status: DISCONTINUED | OUTPATIENT
Start: 2024-03-03 | End: 2024-03-04

## 2024-03-03 RX ADMIN — GADOBENATE DIMEGLUMINE 13 ML: 529 INJECTION, SOLUTION INTRAVENOUS at 19:35

## 2024-03-03 RX ADMIN — INSULIN HUMAN 10 UNITS: 100 INJECTION, SOLUTION PARENTERAL at 20:08

## 2024-03-03 RX ADMIN — SODIUM CHLORIDE 1000 ML: 9 INJECTION, SOLUTION INTRAVENOUS at 20:08

## 2024-03-03 RX ADMIN — POTASSIUM CHLORIDE AND SODIUM CHLORIDE 250 ML/HR: 900; 150 INJECTION, SOLUTION INTRAVENOUS at 23:23

## 2024-03-03 RX ADMIN — INSULIN HUMAN 10 UNITS: 100 INJECTION, SOLUTION PARENTERAL at 18:40

## 2024-03-03 RX ADMIN — SODIUM CHLORIDE 1000 ML/HR: 9 INJECTION, SOLUTION INTRAVENOUS at 21:16

## 2024-03-03 RX ADMIN — IOPAMIDOL 115 ML: 755 INJECTION, SOLUTION INTRAVENOUS at 17:48

## 2024-03-03 RX ADMIN — INSULIN HUMAN 4.2 UNITS/HR: 1 INJECTION, SOLUTION INTRAVENOUS at 23:23

## 2024-03-03 RX ADMIN — SODIUM CHLORIDE 1000 ML: 9 INJECTION, SOLUTION INTRAVENOUS at 18:45

## 2024-03-03 NOTE — CONSULTS
Stroke Consult Note    Patient Name: Costa Robbins   MRN: [id number]  Age: 68 y.o.  Sex: male  : 1955    Primary Care Physician: Jonathan Conner MD  Referring Physician:  Dr. Syd Krishna    TIME STROKE TEAM CALLED: 1727 EST     TIME PATIENT SEEN: 1730 EST    Handedness: Right  Race:     Chief Complaint/Reason for Consultation: Right lower extremity incoordination and shaking    HPI: Mr. Robbins is a 68-year-old male with known medical diagnoses of essential hypertension, hyperlipidemia, diabetes mellitus type 2, pancreatic mass (scheduled to have Whipple procedure next week, biopsy negative for malignancy), peripheral vascular disease s/p left iliac stent and right femorofemoral bypass and remote tobacco abuse who presents to the emergency department via private vehicle for further evaluation of difficulty controlling his right lower extremity which he first noticed at 1400 today.  The patient states that he was try to go to the bathroom and noted that his right leg was difficult to control and that he was having trouble with adduction.  He denies experiencing any unilateral numbness, difficulty with his right upper extremity, visual disturbances, speech disturbances, or headache.  He does note that he had some mild shaking of his right lower extremity as if he could not control it.    He does me that he typically takes Plavix 75 mg daily however he did stop taking this on 3/1 in preparation for his Whipple procedure.  He does not take any other antiplatelet agents or anticoagulation.  He does tell me that he experienced a similar episode in the past however he was unable to move any parts of his body.    Last Known Normal Date/Time: 1400 EST     Review of Systems   Constitutional:  Positive for activity change, fatigue and unexpected weight change. Negative for chills and fever.   HENT:  Negative for trouble swallowing.    Eyes:  Negative for visual disturbance.   Respiratory:  Positive  for shortness of breath. Negative for cough.    Cardiovascular: Negative.  Negative for chest pain and palpitations.   Gastrointestinal:  Negative for blood in stool, diarrhea, nausea and vomiting.   Genitourinary: Negative.  Negative for hematuria.   Musculoskeletal:  Positive for gait problem.   Skin: Negative.    Neurological:  Positive for dizziness and light-headedness. Negative for seizures, speech difficulty, weakness, numbness and headaches.   Hematological: Negative.    Psychiatric/Behavioral: Negative.        Past Medical History:   Diagnosis Date    Bile duct obstruction 01/27/2024    found during ERCP    COVID 2021    no hospitalization    Diabetes mellitus 2017    po meds and insulin- checsk sugar rarely    Diverticulitis 2009    surgically intervention    Dizzy     Enlarged prostate     recently started on Proscar    ETOH use 05/25/2023    Former smoker 05/25/2023    Hearing decreased     History of shingles     2008-  belt line    Hypertension     Joint pain     hands    Melanoma     CHEST, BACK, NECK MULTI, HEAD    Pancreatic mass 01/27/2024    found on CT scan; ERCP confirmed    PVD (peripheral vascular disease)     SOBOE (shortness of breath on exertion)     Tinnitus     Unintentional weight loss     Uses contact lenses     bilat    Wears glasses      Past Surgical History:   Procedure Laterality Date    AORTAGRAM Bilateral 04/12/2023    Procedure: AORTAGRAM WITH RUNOFFS  STENT OF THE LEFT ILIAC ARTERY;  Surgeon: Moses Escalante MD;  Location: [facility];  Service: Vascular;  Laterality: Bilateral;    COLON RESECTION  05/2009    partial colectomy    COLONOSCOPY      DIAGNOSTIC LAPAROSCOPY N/A 02/07/2024    Procedure: DIAGNOSTIC LAPAROSCOPY WITH LIVER BIOPSY AND PERITONEAL WASHINGS;  Surgeon: Silas Maloney MD;  Location: [facility];  Service: General;  Laterality: N/A;    ERCP N/A 01/27/2024    Procedure: ENDOSCOPIC RETROGRADE CHOLANGIOPANCREATOGRAPHY WITH STENT PLACEMENT;  Surgeon:  Brunner, Mark I, MD;  Location: [facility] ENDOSCOPY;  Service: Gastroenterology;  Laterality: N/A;    FEMORAL ENDARTERECTOMY Left 2023    Procedure: FEMORAL ENDARTERECTOMY WITH PROPATEN GRAFT 8MM X 40CM;  Surgeon: Moses Escalante MD;  Location:  [facility];  Service: Vascular;  Laterality: Left;    FEMORAL FEMORAL BYPASS N/A 2023    Procedure: FEMORAL FEMORAL BYPASS;  Surgeon: Moses Escalante MD;  Location:  [facility];  Service: Vascular;  Laterality: N/A;    ORIF ANKLE FRACTURE Left     1 plate and 2 screws and 6 pins    SKIN BIOPSY      SKIN CANCER EXCISION      melanoma    WISDOM TOOTH EXTRACTION       Family History   Problem Relation Age of Onset    Diabetes Mother     Heart attack Mother     Hypertension Mother     Hyperlipidemia Mother     Stroke Mother     Hodgkin's lymphoma Father     Hypertension Father     Diabetes Brother     Hyperlipidemia Brother      Social History     Socioeconomic History    Marital status:     Number of children: 0   Tobacco Use    Smoking status: Former     Current packs/day: 0.00     Average packs/day: 0.5 packs/day for 43.0 years (21.5 ttl pk-yrs)     Types: Cigarettes     Start date: 1980     Quit date: 2023     Years since quittin.0     Passive exposure: Never    Smokeless tobacco: Never    Tobacco comments:     Pt states that he has been able to stop smoking this month - 2023   Vaping Use    Vaping status: Never Used   Substance and Sexual Activity    Alcohol use: Not Currently     Comment: ; hx of ETOH use- nothing last 2 months    Drug use: Yes     Types: Marijuana     Comment: ONCE A MONTH    Sexual activity: Defer     Allergies   Allergen Reactions    Atenolol Other (See Comments)     Bradycardia.    Morphine Itching    Penicillins Hives     Has tolerated cefazolin, cefepime    Beta lactam allergy details  Antibiotic reaction: (!) diarrhea, hives  Age at reaction: child (1st as child then later in college)  Dose to reaction time:  days  Reason for antibiotic: URI  Epinephrine required for reaction?: no  Tolerated antibiotics: unknown        Prior to Admission medications    Medication Sig Start Date End Date Taking? Authorizing Provider   amLODIPine (NORVASC) 10 MG tablet Take 1 tablet by mouth Daily. 9/28/22   Elias Vásquez MD   cefuroxime (CEFTIN) 500 MG tablet Take 1 tablet by mouth 2 (Two) Times a Day.    Gabino Mullins MD   clopidogrel (PLAVIX) 75 MG tablet Take 1 tablet by mouth Daily. Ld 1st- letter on chart    Gabino Mullins MD   empagliflozin (Jardiance) 10 MG tablet tablet Take 1 tablet by mouth Daily.    Gabino Mullins MD   finasteride (PROSCAR) 5 MG tablet Take 1 tablet by mouth Daily. 1/3/24   Gabino Mullins MD   hydrALAZINE (APRESOLINE) 50 MG tablet Take 1 tablet by mouth As Needed.    Gabino Mullins MD   Insulin Glargine (BASAGLAR KWIKPEN SC) Inject 40 Units under the skin into the appropriate area as directed Every Night.    Gabino Mullins MD   lisinopril (PRINIVIL,ZESTRIL) 40 MG tablet Take 1 tablet by mouth Daily.    Gabino Mullins MD   naloxone (NARCAN) 4 MG/0.1ML nasal spray Call 911. Don't prime. [place] in 1 nostril for overdose. Repeat in 2-3 minutes in other nostril if no or minimal breathing/responsiveness. 2/19/24   TOYA Moise,          Temp:  [97.7 °F (36.5 °C)] 97.7 °F (36.5 °C)  Heart Rate:  [109] 109  Resp:  [18] 18  BP: (107)/(67) 107/67  Neurological Exam  Mental Status  Alert. Oriented to person, place, time and situation. Oriented to person, place, and time. Speech is normal. Language is fluent with no aphasia. Attention and concentration are normal.    Cranial Nerves  CN II: Visual fields full to confrontation.  CN III, IV, VI: Extraocular movements intact bilaterally. Pupils equal round and reactive to light bilaterally.  CN V: Facial sensation is normal.  CN VII: Full and symmetric facial movement.  CN VIII: Hearing is intact bilaterally.  CN  XII: Tongue midline without atrophy or fasciculations.    Motor  Decreased muscle bulk throughout. Normal muscle tone. Strength is 5/5 throughout all four extremities.    Sensory  Sensation is intact to light touch, pinprick, vibration and proprioception in all four extremities.    Coordination  Right: Finger-to-nose normal.Left: Finger-to-nose normal.    Gait  Casual gait is normal including stance, stride, and arm swing.      Physical Exam  Vitals reviewed.   Constitutional:       General: He is not in acute distress.     Appearance: He is ill-appearing.      Comments: Cachectic    HENT:      Head: Normocephalic.      Mouth/Throat:      Mouth: Mucous membranes are moist.   Eyes:      Extraocular Movements: Extraocular movements intact.      Pupils: Pupils are equal, round, and reactive to light.   Cardiovascular:      Rate and Rhythm: Normal rate and regular rhythm.   Pulmonary:      Effort: Pulmonary effort is normal. No respiratory distress.      Comments: On room air  Musculoskeletal:      Right lower leg: No edema.      Left lower leg: No edema.   Skin:     General: Skin is warm and dry.      Findings: Bruising present.   Neurological:      General: No focal deficit present.      Mental Status: He is alert and oriented to person, place, and time.      Motor: Motor strength is normal.  Psychiatric:         Mood and Affect: Mood normal.         Speech: Speech normal.         Behavior: Behavior normal.         Acute Stroke Data    Thrombolytic Inclusion / Exclusion Criteria    Time: 17:48 EST  Person Administering Scale: CHERISE Luo    Inclusion Criteria  [x]   18 years of age or greater   []   Onset of symptoms < 4.5 hours before beginning treatment (stroke onset = time patient was last seen well or without symptoms).   []   Diagnosis of acute ischemic stroke causing measurable disabling deficit (Complete Hemianopia, Any Aphasia, Visual or Sensory Extinction, Any weakness limiting sustained  effort against gravity)   []   Any remaining deficit considered potentially disabling in view of patient and practitioner   Exclusion criteria (Do not proceed with Alteplase if any are checked under exclusion criteria)  []   Onset unknown or GREATER than 4.5 hours   []   ICH on CT/MRI   []   CT demonstrates hypodensity representing acute or subacute infarct   []   Significant head trauma or prior stroke in the previous 3 months   []   Symptoms suggestive of subarachnoid hemorrhage   []   History of un-ruptured intracranial aneurysm GREATER than 10 mm   []   Recent intracranial or intraspinal surgery within the last 3 months   []   Arterial puncture at a non-compressible site in the previous 7 days   []   Active internal bleeding   []   Acute bleeding tendency   []   Platelet count LESS than 100,000 for known hematological diseases such as leukemia, thrombocytopenia or chronic cirrhosis   []   Current use of anticoagulant with INR GREATER than 1.7 or PT GREATER than 15 seconds, aPTT GREATER than 40 seconds   []   Heparin received within 48 hours, resulting in abnormally elevated aPTT GREATER than upper limit of normal   []   Current use of direct thrombin inhibitors or direct factor Xa inhibitors in the past 48 hours   []   Elevated blood pressure refractory to treatment (systolic GREATER than 185 mm/Hg or diastolic  GREATER than 110 mm/Hg   []   Suspected infective endocarditis and aortic arch dissection   []   Current use of therapeutic treatment dose of low-molecular-weight heparin (LMWH) within the previous 24 hours   []   Structural GI malignancy or bleed   Relative exclusion for all patients  [x]   Only minor non-disabling symptoms   []   Pregnancy   []   Seizure at onset with postictal residual neurological impairments   []   Major surgery or previous trauma within past 14 days   []   History of previous spontaneous ICH, intracranial neoplasm, or AV malformation   []   Postpartum (within previous 14 days)   []    Recent GI or urinary tract hemorrhage (within previous 21 days)   []   Recent acute MI (within previous 3 months)   []   History of un-ruptured intracranial aneurysm LESS than 10 mm   []   History of ruptured intracranial aneurysm   []   Blood glucose LESS than 50 mg/dL (2.7 mmol/L)   []   Dural puncture within the last 7 days   []   Known GREATER than 10 cerebral microbleeds   Additional exclusions for patients with symptoms onset between 3 and 4.5 hours.  []   Age > 80.   []   On any anticoagulants regardless of INR  >>> Warfarin (Coumadin), Heparin, Enoxaparin (Lovenox), fondaparinux (Arixtra), bivalirudin (Angiomax), Argatroban, dabigatran (Pradaxa), rivaroxaban (Xarelto), or apixaban (Eliquis)   []   Severe stroke (NIHSS > 25).   []   History of BOTH diabetes and previous ischemic stroke.   []   The risks and benefits have been discussed with the patient or family related to the administration of IV thrombolytic therapy for stroke symptoms.   []   I have discussed and reviewed the patient's case and imaging with the attending prior to IV thrombolytic therapy.   N/A Time IV thrombolytic administered       Hospital Meds:  Scheduled- iopamidol, 150 mL, Intravenous, Once in imaging      Infusions-     PRNs-   sodium chloride    Functional Status Prior to Current Stroke/[place] Score: 0    NIH Stroke Scale  Time: 17:48 EST  Person Administering Scale: CHERISE Luo    Interval: baseline  1a. Level of Consciousness: 0-->Alert, keenly responsive  1b. LOC Questions: 0-->Answers both questions correctly  1c. LOC Commands: 0-->Performs both tasks correctly  2. Best Gaze: 0-->Normal  3. Visual: 0-->No visual loss  4. Facial Palsy: 0-->Normal symmetrical movements  5a. Motor Arm, Left: 0-->No drift, limb holds 90 (or 45) degrees for full 10 secs  5b. Motor Arm, Right: 0-->No drift, limb holds 90 (or 45) degrees for full 10 secs  6a. Motor Leg, Left: 0-->No drift, leg holds 30 degree position for full 5  secs  6b. Motor Leg, Right: 0-->No drift, leg holds 30 degree position for full 5 secs  7. Limb Ataxia: 0-->Absent  8. Sensory: 0-->Normal, no sensory loss  9. Best Language: 0-->No aphasia, normal  10. Dysarthria: 0-->Normal  11. Extinction and Inattention (formerly Neglect): 0-->No abnormality    Total (NIH Stroke Scale): 0        Results Reviewed:  I have personally reviewed current lab, radiology, and data and agree with results.    CT head without contrast is negative for hemorrhage and/or acute process.    CTA head/neck is negative for flow-limiting stenosis or LVO.  Right subclavian soft plaque noted with 57% stenosis.  He does have minimal calcified atherosclerotic disease in bilateral carotid arteries and diminutive vertebral/basilar artery with bilateral fetal PCAs.    CTP is negative for LVO.    Glucose 632  WBC 10.19  H/H15.7/45.1  Platelets 394  Na+ 120    Results for orders placed during the hospital encounter of 01/26/24    Adult Transthoracic Echo Complete W/ Cont if Necessary Per Protocol    Interpretation Summary    Left ventricular systolic function is hyperdynamic (EF > 70%). Left ventricular ejection fraction appears to be greater than 70%.    Left ventricular wall thickness is consistent with borderline concentric hypertrophy.    Provoked left ventricular mean pressure gradient of 10 mmHg.    Left ventricular diastolic function is consistent with (grade I) impaired relaxation.       Assessment/Plan:    This is a 68-year-old male with known medical diagnoses of essential hypertension, hyperlipidemia, diabetes mellitus type 2, pancreatic mass (scheduled to have Whipple procedure next week, biopsy negative for malignancy), peripheral vascular disease s/p left iliac stent and right femorofemoral bypass and remote tobacco abuse who presents to the emergency department via private vehicle for further evaluation of difficulty controlling his right lower extremity which he first noticed at 1400 today.   He was initially considered a candidate for IV thrombolytic therapy however on my exam his NIHSS is 0 and he has no difficulty walking and therefore it is felt that the risk is greater than benefit TNK administration.  CTA head/neck is negative for flow-limiting stenosis or LVO.    Antiplatelet PTA: Plavix 75 mg daily (stopped on 3/1 for upcoming surgical procedure)  Anticoagulant PTA: None        Right lower extremity coordination difficulty and shaking, improved  Differentials include limb shaking TIA/CVA (less likely) versus metastatic disease versus partial complex seizure  -Will obtain MRI of the brain with and without contrast  -N.p.o. until bedside nursing dysphagia screen completed, then the patient can be started on a cardiac diet  -Activity as tolerated, fall risk precautions  -CUS to further evaluate R subclavian soft plaque  -Dr. Swanson to speak with general surgery to see if patient can be placed on antiplatelet therapy given presence of soft plaque and increased risk of cerebrovascular event.  Dr. Thompson who is OK to start DAPT if needed   -TTE limited exam; patient had TTE in 1/27/2024 with EF 70% and normal LA cavity size    Plan of care was discussed with the patient and Dr. Krishna.  Stroke neurology will follow-up on results of MRI of the brain and give further recommendations.  Will discuss with Dr. Swanson as to if the patient needs to be admitted or can be discharged home if MRI is negative.  Please call with any questions or concerns.      Laney Corcoran, APRN  March 3, 2024  17:48 EST    1953: MRI reviewed with Dr. Swanson, negative for stroke or metastatic disease - no need to start antiplatelets as this would delay his whipple procedure and with negative MRI and no focal weakness this is less likely a vascular event.  Right subclavian soft plaque is asymptomatic.  From a stroke standpoint no further work-up is needed.  Wife would like patient admitted for further evaluation.  Patient is extremely hyponatremic (120) therefore it would not be unreasonable to admit him for observation overnight and obtaining EEG in AM.  Will implement seizure precautions.  Discussed with Dr. Krishna.  Will have general neurology see in AM.    Laney Corcoran MSN, APRN, AGAP-BC, AN-BC  Stroke Neurology

## 2024-03-04 ENCOUNTER — APPOINTMENT (OUTPATIENT)
Dept: CARDIOLOGY | Facility: HOSPITAL | Age: 69
End: 2024-03-04
Payer: MEDICARE

## 2024-03-04 ENCOUNTER — APPOINTMENT (OUTPATIENT)
Dept: NEUROLOGY | Facility: HOSPITAL | Age: 69
End: 2024-03-04
Payer: MEDICARE

## 2024-03-04 VITALS
DIASTOLIC BLOOD PRESSURE: 87 MMHG | WEIGHT: 149 LBS | SYSTOLIC BLOOD PRESSURE: 179 MMHG | TEMPERATURE: 98.5 F | HEIGHT: 71 IN | RESPIRATION RATE: 16 BRPM | HEART RATE: 81 BPM | OXYGEN SATURATION: 100 % | BODY MASS INDEX: 20.86 KG/M2

## 2024-03-04 LAB
ANION GAP SERPL CALCULATED.3IONS-SCNC: 9 MMOL/L (ref 5–15)
ANION GAP SERPL CALCULATED.3IONS-SCNC: 9 MMOL/L (ref 5–15)
BH CV XLRA MEAS LEFT DIST CCA EDV: 14.3 CM/SEC
BH CV XLRA MEAS LEFT DIST CCA PSV: 82.9 CM/SEC
BH CV XLRA MEAS LEFT DIST ICA EDV: 18.7 CM/SEC
BH CV XLRA MEAS LEFT DIST ICA PSV: 65.9 CM/SEC
BH CV XLRA MEAS LEFT ICA/CCA RATIO: 0.69
BH CV XLRA MEAS LEFT MID CCA EDV: 13.7 CM/SEC
BH CV XLRA MEAS LEFT MID CCA PSV: 104 CM/SEC
BH CV XLRA MEAS LEFT MID ICA EDV: 18.1 CM/SEC
BH CV XLRA MEAS LEFT MID ICA PSV: 75.2 CM/SEC
BH CV XLRA MEAS LEFT PROX CCA EDV: 11 CM/SEC
BH CV XLRA MEAS LEFT PROX CCA PSV: 91.6 CM/SEC
BH CV XLRA MEAS LEFT PROX ECA EDV: 0.7 CM/SEC
BH CV XLRA MEAS LEFT PROX ECA PSV: 109 CM/SEC
BH CV XLRA MEAS LEFT PROX ICA EDV: 13.2 CM/SEC
BH CV XLRA MEAS LEFT PROX ICA PSV: 68.6 CM/SEC
BH CV XLRA MEAS LEFT PROX SCLA PSV: 140 CM/SEC
BH CV XLRA MEAS LEFT VERTEBRAL A EDV: 8.6 CM/SEC
BH CV XLRA MEAS LEFT VERTEBRAL A PSV: 38.8 CM/SEC
BH CV XLRA MEAS RIGHT DIST CCA EDV: 13 CM/SEC
BH CV XLRA MEAS RIGHT DIST CCA PSV: 72.8 CM/SEC
BH CV XLRA MEAS RIGHT DIST ICA EDV: 21.7 CM/SEC
BH CV XLRA MEAS RIGHT DIST ICA PSV: 83.2 CM/SEC
BH CV XLRA MEAS RIGHT ICA/CCA RATIO: 0.59
BH CV XLRA MEAS RIGHT MID CCA EDV: 19.1 CM/SEC
BH CV XLRA MEAS RIGHT MID CCA PSV: 111 CM/SEC
BH CV XLRA MEAS RIGHT MID ICA EDV: 15.5 CM/SEC
BH CV XLRA MEAS RIGHT MID ICA PSV: 66.5 CM/SEC
BH CV XLRA MEAS RIGHT PROX CCA EDV: 14.7 CM/SEC
BH CV XLRA MEAS RIGHT PROX CCA PSV: 104 CM/SEC
BH CV XLRA MEAS RIGHT PROX ECA EDV: 28.8 CM/SEC
BH CV XLRA MEAS RIGHT PROX ECA PSV: 274 CM/SEC
BH CV XLRA MEAS RIGHT PROX ICA EDV: 14.9 CM/SEC
BH CV XLRA MEAS RIGHT PROX ICA PSV: 64 CM/SEC
BH CV XLRA MEAS RIGHT PROX SCLA PSV: 428 CM/SEC
BH CV XLRA MEAS RIGHT VERTEBRAL A EDV: 9.3 CM/SEC
BH CV XLRA MEAS RIGHT VERTEBRAL A PSV: 46.7 CM/SEC
BUN SERPL-MCNC: 11 MG/DL (ref 8–23)
BUN SERPL-MCNC: 13 MG/DL (ref 8–23)
BUN/CREAT SERPL: 19.3 (ref 7–25)
BUN/CREAT SERPL: 20.6 (ref 7–25)
CALCIUM SPEC-SCNC: 10.1 MG/DL (ref 8.6–10.5)
CALCIUM SPEC-SCNC: 10.4 MG/DL (ref 8.6–10.5)
CHLORIDE SERPL-SCNC: 100 MMOL/L (ref 98–107)
CHLORIDE SERPL-SCNC: 102 MMOL/L (ref 98–107)
CO2 SERPL-SCNC: 23 MMOL/L (ref 22–29)
CO2 SERPL-SCNC: 24 MMOL/L (ref 22–29)
CREAT SERPL-MCNC: 0.57 MG/DL (ref 0.76–1.27)
CREAT SERPL-MCNC: 0.63 MG/DL (ref 0.76–1.27)
EGFRCR SERPLBLD CKD-EPI 2021: 104.9 ML/MIN/1.73
EGFRCR SERPLBLD CKD-EPI 2021: 108.1 ML/MIN/1.73
GLUCOSE BLDC GLUCOMTR-MCNC: 138 MG/DL (ref 70–130)
GLUCOSE BLDC GLUCOMTR-MCNC: 143 MG/DL (ref 70–130)
GLUCOSE BLDC GLUCOMTR-MCNC: 162 MG/DL (ref 70–130)
GLUCOSE BLDC GLUCOMTR-MCNC: 163 MG/DL (ref 70–130)
GLUCOSE BLDC GLUCOMTR-MCNC: 164 MG/DL (ref 70–130)
GLUCOSE BLDC GLUCOMTR-MCNC: 165 MG/DL (ref 70–130)
GLUCOSE BLDC GLUCOMTR-MCNC: 181 MG/DL (ref 70–130)
GLUCOSE BLDC GLUCOMTR-MCNC: 227 MG/DL (ref 70–130)
GLUCOSE SERPL-MCNC: 140 MG/DL (ref 65–99)
GLUCOSE SERPL-MCNC: 226 MG/DL (ref 65–99)
MAGNESIUM SERPL-MCNC: 1.9 MG/DL (ref 1.6–2.4)
MAGNESIUM SERPL-MCNC: 2 MG/DL (ref 1.6–2.4)
PHOSPHATE SERPL-MCNC: 1.9 MG/DL (ref 2.5–4.5)
PHOSPHATE SERPL-MCNC: 2.5 MG/DL (ref 2.5–4.5)
POTASSIUM SERPL-SCNC: 3.2 MMOL/L (ref 3.5–5.2)
POTASSIUM SERPL-SCNC: 3.6 MMOL/L (ref 3.5–5.2)
RIGHT ARM BP: NORMAL MMHG
SODIUM SERPL-SCNC: 133 MMOL/L (ref 136–145)
SODIUM SERPL-SCNC: 134 MMOL/L (ref 136–145)

## 2024-03-04 PROCEDURE — 95816 EEG AWAKE AND DROWSY: CPT

## 2024-03-04 PROCEDURE — G0378 HOSPITAL OBSERVATION PER HR: HCPCS

## 2024-03-04 PROCEDURE — 95816 EEG AWAKE AND DROWSY: CPT | Performed by: PSYCHIATRY & NEUROLOGY

## 2024-03-04 PROCEDURE — 99239 HOSP IP/OBS DSCHRG MGMT >30: CPT | Performed by: INTERNAL MEDICINE

## 2024-03-04 PROCEDURE — 82948 REAGENT STRIP/BLOOD GLUCOSE: CPT

## 2024-03-04 PROCEDURE — 93308 TTE F-UP OR LMTD: CPT | Performed by: INTERNAL MEDICINE

## 2024-03-04 PROCEDURE — 99214 OFFICE O/P EST MOD 30 MIN: CPT

## 2024-03-04 PROCEDURE — 80048 BASIC METABOLIC PNL TOTAL CA: CPT | Performed by: INTERNAL MEDICINE

## 2024-03-04 PROCEDURE — 93308 TTE F-UP OR LMTD: CPT

## 2024-03-04 PROCEDURE — 63710000001 INSULIN LISPRO (HUMAN) PER 5 UNITS: Performed by: NURSE PRACTITIONER

## 2024-03-04 PROCEDURE — 83735 ASSAY OF MAGNESIUM: CPT | Performed by: INTERNAL MEDICINE

## 2024-03-04 PROCEDURE — 93880 EXTRACRANIAL BILAT STUDY: CPT | Performed by: INTERNAL MEDICINE

## 2024-03-04 PROCEDURE — 84100 ASSAY OF PHOSPHORUS: CPT | Performed by: INTERNAL MEDICINE

## 2024-03-04 PROCEDURE — 97165 OT EVAL LOW COMPLEX 30 MIN: CPT

## 2024-03-04 PROCEDURE — 93880 EXTRACRANIAL BILAT STUDY: CPT

## 2024-03-04 RX ORDER — FINASTERIDE 5 MG/1
5 TABLET, FILM COATED ORAL DAILY
Status: DISCONTINUED | OUTPATIENT
Start: 2024-03-04 | End: 2024-03-04 | Stop reason: HOSPADM

## 2024-03-04 RX ORDER — ASPIRIN 81 MG/1
81 TABLET, CHEWABLE ORAL DAILY
Qty: 90 TABLET | Refills: 0 | Status: SHIPPED | OUTPATIENT
Start: 2024-03-04

## 2024-03-04 RX ORDER — CLOPIDOGREL BISULFATE 75 MG/1
75 TABLET ORAL DAILY
Status: ON HOLD | COMMUNITY
End: 2024-03-04

## 2024-03-04 RX ORDER — NICOTINE POLACRILEX 4 MG
15 LOZENGE BUCCAL
Status: DISCONTINUED | OUTPATIENT
Start: 2024-03-04 | End: 2024-03-04 | Stop reason: HOSPADM

## 2024-03-04 RX ORDER — LISINOPRIL 40 MG/1
40 TABLET ORAL DAILY
Status: DISCONTINUED | OUTPATIENT
Start: 2024-03-04 | End: 2024-03-04 | Stop reason: HOSPADM

## 2024-03-04 RX ORDER — CLOPIDOGREL BISULFATE 75 MG/1
75 TABLET ORAL DAILY
Start: 2024-03-18

## 2024-03-04 RX ORDER — CEFUROXIME AXETIL 250 MG/1
500 TABLET ORAL 2 TIMES DAILY
Status: DISCONTINUED | OUTPATIENT
Start: 2024-03-04 | End: 2024-03-04 | Stop reason: HOSPADM

## 2024-03-04 RX ORDER — ASPIRIN 81 MG/1
81 TABLET, CHEWABLE ORAL DAILY
Status: DISCONTINUED | OUTPATIENT
Start: 2024-03-04 | End: 2024-03-04 | Stop reason: HOSPADM

## 2024-03-04 RX ORDER — PANTOPRAZOLE SODIUM 40 MG/1
40 TABLET, DELAYED RELEASE ORAL DAILY
COMMUNITY

## 2024-03-04 RX ORDER — DEXTROSE MONOHYDRATE 25 G/50ML
25 INJECTION, SOLUTION INTRAVENOUS
Status: DISCONTINUED | OUTPATIENT
Start: 2024-03-04 | End: 2024-03-04 | Stop reason: HOSPADM

## 2024-03-04 RX ORDER — AMLODIPINE BESYLATE 10 MG/1
10 TABLET ORAL DAILY
Status: DISCONTINUED | OUTPATIENT
Start: 2024-03-04 | End: 2024-03-04 | Stop reason: HOSPADM

## 2024-03-04 RX ORDER — POTASSIUM CHLORIDE 20 MEQ/1
40 TABLET, EXTENDED RELEASE ORAL EVERY 4 HOURS
Status: COMPLETED | OUTPATIENT
Start: 2024-03-04 | End: 2024-03-04

## 2024-03-04 RX ORDER — INSULIN LISPRO 100 [IU]/ML
2-7 INJECTION, SOLUTION INTRAVENOUS; SUBCUTANEOUS
Status: DISCONTINUED | OUTPATIENT
Start: 2024-03-04 | End: 2024-03-04 | Stop reason: HOSPADM

## 2024-03-04 RX ADMIN — POTASSIUM CHLORIDE 40 MEQ: 1500 TABLET, EXTENDED RELEASE ORAL at 07:57

## 2024-03-04 RX ADMIN — LISINOPRIL 40 MG: 40 TABLET ORAL at 07:56

## 2024-03-04 RX ADMIN — INSULIN HUMAN 2.6 UNITS/HR: 1 INJECTION, SOLUTION INTRAVENOUS at 02:17

## 2024-03-04 RX ADMIN — INSULIN LISPRO 2 UNITS: 100 INJECTION, SOLUTION INTRAVENOUS; SUBCUTANEOUS at 12:07

## 2024-03-04 RX ADMIN — Medication 10 ML: at 08:10

## 2024-03-04 RX ADMIN — DEXTROSE MONOHYDRATE, SODIUM CHLORIDE, AND POTASSIUM CHLORIDE 150 ML/HR: 50; 9; 1.49 INJECTION, SOLUTION INTRAVENOUS at 02:37

## 2024-03-04 RX ADMIN — ASPIRIN 81 MG: 81 TABLET, CHEWABLE ORAL at 12:07

## 2024-03-04 RX ADMIN — INSULIN LISPRO 2 UNITS: 100 INJECTION, SOLUTION INTRAVENOUS; SUBCUTANEOUS at 07:55

## 2024-03-04 RX ADMIN — AMLODIPINE BESYLATE 10 MG: 10 TABLET ORAL at 07:57

## 2024-03-04 RX ADMIN — CEFUROXIME AXETIL 500 MG: 250 TABLET, FILM COATED ORAL at 07:57

## 2024-03-04 RX ADMIN — POTASSIUM CHLORIDE 40 MEQ: 1500 TABLET, EXTENDED RELEASE ORAL at 12:07

## 2024-03-04 NOTE — CONSULTS
Full note to follow. Patient known to me with pancreatic head mass scheduled for Whipple procedure on 3/8. Presented to ER on 3/3 with new right lower extremity weakness. CVA work up was performed and was negative. Noted on lab work to have significant hyperglycemia and hyponatremia, both being corrected currently. Given the patient's history of right iliac stent placement, recommend vascular surgery consult to assess for any issues with this. Otherwise, would continue to hold anticoagulation/antiplatelet therapy and please contact me if any other service recommends otherwise prior to starting this therapy.

## 2024-03-04 NOTE — PROGRESS NOTES
[facility] Neurology    Progress Note    Patient Name: Costa Robbins  : 1955  MRN: [id number]  Primary Care Physician:  Jonathan Conner MD  Date of admission: 3/3/2024    Subjective     Chief Complaint: Right lower extremity jerking    History of Present Illness   Patient was seen resting comfortably in bed.  Overall feels back to his normal state.  Symptoms of right leg jerking have resolved.  No weakness noted on exam.  No complaints overnight.  EEG was negative.    Review of Systems   General: Negative for fever, nausea, or vomiting.   Neurological: Negative for headache, pain, or weakness.     Objective     Physical Exam  Vitals and nursing note reviewed.   Constitutional:       General: He is not in acute distress.     Appearance: He is not ill-appearing.   Eyes:      Extraocular Movements: Extraocular movements intact.      Pupils: Pupils are equal, round, and reactive to light.      Comments: No nystagmus or deviated gaze noted.   Neurological:      Mental Status: He is alert and oriented to person, place, and time.      Cranial Nerves: Cranial nerves 2-12 are intact.      Sensory: Sensation is intact.      Motor: Motor function is intact. No weakness, tremor or seizure activity.      Coordination: Finger-Nose-Finger Test normal.      Deep Tendon Reflexes: Babinski sign absent on the right side. Babinski sign absent on the left side.      Reflex Scores:       Bicep reflexes are 2+ on the right side and 1+ on the left side.       Patellar reflexes are 1+ on the right side.     Comments: Cranial Nerves   CN II: Pupils are equal, round, and reactive to light. Normal visual acuity and visual fields.    CN III IV VI: Extraocular movements are full without nystagmus.  CN V: Normal facial sensation and strength of muscles of mastication.  CN VII: Facial movements are symmetric. No weakness.  CN VIII:  Auditory acuity is normal.  CN IX & X:  Symmetric palatal movement.  CN XI: Sternocleidomastoid  and trapezius are normal.  No weakness.  CN XII: The tongue is midline.  No atrophy or fasciculations.            Vitals:   Temp:  [97.7 °F (36.5 °C)-98.5 °F (36.9 °C)] 98.5 °F (36.9 °C)  Heart Rate:  [] 81  Resp:  [12-18] 16  BP: (107-179)/() 179/87    Current Medications    Current Facility-Administered Medications:     amLODIPine (NORVASC) tablet 10 mg, 10 mg, Oral, Daily, Jordyn Becker, APRN, 10 mg at 03/04/24 0757    aspirin chewable tablet 81 mg, 81 mg, Oral, Daily, Lisseth Pulido II DO, 81 mg at 03/04/24 1207    Calcium Replacement - Follow Nurse / BPA Driven Protocol, , Does not apply, Kamar ARRIAZA Kathryn E, MD    cefuroxime (CEFTIN) tablet 500 mg, 500 mg, Oral, BID, Jordyn Becker, APRN, 500 mg at 03/04/24 0757    dextrose (D50W) (25 g/50 mL) IV injection 25 g, 25 g, Intravenous, Q15 Min PRN, Jordyn Becker APRN    dextrose (GLUTOSE) oral gel 15 g, 15 g, Oral, Q15 Min PRN, Jordyn Becker APRN    finasteride (PROSCAR) tablet 5 mg, 5 mg, Oral, Daily, Jordyn Becker APRN    glucagon (GLUCAGEN) injection 1 mg, 1 mg, Intramuscular, Q15 Min PRN, Krystina Galvin MD    Insulin Lispro (humaLOG) injection 2-7 Units, 2-7 Units, Subcutaneous, 4x Daily AC & at Bedtime, Jordyn Becker APRN, 2 Units at 03/04/24 1207    lisinopril (PRINIVIL,ZESTRIL) tablet 40 mg, 40 mg, Oral, Daily, Jordyn Becker, APRN, 40 mg at 03/04/24 0756    Magnesium Standard Dose Replacement - Follow Nurse / BPA Driven Protocol, , Does not apply, Kamar ARRIAZA Kathryn E, MD    Phosphorus Replacement - Follow Nurse / BPA Driven Protocol, , Does not apply, Kamar ARRIAZA Kathryn E, MD    Potassium Replacement - Follow Nurse / BPA Driven Protocol, , Does not apply, Kamar ARRIAZA Kathryn E, MD    sodium chloride 0.9 % flush 10 mL, 10 mL, Intravenous, Q12H, Krystina Galvin MD, 10 mL at 03/04/24 0810    sodium chloride 0.9 % flush 10 mL, 10 mL, Intravenous, PRN, Krystina Galvin MD    sodium  "chloride 0.9 % infusion 40 mL, 40 mL, Intravenous, PRN, Krystina Galvin MD    Laboratory Results:   Lab Results   Component Value Date    GLUCOSE 140 (H) 03/04/2024    CALCIUM 10.1 03/04/2024     (L) 03/04/2024    K 3.2 (L) 03/04/2024    CO2 23.0 03/04/2024     03/04/2024    BUN 11 03/04/2024    CREATININE 0.57 (L) 03/04/2024    BCR 19.3 03/04/2024    ANIONGAP 9.0 03/04/2024     Lab Results   Component Value Date    WBC 10.19 03/03/2024    HGB 15.6 03/03/2024    HGB 15.6 03/03/2024    HCT 46 03/03/2024    HCT 46 03/03/2024    MCV 90.7 03/03/2024     03/03/2024     Lab Results   Component Value Date    CHOL 424 (H) 01/27/2024     Lab Results   Component Value Date    HDL 43 01/27/2024    HDL 51 07/26/2021     Lab Results   Component Value Date     (H) 01/27/2024     (H) 07/26/2021     Lab Results   Component Value Date    TRIG 187 (H) 01/27/2024    TRIG 224 (H) 07/26/2021     Lab Results   Component Value Date    HGBA1C 10.60 (H) 03/03/2024     Lab Results   Component Value Date    INR 1 03/03/2024    INR 1.1 03/03/2024    INR 0.86 (L) 05/15/2023    PROTIME 12.8 03/03/2024    PROTIME 12.8 03/03/2024    PROTIME 11.9 (L) 05/15/2023     No results found for: \"FOLATE\"  No results found for: \"[id number]\"    MRI Brain With & Without Contrast    Result Date: 3/3/2024  MRI BRAIN W WO CONTRAST Date of Exam: 3/3/2024 7:02 PM EST Indication: RLE incoordination.  Comparison: CT head/perfusion/angiography 3/3/2024. Technique:  Routine multiplanar/multisequence sequence images of the brain were obtained before and after the uneventful administration of 13 mL Multihance. Findings: There is no diffusion restriction to suggest acute infarct. The midline structures appear intact. Calvarial and superficial soft tissue signal is within normal limits. Temporomandibular joints and parotid glands appear symmetric. Major arterial flow voids appear intact. Orbits are unremarkable. Paranasal sinuses and " mastoid air cells appear well aerated. There is mild patchy white matter FLAIR hyperintense signal abnormality. No mass effect, midline shift or abnormal extra-axial collection. There is  a large developmental venous anomaly in the left temporal region. There is no acute or chronic intracranial hemorrhage. No abnormal intracranial enhancement.     Impression: Impression: 1.No acute intracranial abnormality. 2.Mild chronic small vessel ischemic change. Electronically Signed: Ramón Monahan MD  3/3/2024 7:59 PM EST  Workstation ID: [id number]        Assessment / Plan   Brief Patient Summary:  Costa Robbins is a 68 y.o. male who Of HTN HLD, T2DM, EtOH use, pancreatic mass, PAD s/p left iliac stent and right fem femoral -femoral bypass who presented to [facility] ED with complaint of inability to control right lower leg.  Of note patient is scheduled for Whipple procedure this Friday with Dr. Maloney.  Patient was originally seen by her stroke navigators with a negative workup.    Plan:   Right leg weakness/jerking-resolved  Hyponatremia-resolved  Abnormal movements in the setting of metabolic derangement-resolved  CT head with no intracranial abnormalities  CT perfusion with no perfusion deficit  CTA of head and neck showed no large vessel occlusion.  50% stenosis was seen of the right subclavian artery due to soft plaque.  Mild atherosclerotic disease and carotid bulbs and proximal ICAs without hemodynamic significant stenosis was seen.  MRI was negative for acute intracranial abnormalities.  EEG revealed a normal study  Patient's recent sodium 135  Per stroke neurology no need to start antiplatelet therapy as patient has a negative MRI and would likely delay his Whipple procedure.  Right subclavian soft plaque is asymptomatic at this time.  Vitamin B12 and folate  Upon assessment patient patient's symptoms have resolved.  He is okay to discharge from neurologic standpoint given negative workup and resolution of symptoms.   Please call with any questions.    I have discussed the above with the patient, bedside RN Dr. Pulido  Time spent with patient: 50 minutes in face-to-face evaluation and management of the patient.      Roseanna Galvan, APRN

## 2024-03-04 NOTE — THERAPY DISCHARGE NOTE
Acute Care - Occupational Therapy Discharge  [facility]    Patient Name: Costa Robbins  : 1955    MRN: [id number]                              Today's Date: 3/4/2024       Admit Date: 3/3/2024    Visit Dx:     ICD-10-CM ICD-9-CM   1. Right leg weakness  R29.898 729.89   2. Hyperglycemia  R73.9 790.29   3. Pancreatic mass  K86.89 577.8   4. History of diabetes mellitus  Z86.39 V12.29   5. History of hypertension  Z86.79 V12.59   6. Type 2 diabetes mellitus with other specified complication, with long-term current use of insulin  E11.69 250.80    Z79.4 V58.67     Patient Active Problem List   Diagnosis    T2DM (type 2 diabetes mellitus)    Essential hypertension    Diverticulosis    Family history of coronary artery disease    Mixed hyperlipidemia    Renal artery stenosis    PVD (peripheral vascular disease) with claudication    ETOH use    Former smoker    Choledocholithiasis    Pancreatic mass    Jaundice    Elevated LFTs    Severe malnutrition    Hepatic abscess    Right leg weakness     Past Medical History:   Diagnosis Date    Bile duct obstruction 2024    found during ERCP    COVID     no hospitalization    Diabetes mellitus 2017    po meds and insulin- checsk sugar rarely    Diverticulitis 2009    surgically intervention    Dizzy     Enlarged prostate     recently started on Proscar    ETOH use 2023    Former smoker 2023    Hearing decreased     History of shingles     2008-  belt line    Hypertension     Joint pain     hands    Melanoma     CHEST, BACK, NECK MULTI, HEAD    Pancreatic mass 2024    found on CT scan; ERCP confirmed    PVD (peripheral vascular disease)     SOBOE (shortness of breath on exertion)     Tinnitus     Unintentional weight loss     Uses contact lenses     bilat    Wears glasses      Past Surgical History:   Procedure Laterality Date    AORTAGRAM Bilateral 2023    Procedure: AORTAGRAM WITH RUNOFFS  STENT OF THE LEFT ILIAC ARTERY;  Surgeon:  Moses Escalante MD;  [facility];  Service: Vascular;  Laterality: Bilateral;    COLON RESECTION  05/2009    partial colectomy    COLONOSCOPY      DIAGNOSTIC LAPAROSCOPY N/A 02/07/2024    Procedure: DIAGNOSTIC LAPAROSCOPY WITH LIVER BIOPSY AND PERITONEAL WASHINGS;  Surgeon: Silas Maloney MD;  Location:  [facility];  Service: General;  Laterality: N/A;    ERCP N/A 01/27/2024    Procedure: ENDOSCOPIC RETROGRADE CHOLANGIOPANCREATOGRAPHY WITH STENT PLACEMENT;  Surgeon: Brunner, Mark I, MD;  Location: Critical [facility] hospital ENDOSCOPY;  Service: Gastroenterology;  Laterality: N/A;    FEMORAL ENDARTERECTOMY Left 05/25/2023    Procedure: FEMORAL ENDARTERECTOMY WITH PROPATEN GRAFT 8MM X 40CM;  Surgeon: Moses Escalante MD;  Location:  [facility];  Service: Vascular;  Laterality: Left;    FEMORAL FEMORAL BYPASS N/A 05/25/2023    Procedure: FEMORAL FEMORAL BYPASS;  Surgeon: Moses Escalante MD;  Location:  [facility];  Service: Vascular;  Laterality: N/A;    ORIF ANKLE FRACTURE Left     1 plate and 2 screws and 6 pins    SKIN BIOPSY      SKIN CANCER EXCISION      melanoma    WISDOM TOOTH EXTRACTION        General Information       Row Name 03/04/24 1106          OT Time and Intention    Document Type discharge evaluation/summary  -JR     Mode of Treatment occupational therapy  -JR       Row Name 03/04/24 1106          General Information    Patient Profile Reviewed yes  -JR     Prior Level of Function independent:;gait;transfer;bed mobility;ADL's;home management  -JR     Existing Precautions/Restrictions fall  -JR     Barriers to Rehab medically complex  -JR       Row Name 03/04/24 1106          Living Environment    People in Home spouse  -JR       Row Name 03/04/24 1106          Home Main Entrance    Number of Stairs, Main Entrance three  -JR       Row Name 03/04/24 1106          Stairs Within Home, Primary    Number of Stairs, Within Home, Primary other (see comments)  pt reports he has an upstairs and doesn't use   -       Row Name 03/04/24 1106          Cognition    Orientation Status (Cognition) oriented x 4  -JR       Row Name 03/04/24 1106          Safety Issues, Functional Mobility    Safety Issues Affecting Function (Mobility) awareness of need for assistance;insight into deficits/self-awareness  -     Impairments Affecting Function (Mobility) endurance/activity tolerance  -               User Key  (r) = Recorded By, (t) = Taken By, (c) = Cosigned By      Initials Name Provider Type    JR Eloise Escoto, OT Occupational Therapist                   Mobility/ADL's       Row Name 03/04/24 1108          Bed Mobility    Bed Mobility supine-sit;sit-supine  -JR     Supine-Sit Lyman (Bed Mobility) modified independence  -     Sit-Supine Lyman (Bed Mobility) modified independence  -     Assistive Device (Bed Mobility) head of bed elevated  -       Row Name 03/04/24 1108          Transfers    Transfers sit-stand transfer  -       Row Name 03/04/24 1108          Sit-Stand Transfer    Sit-Stand Lyman (Transfers) independent  -       Row Name 03/04/24 1108          Functional Mobility    Functional Mobility- Ind. Level independent  -     Functional Mobility-Distance (Feet) --  to the bathroom and back to bed  -       Row Name 03/04/24 1108          Activities of Daily Living    BADL Assessment/Intervention toileting;grooming  -       Row Name 03/04/24 1108          Toileting Assessment/Training    Lyman Level (Toileting) toileting skills;adjust/manage clothing;independent  -     Position (Toileting) unsupported standing  -       Row Name 03/04/24 1108          Grooming Assessment/Training    Lyman Level (Grooming) wash face, hands  -JR     Position (Grooming) sink side  -               User Key  (r) = Recorded By, (t) = Taken By, (c) = Cosigned By      Initials Name Provider Type    JR Eloise Escoto, OT Occupational Therapist                   Obj/Interventions        [facility] Name 03/04/24 1110          Sensory Assessment (Somatosensory)    Sensory Assessment (Somatosensory) right LE  -JR       [facility] Name 03/04/24 1110          Vision Assessment/Intervention    Visual Impairment/Limitations WNL  -[facility] Name 03/04/24 1110          Range of Motion Comprehensive    General Range of Motion no range of motion deficits identified  -[facility] Name 03/04/24 1110          Strength Comprehensive (MMT)    General Manual Muscle Testing (MMT) Assessment no strength deficits identified  -JR       Row Name 03/04/24 1110          Motor Skills    Motor Skills coordination;muscle tone  -     Coordination WNL  -     Muscle Tone WNL  -[facility] Name 03/04/24 1110          Balance    Balance Assessment sitting static balance;standing dynamic balance  -     Static Sitting Balance independent  -     Dynamic Standing Balance independent  -JR     Position/Device Used, Standing Balance unsupported  -JR               User Key  (r) = Recorded By, (t) = Taken By, (c) = Cosigned By      Initials Name Provider Type    Eloise Mercer, OT Occupational Therapist                   Goals/Plan    No documentation.                  Clinical Impression       Row Name 03/04/24 1110          Pain Assessment    Pretreatment Pain Rating 0/10 - no pain  -     Posttreatment Pain Rating 0/10 - no pain  -JR     Additional Documentation Pain Scale: Word Pre/Post-Treatment (Group)  -JR       Row Name 03/04/24 1110          Plan of Care Review    Plan of Care Reviewed With patient  -     Outcome Evaluation OT initial eval and expanded chart review completed. No UE strength or coordination deficits noted. Pt appears at or near baseline with ADL's and mobility. No further skilled OT services indicated at this time. d/c home with spouse when medically appropriate.  -[facility] Name 03/04/24 1110          Therapy Assessment/Plan (OT)    Patient/Family Therapy Goal Statement (OT) go home  -      Criteria for Skilled Therapeutic Interventions Met (OT) no;no problems identified which require skilled intervention  -JR     Therapy Frequency (OT) evaluation only  -JR       Row Name 03/04/24 1110          Therapy Plan Review/Discharge Plan (OT)    Anticipated Discharge Disposition (OT) home with assist  -JR       Row Name 03/04/24 1110          Vital Signs    Pre Systolic BP Rehab 157  -JR     Pre Treatment Diastolic BP 84  -JR     Post Systolic BP Rehab 178  -JR     Post Treatment Diastolic   -JR     Pretreatment Heart Rate (beats/min) 91  -JR     Posttreatment Heart Rate (beats/min) 87  -JR     Post SpO2 (%) 100  -JR     O2 Delivery Post Treatment room air  -JR     Pre Patient Position Supine  -JR     Intra Patient Position Standing  -JR     Post Patient Position Supine  -JR       Row Name 03/04/24 1110          Positioning and Restraints    Pre-Treatment Position in bed  -JR     Post Treatment Position bed  -JR     In Bed notified nsg;supine;call light within reach;encouraged to call for assist  -JR               User Key  (r) = Recorded By, (t) = Taken By, (c) = Cosigned By      Initials Name Provider Type    Eloise Mercer, OT Occupational Therapist                   Outcome Measures       Row Name 03/04/24 1113          How much help from another is currently needed...    Putting on and taking off regular lower body clothing? 4  -JR     Bathing (including washing, rinsing, and drying) 4  -JR     Toileting (which includes using toilet bed pan or urinal) 4  -JR     Putting on and taking off regular upper body clothing 4  -JR     Taking care of personal grooming (such as brushing teeth) 4  -JR     Eating meals 4  -JR     AM-PAC 6 Clicks Score (OT) 24  -JR       Row Name 03/04/24 1113          Modified Bernice Scale    Modified Bernice Scale 0 - No Symptoms at all.  -       Row Name 03/04/24 1113          Functional Assessment    Outcome Measure Options AM-PAC 6 Clicks Daily Activity (OT);Modified  Bernice  [facility]               User Key  (r) = Recorded By, (t) = Taken By, (c) = Cosigned By      Initials Name Provider Type     Eloise Escoto, OT Occupational Therapist                  Occupational Therapy Education       Title: PT OT SLP Therapies (In Progress)       Topic: Occupational Therapy (In Progress)       Point: ADL training (In Progress)       Description:   Instruct learner(s) on proper safety adaptation and remediation techniques during self care or transfers.   Instruct in proper use of assistive devices.                  Learning Progress Summary             Patient Acceptance, E, NR by  at 3/4/2024 1010    Comment: Educated pt regarding role of therapy                         Point: Home exercise program (Not Started)       Description:   Instruct learner(s) on appropriate technique for monitoring, assisting and/or progressing therapeutic exercises/activities.                  Learner Progress:  Not documented in this visit.              Point: Precautions (Not Started)       Description:   Instruct learner(s) on prescribed precautions during self-care and functional transfers.                  Learner Progress:  Not documented in this visit.              Point: Body mechanics (Not Started)       Description:   Instruct learner(s) on proper positioning and spine alignment during self-care, functional mobility activities and/or exercises.                  Learner Progress:  Not documented in this visit.                              User Key       Initials Effective Dates Name Provider Type [facility] 02/03/23 -  Eloise Escoto OT Occupational Therapist OT                  OT Recommendation and Plan  Therapy Frequency (OT): evaluation only  Plan of Care Review  Plan of Care Reviewed With: patient  Outcome Evaluation: OT initial eval and expanded chart review completed. No UE strength or coordination deficits noted. Pt appears at or near baseline with ADL's and mobility. No further  skilled OT services indicated at this time. d/c home with spouse when medically appropriate.  Plan of Care Reviewed With: patient  Outcome Evaluation: OT initial eval and expanded chart review completed. No UE strength or coordination deficits noted. Pt appears at or near baseline with ADL's and mobility. No further skilled OT services indicated at this time. d/c home with spouse when medically appropriate.     Time Calculation:   Evaluation Complexity (OT)  Review Occupational Profile/Medical/Therapy History Complexity: expanded/moderate complexity  Assessment, Occupational Performance/Identification of Deficit Complexity: 1-3 performance deficits  Clinical Decision Making Complexity (OT): detailed assessment/moderate complexity  Overall Complexity of Evaluation (OT): low complexity     Time Calculation- OT       Row Name 03/04/24 1115             Time Calculation- OT    OT Start Time 1010  -JR      OT Received On 03/04/24  -JR         Untimed Charges    OT Eval/Re-eval Minutes 46  -JR         Total Minutes    Untimed Charges Total Minutes 46  -JR       Total Minutes 46  -JR                User Key  (r) = Recorded By, (t) = Taken By, (c) = Cosigned By      Initials Name Provider Type     Eloise Escoto OT Occupational Therapist                  Therapy Charges for Today       Code Description Service Date Service Provider Modifiers Qty    [id number]  OT EVAL LOW COMPLEXITY 4 3/4/2024 Eloise Escoto OT GO 1               OT Discharge Summary  Anticipated Discharge Disposition (OT): home with assist  Reason for Discharge: At baseline function  Outcomes Achieved: Refer to plan of care for updates on goals achieved  Discharge Destination: Home with assist    Eloise Escoto OT  3/4/2024

## 2024-03-04 NOTE — DISCHARGE SUMMARY
[facility] Medicine Services  DISCHARGE SUMMARY    Patient Name: Costa Robbins  : 1955  MRN: [id number]    Date of Admission: 3/3/2024  5:30 PM  Date of Discharge:  3/4/2024  Primary Care Physician: Jonathan Conner MD    Consults       Date and Time Order Name Status Description    3/3/2024 10:35 PM Inpatient General Surgery Consult Completed     3/3/2024  8:10 PM Inpatient Neurology Consult General      3/3/2024  5:28 PM Inpatient Neurology Consult Stroke Completed     2/15/2024 10:18 PM Inpatient Palliative Care MD Consult Completed     2024  3:01 PM Inpatient Infectious Diseases Consult Completed     2024  4:02 AM Inpatient General Surgery Consult Completed             Hospital Course     Presenting Problem: right leg cramping    Active Hospital Problems    Diagnosis  POA    **Right leg weakness [R29.898]  Yes    Pancreatic mass [K86.89]  Yes    ETOH use [Z78.9]  Yes    PVD (peripheral vascular disease) with claudication [I73.9]  Yes    Mixed hyperlipidemia [E78.2]  Yes    T2DM (type 2 diabetes mellitus) [E11.9]  Yes    Essential hypertension [I10]  Yes      Resolved Hospital Problems   No resolved problems to display.          Hospital Course:  Costa Robbins is a 68 y.o. male to the ED with complaints of difficulty controlling his right leg that started at 1400 today.  Patient states that he got up to go to the bathroom and noted that he was having difficulty controlling his right leg.       Right leg spasm/weakness  -- Stroke team followed, underwent extensive neuro imaging which was negative. EEG also negative. Suspect this is related to metabolic derangements w/ hyperglycemia, hyponatremia. Symptoms resolved with IVF, glucose control.   -- I d/w CTS who felt no imaging was needed. They did recommend he stay on asa 81mg but felt it was okay for him to stay off plavix for his scheduled whipple.    T2DM with hyperglycemia  -- Initial glucose 677 in the ED.  Given 3L NS and started on IV insulin on arrival with improvement. Will have him follow up with [facility] endocrine particularly given his upcoming marco antonio.     Hyponatremia  -- Improved after IVF.     Discharge Follow Up Recommendations for outpatient labs/diagnostics:   Dr. Maloney for marco antonio as scheduled    Day of Discharge     HPI:  Up in bed. Feels much better. Wants to go home.    Review of Systems  Gen- No fevers, chills  CV- No chest pain, palpitations  Resp- No cough, dyspnea  GI- No N/V/D, abd pain    Vital Signs:   Temp:  [97.7 °F (36.5 °C)-98 °F (36.7 °C)] 97.7 °F (36.5 °C)  Heart Rate:  [] 73  Resp:  [12-18] 18  BP: (107-170)/() 157/84      Physical Exam:  Constitutional: No acute distress, awake, alert  HENT: NCAT, mucous membranes moist  Respiratory: Clear to auscultation bilaterally, respiratory effort normal   Cardiovascular: RRR, no murmurs, rubs, or gallops  Gastrointestinal: Positive bowel sounds, soft, nontender, nondistended  Musculoskeletal: No bilateral ankle edema  Psychiatric: Appropriate affect, cooperative  Neurologic: Oriented x 3, strength symmetric in all extremities, Cranial Nerves grossly intact to confrontation, speech clear  Skin: No rashes     Pertinent  and/or Most Recent Results     LAB RESULTS:      Lab 03/03/24  1738 03/03/24  1737 02/28/24  1235   WBC  --  10.19 9.69   HEMOGLOBIN  --  15.7 14.7   HEMOGLOBIN, POC 15.6  15.6  --   --    HEMATOCRIT  --  45.1 42.5   HEMATOCRIT POC 46  46  --   --    PLATELETS  --  394 393   NEUTROS ABS  --  7.11* 7.62*   IMMATURE GRANS (ABS)  --  0.07* 0.05   LYMPHS ABS  --  2.06 1.35   MONOS ABS  --  0.78 0.57   EOS ABS  --  0.10 0.05   MCV  --  90.7 91.4   SED RATE  --   --  54*   LACTATE  --  1.9  --    PROTIME 12.8 12.8  --    APTT  --  26.5  --    D DIMER QUANT  --  0.67*  --          Lab 03/04/24  0411 03/04/24  0030 03/03/24  1738 03/03/24  1737   SODIUM 134* 133*  --  120*   POTASSIUM 3.2* 3.6  --  4.7   CHLORIDE 102 100  --   84*   CO2 23.0 24.0  --  20.0*   ANION GAP 9.0 9.0  --  16.0*   BUN 11 13  --  17   CREATININE 0.57* 0.63* 0.70  0.70 0.96   EGFR 108.1 104.9 101.6 87.2   GLUCOSE 140* 226*  --  677*   CALCIUM 10.1 10.4  --  11.2*   MAGNESIUM 1.9 2.0  --  2.3   PHOSPHORUS 2.5 1.9*  --  2.4*   HEMOGLOBIN A1C  --   --   --  10.60*         Lab 03/03/24  1737   TOTAL PROTEIN 7.2   ALBUMIN 4.0   GLOBULIN 3.2   ALT (SGPT) 15  15   AST (SGOT) 12  12   BILIRUBIN 0.9   ALK PHOS 145*         Lab 03/03/24  1738 03/03/24  1737   HSTROP T  --  18   PROTIME 12.8 12.8   INR 1 1.1                 Brief Urine Lab Results  (Last result in the past 365 days)        Color   Clarity   Blood   Leuk Est   Nitrite   Protein   CREAT   Urine HCG        01/26/24 1639 Dark Yellow   Clear   Negative   Negative   Negative   Negative                 Microbiology Results (last 10 days)       ** No results found for the last 240 hours. **            EEG    Result Date: 3/4/2024  Reason for referral: 68 y.o.male with right leg jerking, consideration of seizures Technical Summary:  A 19 channel digital EEG was performed using the international 10-20 placement system, including eye leads and EKG leads. Duration: 21 minutes Findings: The patient is awake.  Diffuse medium amplitude intermixed theta and alpha activity are seen symmetrically over both hemispheres.  A somewhat widespread posterior rhythm is seen at 9 Hz.  Drowsiness is seen with mild slowing of the background but stage II sleep is not seen.  Hyperventilation is not performed.  Photic stimulation yields a symmetric driving response.  No focal features or epileptiform activity are seen Video: Available Technical quality: Superior EKG: Regular, 70 bpm SUMMARY: Normal EEG in the awake and lightly drowsy states No focal features or epileptiform activity are seen     Normal study This report is transcribed using the Dragon dictation system.      MRI Brain With & Without Contrast    Result Date: 3/3/2024  MRI  BRAIN W WO CONTRAST Date of Exam: 3/3/2024 7:02 PM EST Indication: RLE incoordination.  Comparison: CT head/perfusion/angiography 3/3/2024. Technique:  Routine multiplanar/multisequence sequence images of the brain were obtained before and after the uneventful administration of 13 mL Multihance. Findings: There is no diffusion restriction to suggest acute infarct. The midline structures appear intact. Calvarial and superficial soft tissue signal is within normal limits. Temporomandibular joints and parotid glands appear symmetric. Major arterial flow voids appear intact. Orbits are unremarkable. Paranasal sinuses and mastoid air cells appear well aerated. There is mild patchy white matter FLAIR hyperintense signal abnormality. No mass effect, midline shift or abnormal extra-axial collection. There is  a large developmental venous anomaly in the left temporal region. There is no acute or chronic intracranial hemorrhage. No abnormal intracranial enhancement.     Impression: 1.No acute intracranial abnormality. 2.Mild chronic small vessel ischemic change. Electronically Signed: Ramón Monahan MD  3/3/2024 7:59 PM EST  Workstation ID: [id number]    CT Angiogram Head w AI Analysis of LVO    Result Date: 3/3/2024  CT ANGIOGRAM HEAD W AI ANALYSIS OF LVO, CT ANGIOGRAM NECK Date of Exam: 3/3/2024 5:31 PM EST Indication: Neuro deficit, acute stroke suspected Neuro deficit, acute stroke suspected. Comparison: Head CT without contrast and CT perfusion study from 3/3/2024 Technique: CTA of the head and neck was performed after the uneventful intravenous administration of 150 cc Isovue-370. Reconstructed coronal and sagittal images were also obtained. In addition, a 3-D volume rendered image was created for interpretation.  Automated exposure control and iterative reconstruction methods were used. Findings: CTA head: There is a fetal origin of the right PCA the P1 segment of the left PCA is diffusely small in the left posterior  communicating arteries mildly prominent these are anatomic variations. [place] otherwise has a normal configuration. No large vessel occlusions or significant focal stenoses or aneurysms are identified. No pathologic intracranial enhancement. CTA NECK: There is normal configuration of the great vessels arising from the aortic arch. Right-sided findings: The brachiocephalic artery is widely patent. There is a short segment of high-grade stenosis due to soft plaque at the origin of the right subclavian artery. Based on NASCET criteria, this is a 57% stenosis. There is mild diffuse atherosclerotic disease in the remainder of the right subclavian artery. The right common carotid artery is widely patent without disease. There is mild atherosclerotic disease in the right carotid bulb and proximal right ICA without significant stenosis  by NASCET criteria. Right internal carotid artery is widely patent without disease. The external carotid artery is widely patent without disease. The vertebral arteries are codominant and widely patent. Left-sided findings: There is mild atherosclerotic disease in the proximal left subclavian artery without significant stenosis by NASCET criteria. The left common carotid artery is widely patent without disease. There is mild atherosclerotic disease in the carotid bulb and proximal left ICA without hemodynamically significant stenosis by NASCET criteria. The remainder of the left ICA is unremarkable. The left ECA is widely patent without disease. The left vertebral artery is widely patent. Nonvascular findings: There is an enlarged thyroid gland with multiple nodules indicative of which the left nodular goiter. No cervical adenopathy or acute abnormalities in the soft tissues of the neck.     Impression: 1.No large vessel occlusions or significant focal stenoses or aneurysms in the head. 2.There is a 57% stenosis at the origin of the right subclavian artery due to soft plaque. There  is mild atherosclerotic disease in the carotid bulbs and proximal ICAs without hemodynamically significant stenosis by NASCET criteria. 3.Multinodular goiter. Electronically Signed: Alli DO Viktor  3/3/2024 6:42 PM EST  Workstation ID: [id number]    CT Angiogram Neck    Result Date: 3/3/2024  CT ANGIOGRAM HEAD W AI ANALYSIS OF LVO, CT ANGIOGRAM NECK Date of Exam: 3/3/2024 5:31 PM EST Indication: Neuro deficit, acute stroke suspected Neuro deficit, acute stroke suspected. Comparison: Head CT without contrast and CT perfusion study from 3/3/2024 Technique: CTA of the head and neck was performed after the uneventful intravenous administration of 150 cc Isovue-370. Reconstructed coronal and sagittal images were also obtained. In addition, a 3-D volume rendered image was created for interpretation.  Automated exposure control and iterative reconstruction methods were used. Findings: CTA head: There is a fetal origin of the right PCA the P1 segment of the left PCA is diffusely small in the left posterior communicating arteries mildly prominent these are anatomic variations. [place] otherwise has a normal configuration. No large vessel occlusions or significant focal stenoses or aneurysms are identified. No pathologic intracranial enhancement. CTA NECK: There is normal configuration of the great vessels arising from the aortic arch. Right-sided findings: The brachiocephalic artery is widely patent. There is a short segment of high-grade stenosis due to soft plaque at the origin of the right subclavian artery. Based on NASCET criteria, this is a 57% stenosis. There is mild diffuse atherosclerotic disease in the remainder of the right subclavian artery. The right common carotid artery is widely patent without disease. There is mild atherosclerotic disease in the right carotid bulb and proximal right ICA without significant stenosis  by NASCET criteria. Right internal carotid artery is widely patent without disease.  The external carotid artery is widely patent without disease. The vertebral arteries are codominant and widely patent. Left-sided findings: There is mild atherosclerotic disease in the proximal left subclavian artery without significant stenosis by NASCET criteria. The left common carotid artery is widely patent without disease. There is mild atherosclerotic disease in the carotid bulb and proximal left ICA without hemodynamically significant stenosis by NASCET criteria. The remainder of the left ICA is unremarkable. The left ECA is widely patent without disease. The left vertebral artery is widely patent. Nonvascular findings: There is an enlarged thyroid gland with multiple nodules indicative of which the left nodular goiter. No cervical adenopathy or acute abnormalities in the soft tissues of the neck.     Impression: 1.No large vessel occlusions or significant focal stenoses or aneurysms in the head. 2.There is a 57% stenosis at the origin of the right subclavian artery due to soft plaque. There is mild atherosclerotic disease in the carotid bulbs and proximal ICAs without hemodynamically significant stenosis by NASCET criteria. 3.Multinodular goiter. Electronically Signed: Alli Hoang DO  3/3/2024 6:42 PM EST  Workstation ID: [id number]    XR Chest 1 View    Result Date: 3/3/2024  XR CHEST 1 VW Date of Exam: 3/3/2024 5:58 PM EST Indication: Acute Stroke Protocol (onset < 12 hrs) Comparison: 2/13/2024 Findings: The lungs are grossly clear. Cardiac, hilar, and mediastinal silhouettes are . Pulmonary vascularity is within normal limits. No pneumothorax or pleural effusions. The trachea is midline.  No acute bony abnormality or aggressive appearing focal osseous lesions in the visualized bony thorax. Old right rib fractures are noted.     Impression: No active cardiopulmonary disease. Electronically Signed: Alli Hoang DO  3/3/2024 6:23 PM EST  Workstation ID: [id number]    CT CEREBRAL PERFUSION WITH & WITHOUT  CONTRAST    Result Date: 3/3/2024  CT CEREBRAL PERFUSION W WO CONTRAST Date of Exam: 3/3/2024 5:31 PM EST Indication: Neuro deficit, acute stroke suspected.  Comparison: CT head without contrast 3/3/2024 Technique: Axial CT images of the brain were obtained prior to and after the administration of 115 mL Isovue-370. Core blood volume, core blood flow, mean transit time, and Tmax images were obtained utilizing the Rapid software protocol. A limited CT angiogram of the head was also performed to measure the blood vessel density. The radiation dose reduction device was turned on for each scan per the ALARA (As Low as Reasonably Achievable) protocol. Findings: There is no focal core infarct or ischemic penumbra identified. CBF < 30%: 0 ml Tmax > 6 sec: 0 ml Mismatch volume: 0 mL Mismatch ratio: None.     Impression: No focal core infarct or ischemic penumbra. Electronically Signed: Dayday Evans MD  3/3/2024 6:07 PM EST  Workstation ID: [id number]    CT Head Without Contrast Stroke Protocol    Result Date: 3/3/2024  CT HEAD WO CONTRAST STROKE PROTOCOL Date of Exam: 3/3/2024 5:30 PM EST Indication: Neuro deficit, acute, stroke suspected Neuro deficit, acute stroke suspected. Right leg weakness Comparison: None available. Technique: Axial CT images were obtained of the head without contrast administration.  Reconstructed coronal images were also obtained. Automated exposure control and iterative construction methods were used. Scan Time: 5:35 p.m. Results discussed with Stroke Navigator Laney at 5:42 p.m. Findings: No intracranial hemorrhage. Negative for mass effect or midline shift. Mild white matter findings suggesting chronic microvascular disease. No abnormal extra-axial fluid collection. Posterior fossa is without acute abnormality. The globes are intact and symmetric. No retro-orbital abnormality. The mastoid air cells are well-aerated. Mild left deviation with septal deviation. Calvarium intact.     Impression: 1.  No intracranial hemorrhage. 2. Mild white matter findings suggesting chronic microvascular disease. Electronically Signed: Dayday Evans MD  3/3/2024 5:44 PM EST  Workstation ID: [id number]    CT Abdomen Pelvis With Contrast    Result Date: 2/29/2024  CT ABDOMEN PELVIS W CONTRAST Date of Exam: 2/29/2024 9:57 AM EST Indication: K75.0. Liver abscess Comparison: 2/13/2024. Technique: Axial CT images were obtained of the abdomen and pelvis following the uneventful intravenous administration of 85 mL Isovue-300 . Reconstructed coronal and sagittal images were also obtained. Automated exposure control and iterative construction methods were used. Findings: A small hypodensity adjacent to the hepatic capsule in the lateral aspect of the right hepatic lobe is smaller and no longer contains pockets of gas. It measures approximately 13 x 11 mm. Previously noted additional hypodensities in the right hepatic lobe have resolved. A stent in the common bile duct is again identified with associated pneumobilia. The gallbladder is contracted. There is no intrahepatic bile duct dilatation. Subtle hypodensity of the pancreatic head is again identified concerning for underlying pancreatic mass. The spleen size is normal. Adrenal nodules are stable. Right upper pole parapelvic renal cyst again noted. There is no hydronephrosis. Partially filled bladder appears grossly normal. There are surgical sutures in the wall  of the sigmoid colon. There is a large amount of stool. There is diverticulosis without acute diverticulitis. Extensive calcified and noncalcified plaque of the abdominal aorta and iliac arteries again noted. 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 Patient is status post bifemoral bypass graft which appears patent.     Impression: Findings again suggestive of pancreatic head mass, status post stenting of the common bile duct. Decreased size of hypodensity within the right lateral aspect of the liver adjacent to  the hepatic capsule and resolution of previously seen gas. Previously seen additional hypodensities within the right hepatic lobe have resolved. No new abnormality. Electronically Signed: Allie Hicks MD  2/29/2024 10:41 AM EST  Workstation ID: [id number]             Results for orders placed during the hospital encounter of 01/26/24    Adult Transthoracic Echo Complete W/ Cont if Necessary Per Protocol    Interpretation Summary    Left ventricular systolic function is hyperdynamic (EF > 70%). Left ventricular ejection fraction appears to be greater than 70%.    Left ventricular wall thickness is consistent with borderline concentric hypertrophy.    Provoked left ventricular mean pressure gradient of 10 mmHg.    Left ventricular diastolic function is consistent with (grade I) impaired relaxation.      Plan for Follow-up of Pending Labs/Results:     Discharge Details        Discharge Medications        New Medications        Instructions Start Date   aspirin 81 MG chewable tablet   81 mg, Oral, Daily             Changes to Medications        Instructions Start Date   clopidogrel 75 MG tablet  Commonly known as: PLAVIX  What changed: These instructions start on March 18, 2024. If you are unsure what to do until then, ask your doctor or other care provider.   75 mg, Oral, Daily   Start Date: March 18, 2024            Continue These Medications        Instructions Start Date   amLODIPine 10 MG tablet  Commonly known as: NORVASC   10 mg, Oral, Daily      BASAGLAR KWIKPEN SC   40 Units, Subcutaneous, Nightly      finasteride 5 MG tablet  Commonly known as: PROSCAR   1 tablet, Oral, Daily      hydrALAZINE 50 MG tablet  Commonly known as: APRESOLINE   50 mg, Oral, As Needed      Jardiance 10 MG tablet tablet  Generic drug: empagliflozin   10 mg, Oral, Daily      lisinopril 40 MG tablet  Commonly known as: PRINIVIL,ZESTRIL   40 mg, Oral, Daily      naloxone 4 MG/0.1ML nasal spray  Commonly known as: NARCAN   Call 911. Don't  prime. Spray in 1 nostril for overdose. Repeat in 2-3 minutes in other nostril if no or minimal breathing/responsiveness.      pantoprazole 40 MG EC tablet  Commonly known as: PROTONIX   40 mg, Oral, Daily             Stop These Medications      cefuroxime 500 MG tablet  Commonly known as: CEFTIN              Allergies   Allergen Reactions    Atenolol Other (See Comments)     Bradycardia.    Morphine Itching    Penicillins Hives     Has tolerated cefazolin, cefepime    Beta lactam allergy details  Antibiotic reaction: (!) diarrhea, hives  Age at reaction: child (1st as child then later in college)  Dose to reaction time: days  Reason for antibiotic: URI  Epinephrine required for reaction?: no  Tolerated antibiotics: unknown            Discharge Disposition:  Home or Self Care    Diet:  Hospital:  Diet Order   Procedures    Diet: Regular/House; Fluid Consistency: Thin (IDDSI 0)            Activity:      Restrictions or Other Recommendations:         CODE STATUS:    Code Status and Medical Interventions:   Ordered at: 03/03/24 2112     Level Of Support Discussed With:    Patient     Code Status (Patient has no pulse and is not breathing):    CPR (Attempt to Resuscitate)     Medical Interventions (Patient has pulse or is breathing):    Full Support       Future Appointments   Date Time Provider Department Center   7/17/2024 12:30 PM Mariana Benavides APRN [facility]       [unfilled]          [place] [facility] [place],   03/04/24      Time Spent on Discharge:  I spent  34  minutes on this discharge activity which included: face-to-face encounter with the patient, reviewing the data in the system, coordination of the care with the nursing staff as well as consultants, documentation, and entering orders.

## 2024-03-04 NOTE — H&P
[facility] Medicine Services  HISTORY AND PHYSICAL    Patient Name: Costa Robbins  : 1955  MRN: [id number]  Primary Care Physician: Jonathan Conner MD  Date of admission: 3/3/2024    Subjective   Subjective     Chief Complaint:  Difficulty controlling right leg    HPI:  Costa Robbins is a 68 y.o. male with a history of hypertension, hyperlipidemia, T2DM, alcohol use, pancreatic mass (biopsy negative for malignancy), PAD s/p left iliac stent and right femoral-femoral bypass, was admitted here in February for fevers and pancreatic mass.  He was evaluated by ID and treated for cholangitis with IV Invanz.    Patient is scheduled to have a Whipple procedure this Friday with Dr. Maloney.  Patient presents to the ED today with complaints of difficulty controlling his right leg that started at 1400 today.  Patient states that he got up to go to the bathroom and noted that he was having difficulty controlling his right leg.    Of note patient reports that he stopped taking his Plavix on 3/1/2024 in preparation for his Whipple procedure this week.  Patient endorses some shaking and having difficulty with adduction of his right leg.  Patient has lost 50 pounds over the last 2 months.  He has pain in his abdomen after eating.  He has been experiencing dyspnea with mild exertion.  He also reports having some dizziness when standing.   He denies difficulty with his right upper extremity, headaches, difficulty with speech, or any other complaints at this time.  CT head negative for hemorrhage or acute process.  CT PE negative for LVO.  CTA head neck is negative for flow-limiting stenosis or LVO.  Right subclavian soft plaque noted with 57% stenosis.  He does have minimal calcified atherosclerotic disease in bilateral carotid arteries and diminutive vertebral/basilar artery with bilateral fetal PCAs.  MRI negative for stroke or metastatic disease.  Upon arrival patient was found to have  a glucose of 677 and hyponatremia.  Patient reports that he has not taken his Jardiance for a couple of days.  Neurology team plans to get EEG in the a.m.  Patient is being admitted to the hospitalist for further evaluation and management.      Review of Systems   Constitutional:  Positive for unexpected weight change. Negative for chills and fever.   HENT: Negative.     Eyes: Negative.    Respiratory:  Positive for shortness of breath. Negative for cough and wheezing.    Cardiovascular: Negative.    Gastrointestinal:  Positive for abdominal pain. Negative for diarrhea, nausea and vomiting.   Endocrine: Negative.    Genitourinary: Negative.    Musculoskeletal: Negative.    Skin: Negative.    Allergic/Immunologic: Negative.    Neurological:  Positive for dizziness and weakness. Negative for seizures, syncope, speech difficulty and headaches.   Hematological: Negative.    Psychiatric/Behavioral: Negative.          Personal History     Past Medical History:   Diagnosis Date    Bile duct obstruction 01/27/2024    found during ERCP    COVID 2021    no hospitalization    Diabetes mellitus 2017    po meds and insulin- checsk sugar rarely    Diverticulitis 2009    surgically intervention    Dizzy     Enlarged prostate     recently started on Proscar    ETOH use 05/25/2023    Former smoker 05/25/2023    Hearing decreased     History of shingles     2008-  belt line    Hypertension     Joint pain     hands    Melanoma     CHEST, BACK, NECK MULTI, HEAD    Pancreatic mass 01/27/2024    found on CT scan; ERCP confirmed    PVD (peripheral vascular disease)     SOBOE (shortness of breath on exertion)     Tinnitus     Unintentional weight loss     Uses contact lenses     bilat    Wears glasses        Past Surgical History:   Procedure Laterality Date    AORTAGRAM Bilateral 04/12/2023    Procedure: AORTAGRAM WITH RUNOFFS  STENT OF THE LEFT ILIAC ARTERY;  Surgeon: Moses Escalante MD;  Location: [facility];  Service:  Vascular;  Laterality: Bilateral;    COLON RESECTION  05/2009    partial colectomy    COLONOSCOPY      DIAGNOSTIC LAPAROSCOPY N/A 02/07/2024    Procedure: DIAGNOSTIC LAPAROSCOPY WITH LIVER BIOPSY AND PERITONEAL WASHINGS;  Surgeon: Silas Maloney MD;  Location:  [facility];  Service: General;  Laterality: N/A;    ERCP N/A 01/27/2024    Procedure: ENDOSCOPIC RETROGRADE CHOLANGIOPANCREATOGRAPHY WITH STENT PLACEMENT;  Surgeon: Brunner, Mark I, MD;  Location: [facility] ENDOSCOPY;  Service: Gastroenterology;  Laterality: N/A;    FEMORAL ENDARTERECTOMY Left 05/25/2023    Procedure: FEMORAL ENDARTERECTOMY WITH PROPATEN GRAFT 8MM X 40CM;  Surgeon: Moses Escalante MD;  Location:  [facility];  Service: Vascular;  Laterality: Left;    FEMORAL FEMORAL BYPASS N/A 05/25/2023    Procedure: FEMORAL FEMORAL BYPASS;  Surgeon: Moses Escalante MD;  Location:  [facility];  Service: Vascular;  Laterality: N/A;    ORIF ANKLE FRACTURE Left     1 plate and 2 screws and 6 pins    SKIN BIOPSY      SKIN CANCER EXCISION      melanoma    WISDOM TOOTH EXTRACTION         Family History:  family history includes Diabetes in his brother and mother; Heart attack in his mother; Hodgkin's lymphoma in his father; Hyperlipidemia in his brother and mother; Hypertension in his father and mother; Stroke in his mother.     Social History:  reports that he quit smoking about 13 months ago. His smoking use included cigarettes. He started smoking about 44 years ago. He has a 21.5 pack-year smoking history. He has never been exposed to tobacco smoke. He has never used smokeless tobacco. He reports that he does not currently use alcohol. He reports current drug use. Drug: Marijuana.  Social History     Social History Narrative    [place]        Medications:  Insulin Glargine, amLODIPine, cefuroxime, empagliflozin, finasteride, hydrALAZINE, lisinopril, and naloxone    Allergies   Allergen Reactions    Atenolol Other (See Comments)     Bradycardia.     Morphine Itching    Penicillins Hives     Has tolerated cefazolin, cefepime    Beta lactam allergy details  Antibiotic reaction: (!) diarrhea, hives  Age at reaction: child (1st as child then later in college)  Dose to reaction time: days  Reason for antibiotic: URI  Epinephrine required for reaction?: no  Tolerated antibiotics: unknown          Objective   Objective     Vital Signs:   Temp:  [97.7 °F (36.5 °C)-98 °F (36.7 °C)] 98 °F (36.7 °C)  Heart Rate:  [] 80  Resp:  [12-18] 16  BP: (107-164)/() 157/89    Physical Exam   Constitutional: Awake, alert, resting in bed, wife at bedside  Eyes: PERRLA, sclerae anicteric, no conjunctival injection  HENT: NCAT, mucous membranes moist  Neck: Supple, no thyromegaly, no lymphadenopathy, trachea midline  Respiratory: Clear to auscultation bilaterally, nonlabored respirations   Cardiovascular: RRR, no murmurs, rubs, or gallops, palpable pedal pulses bilaterally  Gastrointestinal: Positive bowel sounds, soft, nontender, nondistended  Musculoskeletal: No bilateral ankle edema, no clubbing or cyanosis to extremities  Psychiatric: Appropriate affect, cooperative  Neurologic: Oriented x 3, strength symmetric in all extremities, Cranial Nerves grossly intact to confrontation, speech clear  Skin: No rashes        Result Review:  I have personally reviewed the results from the time of this admission to 3/3/2024 21:12 EST and agree with these findings:  [x]  Laboratory list / accordion  []  Microbiology  [x]  Radiology  [x]  EKG/Telemetry   []  Cardiology/Vascular   []  Pathology  [x]  Old records  []  Other:  Most notable findings include: glucose 677, Na 120,     LAB RESULTS:      Lab 03/03/24  1738 03/03/24  1737 02/28/24  1235   WBC  --  10.19 9.69   HEMOGLOBIN  --  15.7 14.7   HEMOGLOBIN, POC 15.6  15.6  --   --    HEMATOCRIT  --  45.1 42.5   HEMATOCRIT POC 46  46  --   --    PLATELETS  --  394 393   NEUTROS ABS  --  7.11* 7.62*   IMMATURE GRANS (ABS)  --  0.07*  0.05   LYMPHS ABS  --  2.06 1.35   MONOS ABS  --  0.78 0.57   EOS ABS  --  0.10 0.05   MCV  --  90.7 91.4   SED RATE  --   --  54*   LACTATE  --  1.9  --    PROTIME 12.8 12.8  --    APTT  --  26.5  --    D DIMER QUANT  --  0.67*  --          Lab 03/03/24  1738 03/03/24  1737   SODIUM  --  120*   POTASSIUM  --  4.7   CHLORIDE  --  84*   CO2  --  20.0*   ANION GAP  --  16.0*   BUN  --  17   CREATININE 0.70  0.70 0.96   EGFR 101.6 87.2   GLUCOSE  --  677*   CALCIUM  --  11.2*   MAGNESIUM  --  2.3   PHOSPHORUS  --  2.4*   HEMOGLOBIN A1C  --  10.60*         Lab 03/03/24  1737   TOTAL PROTEIN 7.2   ALBUMIN 4.0   GLOBULIN 3.2   ALT (SGPT) 15  15   AST (SGOT) 12  12   BILIRUBIN 0.9   ALK PHOS 145*         Lab 03/03/24  1738 03/03/24  1737   HSTROP T  --  18   PROTIME 12.8 12.8   INR 1 1.1                 Brief Urine Lab Results  (Last result in the past 365 days)        Color   Clarity   Blood   Leuk Est   Nitrite   Protein   CREAT   Urine HCG        01/26/24 1639 Dark Yellow   Clear   Negative   Negative   Negative   Negative                 Microbiology Results (last 10 days)       ** No results found for the last 240 hours. **            MRI Brain With & Without Contrast    Result Date: 3/3/2024  MRI BRAIN W WO CONTRAST Date of Exam: 3/3/2024 7:02 PM EST Indication: RLE incoordination.  Comparison: CT head/perfusion/angiography 3/3/2024. Technique:  Routine multiplanar/multisequence sequence images of the brain were obtained before and after the uneventful administration of 13 mL Multihance. Findings: There is no diffusion restriction to suggest acute infarct. The midline structures appear intact. Calvarial and superficial soft tissue signal is within normal limits. Temporomandibular joints and parotid glands appear symmetric. Major arterial flow voids appear intact. Orbits are unremarkable. Paranasal sinuses and mastoid air cells appear well aerated. There is mild patchy white matter FLAIR hyperintense signal  abnormality. No mass effect, midline shift or abnormal extra-axial collection. There is  a large developmental venous anomaly in the left temporal region. There is no acute or chronic intracranial hemorrhage. No abnormal intracranial enhancement.     Impression: Impression: 1.No acute intracranial abnormality. 2.Mild chronic small vessel ischemic change. Electronically Signed: Ramón Monahan MD  3/3/2024 7:59 PM EST  Workstation ID: [id number]    CT Angiogram Head w AI Analysis of LVO    Result Date: 3/3/2024  CT ANGIOGRAM HEAD W AI ANALYSIS OF LVO, CT ANGIOGRAM NECK Date of Exam: 3/3/2024 5:31 PM EST Indication: Neuro deficit, acute stroke suspected Neuro deficit, acute stroke suspected. Comparison: Head CT without contrast and CT perfusion study from 3/3/2024 Technique: CTA of the head and neck was performed after the uneventful intravenous administration of 150 cc Isovue-370. Reconstructed coronal and sagittal images were also obtained. In addition, a 3-D volume rendered image was created for interpretation.  Automated exposure control and iterative reconstruction methods were used. Findings: CTA head: There is a fetal origin of the right PCA the P1 segment of the left PCA is diffusely small in the left posterior communicating arteries mildly prominent these are anatomic variations. [place] otherwise has a normal configuration. No large vessel occlusions or significant focal stenoses or aneurysms are identified. No pathologic intracranial enhancement. CTA NECK: There is normal configuration of the great vessels arising from the aortic arch. Right-sided findings: The brachiocephalic artery is widely patent. There is a short segment of high-grade stenosis due to soft plaque at the origin of the right subclavian artery. Based on NASCET criteria, this is a 57% stenosis. There is mild diffuse atherosclerotic disease in the remainder of the right subclavian artery. The right common carotid artery is widely  patent without disease. There is mild atherosclerotic disease in the right carotid bulb and proximal right ICA without significant stenosis  by NASCET criteria. Right internal carotid artery is widely patent without disease. The external carotid artery is widely patent without disease. The vertebral arteries are codominant and widely patent. Left-sided findings: There is mild atherosclerotic disease in the proximal left subclavian artery without significant stenosis by NASCET criteria. The left common carotid artery is widely patent without disease. There is mild atherosclerotic disease in the carotid bulb and proximal left ICA without hemodynamically significant stenosis by NASCET criteria. The remainder of the left ICA is unremarkable. The left ECA is widely patent without disease. The left vertebral artery is widely patent. Nonvascular findings: There is an enlarged thyroid gland with multiple nodules indicative of which the left nodular goiter. No cervical adenopathy or acute abnormalities in the soft tissues of the neck.     Impression: Impression: 1.No large vessel occlusions or significant focal stenoses or aneurysms in the head. 2.There is a 57% stenosis at the origin of the right subclavian artery due to soft plaque. There is mild atherosclerotic disease in the carotid bulbs and proximal ICAs without hemodynamically significant stenosis by NASCET criteria. 3.Multinodular goiter. Electronically Signed: Alli Hoang DO  3/3/2024 6:42 PM EST  Workstation ID: [id number]    CT Angiogram Neck    Result Date: 3/3/2024  CT ANGIOGRAM HEAD W AI ANALYSIS OF LVO, CT ANGIOGRAM NECK Date of Exam: 3/3/2024 5:31 PM EST Indication: Neuro deficit, acute stroke suspected Neuro deficit, acute stroke suspected. Comparison: Head CT without contrast and CT perfusion study from 3/3/2024 Technique: CTA of the head and neck was performed after the uneventful intravenous administration of 150 cc Isovue-370. Reconstructed coronal and  sagittal images were also obtained. In addition, a 3-D volume rendered image was created for interpretation.  Automated exposure control and iterative reconstruction methods were used. Findings: CTA head: There is a fetal origin of the right PCA the P1 segment of the left PCA is diffusely small in the left posterior communicating arteries mildly prominent these are anatomic variations. [place] otherwise has a normal configuration. No large vessel occlusions or significant focal stenoses or aneurysms are identified. No pathologic intracranial enhancement. CTA NECK: There is normal configuration of the great vessels arising from the aortic arch. Right-sided findings: The brachiocephalic artery is widely patent. There is a short segment of high-grade stenosis due to soft plaque at the origin of the right subclavian artery. Based on NASCET criteria, this is a 57% stenosis. There is mild diffuse atherosclerotic disease in the remainder of the right subclavian artery. The right common carotid artery is widely patent without disease. There is mild atherosclerotic disease in the right carotid bulb and proximal right ICA without significant stenosis  by NASCET criteria. Right internal carotid artery is widely patent without disease. The external carotid artery is widely patent without disease. The vertebral arteries are codominant and widely patent. Left-sided findings: There is mild atherosclerotic disease in the proximal left subclavian artery without significant stenosis by NASCET criteria. The left common carotid artery is widely patent without disease. There is mild atherosclerotic disease in the carotid bulb and proximal left ICA without hemodynamically significant stenosis by NASCET criteria. The remainder of the left ICA is unremarkable. The left ECA is widely patent without disease. The left vertebral artery is widely patent. Nonvascular findings: There is an enlarged thyroid gland with multiple nodules  indicative of which the left nodular goiter. No cervical adenopathy or acute abnormalities in the soft tissues of the neck.     Impression: Impression: 1.No large vessel occlusions or significant focal stenoses or aneurysms in the head. 2.There is a 57% stenosis at the origin of the right subclavian artery due to soft plaque. There is mild atherosclerotic disease in the carotid bulbs and proximal ICAs without hemodynamically significant stenosis by NASCET criteria. 3.Multinodular goiter. Electronically Signed: Alli Hoang DO  3/3/2024 6:42 PM EST  Workstation ID: [id number]    XR Chest 1 View    Result Date: 3/3/2024  XR CHEST 1 VW Date of Exam: 3/3/2024 5:58 PM EST Indication: Acute Stroke Protocol (onset < 12 hrs) Comparison: 2/13/2024 Findings: The lungs are grossly clear. Cardiac, hilar, and mediastinal silhouettes are . Pulmonary vascularity is within normal limits. No pneumothorax or pleural effusions. The trachea is midline.  No acute bony abnormality or aggressive appearing focal osseous lesions in the visualized bony thorax. Old right rib fractures are noted.     Impression: Impression: No active cardiopulmonary disease. Electronically Signed: Alli Hoang DO  3/3/2024 6:23 PM EST  Workstation ID: [id number]    CT CEREBRAL PERFUSION WITH & WITHOUT CONTRAST    Result Date: 3/3/2024  CT CEREBRAL PERFUSION W WO CONTRAST Date of Exam: 3/3/2024 5:31 PM EST Indication: Neuro deficit, acute stroke suspected.  Comparison: CT head without contrast 3/3/2024 Technique: Axial CT images of the brain were obtained prior to and after the administration of 115 mL Isovue-370. Core blood volume, core blood flow, mean transit time, and Tmax images were obtained utilizing the Rapid software protocol. A limited CT angiogram of the head was also performed to measure the blood vessel density. The radiation dose reduction device was turned on for each scan per the ALARA (As Low as Reasonably Achievable) protocol. Findings: There  is no focal core infarct or ischemic penumbra identified. CBF < 30%: 0 ml Tmax > 6 sec: 0 ml Mismatch volume: 0 mL Mismatch ratio: None.     Impression: Impression: No focal core infarct or ischemic penumbra. Electronically Signed: Dayday Evans MD  3/3/2024 6:07 PM EST  Workstation ID: [id number]    CT Head Without Contrast Stroke Protocol    Result Date: 3/3/2024  CT HEAD WO CONTRAST STROKE PROTOCOL Date of Exam: 3/3/2024 5:30 PM EST Indication: Neuro deficit, acute, stroke suspected Neuro deficit, acute stroke suspected. Right leg weakness Comparison: None available. Technique: Axial CT images were obtained of the head without contrast administration.  Reconstructed coronal images were also obtained. Automated exposure control and iterative construction methods were used. Scan Time: 5:35 p.m. Results discussed with Stroke Navigator Laney at 5:42 p.m. Findings: No intracranial hemorrhage. Negative for mass effect or midline shift. Mild white matter findings suggesting chronic microvascular disease. No abnormal extra-axial fluid collection. Posterior fossa is without acute abnormality. The globes are intact and symmetric. No retro-orbital abnormality. The mastoid air cells are well-aerated. Mild left deviation with septal deviation. Calvarium intact.     Impression: Impression: 1. No intracranial hemorrhage. 2. Mild white matter findings suggesting chronic microvascular disease. Electronically Signed: Dayday Evans MD  3/3/2024 5:44 PM EST  Workstation ID: [id number]     Results for orders placed during the hospital encounter of 01/26/24    Adult Transthoracic Echo Complete W/ Cont if Necessary Per Protocol    Interpretation Summary    Left ventricular systolic function is hyperdynamic (EF > 70%). Left ventricular ejection fraction appears to be greater than 70%.    Left ventricular wall thickness is consistent with borderline concentric hypertrophy.    Provoked left ventricular mean pressure gradient of 10 mmHg.     Left ventricular diastolic function is consistent with (grade I) impaired relaxation.      Assessment & Plan   Assessment & Plan       Right leg weakness    T2DM (type 2 diabetes mellitus)    Essential hypertension    Mixed hyperlipidemia    PVD (peripheral vascular disease) with claudication    ETOH use    Pancreatic mass    Costa Robbins is a 68 y.o. male with a history of hypertension, hyperlipidemia, T2DM, alcohol use, pancreatic mass (biopsy negative for malignancy), PAD s/p left iliac stent and right femoral-femoral bypass, was admitted here in February for fevers and pancreatic mass.  He was evaluated by ID and treated for cholangitis with IV Invanz.  Patient is scheduled to have a Whipple procedure this Friday with Dr. Maloney.  Patient presents to the ED today with complaints of difficulty controlling his right leg that started at 1400 today.      Assessment and plan:    Right leg weakness/jerking  -- CT head shows no intracranial hemorrhage, mild white matter findings suggesting chronic microvascular disease  -- CT perfusion shows no focal core infarct or ischemic penumbra  -- CTA head and neck shows no large vessel occlusions or significant focal stenosis or aneurysms in the head.  There is a 57% stenosis at the origin of the right subclavian artery due to soft plaque.  There is mild atherosclerotic disease in the carotid bulbs and proximal ICAs without hemodynamically significant stenosis.  Multinodular goiter  -- MRI negative for stroke or metastatic disease.  -- Chest x-ray shows no active cardiopulmonary disease  -- Echo  -- Carotid duplex   -- EEG  -- neuro checks  -- fall precautions  -- consult neurology  -- labs    Hypertension  Hyperlipidemia  -- lisinopril, norvasc    T2DM with hyperglycemia  -- Initial glucose 677 in the ED  -- Patient was given 3 L of saline in the ED and 20 units of regular insulin  -- Will continue insulin drip that was started in the ED  -- IV fluids per Glucomander  protocol  -- Magnesium, phosphorus, BMP every 4 hours while on insulin drip  -- Check A1c  -- consult diabetes educator    Hyponatremia  --    -- corrected sodium 129  -- BMP every 4 hours    Pancreatic mass  ERCP/stent on 1/27/2023  -- Patient treated for cholangitis with IV Invanz per ID Dr. Lo  -- Patient is scheduled for Whipple procedure on Friday    Alcohol use  -- has not had any alcohol since January     Goiter  -- CTA neck shows a multinodular goiter    DVT prophylaxis:  Mechanical     CODE STATUS:    Level Of Support Discussed With: Patient  Code Status (Patient has no pulse and is not breathing): CPR (Attempt to Resuscitate)  Medical Interventions (Patient has pulse or is breathing): Full Support      Expected Discharge  Expected Discharge Date: 3/4/2024; Expected Discharge Time:       This note has been completed as part of a split-shared workflow.     Signature: Electronically signed by CHERISE Choi, 03/03/24, 9:12 PM EST.     Patient seen briefly and agree with above.  Patient is immensely concerned about weight loss and delay in surgery and feeling comfortable with the plan.  Needs diabetic education.  Krystina Galvin MD 03/03/24 22:48 EST

## 2024-03-04 NOTE — PLAN OF CARE
Goal Outcome Evaluation:  Plan of Care Reviewed With: patient           Outcome Evaluation: OT initial eval and expanded chart review completed. No UE strength or coordination deficits noted. Pt appears at or near baseline with ADL's and mobility. No further skilled OT services indicated at this time. d/c home with spouse when medically appropriate.      Anticipated Discharge Disposition (OT): home with assist

## 2024-03-04 NOTE — ED NOTES
Costa Robbins    Nursing Report ED to Floor:  Mental status: A&Ox4  Ambulatory status: independent  Oxygen Therapy:  RA  Cardiac Rhythm: NS  Admitted from: ED/Home  Safety Concerns:  off thinners due to whipple scheduled friday  Social Issues: none  ED Room #:  2    ED Nurse Phone Extension - [phone] or may call [phone].      HPI:   Chief Complaint   Patient presents with    Extremity Weakness       Past Medical History:  Past Medical History:   Diagnosis Date    Bile duct obstruction 01/27/2024    found during ERCP    COVID 2021    no hospitalization    Diabetes mellitus 2017    po meds and insulin- checsk sugar rarely    Diverticulitis 2009    surgically intervention    Dizzy     Enlarged prostate     recently started on Proscar    ETOH use 05/25/2023    Former smoker 05/25/2023    Hearing decreased     History of shingles     2008-  belt line    Hypertension     Joint pain     hands    Melanoma     CHEST, BACK, NECK MULTI, HEAD    Pancreatic mass 01/27/2024    found on CT scan; ERCP confirmed    PVD (peripheral vascular disease)     SOBOE (shortness of breath on exertion)     Tinnitus     Unintentional weight loss     Uses contact lenses     bilat    Wears glasses         Past Surgical History:  Past Surgical History:   Procedure Laterality Date    AORTAGRAM Bilateral 04/12/2023    Procedure: AORTAGRAM WITH RUNOFFS  STENT OF THE LEFT ILIAC ARTERY;  Surgeon: Moses Escalante MD;  Location: [facility] HYBRID VICKY;  Service: Vascular;  Laterality: Bilateral;    COLON RESECTION  05/2009    partial colectomy    COLONOSCOPY      DIAGNOSTIC LAPAROSCOPY N/A 02/07/2024    Procedure: DIAGNOSTIC LAPAROSCOPY WITH LIVER BIOPSY AND PERITONEAL WASHINGS;  Surgeon: Silas Maloney MD;  Location: [facility] OR;  Service: General;  Laterality: N/A;    ERCP N/A 01/27/2024    Procedure: ENDOSCOPIC RETROGRADE CHOLANGIOPANCREATOGRAPHY WITH STENT PLACEMENT;  Surgeon: Brunner, Mark I, MD;  Location: [facility] ENDOSCOPY;  Service:  Gastroenterology;  Laterality: N/A;    FEMORAL ENDARTERECTOMY Left 05/25/2023    Procedure: FEMORAL ENDARTERECTOMY WITH PROPATEN GRAFT 8MM X 40CM;  Surgeon: Moses Escalante MD;  Location:  [facility] OR;  Service: Vascular;  Laterality: Left;    FEMORAL FEMORAL BYPASS N/A 05/25/2023    Procedure: FEMORAL FEMORAL BYPASS;  Surgeon: Moses Escalante MD;  Location:  [facility] OR;  Service: Vascular;  Laterality: N/A;    ORIF ANKLE FRACTURE Left     1 plate and 2 screws and 6 pins    SKIN BIOPSY      SKIN CANCER EXCISION      melanoma    WISDOM TOOTH EXTRACTION          Admitting Doctor:   Krystina Galvin MD    Consulting Provider(s):  Consults       Date and Time Order Name Status Description    3/3/2024  8:10 PM Inpatient Neurology Consult General      3/3/2024  5:28 PM Inpatient Neurology Consult Stroke Completed     2/15/2024 10:18 PM Inpatient Palliative Care MD Consult Completed     2/14/2024  3:01 PM Inpatient Infectious Diseases Consult Completed     2/14/2024  4:02 AM Inpatient General Surgery Consult Completed              Admitting Diagnosis:   The primary encounter diagnosis was Right leg weakness. Diagnoses of Hyperglycemia, Pancreatic mass, History of diabetes mellitus, and History of hypertension were also pertinent to this visit.    Most Recent Vitals:   Vitals:    03/03/24 1759 03/03/24 1804 03/03/24 1806 03/03/24 1830   BP: 164/97  164/97 (!) 143/103   BP Location:   Right arm    Patient Position:   Sitting    Pulse:  85 83 87   Resp:   12    Temp:       TempSrc:       SpO2:   100% 100%   Weight:       Height:           Active LDAs/IV Access:   Lines, Drains & Airways       Active LDAs       Name Placement date Placement time Site Days    Peripheral IV 03/03/24 1738 Left Antecubital 03/03/24 1738  Antecubital  less than 1                    Labs (abnormal labs have a star):   Labs Reviewed   CBC WITH AUTO DIFFERENTIAL - Abnormal; Notable for the following components:       Result Value    Immature Grans %  0.7 (*)     Neutrophils, Absolute 7.11 (*)     Immature Grans, Absolute 0.07 (*)     All other components within normal limits   COMPREHENSIVE METABOLIC PANEL - Abnormal; Notable for the following components:    Glucose 677 (*)     Sodium 120 (*)     Chloride 84 (*)     CO2 20.0 (*)     Calcium 11.2 (*)     Alkaline Phosphatase 145 (*)     Anion Gap 16.0 (*)     All other components within normal limits    Narrative:     GFR Normal >60  Chronic Kidney Disease <60  Kidney Failure <15     POCT CHEM 8 - Abnormal; Notable for the following components:    Sodium 124 (*)     Chloride 90 (*)     Glucose 632 (*)     All other components within normal limits   POCT CHEM 8 - Abnormal; Notable for the following components:    Glucose >599 (*)     Sodium 124 (*)     Chloride 90 (*)     Ionized Calcium 1.46 (*)     All other components within normal limits   POCT GLUCOSE FINGERSTICK - Abnormal; Notable for the following components:    Glucose 573 (*)     All other components within normal limits   SINGLE HSTROPONIN T - Normal    Narrative:     High Sensitive Troponin T Reference Range:  <14.0 ng/L- Negative Female for AMI  <22.0 ng/L- Negative Male for AMI  >=14 - Abnormal Female indicating possible myocardial injury.  >=22 - Abnormal Male indicating possible myocardial injury.   Clinicians would have to utilize clinical acumen, EKG, Troponin, and serial changes to determine if it is an Acute Myocardial Infarction or myocardial injury due to an underlying chronic condition.        APTT - Normal    Narrative:     PTT = The equivalent PTT values for the therapeutic range of heparin levels at 0.3 to 0.5 U/ml are 60 to 70 seconds.   AST - Normal   ALT - Normal   LACTIC ACID, PLASMA - Normal   POCT PROTIME - INR - Normal   POCT PROTIME - INR - Normal   RAINBOW DRAW    Narrative:     The following orders were created for panel order [place] Draw.  Procedure                               Abnormality         Status                      ---------                               -----------         ------                     Green Top (Gel)[[id number]]                                  Final result               Lavender Top[[id number]]                                     Final result               Gold Top - SST[[id number]]                                   Final result               Gray Top[[id number]]                                         In process                 Light Blue Top[[id number]]                                   Final result                 Please view results for these tests on the individual orders.   PHOSPHORUS   MAGNESIUM   HEMOGLOBIN A1C   BASIC METABOLIC PANEL   BASIC METABOLIC PANEL   MAGNESIUM   MAGNESIUM   PHOSPHORUS   PHOSPHORUS   D-DIMER, QUANTITATIVE   POCT GLUCOSE FINGERSTICK   POCT GLUCOSE FINGERSTICK   CBC AND DIFFERENTIAL    Narrative:     The following orders were created for panel order CBC & Differential.  Procedure                               Abnormality         Status                     ---------                               -----------         ------                     CBC Auto Differential[[id number]]        Abnormal            Final result                 Please view results for these tests on the individual orders.   GREEN TOP   LAVENDER TOP   GOLD TOP - SST   LIGHT BLUE TOP   GRAY TOP       Meds Given in ED:   Medications   sodium chloride 0.9 % flush 10 mL (has no administration in time range)   sodium chloride 0.9 % bolus 1,000 mL (1,000 mL Intravenous New Bag 3/3/24 2008)   sodium chloride 0.9 % flush 10 mL (has no administration in time range)   sodium chloride 0.9 % flush 10 mL (has no administration in time range)   sodium chloride 0.9 % infusion 40 mL (has no administration in time range)   dextrose (GLUTOSE) oral gel 15 g (has no administration in time range)   dextrose (D50W) (25 g/50 mL) IV injection 10-50 mL (has no administration in time range)   glucagon (GLUCAGEN) injection 1 mg (has no  administration in time range)   sodium chloride 0.9 % bolus (has no administration in time range)   sodium chloride 0.9 % infusion (has no administration in time range)   sodium chloride 0.9 % with KCl 20 mEq/L infusion (has no administration in time range)   sodium chloride 0.9 % with KCl 40 mEq/L infusion (has no administration in time range)   dextrose 5 % and sodium chloride 0.9 % infusion (has no administration in time range)   dextrose 5 % and sodium chloride 0.9 % with KCl 20 mEq/L infusion (has no administration in time range)   dextrose 5 % and sodium chloride 0.9 % with KCl 40 mEq/L infusion (has no administration in time range)   sodium chloride 0.45 % infusion (has no administration in time range)   sodium chloride 0.45 % with KCl 20 mEq/L infusion (has no administration in time range)   sodium chloride 0.45 % 1,000 mL with potassium chloride 40 mEq infusion (has no administration in time range)   dextrose 5 % and sodium chloride 0.45 % infusion (has no administration in time range)   dextrose 5 % and sodium chloride 0.45 % with KCl 20 mEq/L infusion (has no administration in time range)   dextrose 5 % and sodium chloride 0.45 % with KCl 40 mEq/L infusion (has no administration in time range)   insulin regular 1 unit/mL in 0.9% sodium chloride (Glucommander) (has no administration in time range)   Potassium Replacement - Follow Nurse / BPA Driven Protocol (has no administration in time range)   Magnesium Standard Dose Replacement - Follow Nurse / BPA Driven Protocol (has no administration in time range)   Phosphorus Replacement - Follow Nurse / BPA Driven Protocol (has no administration in time range)   Calcium Replacement - Follow Nurse / BPA Driven Protocol (has no administration in time range)   iopamidol (ISOVUE-370) 76 % injection 150 mL (115 mL Intravenous Given 3/3/24 3542)   sodium chloride 0.9 % bolus 1,000 mL (1,000 mL Intravenous New Bag 3/3/24 6273)   insulin regular (humuLIN R,novoLIN R)  injection 10 Units (10 Units Intravenous Given 3/3/24 1840)   gadobenate dimeglumine (MULTIHANCE) injection 13 mL (13 mL Intravenous Given 3/3/24 1935)   insulin regular (humuLIN R,novoLIN R) injection 10 Units (10 Units Intravenous Given 3/3/24 2008)     dextrose 5 % and sodium chloride 0.45 %, 150 mL/hr  dextrose 5 % and sodium chloride 0.45 % with KCl 20 mEq/L, 150 mL/hr  dextrose 5 % and sodium chloride 0.45 % with KCl 40 mEq/L, 150 mL/hr  dextrose 5 % and sodium chloride 0.9 %, 150 mL/hr  dextrose 5 % and sodium chloride 0.9 % with KCl 20 mEq, 150 mL/hr  dextrose 5% and sodium chloride 0.9% with KCl 40 mEq/L, 150 mL/hr  insulin, 0-100 Units/hr  sodium chloride 0.45 % 1,000 mL with potassium chloride 40 mEq infusion, 250 mL/hr  sodium chloride, 250 mL/hr  sodium chloride 0.45 % with KCl 20 mEq, 250 mL/hr  sodium chloride, 1,000 mL/hr  sodium chloride, 250 mL/hr  sodium chloride 0.9 % with KCl 20 mEq, 250 mL/hr  sodium chloride 0.9 % with KCl 40 mEq/L, 250 mL/hr

## 2024-03-04 NOTE — CONSULTS
"Diabetes Education  Assessment/Teaching    Patient Name:  Costa Robbins  YOB: 1955  MRN: [id number]  Admit Date:  3/3/2024      Assessment Date:  3/4/2024  Flowsheet Row Most Recent Value   General Information     Referral From: MD molina   Height 180.3 cm (71\")   Height Method Stated   Weight 67.6 kg (149 lb)   Weight Method Stated   Pregnancy Assessment    Diabetes History    What type of diabetes do you have? Type 2   Length of Diabetes Diagnosis 6 - 10 years   Current DM knowledge fair   Have you had diabetes education/teaching in the past? yes   When and where was your diabetes education? At diagnosis   Do you test your blood sugar at home? yes   Frequency of checks BID   Who performs the test? self   Typical readings 200s   Have you had low blood sugar? (<70mg/dl) no   Have you had high blood sugar? (>140mg/dl) yes   How often do you have high blood sugar? frequently   When was your last high blood sugar? today   Education Preferences    What areas of diabetes would you like to learn about? other (comment)  [Nothing at this time]   Nutrition Information    Assessment Topics    Healthy Eating - Assessment Needs education   Taking Medication - Assessment Needs education   Problem Solving - Assessment Needs education   Reducing Risk - Assessment Needs education   Healthy Coping - Assessment Needs education   DM Goals             Flowsheet Row Most Recent Value   DM Education Needs    Meter Has own   Frequency of Testing 2 times a day   Blood Glucose Target Range Range per ADA guidelines   Medication Oral, Insulin   Problem Solving Hypoglycemia, Hyperglycemia, Signs, Symptoms, Treatment   Reducing Risks A1C testing, Blood pressure   Healthy Coping Appropriate   Discharge Plan Home   Motivation Not interested   Teaching Method Explanation, Discussion, Handouts, Teach back   Patient Response Needs reinforcement        Mr. Robbins gave permission for diabetes education. Approximately 30 minutes " spent reviewing chart, preparing educational materials, coordinating care, and providing education at bedside. Current A1c 10.6%,  at admission. He states he takes basaglar and jardiance for glycemic control, but ran out of jardiance a couple of days ago. Reviewed T2DM self-management, risk factors, and importance of blood glucose control to reduce complications. Target blood glucose readings and A1c goals per ADA were reviewed. Reviewed current A1c and discussed its significance. Signs, symptoms and treatment of hyperglycemia and hypoglycemia were discussed. Lifestyle changes such as physical activity with MD approval and healthy eating were encouraged. He states he is scheduled to have a Whipple on 3/8/24. Stressed the importance of strict blood sugar control after surgery to prevent complications such as infection and to promote healing of incision. To keep in close contact with PCP to report glucose levels. He reports he has had difficulty keeping weight on. Offered to have an RD see him, but declined. Encouraged him to speak with an RD after his upcoming surgery. Thank you for this referral.          Electronically signed by:  Heavenly Kan RN [facility]  03/04/24 12:18 EST

## 2024-03-04 NOTE — CASE MANAGEMENT/SOCIAL WORK
Discharge Planning Assessment  [facility]     Patient Name: Costa Robbins  MRN: [id number]  Today's Date: 3/4/2024    Admit Date: 3/3/2024    Plan: Home   Discharge Needs Assessment       Row Name 03/04/24 1226       Living Environment    People in Home spouse    Name(s) of People in Home Prisca Robbins  Spouse  [phone]    Current Living Arrangements home    Primary Care Provided by self    Provides Primary Care For no one    Family Caregiver if Needed spouse    Quality of Family Relationships unable to assess    Able to Return to Prior Arrangements yes       Transition Planning    Patient/Family Anticipates Transition to home    Patient/Family Anticipated Services at Transition     Transportation Anticipated family or friend will provide       Discharge Needs Assessment    Equipment Currently Used at Home none                   Discharge Plan       Row Name 03/04/24 1228       Plan    Plan Home    Patient/Family in Agreement with Plan yes    Plan Comments Spoke with Mr. Robbins in his room, to initiate discharge planning. He lives with his wife in two story house with three steps to enter, in [facility]. He verified he has [employer] Medicare Replacement insurance and he has prescription coverage. He uses [facility] in [place], [place] to get his prescriptions filled. His PCP is Jonathan Conner. Prior to admission, he was independent with ADL's. He uses no medical equipment at home and he is not current with home health. His goal is to return home at discharge. Wife will transport. Await therapy recommendations to determine proper discharge placement. CM will continue to follow.    Final Discharge Disposition Code 30 - still a patient                  Continued Care and Services - Admitted Since 3/3/2024    No active coordination exists for this encounter.       Selected Continued Care - Prior Encounters Includes continued care and service providers with selected services from prior encounters from  12/4/2023 to 3/4/2024      Discharged on 2/19/2024 Admission date: 2/13/2024 - Discharge disposition: Home or Self Care      Dialysis/Infusion       Service Provider Selected Services Address Phone Fax Patient Preferred    [facility] [address], [facility] [phone] --                          Expected Discharge Date and Time       Expected Discharge Date Expected Discharge Time    Mar 4, 2024            Demographic Summary       Row Name 03/04/24 1224       General Information    Admission Type observation    Arrived From emergency department    Referral Source admission list    Reason for Consult discharge planning    Preferred Language English       Contact Information    Permission Granted to Share Info With     Contact Information Obtained for                    Functional Status       Row Name 03/04/24 1225       Functional Status    Usual Activity Tolerance moderate    Current Activity Tolerance moderate       Functional Status, IADL    Medications independent    Meal Preparation independent    Housekeeping independent    Laundry independent    Shopping independent                   Psychosocial    No documentation.                  Abuse/Neglect    No documentation.                  Legal    No documentation.                  Substance Abuse    No documentation.                  Patient Forms    No documentation.                     Michelle Mclean RN

## 2024-03-04 NOTE — PROGRESS NOTES
[facility] Medicine Services  DISCHARGE SUMMARY    Patient Name: Costa Robbins  : 1955  MRN: [id number]    Date of Admission: 3/3/2024  5:30 PM  Date of Discharge:  3/4/2024  Primary Care Physician: Jonathan Conner MD    Consults       Date and Time Order Name Status Description    3/3/2024 10:35 PM Inpatient General Surgery Consult Completed     3/3/2024  8:10 PM Inpatient Neurology Consult General      3/3/2024  5:28 PM Inpatient Neurology Consult Stroke Completed     2/15/2024 10:18 PM Inpatient Palliative Care MD Consult Completed     2024  3:01 PM Inpatient Infectious Diseases Consult Completed     2024  4:02 AM Inpatient General Surgery Consult Completed             Hospital Course     Presenting Problem: right leg cramping    Active Hospital Problems    Diagnosis  POA    **Right leg weakness [R29.898]  Yes    Pancreatic mass [K86.89]  Yes    ETOH use [Z78.9]  Yes    PVD (peripheral vascular disease) with claudication [I73.9]  Yes    Mixed hyperlipidemia [E78.2]  Yes    T2DM (type 2 diabetes mellitus) [E11.9]  Yes    Essential hypertension [I10]  Yes      Resolved Hospital Problems   No resolved problems to display.          Hospital Course:  Costa Robbins is a 68 y.o. male to the ED with complaints of difficulty controlling his right leg that started at 1400 today.  Patient states that he got up to go to the bathroom and noted that he was having difficulty controlling his right leg.       Right leg spasm/weakness  -- Stroke team followed, underwent extensive neuro imaging which was negative. EEG also negative. Suspect this is related to metabolic derangements w/ hyperglycemia, hyponatremia. Symptoms resolved with IVF, glucose control.   -- I d/w CTS who felt no imaging was needed. They did recommend he stay on asa 81mg but felt it was okay for him to stay off plavix for his scheduled whipple.    T2DM with hyperglycemia  -- Initial glucose 677 in the ED.  Given 3L NS and started on IV insulin on arrival with improvement. Will have him follow up with [facility] endocrine particularly given his upcoming marco antonio.     Hyponatremia  -- Improved after IVF.     Discharge Follow Up Recommendations for outpatient labs/diagnostics:   Dr. Maloney for marco antonio as scheduled    Day of Discharge     HPI:  Up in bed. Feels much better. Wants to go home.    Review of Systems  Gen- No fevers, chills  CV- No chest pain, palpitations  Resp- No cough, dyspnea  GI- No N/V/D, abd pain    Vital Signs:   Temp:  [97.7 °F (36.5 °C)-98 °F (36.7 °C)] 97.7 °F (36.5 °C)  Heart Rate:  [] 73  Resp:  [12-18] 18  BP: (107-170)/() 157/84      Physical Exam:  Constitutional: No acute distress, awake, alert  HENT: NCAT, mucous membranes moist  Respiratory: Clear to auscultation bilaterally, respiratory effort normal   Cardiovascular: RRR, no murmurs, rubs, or gallops  Gastrointestinal: Positive bowel sounds, soft, nontender, nondistended  Musculoskeletal: No bilateral ankle edema  Psychiatric: Appropriate affect, cooperative  Neurologic: Oriented x 3, strength symmetric in all extremities, Cranial Nerves grossly intact to confrontation, speech clear  Skin: No rashes     Pertinent  and/or Most Recent Results     LAB RESULTS:      Lab 03/03/24  1738 03/03/24  1737 02/28/24  1235   WBC  --  10.19 9.69   HEMOGLOBIN  --  15.7 14.7   HEMOGLOBIN, POC 15.6  15.6  --   --    HEMATOCRIT  --  45.1 42.5   HEMATOCRIT POC 46  46  --   --    PLATELETS  --  394 393   NEUTROS ABS  --  7.11* 7.62*   IMMATURE GRANS (ABS)  --  0.07* 0.05   LYMPHS ABS  --  2.06 1.35   MONOS ABS  --  0.78 0.57   EOS ABS  --  0.10 0.05   MCV  --  90.7 91.4   SED RATE  --   --  54*   LACTATE  --  1.9  --    PROTIME 12.8 12.8  --    APTT  --  26.5  --    D DIMER QUANT  --  0.67*  --          Lab 03/04/24  0411 03/04/24  0030 03/03/24  1738 03/03/24  1737   SODIUM 134* 133*  --  120*   POTASSIUM 3.2* 3.6  --  4.7   CHLORIDE 102 100  --   84*   CO2 23.0 24.0  --  20.0*   ANION GAP 9.0 9.0  --  16.0*   BUN 11 13  --  17   CREATININE 0.57* 0.63* 0.70  0.70 0.96   EGFR 108.1 104.9 101.6 87.2   GLUCOSE 140* 226*  --  677*   CALCIUM 10.1 10.4  --  11.2*   MAGNESIUM 1.9 2.0  --  2.3   PHOSPHORUS 2.5 1.9*  --  2.4*   HEMOGLOBIN A1C  --   --   --  10.60*         Lab 03/03/24  1737   TOTAL PROTEIN 7.2   ALBUMIN 4.0   GLOBULIN 3.2   ALT (SGPT) 15  15   AST (SGOT) 12  12   BILIRUBIN 0.9   ALK PHOS 145*         Lab 03/03/24  1738 03/03/24  1737   HSTROP T  --  18   PROTIME 12.8 12.8   INR 1 1.1                 Brief Urine Lab Results  (Last result in the past 365 days)        Color   Clarity   Blood   Leuk Est   Nitrite   Protein   CREAT   Urine HCG        01/26/24 1639 Dark Yellow   Clear   Negative   Negative   Negative   Negative                 Microbiology Results (last 10 days)       ** No results found for the last 240 hours. **            EEG    Result Date: 3/4/2024  Reason for referral: 68 y.o.male with right leg jerking, consideration of seizures Technical Summary:  A 19 channel digital EEG was performed using the international 10-20 placement system, including eye leads and EKG leads. Duration: 21 minutes Findings: The patient is awake.  Diffuse medium amplitude intermixed theta and alpha activity are seen symmetrically over both hemispheres.  A somewhat widespread posterior rhythm is seen at 9 Hz.  Drowsiness is seen with mild slowing of the background but stage II sleep is not seen.  Hyperventilation is not performed.  Photic stimulation yields a symmetric driving response.  No focal features or epileptiform activity are seen Video: Available Technical quality: Superior EKG: Regular, 70 bpm SUMMARY: Normal EEG in the awake and lightly drowsy states No focal features or epileptiform activity are seen     Normal study This report is transcribed using the Dragon dictation system.      MRI Brain With & Without Contrast    Result Date: 3/3/2024  MRI  BRAIN W WO CONTRAST Date of Exam: 3/3/2024 7:02 PM EST Indication: RLE incoordination.  Comparison: CT head/perfusion/angiography 3/3/2024. Technique:  Routine multiplanar/multisequence sequence images of the brain were obtained before and after the uneventful administration of 13 mL Multihance. Findings: There is no diffusion restriction to suggest acute infarct. The midline structures appear intact. Calvarial and superficial soft tissue signal is within normal limits. Temporomandibular joints and parotid glands appear symmetric. Major arterial flow voids appear intact. Orbits are unremarkable. Paranasal sinuses and mastoid air cells appear well aerated. There is mild patchy white matter FLAIR hyperintense signal abnormality. No mass effect, midline shift or abnormal extra-axial collection. There is  a large developmental venous anomaly in the left temporal region. There is no acute or chronic intracranial hemorrhage. No abnormal intracranial enhancement.     Impression: 1.No acute intracranial abnormality. 2.Mild chronic small vessel ischemic change. Electronically Signed: Ramón Monahan MD  3/3/2024 7:59 PM EST  Workstation ID: [id number]    CT Angiogram Head w AI Analysis of LVO    Result Date: 3/3/2024  CT ANGIOGRAM HEAD W AI ANALYSIS OF LVO, CT ANGIOGRAM NECK Date of Exam: 3/3/2024 5:31 PM EST Indication: Neuro deficit, acute stroke suspected Neuro deficit, acute stroke suspected. Comparison: Head CT without contrast and CT perfusion study from 3/3/2024 Technique: CTA of the head and neck was performed after the uneventful intravenous administration of 150 cc Isovue-370. Reconstructed coronal and sagittal images were also obtained. In addition, a 3-D volume rendered image was created for interpretation.  Automated exposure control and iterative reconstruction methods were used. Findings: CTA head: There is a fetal origin of the right PCA the P1 segment of the left PCA is diffusely small in the left posterior  communicating arteries mildly prominent these are anatomic variations. [place] otherwise has a normal configuration. No large vessel occlusions or significant focal stenoses or aneurysms are identified. No pathologic intracranial enhancement. CTA NECK: There is normal configuration of the great vessels arising from the aortic arch. Right-sided findings: The brachiocephalic artery is widely patent. There is a short segment of high-grade stenosis due to soft plaque at the origin of the right subclavian artery. Based on NASCET criteria, this is a 57% stenosis. There is mild diffuse atherosclerotic disease in the remainder of the right subclavian artery. The right common carotid artery is widely patent without disease. There is mild atherosclerotic disease in the right carotid bulb and proximal right ICA without significant stenosis  by NASCET criteria. Right internal carotid artery is widely patent without disease. The external carotid artery is widely patent without disease. The vertebral arteries are codominant and widely patent. Left-sided findings: There is mild atherosclerotic disease in the proximal left subclavian artery without significant stenosis by NASCET criteria. The left common carotid artery is widely patent without disease. There is mild atherosclerotic disease in the carotid bulb and proximal left ICA without hemodynamically significant stenosis by NASCET criteria. The remainder of the left ICA is unremarkable. The left ECA is widely patent without disease. The left vertebral artery is widely patent. Nonvascular findings: There is an enlarged thyroid gland with multiple nodules indicative of which the left nodular goiter. No cervical adenopathy or acute abnormalities in the soft tissues of the neck.     Impression: 1.No large vessel occlusions or significant focal stenoses or aneurysms in the head. 2.There is a 57% stenosis at the origin of the right subclavian artery due to soft plaque. There  is mild atherosclerotic disease in the carotid bulbs and proximal ICAs without hemodynamically significant stenosis by NASCET criteria. 3.Multinodular goiter. Electronically Signed: Alli DO Viktor  3/3/2024 6:42 PM EST  Workstation ID: [id number]    CT Angiogram Neck    Result Date: 3/3/2024  CT ANGIOGRAM HEAD W AI ANALYSIS OF LVO, CT ANGIOGRAM NECK Date of Exam: 3/3/2024 5:31 PM EST Indication: Neuro deficit, acute stroke suspected Neuro deficit, acute stroke suspected. Comparison: Head CT without contrast and CT perfusion study from 3/3/2024 Technique: CTA of the head and neck was performed after the uneventful intravenous administration of 150 cc Isovue-370. Reconstructed coronal and sagittal images were also obtained. In addition, a 3-D volume rendered image was created for interpretation.  Automated exposure control and iterative reconstruction methods were used. Findings: CTA head: There is a fetal origin of the right PCA the P1 segment of the left PCA is diffusely small in the left posterior communicating arteries mildly prominent these are anatomic variations. [place] otherwise has a normal configuration. No large vessel occlusions or significant focal stenoses or aneurysms are identified. No pathologic intracranial enhancement. CTA NECK: There is normal configuration of the great vessels arising from the aortic arch. Right-sided findings: The brachiocephalic artery is widely patent. There is a short segment of high-grade stenosis due to soft plaque at the origin of the right subclavian artery. Based on NASCET criteria, this is a 57% stenosis. There is mild diffuse atherosclerotic disease in the remainder of the right subclavian artery. The right common carotid artery is widely patent without disease. There is mild atherosclerotic disease in the right carotid bulb and proximal right ICA without significant stenosis  by NASCET criteria. Right internal carotid artery is widely patent without disease.  The external carotid artery is widely patent without disease. The vertebral arteries are codominant and widely patent. Left-sided findings: There is mild atherosclerotic disease in the proximal left subclavian artery without significant stenosis by NASCET criteria. The left common carotid artery is widely patent without disease. There is mild atherosclerotic disease in the carotid bulb and proximal left ICA without hemodynamically significant stenosis by NASCET criteria. The remainder of the left ICA is unremarkable. The left ECA is widely patent without disease. The left vertebral artery is widely patent. Nonvascular findings: There is an enlarged thyroid gland with multiple nodules indicative of which the left nodular goiter. No cervical adenopathy or acute abnormalities in the soft tissues of the neck.     Impression: 1.No large vessel occlusions or significant focal stenoses or aneurysms in the head. 2.There is a 57% stenosis at the origin of the right subclavian artery due to soft plaque. There is mild atherosclerotic disease in the carotid bulbs and proximal ICAs without hemodynamically significant stenosis by NASCET criteria. 3.Multinodular goiter. Electronically Signed: Alli Hoang DO  3/3/2024 6:42 PM EST  Workstation ID: [id number]    XR Chest 1 View    Result Date: 3/3/2024  XR CHEST 1 VW Date of Exam: 3/3/2024 5:58 PM EST Indication: Acute Stroke Protocol (onset < 12 hrs) Comparison: 2/13/2024 Findings: The lungs are grossly clear. Cardiac, hilar, and mediastinal silhouettes are . Pulmonary vascularity is within normal limits. No pneumothorax or pleural effusions. The trachea is midline.  No acute bony abnormality or aggressive appearing focal osseous lesions in the visualized bony thorax. Old right rib fractures are noted.     Impression: No active cardiopulmonary disease. Electronically Signed: Alli Hoang DO  3/3/2024 6:23 PM EST  Workstation ID: [id number]    CT CEREBRAL PERFUSION WITH & WITHOUT  CONTRAST    Result Date: 3/3/2024  CT CEREBRAL PERFUSION W WO CONTRAST Date of Exam: 3/3/2024 5:31 PM EST Indication: Neuro deficit, acute stroke suspected.  Comparison: CT head without contrast 3/3/2024 Technique: Axial CT images of the brain were obtained prior to and after the administration of 115 mL Isovue-370. Core blood volume, core blood flow, mean transit time, and Tmax images were obtained utilizing the Rapid software protocol. A limited CT angiogram of the head was also performed to measure the blood vessel density. The radiation dose reduction device was turned on for each scan per the ALARA (As Low as Reasonably Achievable) protocol. Findings: There is no focal core infarct or ischemic penumbra identified. CBF < 30%: 0 ml Tmax > 6 sec: 0 ml Mismatch volume: 0 mL Mismatch ratio: None.     Impression: No focal core infarct or ischemic penumbra. Electronically Signed: Dayday Evans MD  3/3/2024 6:07 PM EST  Workstation ID: [id number]    CT Head Without Contrast Stroke Protocol    Result Date: 3/3/2024  CT HEAD WO CONTRAST STROKE PROTOCOL Date of Exam: 3/3/2024 5:30 PM EST Indication: Neuro deficit, acute, stroke suspected Neuro deficit, acute stroke suspected. Right leg weakness Comparison: None available. Technique: Axial CT images were obtained of the head without contrast administration.  Reconstructed coronal images were also obtained. Automated exposure control and iterative construction methods were used. Scan Time: 5:35 p.m. Results discussed with Stroke Navigator Laney at 5:42 p.m. Findings: No intracranial hemorrhage. Negative for mass effect or midline shift. Mild white matter findings suggesting chronic microvascular disease. No abnormal extra-axial fluid collection. Posterior fossa is without acute abnormality. The globes are intact and symmetric. No retro-orbital abnormality. The mastoid air cells are well-aerated. Mild left deviation with septal deviation. Calvarium intact.     Impression: 1.  No intracranial hemorrhage. 2. Mild white matter findings suggesting chronic microvascular disease. Electronically Signed: Dayday Evans MD  3/3/2024 5:44 PM EST  Workstation ID: [id number]    CT Abdomen Pelvis With Contrast    Result Date: 2/29/2024  CT ABDOMEN PELVIS W CONTRAST Date of Exam: 2/29/2024 9:57 AM EST Indication: K75.0. Liver abscess Comparison: 2/13/2024. Technique: Axial CT images were obtained of the abdomen and pelvis following the uneventful intravenous administration of 85 mL Isovue-300 . Reconstructed coronal and sagittal images were also obtained. Automated exposure control and iterative construction methods were used. Findings: A small hypodensity adjacent to the hepatic capsule in the lateral aspect of the right hepatic lobe is smaller and no longer contains pockets of gas. It measures approximately 13 x 11 mm. Previously noted additional hypodensities in the right hepatic lobe have resolved. A stent in the common bile duct is again identified with associated pneumobilia. The gallbladder is contracted. There is no intrahepatic bile duct dilatation. Subtle hypodensity of the pancreatic head is again identified concerning for underlying pancreatic mass. The spleen size is normal. Adrenal nodules are stable. Right upper pole parapelvic renal cyst again noted. There is no hydronephrosis. Partially filled bladder appears grossly normal. There are surgical sutures in the wall  of the sigmoid colon. There is a large amount of stool. There is diverticulosis without acute diverticulitis. Extensive calcified and noncalcified plaque of the abdominal aorta and iliac arteries again noted. 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 Patient is status post bifemoral bypass graft which appears patent.     Impression: Findings again suggestive of pancreatic head mass, status post stenting of the common bile duct. Decreased size of hypodensity within the right lateral aspect of the liver adjacent to  the hepatic capsule and resolution of previously seen gas. Previously seen additional hypodensities within the right hepatic lobe have resolved. No new abnormality. Electronically Signed: Allie Hicks MD  2/29/2024 10:41 AM EST  Workstation ID: [id number]             Results for orders placed during the hospital encounter of 01/26/24    Adult Transthoracic Echo Complete W/ Cont if Necessary Per Protocol    Interpretation Summary    Left ventricular systolic function is hyperdynamic (EF > 70%). Left ventricular ejection fraction appears to be greater than 70%.    Left ventricular wall thickness is consistent with borderline concentric hypertrophy.    Provoked left ventricular mean pressure gradient of 10 mmHg.    Left ventricular diastolic function is consistent with (grade I) impaired relaxation.      Plan for Follow-up of Pending Labs/Results:     Discharge Details        Discharge Medications        New Medications        Instructions Start Date   aspirin 81 MG chewable tablet   81 mg, Oral, Daily             Changes to Medications        Instructions Start Date   clopidogrel 75 MG tablet  Commonly known as: PLAVIX  What changed: These instructions start on March 18, 2024. If you are unsure what to do until then, ask your doctor or other care provider.   75 mg, Oral, Daily   Start Date: March 18, 2024            Continue These Medications        Instructions Start Date   amLODIPine 10 MG tablet  Commonly known as: NORVASC   10 mg, Oral, Daily      BASAGLAR KWIKPEN SC   40 Units, Subcutaneous, Nightly      finasteride 5 MG tablet  Commonly known as: PROSCAR   1 tablet, Oral, Daily      hydrALAZINE 50 MG tablet  Commonly known as: APRESOLINE   50 mg, Oral, As Needed      Jardiance 10 MG tablet tablet  Generic drug: empagliflozin   10 mg, Oral, Daily      lisinopril 40 MG tablet  Commonly known as: PRINIVIL,ZESTRIL   40 mg, Oral, Daily      naloxone 4 MG/0.1ML nasal spray  Commonly known as: NARCAN   Call 911. Don't  prime. Spray in 1 nostril for overdose. Repeat in 2-3 minutes in other nostril if no or minimal breathing/responsiveness.      pantoprazole 40 MG EC tablet  Commonly known as: PROTONIX   40 mg, Oral, Daily             Stop These Medications      cefuroxime 500 MG tablet  Commonly known as: CEFTIN              Allergies   Allergen Reactions    Atenolol Other (See Comments)     Bradycardia.    Morphine Itching    Penicillins Hives     Has tolerated cefazolin, cefepime    Beta lactam allergy details  Antibiotic reaction: (!) diarrhea, hives  Age at reaction: child (1st as child then later in college)  Dose to reaction time: days  Reason for antibiotic: URI  Epinephrine required for reaction?: no  Tolerated antibiotics: unknown            Discharge Disposition:  Home or Self Care    Diet:  Hospital:  Diet Order   Procedures    Diet: Regular/House; Fluid Consistency: Thin (IDDSI 0)            Activity:      Restrictions or Other Recommendations:         CODE STATUS:    Code Status and Medical Interventions:   Ordered at: 03/03/24 2112     Level Of Support Discussed With:    Patient     Code Status (Patient has no pulse and is not breathing):    CPR (Attempt to Resuscitate)     Medical Interventions (Patient has pulse or is breathing):    Full Support       Future Appointments   Date Time Provider Department Center   7/17/2024 12:30 PM Mariana Benavides APRN [facility]       [unfilled]          [place] [facility] [place],   03/04/24      Time Spent on Discharge:  I spent  34  minutes on this discharge activity which included: face-to-face encounter with the patient, reviewing the data in the system, coordination of the care with the nursing staff as well as consultants, documentation, and entering orders.

## 2024-03-04 NOTE — CONSULTS
Patient Name:  Costa Robbins  YOB: 1955  [id number]       Patient Care Team:  Jonathan Conner MD as PCP - General (Family Medicine)  Moses Frankel MD as Consulting Physician (Cardiology)      General Surgery Consult Note     Date of Consultation: 03/04/24    Referring Physician - Lisseth Pulido II, DO    Reason for Consult - pancreatic mass, RLE weakness    Subjective     I have been asked to see  Costa Robbins , a 68 y.o. male in consultation for pancreatic mass, RLE weakness from Dr. Pulido.  The patient presented to the emergency room on 3/3/2024 with acute onset of right lower extremity weakness.  The patient has a history of right iliac stent placement.  The patient underwent a stroke evaluation and was found to have no evidence of CVA.  However on blood work the patient was noted to be severely hyponatremic and hyperglycemic.  The patient continues to have weight loss and epigastric abdominal pain.  No fevers or chills.      Allergy:   Allergies   Allergen Reactions    Atenolol Other (See Comments)     Bradycardia.    Morphine Itching    Penicillins Hives     Has tolerated cefazolin, cefepime    Beta lactam allergy details  Antibiotic reaction: (!) diarrhea, hives  Age at reaction: child (1st as child then later in college)  Dose to reaction time: days  Reason for antibiotic: URI  Epinephrine required for reaction?: no  Tolerated antibiotics: unknown          Medications:  amLODIPine, 10 mg, Oral, Daily  aspirin, 81 mg, Oral, Daily  cefuroxime, 500 mg, Oral, BID  finasteride, 5 mg, Oral, Daily  insulin lispro, 2-7 Units, Subcutaneous, 4x Daily AC & at Bedtime  lisinopril, 40 mg, Oral, Daily  sodium chloride, 10 mL, Intravenous, Q12H         No current facility-administered medications on file prior to encounter.     Current Outpatient Medications on File Prior to Encounter   Medication Sig    amLODIPine (NORVASC) 10 MG tablet Take 1 tablet by mouth Daily.    cefuroxime  (CEFTIN) 500 MG tablet Take 1 tablet by mouth 2 (Two) Times a Day. For 7 days, stared on 02-29-24    empagliflozin (Jardiance) 10 MG tablet tablet Take 1 tablet by mouth Daily.    finasteride (PROSCAR) 5 MG tablet Take 1 tablet by mouth Daily.    hydrALAZINE (APRESOLINE) 50 MG tablet Take 1 tablet by mouth As Needed.    Insulin Glargine (BASAGLAR KWIKPEN SC) Inject 40 Units under the skin into the appropriate area as directed Every Night.    lisinopril (PRINIVIL,ZESTRIL) 40 MG tablet Take 1 tablet by mouth Daily.    pantoprazole (PROTONIX) 40 MG EC tablet Take 1 tablet by mouth Daily.    [DISCONTINUED] clopidogrel (PLAVIX) 75 MG tablet Take 1 tablet by mouth Daily.    naloxone (NARCAN) 4 MG/0.1ML nasal spray Call 911. Don't prime. [place] in 1 nostril for overdose. Repeat in 2-3 minutes in other nostril if no or minimal breathing/responsiveness.       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    found during ERCP    COVID 2021    no hospitalization    Diabetes mellitus 2017    po meds and insulin- checsk sugar rarely    Diverticulitis 2009    surgically intervention    Dizzy     Enlarged prostate     recently started on Proscar    ETOH use 05/25/2023    Former smoker 05/25/2023    Hearing decreased     History of shingles     2008-  belt line    Hypertension     Joint pain     hands    Melanoma     CHEST, BACK, NECK MULTI, HEAD    Pancreatic mass 01/27/2024    found on CT scan; ERCP confirmed    PVD (peripheral vascular disease)     SOBOE (shortness of breath on exertion)     Tinnitus     Unintentional weight loss     Uses contact lenses     bilat    Wears glasses        Past Surgical History:  Past Surgical History:   Procedure Laterality Date    AORTAGRAM Bilateral 04/12/2023    Procedure: AORTAGRAM WITH RUNOFFS  STENT OF THE LEFT ILIAC ARTERY;  Surgeon: Moses Escalante MD;  Location: [facility];  Service: Vascular;  Laterality: Bilateral;    COLON RESECTION   2009    partial colectomy    COLONOSCOPY      DIAGNOSTIC LAPAROSCOPY N/A 2024    Procedure: DIAGNOSTIC LAPAROSCOPY WITH LIVER BIOPSY AND PERITONEAL WASHINGS;  Surgeon: Silas Maloney MD;  Location:  [facility];  Service: General;  Laterality: N/A;    ERCP N/A 2024    Procedure: ENDOSCOPIC RETROGRADE CHOLANGIOPANCREATOGRAPHY WITH STENT PLACEMENT;  Surgeon: Brunner, Mark I, MD;  Location:  [facility];  Service: Gastroenterology;  Laterality: N/A;    FEMORAL ENDARTERECTOMY Left 2023    Procedure: FEMORAL ENDARTERECTOMY WITH PROPATEN GRAFT 8MM X 40CM;  Surgeon: Moses Escalante MD;  Location:  [facility];  Service: Vascular;  Laterality: Left;    FEMORAL FEMORAL BYPASS N/A 2023    Procedure: FEMORAL FEMORAL BYPASS;  Surgeon: Moses Escalante MD;  Location:  [facility];  Service: Vascular;  Laterality: N/A;    ORIF ANKLE FRACTURE Left     1 plate and 2 screws and 6 pins    SKIN BIOPSY      SKIN CANCER EXCISION      melanoma    WISDOM TOOTH EXTRACTION          Family History:   Family History   Problem Relation Age of Onset    Diabetes Mother     Heart attack Mother     Hypertension Mother     Hyperlipidemia Mother     Stroke Mother     Hodgkin's lymphoma Father     Hypertension Father     Diabetes Brother     Hyperlipidemia Brother         Social History:   Social History     Socioeconomic History    Marital status:     Number of children: 0   Tobacco Use    Smoking status: Former     Current packs/day: 0.00     Average packs/day: 0.5 packs/day for 43.0 years (21.5 ttl pk-yrs)     Types: Cigarettes     Start date: 1980     Quit date: 2023     Years since quittin.0     Passive exposure: Never    Smokeless tobacco: Never    Tobacco comments:     Pt states that he has been able to stop smoking this month - 2023   Vaping Use    Vaping status: Never Used   Substance and Sexual Activity    Alcohol use: Not Currently     Comment: ; hx of ETOH use- nothing last 2 months     "Drug use: Yes     Types: Marijuana     Comment: ONCE A MONTH    Sexual activity: Defer         Review of Systems     Constitutional: See HPI   Eyes: Denies visual changes    Cardiovascular: Denies chest pain, palpitations   Pulmonary: Denies cough or shortness of breath   Abdominal/ GI: See HPI    Genitourinary: Denies dysuria or hematuria   Musculoskeletal: Denies any but chronic joint aches, pains or deformities   Psychiatric: No recent mood changes   Neurologic: See HPI          Objective     Physical Exam:      Vital Signs  /87 (BP Location: Right arm, Patient Position: Lying)   Pulse 81   Temp 98.5 °F (36.9 °C) (Oral)   Resp 16   Ht 180.3 cm (71\")   Wt 67.6 kg (149 lb)   SpO2 100%   BMI 20.78 kg/m²   No intake or output data in the 24 hours ending 03/04/24 1305      Physical Exam:    Head: Normocephalic, atraumatic.   Eyes: Pupils equal, round, react to light and accommodation.   Mouth: Oral mucosa without lesions  Neck: No masses, lymphadenopathy or carotid bruits bilaterally  CV: Rhythm and rate regular, no murmurs, rubs or gallops  Lungs: Clear to auscultation bilaterally  Abdomen: Bowel sounds positive, soft, nontender, nondistended  Groin : No obvious hernias bilaterally  Extremities:  No cyanosis, clubbing or edema bilaterally  Lymphatics: No abnormal lymphadenopathy appreciated  Neurologic: No gross deficits      Results Review: I have personally reviewed all of the recent lab and imaging results available at this time.   Na 134  Cr 0.57  K 3.2    WBC 10.2  Hgb 15.6         Assessment & Plan     Assessment and Plan:    atient known to me with pancreatic head mass scheduled for Whipple procedure on 3/8. Presented to ER on 3/3 with new right lower extremity weakness. CVA work up was performed and was negative. Noted on lab work to have significant hyperglycemia and hyponatremia, both corrected.   Since correction, RLE symptoms have resolved. Recommend DC planning, continue to hold " Plavix, plan for OR 3/8.    I discussed the patient's findings and my recommendations with the patient and/or family, as well as the primary team     Silas Maloney MD  03/04/24  13:05 EST

## 2024-03-04 NOTE — ED PROVIDER NOTES
[place]    EMERGENCY DEPARTMENT ENCOUNTER      Pt Name: Costa Robbins  MRN: [id number]  YOB: 1955  Date of evaluation: 3/3/2024  Provider: Syd Krishna MD    CHIEF COMPLAINT       Chief Complaint   Patient presents with    Extremity Weakness         HISTORY OF PRESENT ILLNESS   Costa Robbins is a 68 y.o. male who presents to the emergency department ***       Nursing notes were reviewed.    REVIEW OF SYSTEMS     ROS:  A chief complaint appropriate review of systems was completed and is negative except as noted in the HPI.      PAST MEDICAL HISTORY     Past Medical History:   Diagnosis Date    Bile duct obstruction 01/27/2024    found during ERCP    COVID 2021    no hospitalization    Diabetes mellitus 2017    po meds and insulin- checsk sugar rarely    Diverticulitis 2009    surgically intervention    Dizzy     Enlarged prostate     recently started on Proscar    ETOH use 05/25/2023    Former smoker 05/25/2023    Hearing decreased     History of shingles     2008-  belt line    Hypertension     Joint pain     hands    Melanoma     CHEST, BACK, NECK MULTI, HEAD    Pancreatic mass 01/27/2024    found on CT scan; ERCP confirmed    PVD (peripheral vascular disease)     SOBOE (shortness of breath on exertion)     Tinnitus     Unintentional weight loss     Uses contact lenses     bilat    Wears glasses          SURGICAL HISTORY       Past Surgical History:   Procedure Laterality Date    AORTAGRAM Bilateral 04/12/2023    Procedure: AORTAGRAM WITH RUNOFFS  STENT OF THE LEFT ILIAC ARTERY;  Surgeon: Moses Escalante MD;  Location: [facility];  Service: Vascular;  Laterality: Bilateral;    COLON RESECTION  05/2009    partial colectomy    COLONOSCOPY      DIAGNOSTIC LAPAROSCOPY N/A 02/07/2024    Procedure: DIAGNOSTIC LAPAROSCOPY WITH LIVER BIOPSY AND PERITONEAL WASHINGS;  Surgeon: Silas Maloney MD;  Location: [facility];  Service: General;  Laterality: N/A;    ERCP N/A 01/27/2024     Procedure: ENDOSCOPIC RETROGRADE CHOLANGIOPANCREATOGRAPHY WITH STENT PLACEMENT;  Surgeon: Brunner, Mark I, MD;  Location:  [facility];  Service: Gastroenterology;  Laterality: N/A;    FEMORAL ENDARTERECTOMY Left 05/25/2023    Procedure: FEMORAL ENDARTERECTOMY WITH PROPATEN GRAFT 8MM X 40CM;  Surgeon: Moses Escalante MD;  Location:  [facility];  Service: Vascular;  Laterality: Left;    FEMORAL FEMORAL BYPASS N/A 05/25/2023    Procedure: FEMORAL FEMORAL BYPASS;  Surgeon: Moses Escalante MD;  Location:  [facility];  Service: Vascular;  Laterality: N/A;    ORIF ANKLE FRACTURE Left     1 plate and 2 screws and 6 pins    SKIN BIOPSY      SKIN CANCER EXCISION      melanoma    WISDOM TOOTH EXTRACTION           CURRENT MEDICATIONS       Current Facility-Administered Medications:     sodium chloride 0.9 % flush 10 mL, 10 mL, Intravenous, PRN, Syd Krishna MD    Current Outpatient Medications:     amLODIPine (NORVASC) 10 MG tablet, Take 1 tablet by mouth Daily., Disp: 30 tablet, Rfl: 5    cefuroxime (CEFTIN) 500 MG tablet, Take 1 tablet by mouth 2 (Two) Times a Day., Disp: , Rfl:     empagliflozin (Jardiance) 10 MG tablet tablet, Take 1 tablet by mouth Daily., Disp: , Rfl:     finasteride (PROSCAR) 5 MG tablet, Take 1 tablet by mouth Daily., Disp: , Rfl:     lisinopril (PRINIVIL,ZESTRIL) 40 MG tablet, Take 1 tablet by mouth Daily., Disp: , Rfl:     clopidogrel (PLAVIX) 75 MG tablet, Take 1 tablet by mouth Daily. Ld 1st- letter on chart, Disp: , Rfl:     hydrALAZINE (APRESOLINE) 50 MG tablet, Take 1 tablet by mouth As Needed., Disp: , Rfl:     Insulin Glargine (BASAGLAR KWIKPEN SC), Inject 40 Units under the skin into the appropriate area as directed Every Night., Disp: , Rfl:     naloxone (NARCAN) 4 MG/0.1ML nasal spray, Call 911. Don't prime. Constantine in 1 nostril for overdose. Repeat in 2-3 minutes in other nostril if no or minimal breathing/responsiveness., Disp: 2 each, Rfl: 0    ALLERGIES     Atenolol, Morphine,  and Penicillins    FAMILY HISTORY       Family History   Problem Relation Age of Onset    Diabetes Mother     Heart attack Mother     Hypertension Mother     Hyperlipidemia Mother     Stroke Mother     Hodgkin's lymphoma Father     Hypertension Father     Diabetes Brother     Hyperlipidemia Brother           SOCIAL HISTORY       Social History     Socioeconomic History    Marital status:     Number of children: 0   Tobacco Use    Smoking status: Former     Current packs/day: 0.00     Average packs/day: 0.5 packs/day for 43.0 years (21.5 ttl pk-yrs)     Types: Cigarettes     Start date: 1980     Quit date: 2023     Years since quittin.0     Passive exposure: Never    Smokeless tobacco: Never    Tobacco comments:     Pt states that he has been able to stop smoking this month - 2023   Vaping Use    Vaping status: Never Used   Substance and Sexual Activity    Alcohol use: Not Currently     Comment: ; hx of ETOH use- nothing last 2 months    Drug use: Yes     Types: Marijuana     Comment: ONCE A MONTH    Sexual activity: Defer         PHYSICAL EXAM    (up to 7 for level 4, 8 or more for level 5)     Vitals:    24 1759 24 1804 24 1806 24 1830   BP: 164/97  164/97 (!) 143/103   BP Location:   Right arm    Patient Position:   Sitting    Pulse:  85 83 87   Resp:   12    Temp:       TempSrc:       SpO2:   100% 100%   Weight:       Height:           ***      DIAGNOSTIC RESULTS     EKG: All EKGs are interpreted by the Emergency Department Physician who either signs or Co-signs this chart in the absence of a cardiologist.    ECG 12 Lead ED Triage Standing Order; Acute Stroke (Onset <24 hrs)   Preliminary Result   Test Reason : ED Triage Standing Order~   Blood Pressure :   */*   mmHG   Vent. Rate :  84 BPM     Atrial Rate :  84 BPM      P-R Int : 198 ms          QRS Dur :  92 ms       QT Int : 362 ms       P-R-T Axes :  60  54  57 degrees      QTc Int : 427 ms      Normal sinus rhythm    Nonspecific ST abnormality   Abnormal ECG   When compared with ECG of 13-FEB-2024 21:25,   ST no longer depressed in Anterior leads   Nonspecific T wave abnormality, improved in Inferior leads   Nonspecific T wave abnormality no longer evident in Lateral leads      Referred By: ED MD           Confirmed By:             RADIOLOGY:   [x] Radiologist's Report Reviewed:  XR Chest 1 View   Final Result   Impression:   No active cardiopulmonary disease.            Electronically Signed: Alli Hoang DO     3/3/2024 6:23 PM EST     Workstation ID: [id number]      CT Angiogram Head w AI Analysis of LVO   Final Result   Impression:   1.No large vessel occlusions or significant focal stenoses or aneurysms in the head.   2.There is a 57% stenosis at the origin of the right subclavian artery due to soft plaque. There is mild atherosclerotic disease in the carotid bulbs and proximal ICAs without hemodynamically significant stenosis by NASCET criteria.   3.Multinodular goiter.            Electronically Signed: Alli Hoang DO     3/3/2024 6:42 PM EST     Workstation ID: [id number]      CT Angiogram Neck   Final Result   Impression:   1.No large vessel occlusions or significant focal stenoses or aneurysms in the head.   2.There is a 57% stenosis at the origin of the right subclavian artery due to soft plaque. There is mild atherosclerotic disease in the carotid bulbs and proximal ICAs without hemodynamically significant stenosis by NASCET criteria.   3.Multinodular goiter.            Electronically Signed: Alli Hoang DO     3/3/2024 6:42 PM EST     Workstation ID: [id number]      CT CEREBRAL PERFUSION WITH & WITHOUT CONTRAST   Final Result   Impression:   No focal core infarct or ischemic penumbra.            Electronically Signed: Dayday Evans MD     3/3/2024 6:07 PM EST     Workstation ID: [id number]      CT Head Without Contrast Stroke Protocol   Final Result   Impression:    1. No intracranial hemorrhage.   2. Mild white  matter findings suggesting chronic microvascular disease.            Electronically Signed: Dayday Evans MD     3/3/2024 5:44 PM EST     Workstation ID: [id number]      MRI Brain With & Without Contrast    (Results Pending)       I ordered and independently reviewed the above noted radiographic studies.        LABS:    I have reviewed and interpreted all of the currently available lab results from this visit (if applicable):  Results for orders placed or performed during the hospital encounter of 03/03/24   Single High Sensitivity Troponin T    Specimen: Blood   Result Value Ref Range    HS Troponin T 18 <22 ng/L   aPTT    Specimen: Blood   Result Value Ref Range    PTT 26.5 22.0 - 39.0 seconds   AST    Specimen: Blood   Result Value Ref Range    AST (SGOT) 12 1 - 40 U/L   ALT    Specimen: Blood   Result Value Ref Range    ALT (SGPT) 15 1 - 41 U/L   CBC Auto Differential    Specimen: Blood   Result Value Ref Range    WBC 10.19 3.40 - 10.80 10*3/mm3    RBC 4.97 4.14 - 5.80 10*6/mm3    Hemoglobin 15.7 13.0 - 17.7 g/dL    Hematocrit 45.1 37.5 - 51.0 %    MCV 90.7 79.0 - 97.0 fL    MCH 31.6 26.6 - 33.0 pg    MCHC 34.8 31.5 - 35.7 g/dL    RDW 13.0 12.3 - 15.4 %    RDW-SD 43.2 37.0 - 54.0 fl    MPV 11.2 6.0 - 12.0 fL    Platelets 394 140 - 450 10*3/mm3    Neutrophil % 69.7 42.7 - 76.0 %    Lymphocyte % 20.2 19.6 - 45.3 %    Monocyte % 7.7 5.0 - 12.0 %    Eosinophil % 1.0 0.3 - 6.2 %    Basophil % 0.7 0.0 - 1.5 %    Immature Grans % 0.7 (H) 0.0 - 0.5 %    Neutrophils, Absolute 7.11 (H) 1.70 - 7.00 10*3/mm3    Lymphocytes, Absolute 2.06 0.70 - 3.10 10*3/mm3    Monocytes, Absolute 0.78 0.10 - 0.90 10*3/mm3    Eosinophils, Absolute 0.10 0.00 - 0.40 10*3/mm3    Basophils, Absolute 0.07 0.00 - 0.20 10*3/mm3    Immature Grans, Absolute 0.07 (H) 0.00 - 0.05 10*3/mm3    nRBC 0.0 0.0 - 0.2 /100 WBC   Lactic Acid, Plasma    Specimen: Blood   Result Value Ref Range    Lactate 1.9 0.5 - 2.0 mmol/L   Comprehensive Metabolic Panel     Specimen: Blood   Result Value Ref Range    Glucose 677 (C) 65 - 99 mg/dL    BUN 17 8 - 23 mg/dL    Creatinine 0.96 0.76 - 1.27 mg/dL    Sodium 120 (L) 136 - 145 mmol/L    Potassium 4.7 3.5 - 5.2 mmol/L    Chloride 84 (L) 98 - 107 mmol/L    CO2 20.0 (L) 22.0 - 29.0 mmol/L    Calcium 11.2 (H) 8.6 - 10.5 mg/dL    Total Protein 7.2 6.0 - 8.5 g/dL    Albumin 4.0 3.5 - 5.2 g/dL    ALT (SGPT) 15 1 - 41 U/L    AST (SGOT) 12 1 - 40 U/L    Alkaline Phosphatase 145 (H) 39 - 117 U/L    Total Bilirubin 0.9 0.0 - 1.2 mg/dL    Globulin 3.2 gm/dL    A/G Ratio 1.3 g/dL    BUN/Creatinine Ratio 17.7 7.0 - 25.0    Anion Gap 16.0 (H) 5.0 - 15.0 mmol/L    eGFR 87.2 >60.0 mL/min/1.73   POC CHEM 8    Specimen: Blood   Result Value Ref Range    Creatinine 0.70 0.60 - 1.30 mg/dL    Sodium 124 (A) 138 - 146 mmol/L    POC Potassium 4.7 3.5 - 4.9 mmol/L    Chloride 90 (A) 98 - 109 mmol/L    Calcium, Arterial 1.46 mg/dL    Glucose 632 (A) 70 - 130 mg/dL    BUN 17 mg/dL    Hemoglobin 15.6 12.0 - 17.0 g/dL    Hematocrit 46 38 - 51 %   POCT Protime/INR    Specimen: Blood   Result Value Ref Range    Protime 12.8 seconds    INR 1 0.9 - 1.1   POC CHEM 8    Specimen: Blood   Result Value Ref Range    Glucose >599 (C) 70 - 130 mg/dL    BUN 17 8 - 26 mg/dL    Creatinine 0.70 0.60 - 1.30 mg/dL    Sodium 124 (L) 138 - 146 mmol/L    POC Potassium 4.7 3.5 - 4.9 mmol/L    Chloride 90 (L) 98 - 109 mmol/L    Total CO2 24 24 - 29 mmol/L    Hemoglobin 15.6 12.0 - 17.0 g/dL    Hematocrit 46 38 - 51 %    Ionized Calcium 1.46 (H) 1.20 - 1.32 mmol/L    eGFR 101.6 >60.0 mL/min/1.73   POC Protime / INR    Specimen: Blood   Result Value Ref Range    Protime 12.8 12.8 - 15.2 seconds    INR 1.1 0.8 - 1.2   ECG 12 Lead ED Triage Standing Order; Acute Stroke (Onset <24 hrs)   Result Value Ref Range    QT Interval 362 ms    QTC Interval 427 ms   Green Top (Gel)   Result Value Ref Range    Extra Tube Hold for add-ons.    Lavender Top   Result Value Ref Range    Extra Tube  hold for add-on    Gold Top - SST   Result Value Ref Range    Extra Tube Hold for add-ons.    Light Blue Top   Result Value Ref Range    Extra Tube Hold for add-ons.         If labs were ordered, I independently reviewed the results and considered them in treating the patient.      EMERGENCY DEPARTMENT COURSE and DIFFERENTIAL DIAGNOSIS/MDM:   Vitals:  AS OF 19:46 EST    BP - (!) 143/103  HR - 87  TEMP - 97.7 °F (36.5 °C) (Oral)  O2 SATS - 100%        Discussion below represents my analysis of pertinent findings related to patient's condition, differential diagnosis, treatment plan and final disposition.      Differential diagnosis:  The differential diagnosis associated with the patient's presentation includes: ***      Independent interpretations (ECG/rhythm strip/X-ray/US/CT scan): ***      Additional sources:  Discussed/obtained information from independent historians:   [] Spouse:   [] Parent:   [] Friend:   [] EMS:   [] Other:  External (non-ED) record review:   [] Inpatient record:   [] Office record:   [] Outpatient record:   [] Prior Outpatient labs:   [] Prior Outpatient radiology:   [] Primary Care record:   [] Outside ED record:   [] Other:       Patient's care impacted by:   [] Diabetes   [] Hypertension   [] Coronary Artery Disease   [] Cancer   [] Other:     Care significantly affected by Social Determinants of Health (housing and economic circumstances, unemployment)    [] Yes     [] No   If yes, Patient's care significantly limited by  Social Determinants of Health including:    [] Inadequate housing    [] Low income    [] Alcoholism and drug addiction in family    [] Problems related to primary support group    [] Unemployment    [] Problems related to employment    [] Other Social Determinants of Health:       Consideration of admission/observation vs discharge: ***      I considered prescription management with: ***   [] Pain medication:   [] Antiviral:   [] Antibiotic:   [] Other:    Additional  orders considered but not ordered:  The following testing was considered but ultimately not selected after discussion with patient/family: ***    ED Course:           ***    I had a discussion with the patient/family regarding diagnosis, diagnostic results, treatment plan, and medications.  The patient/family indicated understanding of these instructions.  I spent adequate time at the bedside preceding discharge necessary to personally discuss the aftercare instructions, giving patient education, providing explanations of the results of our evaluations/findings, and my decision making to assure that the patient/family understand the plan of care.  Time was allotted to answer questions at that time and throughout the ED course.  Emphasis was placed on timely follow-up after discharge.  I also discussed the potential for the development of an acute emergent condition requiring further evaluation, admission, or even surgical intervention. I discussed that we found nothing during the visit today indicating the need for further workup, admission, or the presence of an unstable medical condition.  I encouraged the patient to return to the emergency department immediately for ANY concerns, worsening, new complaints, or if symptoms persist and unable to seek follow-up in a timely fashion.  The patient/family expressed understanding and agreement with this plan.  The patient will follow-up with their PCP in 1-2 days for reevaluation.           PROCEDURES:  Procedures    CRITICAL CARE TIME        FINAL IMPRESSION    No diagnosis found.      DISPOSITION/PLAN     ED Disposition       None              Comment: Please note this report has been produced using speech recognition software.      Syd Krishna MD  Attending Emergency Physician           163 (H) 70 - 130 mg/dL   POC Glucose Once    Specimen: Blood   Result Value Ref Range    Glucose 162 (H) 70 - 130 mg/dL   POC Glucose Once    Specimen: Blood   Result Value Ref Range    Glucose 181 (H) 70 - 130 mg/dL   ECG 12 Lead ED Triage Standing Order; Acute Stroke (Onset <24 hrs)   Result Value Ref Range    QT Interval 362 ms    QTC Interval 427 ms   Adult Transthoracic Echo Limited W/ Cont if Necessary Per Protocol   Result Value Ref Range    LVIDd 3.4 cm    LVIDs 2.39 cm    IVSd 0.98 cm    LVPWd 1.08 cm    FS 29.4 %    IVS/LVPW 0.91 cm    ESV(cubed) 13.7 ml    EDV(cubed) 39.0 ml    LV mass(C)d 102.8 grams    LA dimension (2D)  3.6 cm    BH CV ECHO SHUNT ASSESSMENT PERFORMED (HIDDEN SCRIPTING) 1     BH CV VAS BP RIGHT /84 mmHg   Green Top (Gel)   Result Value Ref Range    Extra Tube Hold for add-ons.    Lavender Top   Result Value Ref Range    Extra Tube hold for add-on    Gold Top - SST   Result Value Ref Range    Extra Tube Hold for add-ons.    Gray Top   Result Value Ref Range    Extra Tube Hold for add-ons.    Light Blue Top   Result Value Ref Range    Extra Tube Hold for add-ons.    Duplex Carotid Ultrasound CAR   Result Value Ref Range    Prox CCA .0 cm/sec    Prox CCA EDV 14.7 cm/sec    Right Mid CCA .0 cm/sec    right Mid CCA EDV 19.1 cm/sec    Dist CCA PSV 72.8 cm/sec    Dist CCA EDV 13.0 cm/sec    Prox ICA PSV 64.0 cm/sec    Prox ICA EDV 14.9 cm/sec    Mid ICA PSV 66.5 cm/sec    Mid ICA EDV 15.5 cm/sec    Dist ICA PSV 83.2 cm/sec    Dist ICA EDV 21.7 cm/sec    Prox ECA .0 cm/sec    Prox ECA EDV 28.8 cm/sec    Vertebral A PSV 46.7 cm/sec    Vertebral A EDV 9.3 cm/sec    Prox SCLA .0 cm/sec    Prox CCA PSV 91.6 cm/sec    Prox CCA EDV 11.0 cm/sec    left Mid CCA .0 cm/sec    left Mid CCA EDV 13.7 cm/sec    Dist CCA PSV 82.9 cm/sec    Dist CCA EDV 14.3 cm/sec    Prox ICA PSV 68.6 cm/sec    Prox ICA EDV 13.2 cm/sec    Mid ICA PSV 75.2 cm/sec    Mid ICA EDV 18.1  cm/sec    Dist ICA PSV 65.9 cm/sec    Dist ICA EDV 18.7 cm/sec    Prox ECA .0 cm/sec    Prox ECA EDV 0.70 cm/sec    Vertebral A PSV 38.8 cm/sec    Vertebral A EDV 8.6 cm/sec    Prox SCLA .0 cm/sec    Right arm /84 mmHg    ICA/CCA ratio 0.59     ICA/CCA ratio 0.69         If labs were ordered, I independently reviewed the results and considered them in treating the patient.      EMERGENCY DEPARTMENT COURSE and DIFFERENTIAL DIAGNOSIS/MDM:   Vitals:  AS OF 14:14 EDT    BP - 179/87  HR - 81  TEMP - 98.5 °F (36.9 °C) (Oral)  O2 SATS - 100%        Discussion below represents my analysis of pertinent findings related to patient's condition, differential diagnosis, treatment plan and final disposition.      Differential diagnosis:  The differential diagnosis associated with the patient's presentation includes: cva, ich, intracranial mass, migraine, seizure      Independent interpretations (ECG/rhythm strip/X-ray/US/CT scan): I independently interpreted the pt head CT and cardiac monitor - there is no ich and the pt is in nsr      Patient's care impacted by:   [] Diabetes   [x] Hypertension   [] Coronary Artery Disease   [] Cancer   [] Other:     Care significantly affected by Social Determinants of Health (housing and economic circumstances, unemployment)    [] Yes     [x] No   If yes, Patient's care significantly limited by  Social Determinants of Health including:    [] Inadequate housing    [] Low income    [] Alcoholism and drug addiction in family    [] Problems related to primary support group    [] Unemployment    [] Problems related to employment    [] Other Social Determinants of Health:       Consideration of admission/observation vs discharge: Pt w findings concerning for cva and req admission      ED Course:    ED Course as of 03/26/24 1414   Sun Mar 03, 2024   1948 Patient presented with acute right leg weakness that improved by the time he arrived. Symptoms started around 2 PM. He has known  pancreatic lesion and is supposed to have a Whipple procedure this coming Friday. Initial CT imaging looks okay but he does apparently have some soft plaque at his right subclavian that is high risk and stroke team recommended admission. He has no ongoing deficits at this point. [NS]   2004 I discussed with hospitalist Dr. Gross. Discussed history, presentation, workup. Accepts pt for admission. [NS]      ED Course User Index  [NS] Syd Krishna MD         PROCEDURES:  Procedures    CRITICAL CARE TIME        FINAL IMPRESSION      1. Right leg weakness    2. Hyperglycemia    3. Pancreatic mass    4. History of diabetes mellitus    5. History of hypertension    6. Type 2 diabetes mellitus with other specified complication, with long-term current use of insulin          DISPOSITION/PLAN     ED Disposition       ED Disposition   Decision to Admit    Condition   --    Comment   Level of Care: Telemetry [5]   Diagnosis: Right leg weakness [[id number]]   Admitting Physician: MARTÍNEZ GROSS [[id number]]   Attending Physician: MARTÍNEZ GROSS [[id number]]                   Comment: Please note this report has been produced using speech recognition software.      Syd Krishna MD  Attending Emergency Physician             Syd Krishna MD  03/26/24 [id number]

## 2024-03-04 NOTE — PLAN OF CARE
Goal Outcome Evaluation:  Plan of Care Reviewed With: patient        Progress: improving  Outcome Evaluation: VSS on RA. A&O x4. No complaints of pain or nausea. IVF and Insulin gtt infusing. NPO. No other concerns at this time. POC ongoing.

## 2024-03-05 ENCOUNTER — READMISSION MANAGEMENT (OUTPATIENT)
Dept: CALL CENTER | Facility: HOSPITAL | Age: 69
End: 2024-03-05
Payer: MEDICARE

## 2024-03-05 LAB
BH CV ECHO MEAS - EDV(CUBED): 39 ML
BH CV ECHO MEAS - ESV(CUBED): 13.7 ML
BH CV ECHO MEAS - FS: 29.4 %
BH CV ECHO MEAS - IVS/LVPW: 0.91 CM
BH CV ECHO MEAS - IVSD: 0.98 CM
BH CV ECHO MEAS - LA DIMENSION: 3.6 CM
BH CV ECHO MEAS - LV MASS(C)D: 102.8 GRAMS
BH CV ECHO MEAS - LVIDD: 3.4 CM
BH CV ECHO MEAS - LVIDS: 2.39 CM
BH CV ECHO MEAS - LVPWD: 1.08 CM
BH CV ECHO SHUNT ASSESSMENT PERFORMED (HIDDEN SCRIPTING): 1
BH CV VAS BP RIGHT ARM: NORMAL MMHG
QT INTERVAL: 362 MS
QTC INTERVAL: 427 MS

## 2024-03-05 NOTE — OUTREACH NOTE
Prep Survey      Flowsheet Row Responses   [facility] patient discharged from? [facility]   Is LACE score < 7 ? No   Eligibility Readm Mgmt   Discharge diagnosis Right leg weakness   Does the patient have one of the following disease processes/diagnoses(primary or secondary)? Other   Does the patient have Home health ordered? No   Is there a DME ordered? No   Comments regarding appointments Dr. Amara bernard as scheduled   Medication alerts for this patient see avs   Prep survey completed? Yes            Iris OLEA - Registered Nurse

## 2024-03-07 ENCOUNTER — ANESTHESIA EVENT (OUTPATIENT)
Dept: PERIOP | Facility: HOSPITAL | Age: 69
End: 2024-03-07
Payer: MEDICARE

## 2024-03-07 RX ORDER — SODIUM CHLORIDE 0.9 % (FLUSH) 0.9 %
10 SYRINGE (ML) INJECTION AS NEEDED
Status: CANCELLED | OUTPATIENT
Start: 2024-03-07

## 2024-03-07 RX ORDER — FAMOTIDINE 10 MG/ML
20 INJECTION, SOLUTION INTRAVENOUS ONCE
Status: CANCELLED | OUTPATIENT
Start: 2024-03-07 | End: 2024-03-07

## 2024-03-07 RX ORDER — SODIUM CHLORIDE 9 MG/ML
40 INJECTION, SOLUTION INTRAVENOUS AS NEEDED
Status: CANCELLED | OUTPATIENT
Start: 2024-03-07

## 2024-03-08 ENCOUNTER — HOSPITAL ENCOUNTER (INPATIENT)
Facility: HOSPITAL | Age: 69
LOS: 4 days | Discharge: HOME | DRG: 414 | End: 2024-03-12
Attending: SURGERY | Admitting: SURGERY
Payer: MEDICARE

## 2024-03-08 ENCOUNTER — ANESTHESIA EVENT CONVERTED (OUTPATIENT)
Dept: ANESTHESIOLOGY | Facility: HOSPITAL | Age: 69
DRG: 414 | End: 2024-03-08
Payer: MEDICARE

## 2024-03-08 ENCOUNTER — ANESTHESIA (OUTPATIENT)
Dept: PERIOP | Facility: HOSPITAL | Age: 69
End: 2024-03-08
Payer: MEDICARE

## 2024-03-08 ENCOUNTER — READMISSION MANAGEMENT (OUTPATIENT)
Dept: CALL CENTER | Facility: HOSPITAL | Age: 69
End: 2024-03-08
Payer: MEDICARE

## 2024-03-08 DIAGNOSIS — C25.9 PANCREATIC CARCINOMA METASTATIC TO INTRA-ABDOMINAL LYMPH NODE: Primary | ICD-10-CM

## 2024-03-08 DIAGNOSIS — C77.2 PANCREATIC CARCINOMA METASTATIC TO INTRA-ABDOMINAL LYMPH NODE: Primary | ICD-10-CM

## 2024-03-08 DIAGNOSIS — K86.89 PANCREATIC MASS: ICD-10-CM

## 2024-03-08 LAB
ABO GROUP BLD: NORMAL
ALBUMIN SERPL-MCNC: 3.7 G/DL (ref 3.5–5.2)
ALBUMIN/GLOB SERPL: 1.1 G/DL
ALP SERPL-CCNC: 105 U/L (ref 39–117)
ALT SERPL W P-5'-P-CCNC: 16 U/L (ref 1–41)
ANION GAP SERPL CALCULATED.3IONS-SCNC: 14 MMOL/L (ref 5–15)
APTT PPP: 27.5 SECONDS (ref 22–39)
AST SERPL-CCNC: 15 U/L (ref 1–40)
BASE EXCESS BLDA CALC-SCNC: -4 MMOL/L (ref -5–5)
BASE EXCESS BLDA CALC-SCNC: -4 MMOL/L (ref -5–5)
BASE EXCESS BLDA CALC-SCNC: -8 MMOL/L (ref -5–5)
BILIRUB SERPL-MCNC: 0.6 MG/DL (ref 0–1.2)
BLD GP AB SCN SERPL QL: NEGATIVE
BUN SERPL-MCNC: 13 MG/DL (ref 8–23)
BUN/CREAT SERPL: 14.4 (ref 7–25)
CA-I BLDA-SCNC: 1.29 MMOL/L (ref 1.2–1.32)
CA-I BLDA-SCNC: 1.37 MMOL/L (ref 1.2–1.32)
CA-I BLDA-SCNC: 1.44 MMOL/L (ref 1.2–1.32)
CALCIUM SPEC-SCNC: 10.9 MG/DL (ref 8.6–10.5)
CHLORIDE SERPL-SCNC: 100 MMOL/L (ref 98–107)
CO2 BLDA-SCNC: 20 MMOL/L (ref 24–29)
CO2 BLDA-SCNC: 22 MMOL/L (ref 24–29)
CO2 BLDA-SCNC: 23 MMOL/L (ref 24–29)
CO2 SERPL-SCNC: 21 MMOL/L (ref 22–29)
CREAT SERPL-MCNC: 0.9 MG/DL (ref 0.76–1.27)
DEPRECATED RDW RBC AUTO: 42.4 FL (ref 37–54)
EGFRCR SERPLBLD CKD-EPI 2021: 94.2 ML/MIN/1.73
ERYTHROCYTE [DISTWIDTH] IN BLOOD BY AUTOMATED COUNT: 13.2 % (ref 12.3–15.4)
GLOBULIN UR ELPH-MCNC: 3.3 GM/DL
GLUCOSE BLDC GLUCOMTR-MCNC: 129 MG/DL (ref 70–130)
GLUCOSE BLDC GLUCOMTR-MCNC: 162 MG/DL (ref 70–130)
GLUCOSE BLDC GLUCOMTR-MCNC: 162 MG/DL (ref 70–130)
GLUCOSE BLDC GLUCOMTR-MCNC: 173 MG/DL (ref 70–130)
GLUCOSE BLDC GLUCOMTR-MCNC: 174 MG/DL (ref 70–130)
GLUCOSE BLDC GLUCOMTR-MCNC: 176 MG/DL (ref 70–130)
GLUCOSE SERPL-MCNC: 176 MG/DL (ref 65–99)
HBA1C MFR BLD: 10.6 % (ref 4.8–5.6)
HCO3 BLDA-SCNC: 18.6 MMOL/L (ref 22–26)
HCO3 BLDA-SCNC: 20.9 MMOL/L (ref 22–26)
HCO3 BLDA-SCNC: 21.5 MMOL/L (ref 22–26)
HCT VFR BLD AUTO: 40.7 % (ref 37.5–51)
HCT VFR BLDA CALC: 28 % (ref 38–51)
HCT VFR BLDA CALC: 29 % (ref 38–51)
HCT VFR BLDA CALC: 33 % (ref 38–51)
HGB BLD-MCNC: 14.3 G/DL (ref 13–17.7)
HGB BLDA-MCNC: 11.2 G/DL (ref 12–17)
HGB BLDA-MCNC: 9.5 G/DL (ref 12–17)
HGB BLDA-MCNC: 9.9 G/DL (ref 12–17)
INR PPP: 0.89 (ref 0.89–1.12)
MCH RBC QN AUTO: 30.8 PG (ref 26.6–33)
MCHC RBC AUTO-ENTMCNC: 35.1 G/DL (ref 31.5–35.7)
MCV RBC AUTO: 87.5 FL (ref 79–97)
PCO2 BLDA: 34.6 MM HG (ref 35–45)
PCO2 BLDA: 36.3 MM HG (ref 35–45)
PCO2 BLDA: 40.8 MM HG (ref 35–45)
PH BLDA: 7.32 PH UNITS (ref 7.35–7.6)
PH BLDA: 7.33 PH UNITS (ref 7.35–7.6)
PH BLDA: 7.39 PH UNITS (ref 7.35–7.6)
PLATELET # BLD AUTO: 318 10*3/MM3 (ref 140–450)
PMV BLD AUTO: 11.7 FL (ref 6–12)
PO2 BLDA: 246 MMHG (ref 80–105)
PO2 BLDA: 251 MMHG (ref 80–105)
PO2 BLDA: 550 MMHG (ref 80–105)
POTASSIUM BLDA-SCNC: 2.7 MMOL/L (ref 3.5–4.9)
POTASSIUM BLDA-SCNC: 2.8 MMOL/L (ref 3.5–4.9)
POTASSIUM BLDA-SCNC: 3.2 MMOL/L (ref 3.5–4.9)
POTASSIUM SERPL-SCNC: 3.3 MMOL/L (ref 3.5–5.2)
PROT SERPL-MCNC: 7 G/DL (ref 6–8.5)
PROTHROMBIN TIME: 12.2 SECONDS (ref 12.2–14.5)
RBC # BLD AUTO: 4.65 10*6/MM3 (ref 4.14–5.8)
RH BLD: POSITIVE
SAO2 % BLDA: 100 % (ref 95–98)
SODIUM BLD-SCNC: 136 MMOL/L (ref 138–146)
SODIUM BLD-SCNC: 138 MMOL/L (ref 138–146)
SODIUM BLD-SCNC: 140 MMOL/L (ref 138–146)
SODIUM SERPL-SCNC: 135 MMOL/L (ref 136–145)
T&S EXPIRATION DATE: NORMAL
WBC NRBC COR # BLD AUTO: 13.23 10*3/MM3 (ref 3.4–10.8)

## 2024-03-08 PROCEDURE — 88304 TISSUE EXAM BY PATHOLOGIST: CPT | Performed by: SURGERY

## 2024-03-08 PROCEDURE — 88307 TISSUE EXAM BY PATHOLOGIST: CPT | Performed by: SURGERY

## 2024-03-08 PROCEDURE — P9041 ALBUMIN (HUMAN),5%, 50ML: HCPCS | Performed by: ANESTHESIOLOGY

## 2024-03-08 PROCEDURE — 25010000002 SUGAMMADEX 200 MG/2ML SOLUTION: Performed by: ANESTHESIOLOGY

## 2024-03-08 PROCEDURE — 85014 HEMATOCRIT: CPT

## 2024-03-08 PROCEDURE — 25010000002 GLYCOPYRROLATE 0.4 MG/2ML SOLUTION: Performed by: ANESTHESIOLOGY

## 2024-03-08 PROCEDURE — 25010000002 ONDANSETRON PER 1 MG: Performed by: ANESTHESIOLOGY

## 2024-03-08 PROCEDURE — 25010000002 BUPIVACAINE (PF) 0.25 % SOLUTION: Performed by: ANESTHESIOLOGY

## 2024-03-08 PROCEDURE — 25810000003 LACTATED RINGERS PER 1000 ML: Performed by: ANESTHESIOLOGY

## 2024-03-08 PROCEDURE — 99232 SBSQ HOSP IP/OBS MODERATE 35: CPT | Performed by: NURSE PRACTITIONER

## 2024-03-08 PROCEDURE — 25010000002 POTASSIUM CHLORIDE 10 MEQ/100ML SOLUTION: Performed by: ANESTHESIOLOGY

## 2024-03-08 PROCEDURE — 25010000002 HYDROMORPHONE PER 4 MG: Performed by: ANESTHESIOLOGY

## 2024-03-08 PROCEDURE — 85610 PROTHROMBIN TIME: CPT | Performed by: SURGERY

## 2024-03-08 PROCEDURE — 86850 RBC ANTIBODY SCREEN: CPT | Performed by: SURGERY

## 2024-03-08 PROCEDURE — 25010000002 FENTANYL CITRATE (PF) 50 MCG/ML SOLUTION

## 2024-03-08 PROCEDURE — 0DBV0ZX EXCISION OF MESENTERY, OPEN APPROACH, DIAGNOSTIC: ICD-10-PCS | Performed by: SURGERY

## 2024-03-08 PROCEDURE — 25010000002 KETOROLAC TROMETHAMINE PER 15 MG: Performed by: SURGERY

## 2024-03-08 PROCEDURE — 88331 PATH CONSLTJ SURG 1 BLK 1SPC: CPT | Performed by: PATHOLOGY

## 2024-03-08 PROCEDURE — 63710000001 INSULIN DETEMIR PER 5 UNITS: Performed by: SURGERY

## 2024-03-08 PROCEDURE — 25010000002 ESMOLOL 100 MG/10ML SOLUTION: Performed by: ANESTHESIOLOGY

## 2024-03-08 PROCEDURE — 85730 THROMBOPLASTIN TIME PARTIAL: CPT | Performed by: SURGERY

## 2024-03-08 PROCEDURE — 25010000002 DEXAMETHASONE SODIUM PHOSPHATE 10 MG/ML SOLUTION: Performed by: ANESTHESIOLOGY

## 2024-03-08 PROCEDURE — 84132 ASSAY OF SERUM POTASSIUM: CPT

## 2024-03-08 PROCEDURE — 0FT40ZZ RESECTION OF GALLBLADDER, OPEN APPROACH: ICD-10-PCS | Performed by: SURGERY

## 2024-03-08 PROCEDURE — 83036 HEMOGLOBIN GLYCOSYLATED A1C: CPT | Performed by: SURGERY

## 2024-03-08 PROCEDURE — 80053 COMPREHEN METABOLIC PANEL: CPT | Performed by: SURGERY

## 2024-03-08 PROCEDURE — 86901 BLOOD TYPING SEROLOGIC RH(D): CPT | Performed by: SURGERY

## 2024-03-08 PROCEDURE — 82330 ASSAY OF CALCIUM: CPT

## 2024-03-08 PROCEDURE — 25010000002 FENTANYL CITRATE (PF) 100 MCG/2ML SOLUTION: Performed by: ANESTHESIOLOGY

## 2024-03-08 PROCEDURE — 25010000002 ALBUMIN HUMAN 5% PER 50 ML: Performed by: ANESTHESIOLOGY

## 2024-03-08 PROCEDURE — 0DQ80ZZ REPAIR SMALL INTESTINE, OPEN APPROACH: ICD-10-PCS | Performed by: SURGERY

## 2024-03-08 PROCEDURE — 0DHA0UZ INSERTION OF FEEDING DEVICE INTO JEJUNUM, OPEN APPROACH: ICD-10-PCS | Performed by: SURGERY

## 2024-03-08 PROCEDURE — 82947 ASSAY GLUCOSE BLOOD QUANT: CPT

## 2024-03-08 PROCEDURE — 82803 BLOOD GASES ANY COMBINATION: CPT

## 2024-03-08 PROCEDURE — 25010000002 CEFOXITIN PER 1 G: Performed by: SURGERY

## 2024-03-08 PROCEDURE — 86900 BLOOD TYPING SEROLOGIC ABO: CPT | Performed by: SURGERY

## 2024-03-08 PROCEDURE — 25010000002 MIDAZOLAM PER 1 MG: Performed by: ANESTHESIOLOGY

## 2024-03-08 PROCEDURE — S0260 H&P FOR SURGERY: HCPCS | Performed by: PHYSICIAN ASSISTANT

## 2024-03-08 PROCEDURE — 25810000003 SODIUM CHLORIDE 0.9 % SOLUTION: Performed by: ANESTHESIOLOGY

## 2024-03-08 PROCEDURE — 25010000002 PHENYLEPHRINE 10 MG/ML SOLUTION: Performed by: NURSE ANESTHETIST, CERTIFIED REGISTERED

## 2024-03-08 PROCEDURE — 25010000002 PROPOFOL 10 MG/ML EMULSION: Performed by: ANESTHESIOLOGY

## 2024-03-08 PROCEDURE — 84295 ASSAY OF SERUM SODIUM: CPT

## 2024-03-08 PROCEDURE — 85027 COMPLETE CBC AUTOMATED: CPT | Performed by: SURGERY

## 2024-03-08 DEVICE — LIGACLIP MCA MULTIPLE CLIP APPLIERS, 20 MEDIUM CLIPS
Type: IMPLANTABLE DEVICE | Site: ABDOMEN | Status: FUNCTIONAL
Brand: LIGACLIP

## 2024-03-08 DEVICE — CLIP LIGAT VASC HORIZON TI MD BLU 6CT: Type: IMPLANTABLE DEVICE | Site: ABDOMEN | Status: FUNCTIONAL

## 2024-03-08 DEVICE — LIGACLIP MCA MULTIPLE CLIP APPLIERS, 20 LARGE CLIPS
Type: IMPLANTABLE DEVICE | Site: ABDOMEN | Status: FUNCTIONAL
Brand: LIGACLIP

## 2024-03-08 DEVICE — ABSORBABLE HEMOSTAT (OXIDIZED REGENERATED CELLULOSE)
Type: IMPLANTABLE DEVICE | Site: ABDOMEN | Status: FUNCTIONAL
Brand: SURGICEL

## 2024-03-08 DEVICE — CLIP LIGAT VASC HORIZON TI MD/LG GRN 6CT: Type: IMPLANTABLE DEVICE | Site: ABDOMEN | Status: FUNCTIONAL

## 2024-03-08 DEVICE — CLIP LIGAT VASC HORIZON TI LG ORNG 6CT: Type: IMPLANTABLE DEVICE | Site: ABDOMEN | Status: FUNCTIONAL

## 2024-03-08 DEVICE — CLIP LIGAT VASC HORIZON TI SM YEL 6CT: Type: IMPLANTABLE DEVICE | Site: ABDOMEN | Status: FUNCTIONAL

## 2024-03-08 RX ORDER — NALOXONE HCL 0.4 MG/ML
0.4 VIAL (ML) INJECTION
Status: DISCONTINUED | OUTPATIENT
Start: 2024-03-08 | End: 2024-03-08

## 2024-03-08 RX ORDER — LABETALOL HYDROCHLORIDE 5 MG/ML
INJECTION, SOLUTION INTRAVENOUS AS NEEDED
Status: DISCONTINUED | OUTPATIENT
Start: 2024-03-08 | End: 2024-03-08 | Stop reason: SURG

## 2024-03-08 RX ORDER — SODIUM CHLORIDE 9 MG/ML
INJECTION, SOLUTION INTRAVENOUS CONTINUOUS PRN
Status: DISCONTINUED | OUTPATIENT
Start: 2024-03-08 | End: 2024-03-08 | Stop reason: SURG

## 2024-03-08 RX ORDER — EPHEDRINE SULFATE 50 MG/ML
INJECTION INTRAVENOUS AS NEEDED
Status: DISCONTINUED | OUTPATIENT
Start: 2024-03-08 | End: 2024-03-08 | Stop reason: SURG

## 2024-03-08 RX ORDER — ACETAMINOPHEN 325 MG/1
650 TABLET ORAL EVERY 4 HOURS PRN
Status: DISCONTINUED | OUTPATIENT
Start: 2024-03-08 | End: 2024-03-12 | Stop reason: HOSPADM

## 2024-03-08 RX ORDER — MIDAZOLAM HYDROCHLORIDE 1 MG/ML
0.5 INJECTION INTRAMUSCULAR; INTRAVENOUS
Status: DISCONTINUED | OUTPATIENT
Start: 2024-03-08 | End: 2024-03-08 | Stop reason: HOSPADM

## 2024-03-08 RX ORDER — INSULIN LISPRO 100 [IU]/ML
1-200 INJECTION, SOLUTION INTRAVENOUS; SUBCUTANEOUS AS NEEDED
Status: DISCONTINUED | OUTPATIENT
Start: 2024-03-08 | End: 2024-03-12 | Stop reason: HOSPADM

## 2024-03-08 RX ORDER — SODIUM CHLORIDE, SODIUM LACTATE, POTASSIUM CHLORIDE, CALCIUM CHLORIDE 600; 310; 30; 20 MG/100ML; MG/100ML; MG/100ML; MG/100ML
9 INJECTION, SOLUTION INTRAVENOUS CONTINUOUS
Status: DISCONTINUED | OUTPATIENT
Start: 2024-03-08 | End: 2024-03-08

## 2024-03-08 RX ORDER — NICOTINE POLACRILEX 4 MG
15 LOZENGE BUCCAL
Status: DISCONTINUED | OUTPATIENT
Start: 2024-03-08 | End: 2024-03-12 | Stop reason: HOSPADM

## 2024-03-08 RX ORDER — ESMOLOL HYDROCHLORIDE 10 MG/ML
INJECTION INTRAVENOUS AS NEEDED
Status: DISCONTINUED | OUTPATIENT
Start: 2024-03-08 | End: 2024-03-08 | Stop reason: SURG

## 2024-03-08 RX ORDER — FENTANYL CITRATE 50 UG/ML
INJECTION, SOLUTION INTRAMUSCULAR; INTRAVENOUS
Status: COMPLETED
Start: 2024-03-08 | End: 2024-03-08

## 2024-03-08 RX ORDER — DOCUSATE SODIUM 100 MG/1
100 CAPSULE, LIQUID FILLED ORAL 2 TIMES DAILY PRN
Status: DISCONTINUED | OUTPATIENT
Start: 2024-03-08 | End: 2024-03-12 | Stop reason: HOSPADM

## 2024-03-08 RX ORDER — IBUPROFEN 600 MG/1
1 TABLET ORAL
Status: DISCONTINUED | OUTPATIENT
Start: 2024-03-08 | End: 2024-03-12 | Stop reason: HOSPADM

## 2024-03-08 RX ORDER — DEXAMETHASONE SODIUM PHOSPHATE 10 MG/ML
INJECTION, SOLUTION INTRAMUSCULAR; INTRAVENOUS
Status: COMPLETED | OUTPATIENT
Start: 2024-03-08 | End: 2024-03-08

## 2024-03-08 RX ORDER — INSULIN LISPRO 100 [IU]/ML
1-200 INJECTION, SOLUTION INTRAVENOUS; SUBCUTANEOUS
Status: DISCONTINUED | OUTPATIENT
Start: 2024-03-08 | End: 2024-03-12 | Stop reason: HOSPADM

## 2024-03-08 RX ORDER — HYDROCODONE BITARTRATE AND ACETAMINOPHEN 5; 325 MG/1; MG/1
1 TABLET ORAL EVERY 4 HOURS PRN
Status: DISCONTINUED | OUTPATIENT
Start: 2024-03-08 | End: 2024-03-12 | Stop reason: HOSPADM

## 2024-03-08 RX ORDER — MAGNESIUM HYDROXIDE 1200 MG/15ML
LIQUID ORAL AS NEEDED
Status: DISCONTINUED | OUTPATIENT
Start: 2024-03-08 | End: 2024-03-08 | Stop reason: HOSPADM

## 2024-03-08 RX ORDER — ONDANSETRON 2 MG/ML
INJECTION INTRAMUSCULAR; INTRAVENOUS AS NEEDED
Status: DISCONTINUED | OUTPATIENT
Start: 2024-03-08 | End: 2024-03-08 | Stop reason: SURG

## 2024-03-08 RX ORDER — AMLODIPINE BESYLATE 10 MG/1
10 TABLET ORAL DAILY
Status: DISCONTINUED | OUTPATIENT
Start: 2024-03-09 | End: 2024-03-12 | Stop reason: HOSPADM

## 2024-03-08 RX ORDER — KETOROLAC TROMETHAMINE 30 MG/ML
30 INJECTION, SOLUTION INTRAMUSCULAR; INTRAVENOUS EVERY 6 HOURS
Status: DISPENSED | OUTPATIENT
Start: 2024-03-08 | End: 2024-03-10

## 2024-03-08 RX ORDER — METOCLOPRAMIDE HYDROCHLORIDE 5 MG/ML
10 INJECTION INTRAMUSCULAR; INTRAVENOUS EVERY 6 HOURS PRN
Status: DISCONTINUED | OUTPATIENT
Start: 2024-03-08 | End: 2024-03-12 | Stop reason: HOSPADM

## 2024-03-08 RX ORDER — BUPIVACAINE HYDROCHLORIDE 2.5 MG/ML
INJECTION, SOLUTION EPIDURAL; INFILTRATION; INTRACAUDAL
Status: COMPLETED | OUTPATIENT
Start: 2024-03-08 | End: 2024-03-08

## 2024-03-08 RX ORDER — HYDROMORPHONE HYDROCHLORIDE 2 MG/ML
INJECTION, SOLUTION INTRAMUSCULAR; INTRAVENOUS; SUBCUTANEOUS AS NEEDED
Status: DISCONTINUED | OUTPATIENT
Start: 2024-03-08 | End: 2024-03-08 | Stop reason: SURG

## 2024-03-08 RX ORDER — SODIUM CHLORIDE 0.9 % (FLUSH) 0.9 %
10 SYRINGE (ML) INJECTION EVERY 12 HOURS SCHEDULED
Status: DISCONTINUED | OUTPATIENT
Start: 2024-03-08 | End: 2024-03-08 | Stop reason: HOSPADM

## 2024-03-08 RX ORDER — DEXTROSE MONOHYDRATE 25 G/50ML
10-50 INJECTION, SOLUTION INTRAVENOUS
Status: DISCONTINUED | OUTPATIENT
Start: 2024-03-08 | End: 2024-03-12 | Stop reason: HOSPADM

## 2024-03-08 RX ORDER — LIDOCAINE HYDROCHLORIDE 10 MG/ML
INJECTION, SOLUTION EPIDURAL; INFILTRATION; INTRACAUDAL; PERINEURAL AS NEEDED
Status: DISCONTINUED | OUTPATIENT
Start: 2024-03-08 | End: 2024-03-08 | Stop reason: SURG

## 2024-03-08 RX ORDER — IBUPROFEN 400 MG/1
400 TABLET ORAL EVERY 6 HOURS PRN
Status: DISCONTINUED | OUTPATIENT
Start: 2024-03-08 | End: 2024-03-12 | Stop reason: HOSPADM

## 2024-03-08 RX ORDER — GLYCOPYRROLATE 0.2 MG/ML
INJECTION INTRAMUSCULAR; INTRAVENOUS AS NEEDED
Status: DISCONTINUED | OUTPATIENT
Start: 2024-03-08 | End: 2024-03-08 | Stop reason: SURG

## 2024-03-08 RX ORDER — FINASTERIDE 5 MG/1
5 TABLET, FILM COATED ORAL DAILY
Status: DISCONTINUED | OUTPATIENT
Start: 2024-03-09 | End: 2024-03-12 | Stop reason: HOSPADM

## 2024-03-08 RX ORDER — HYDROMORPHONE HYDROCHLORIDE 1 MG/ML
0.5 INJECTION, SOLUTION INTRAMUSCULAR; INTRAVENOUS; SUBCUTANEOUS
Status: DISCONTINUED | OUTPATIENT
Start: 2024-03-08 | End: 2024-03-08

## 2024-03-08 RX ORDER — DEXAMETHASONE SODIUM PHOSPHATE 10 MG/ML
INJECTION, SOLUTION INTRAMUSCULAR; INTRAVENOUS AS NEEDED
Status: DISCONTINUED | OUTPATIENT
Start: 2024-03-08 | End: 2024-03-08 | Stop reason: SURG

## 2024-03-08 RX ORDER — ALBUMIN, HUMAN INJ 5% 5 %
SOLUTION INTRAVENOUS CONTINUOUS PRN
Status: DISCONTINUED | OUTPATIENT
Start: 2024-03-08 | End: 2024-03-08 | Stop reason: SURG

## 2024-03-08 RX ORDER — FAMOTIDINE 10 MG/ML
20 INJECTION, SOLUTION INTRAVENOUS 2 TIMES DAILY
Status: DISCONTINUED | OUTPATIENT
Start: 2024-03-08 | End: 2024-03-12 | Stop reason: HOSPADM

## 2024-03-08 RX ORDER — FAMOTIDINE 20 MG/1
20 TABLET, FILM COATED ORAL ONCE
Status: COMPLETED | OUTPATIENT
Start: 2024-03-08 | End: 2024-03-08

## 2024-03-08 RX ORDER — FENTANYL CITRATE 50 UG/ML
INJECTION, SOLUTION INTRAMUSCULAR; INTRAVENOUS AS NEEDED
Status: DISCONTINUED | OUTPATIENT
Start: 2024-03-08 | End: 2024-03-08 | Stop reason: SURG

## 2024-03-08 RX ORDER — ONDANSETRON 2 MG/ML
4 INJECTION INTRAMUSCULAR; INTRAVENOUS EVERY 6 HOURS PRN
Status: DISCONTINUED | OUTPATIENT
Start: 2024-03-08 | End: 2024-03-12 | Stop reason: HOSPADM

## 2024-03-08 RX ORDER — ROCURONIUM BROMIDE 10 MG/ML
INJECTION, SOLUTION INTRAVENOUS AS NEEDED
Status: DISCONTINUED | OUTPATIENT
Start: 2024-03-08 | End: 2024-03-08 | Stop reason: SURG

## 2024-03-08 RX ORDER — POTASSIUM CHLORIDE 7.45 MG/ML
INJECTION INTRAVENOUS CONTINUOUS PRN
Status: DISCONTINUED | OUTPATIENT
Start: 2024-03-08 | End: 2024-03-08 | Stop reason: SURG

## 2024-03-08 RX ORDER — DIAZEPAM 5 MG/1
5 TABLET ORAL EVERY 6 HOURS PRN
Status: DISCONTINUED | OUTPATIENT
Start: 2024-03-08 | End: 2024-03-12 | Stop reason: HOSPADM

## 2024-03-08 RX ORDER — PHENYLEPHRINE HYDROCHLORIDE 10 MG/ML
INJECTION INTRAVENOUS AS NEEDED
Status: DISCONTINUED | OUTPATIENT
Start: 2024-03-08 | End: 2024-03-08 | Stop reason: SURG

## 2024-03-08 RX ORDER — LISINOPRIL 40 MG/1
40 TABLET ORAL DAILY
Status: DISCONTINUED | OUTPATIENT
Start: 2024-03-09 | End: 2024-03-12 | Stop reason: HOSPADM

## 2024-03-08 RX ORDER — LIDOCAINE HYDROCHLORIDE 10 MG/ML
0.5 INJECTION, SOLUTION EPIDURAL; INFILTRATION; INTRACAUDAL; PERINEURAL ONCE AS NEEDED
Status: COMPLETED | OUTPATIENT
Start: 2024-03-08 | End: 2024-03-08

## 2024-03-08 RX ORDER — MIDAZOLAM HYDROCHLORIDE 1 MG/ML
1 INJECTION INTRAMUSCULAR; INTRAVENOUS
Status: COMPLETED | OUTPATIENT
Start: 2024-03-08 | End: 2024-03-08

## 2024-03-08 RX ORDER — PROPOFOL 10 MG/ML
VIAL (ML) INTRAVENOUS AS NEEDED
Status: DISCONTINUED | OUTPATIENT
Start: 2024-03-08 | End: 2024-03-08 | Stop reason: SURG

## 2024-03-08 RX ORDER — ONDANSETRON 4 MG/1
4 TABLET, ORALLY DISINTEGRATING ORAL EVERY 6 HOURS PRN
Status: DISCONTINUED | OUTPATIENT
Start: 2024-03-08 | End: 2024-03-12 | Stop reason: HOSPADM

## 2024-03-08 RX ORDER — FENTANYL CITRATE 50 UG/ML
50 INJECTION, SOLUTION INTRAMUSCULAR; INTRAVENOUS
Status: DISCONTINUED | OUTPATIENT
Start: 2024-03-08 | End: 2024-03-08

## 2024-03-08 RX ADMIN — ONDANSETRON 4 MG: 2 INJECTION INTRAMUSCULAR; INTRAVENOUS at 10:21

## 2024-03-08 RX ADMIN — ESMOLOL HYDROCHLORIDE 5 MG: 100 INJECTION, SOLUTION INTRAVENOUS at 10:40

## 2024-03-08 RX ADMIN — EPHEDRINE SULFATE 10 MG: 50 INJECTION INTRAVENOUS at 07:41

## 2024-03-08 RX ADMIN — DEXAMETHASONE SODIUM PHOSPHATE 6 MG: 10 INJECTION, SOLUTION INTRAMUSCULAR; INTRAVENOUS at 08:00

## 2024-03-08 RX ADMIN — FAMOTIDINE 20 MG: 20 TABLET, FILM COATED ORAL at 07:08

## 2024-03-08 RX ADMIN — DEXAMETHASONE SODIUM PHOSPHATE 4 MG: 10 INJECTION, SOLUTION INTRAMUSCULAR; INTRAVENOUS at 07:42

## 2024-03-08 RX ADMIN — ROCURONIUM BROMIDE 20 MG: 10 INJECTION INTRAVENOUS at 08:24

## 2024-03-08 RX ADMIN — ALBUMIN (HUMAN): 12.5 INJECTION, SOLUTION INTRAVENOUS at 08:16

## 2024-03-08 RX ADMIN — INSULIN DETEMIR 19 UNITS: 100 INJECTION, SOLUTION SUBCUTANEOUS at 21:14

## 2024-03-08 RX ADMIN — SODIUM CHLORIDE, POTASSIUM CHLORIDE, SODIUM LACTATE AND CALCIUM CHLORIDE: 600; 310; 30; 20 INJECTION, SOLUTION INTRAVENOUS at 08:53

## 2024-03-08 RX ADMIN — LIDOCAINE HYDROCHLORIDE 0.5 ML: 10 INJECTION, SOLUTION EPIDURAL; INFILTRATION; INTRACAUDAL; PERINEURAL at 07:08

## 2024-03-08 RX ADMIN — SODIUM CHLORIDE, POTASSIUM CHLORIDE, SODIUM LACTATE AND CALCIUM CHLORIDE: 600; 310; 30; 20 INJECTION, SOLUTION INTRAVENOUS at 11:42

## 2024-03-08 RX ADMIN — ALBUMIN (HUMAN): 12.5 INJECTION, SOLUTION INTRAVENOUS at 08:33

## 2024-03-08 RX ADMIN — PHENYLEPHRINE HYDROCHLORIDE 100 MCG: 10 INJECTION INTRAVENOUS at 10:21

## 2024-03-08 RX ADMIN — FENTANYL CITRATE 50 MCG: 50 INJECTION, SOLUTION INTRAMUSCULAR; INTRAVENOUS at 08:16

## 2024-03-08 RX ADMIN — SODIUM BICARBONATE 50 MEQ: 84 INJECTION INTRAVENOUS at 10:57

## 2024-03-08 RX ADMIN — POTASSIUM CHLORIDE: 7.46 INJECTION, SOLUTION INTRAVENOUS at 09:46

## 2024-03-08 RX ADMIN — CEFOXITIN SODIUM 2000 MG: 2 POWDER, FOR SOLUTION INTRAVENOUS at 07:41

## 2024-03-08 RX ADMIN — MIDAZOLAM HYDROCHLORIDE 1 MG: 1 INJECTION, SOLUTION INTRAMUSCULAR; INTRAVENOUS at 07:18

## 2024-03-08 RX ADMIN — PHENYLEPHRINE HYDROCHLORIDE 100 MCG: 10 INJECTION INTRAVENOUS at 10:18

## 2024-03-08 RX ADMIN — HYDROMORPHONE HYDROCHLORIDE 0.4 MG: 2 INJECTION, SOLUTION INTRAMUSCULAR; INTRAVENOUS; SUBCUTANEOUS at 11:46

## 2024-03-08 RX ADMIN — ROCURONIUM BROMIDE 20 MG: 10 INJECTION INTRAVENOUS at 09:19

## 2024-03-08 RX ADMIN — ALBUMIN (HUMAN): 12.5 INJECTION, SOLUTION INTRAVENOUS at 09:47

## 2024-03-08 RX ADMIN — SODIUM CHLORIDE: 9 INJECTION, SOLUTION INTRAVENOUS at 07:45

## 2024-03-08 RX ADMIN — MIDAZOLAM HYDROCHLORIDE 1 MG: 1 INJECTION, SOLUTION INTRAMUSCULAR; INTRAVENOUS at 07:30

## 2024-03-08 RX ADMIN — HYDROMORPHONE HYDROCHLORIDE 0.2 MG: 2 INJECTION, SOLUTION INTRAMUSCULAR; INTRAVENOUS; SUBCUTANEOUS at 09:09

## 2024-03-08 RX ADMIN — Medication 10 MG: at 08:25

## 2024-03-08 RX ADMIN — ESMOLOL HYDROCHLORIDE 5 MG: 100 INJECTION, SOLUTION INTRAVENOUS at 09:13

## 2024-03-08 RX ADMIN — LIDOCAINE HYDROCHLORIDE 80 MG: 10 INJECTION, SOLUTION EPIDURAL; INFILTRATION; INTRACAUDAL; PERINEURAL at 07:37

## 2024-03-08 RX ADMIN — KETOROLAC TROMETHAMINE 30 MG: 30 INJECTION, SOLUTION INTRAMUSCULAR; INTRAVENOUS at 21:13

## 2024-03-08 RX ADMIN — SUGAMMADEX 150 MG: 100 INJECTION, SOLUTION INTRAVENOUS at 11:35

## 2024-03-08 RX ADMIN — CEFOXITIN SODIUM 2000 MG: 2 POWDER, FOR SOLUTION INTRAVENOUS at 09:31

## 2024-03-08 RX ADMIN — KETOROLAC TROMETHAMINE 30 MG: 30 INJECTION, SOLUTION INTRAMUSCULAR; INTRAVENOUS at 16:59

## 2024-03-08 RX ADMIN — FENTANYL CITRATE 50 MCG: 50 INJECTION, SOLUTION INTRAMUSCULAR; INTRAVENOUS at 13:20

## 2024-03-08 RX ADMIN — ESMOLOL HYDROCHLORIDE 5 MG: 100 INJECTION, SOLUTION INTRAVENOUS at 09:00

## 2024-03-08 RX ADMIN — PHENYLEPHRINE HYDROCHLORIDE 100 MCG: 10 INJECTION INTRAVENOUS at 08:30

## 2024-03-08 RX ADMIN — SODIUM CHLORIDE, POTASSIUM CHLORIDE, SODIUM LACTATE AND CALCIUM CHLORIDE: 600; 310; 30; 20 INJECTION, SOLUTION INTRAVENOUS at 09:53

## 2024-03-08 RX ADMIN — GLYCOPYRROLATE 0.2 MG: 0.2 INJECTION INTRAMUSCULAR; INTRAVENOUS at 10:21

## 2024-03-08 RX ADMIN — ROCURONIUM BROMIDE 50 MG: 10 INJECTION INTRAVENOUS at 07:37

## 2024-03-08 RX ADMIN — ESMOLOL HYDROCHLORIDE 5 MG: 100 INJECTION, SOLUTION INTRAVENOUS at 10:06

## 2024-03-08 RX ADMIN — SODIUM CHLORIDE, POTASSIUM CHLORIDE, SODIUM LACTATE AND CALCIUM CHLORIDE 9 ML/HR: 600; 310; 30; 20 INJECTION, SOLUTION INTRAVENOUS at 07:08

## 2024-03-08 RX ADMIN — PROPOFOL 50 MG: 10 INJECTION, EMULSION INTRAVENOUS at 07:50

## 2024-03-08 RX ADMIN — SODIUM BICARBONATE 50 MEQ: 84 INJECTION INTRAVENOUS at 10:04

## 2024-03-08 RX ADMIN — HYDROMORPHONE HYDROCHLORIDE 0.4 MG: 2 INJECTION, SOLUTION INTRAMUSCULAR; INTRAVENOUS; SUBCUTANEOUS at 11:00

## 2024-03-08 RX ADMIN — PROPOFOL 150 MG: 10 INJECTION, EMULSION INTRAVENOUS at 07:37

## 2024-03-08 RX ADMIN — SODIUM BICARBONATE 50 MEQ: 84 INJECTION INTRAVENOUS at 09:37

## 2024-03-08 RX ADMIN — ESMOLOL HYDROCHLORIDE 70 MG: 100 INJECTION, SOLUTION INTRAVENOUS at 07:37

## 2024-03-08 RX ADMIN — HYDROMORPHONE HYDROCHLORIDE 1 MG: 2 INJECTION, SOLUTION INTRAMUSCULAR; INTRAVENOUS; SUBCUTANEOUS at 08:23

## 2024-03-08 RX ADMIN — FENTANYL CITRATE 50 MCG: 50 INJECTION, SOLUTION INTRAMUSCULAR; INTRAVENOUS at 08:12

## 2024-03-08 RX ADMIN — BUPIVACAINE HYDROCHLORIDE 60 ML: 2.5 INJECTION, SOLUTION EPIDURAL; INFILTRATION; INTRACAUDAL; PERINEURAL at 07:42

## 2024-03-08 NOTE — ANESTHESIA PROCEDURE NOTES
Airway  Urgency: elective    Date/Time: 3/8/2024 7:39 AM  Airway not difficult    General Information and Staff    Patient location during procedure: OR  SRNA: Mercedez Montgomery, JIMBO  Indications and Patient Condition  Indications for airway management: airway protection    Preoxygenated: yes  MILS not maintained throughout  Mask difficulty assessment: 1 - vent by mask    Final Airway Details  Final airway type: endotracheal airway      Successful airway: ETT  Cuffed: yes   Successful intubation technique: direct laryngoscopy  Endotracheal tube insertion site: oral  Blade: Long  Blade size: 2  ETT size (mm): 7.5  Cormack-Lehane Classification: grade I - full view of glottis  Placement verified by: chest auscultation and capnometry   Cuff volume (mL): 8  Measured from: lips  ETT/EBT  to lips (cm): 21  Number of attempts at approach: 1  Assessment: lips, teeth, and gum same as pre-op and atraumatic intubation    Additional Comments  Negative epigastric sounds, Breath sound equal bilaterally with symmetric chest rise and fall

## 2024-03-08 NOTE — H&P
"Pre-Op H&P  Costa Robbins  [id number]  1955    Chief complaint: \"I have a mass on the pancreas\"    HPI:    Patient is a 68 y.o.male who presents with a known pancreatic mass.  Workup shows no evidence of metastatic disease.  He was seen a week ago in the emergency room secondary to weakness in his lower extremity.  He was found to be hyponatremic and hyper per glycemic.  Both of these were corrected.  His right lower extremity symptoms have resolved.  He held his Plavix.  He is now admitted for a Whipple procedure.    Review of Systems:  General ROS: negative for chills, fever or skin lesions;  No changes since last office visit.  Neg for recent sick exposure  Cardiovascular ROS: no chest pain or dyspnea on exertion  Respiratory ROS: no cough, shortness of breath, or wheezing    Allergies:   Allergies   Allergen Reactions    Atenolol Other (See Comments)     Bradycardia.    Morphine Itching    Penicillins Hives     Has tolerated cefazolin, cefepime    Beta lactam allergy details  Antibiotic reaction: (!) diarrhea, hives  Age at reaction: child (1st as child then later in college)  Dose to reaction time: days  Reason for antibiotic: URI  Epinephrine required for reaction?: no  Tolerated antibiotics: unknown          Home Meds:    No current facility-administered medications on file prior to encounter.     Current Outpatient Medications on File Prior to Encounter   Medication Sig Dispense Refill    amLODIPine (NORVASC) 10 MG tablet Take 1 tablet by mouth Daily. 30 tablet 5    empagliflozin (Jardiance) 10 MG tablet tablet Take 1 tablet by mouth Daily.      finasteride (PROSCAR) 5 MG tablet Take 1 tablet by mouth Daily.      Insulin Glargine (BASAGLAR KWIKPEN SC) Inject 40 Units under the skin into the appropriate area as directed Every Night.      lisinopril (PRINIVIL,ZESTRIL) 40 MG tablet Take 1 tablet by mouth Daily.      naloxone (NARCAN) 4 MG/0.1ML nasal spray Call 911. Don't prime. Spray in 1 nostril for " overdose. Repeat in 2-3 minutes in other nostril if no or minimal breathing/responsiveness. 2 each 0       PMH:   Past Medical History:   Diagnosis Date    Bile duct obstruction 01/27/2024    found during ERCP    COVID 2021    no hospitalization    Diabetes mellitus 2017    po meds and insulin- checsk sugar rarely    Diverticulitis 2009    surgically intervention    Dizzy     Enlarged prostate     recently started on Proscar    ETOH use 05/25/2023    Former smoker 05/25/2023    Hearing decreased     History of shingles     2008-  belt line    Hypertension     Joint pain     hands    Melanoma     CHEST, BACK, NECK MULTI, HEAD    Pancreatic mass 01/27/2024    found on CT scan; ERCP confirmed    PVD (peripheral vascular disease)     SOBOE (shortness of breath on exertion)     Tinnitus     Unintentional weight loss     Uses contact lenses     bilat    Wears glasses      PSH:    Past Surgical History:   Procedure Laterality Date    AORTAGRAM Bilateral 04/12/2023    Procedure: AORTAGRAM WITH RUNOFFS  STENT OF THE LEFT ILIAC ARTERY;  Surgeon: Moses Escalante MD;  Location: [facility];  Service: Vascular;  Laterality: Bilateral;    COLON RESECTION  05/2009    partial colectomy    COLONOSCOPY      DIAGNOSTIC LAPAROSCOPY N/A 02/07/2024    Procedure: DIAGNOSTIC LAPAROSCOPY WITH LIVER BIOPSY AND PERITONEAL WASHINGS;  Surgeon: Silas Maloney MD;  Location:  [facility];  Service: General;  Laterality: N/A;    ERCP N/A 01/27/2024    Procedure: ENDOSCOPIC RETROGRADE CHOLANGIOPANCREATOGRAPHY WITH STENT PLACEMENT;  Surgeon: Brunner, Mark I, MD;  Location:  [facility];  Service: Gastroenterology;  Laterality: N/A;    FEMORAL ENDARTERECTOMY Left 05/25/2023    Procedure: FEMORAL ENDARTERECTOMY WITH PROPATEN GRAFT 8MM X 40CM;  Surgeon: Moses Escalante MD;  Location:  [facility];  Service: Vascular;  Laterality: Left;    FEMORAL FEMORAL BYPASS N/A 05/25/2023    Procedure: FEMORAL FEMORAL BYPASS;  Surgeon: Boris  Moses WILLINGHAM MD;  Location: [facility];  Service: Vascular;  Laterality: N/A;    ORIF ANKLE FRACTURE Left     1 plate and 2 screws and 6 pins    SKIN BIOPSY      SKIN CANCER EXCISION      melanoma    WISDOM TOOTH EXTRACTION       Patient denies allergy to contrast dye or latex  Immunization History:  Influenza: No  Pneumococcal: No  Tetanus: Probably up-to-date    Social History:   Tobacco:   Social History     Tobacco Use   Smoking Status Former    Current packs/day: 0.00    Average packs/day: 0.5 packs/day for 43.0 years (21.5 ttl pk-yrs)    Types: Cigarettes    Start date: 1980    Quit date: 2023    Years since quittin.0    Passive exposure: Never   Smokeless Tobacco Never   Tobacco Comments    Pt states that he has been able to stop smoking this month - 2023      Alcohol:     Social History     Substance and Sexual Activity   Alcohol Use Not Currently    Comment: ; hx of ETOH use- nothing last 2 months       Vitals:           There were no vitals taken for this visit.    Physical Exam:  General Appearance:    Alert, cooperative, no distress, appears stated age   Head:    Normocephalic, without obvious abnormality, atraumatic   Lungs:   Clear to auscultation bilaterally to the bases    Heart: S 1 S2 without rubs murmurs or gallops    Abdomen:  Soft, nontender, bowel sounds present throughout.   Breast Exam:    deferred   Genitalia:    deferred   Extremities:   Extremities normal, atraumatic, no cyanosis or edema   Skin:   Skin color, texture, turgor normal, no rashes or lesions   Neurologic:   Grossly intact   Results Review  LABS:  Lab Results   Component Value Date    WBC 10.19 2024    HGB 15.6 2024    HGB 15.6 2024    HCT 46 2024    HCT 46 2024    MCV 90.7 2024     2024    NEUTROABS 7.11 (H) 2024    GLUCOSE 140 (H) 2024    BUN 11 2024    CREATININE 0.57 (L) 2024     (L) 2024    K 3.2 (L) 2024      03/04/2024    CO2 23.0 03/04/2024    MG 1.9 03/04/2024    PHOS 2.5 03/04/2024    CALCIUM 10.1 03/04/2024    ALBUMIN 4.0 03/03/2024    AST 12 03/03/2024    AST 12 03/03/2024    ALT 15 03/03/2024    ALT 15 03/03/2024    BILITOT 0.9 03/03/2024    PTT 26.5 03/03/2024    INR 1 03/03/2024       RADIOLOGY:  Adult Transthoracic Echo Limited W/ Cont if Necessary Per Protocol    Result Date: 3/5/2024    Left ventricular systolic function is normal. Left ventricular ejection fraction appears to be 66 - 70%.   Saline test results are negative.   There is calcification of the aortic and mitral valve.     Duplex Carotid Ultrasound CAR    Result Date: 3/4/2024    Right internal carotid artery demonstrates normal flow without evidence of hemodynamically significant stenosis.   Left internal carotid artery demonstrates normal flow without evidence of hemodynamically significant stenosis.   Bilateral intimal thickening and mild scattered plaque is noted.       I reviewed the patient's new clinical results.    Cancer Staging (if applicable)  Cancer Patient: __ yes __no __unknown; If yes, clinical stage T:__ N:__M:__, stage group or __N/A    Impression: Pancreatic head tumor  Past history of EtOH abuse  Recent hypokalemia, hyponatremia  Recent laparoscopy with liver biopsy.  Jaundice  Type 2 diabetes mellitus  Hypertension    Plan: Whipple procedure    KALYN Ybarra   03/08/24   6:46 AM EST

## 2024-03-08 NOTE — ANESTHESIA PREPROCEDURE EVALUATION
Anesthesia Evaluation     Patient summary reviewed and Nursing notes reviewed   no history of anesthetic complications:   NPO Solid Status: > 8 hours  NPO Liquid Status: > 2 hours           Airway   Mallampati: II  TM distance: >3 FB  Neck ROM: full  No difficulty expected  Dental - normal exam     Pulmonary - normal exam   (+) a smoker Former,shortness of breath  Cardiovascular - normal exam  Exercise tolerance: good (4-7 METS)    ECG reviewed  PT is on anticoagulation therapy    (+) hypertension, PVD, hyperlipidemia  (-) dysrhythmias, angina, CHF, cardiac stents    ROS comment: 3/4/24- [id number], av and mv calcification; nl pericardium without effusion    9/22-· The patient denied chest pain and dyspnea throughout the test  · SR without ecopy; brief pause during exercise  · No significant ST or T wave changes noted  · Baseline EKG with 1st degree AV block  · Left ventricular ejection fraction is hyperdynamic (Calculated EF > 70%). .  · Myocardial perfusion imaging indicates a normal myocardial perfusion study with no evidence of ischemia.  · Impressions are consistent with a low risk study.  · There is no prior study available for comparison.  · Findings consistent with a normal ECG stress test.      Neuro/Psych  (+) dizziness/light headedness  (-) seizures, CVA  GI/Hepatic/Renal/Endo    (+) liver disease, diabetes mellitus  (-) GERD    Musculoskeletal     Abdominal    Substance History   (+) drug use (MJ)  (-) alcohol use     OB/GYN          Other      history of cancer    ROS/Med Hx Other: 2/7/24-  mac3 gr1v  Hgb 15.7 k 3.2   Hx melanoma  Pancreatic mass  right iliac stent placement.                Anesthesia Plan    ASA 3     general with block and Bailey     (Risks and benefits of general anesthesia discussed with patient (including MI, CVA, death, recall, aspiration, oropharyngeal/dental damage), questions answered, agreeable to proceed.    Benefits and risks of TAP nerve block/catheter discussed with patient  ((including failed block, damage to nerve/nearby structures, intravascular injection)); questions answered, agreeable to proceed.    )  intravenous induction     Anesthetic plan, risks, benefits, and alternatives have been provided, discussed and informed consent has been obtained with: patient.    Use of blood products discussed with patient  Consented to blood products.    Plan discussed with CRNA.    CODE STATUS:

## 2024-03-08 NOTE — CONSULTS
[facility]  CONSULT NOTE      Patient Name: Costa Robbins  : 1955  MRN: [id number]    Primary Care Physician: Jonathan Conner MD  Provider requesting consultation: Brandy Ceballos MD    Subjective   Subjective     Reason for Consultation:  Medical management     HPI:  Costa Robbins is a 68 y.o. male PMH HTN, HLD, DMII, BPH, alcohol use, hx of pancreatic mass admitted for Whipple procedure per Dr Maloney. Pt reports abd pain but denies N/V. He ate all of his dinner try except for jello. Pt states he does not want dilaudid for pain because the last time he had it, it caused intense abdominal pain. He is requesting Norco. Will make the adjustment.       Review of Systems  Gen- No fevers, chills  CV- No chest pain, palpitations  Resp- No cough, dyspnea  GI- No N/V/D, (+) abd pain      Otherwise complete ROS reviewed and is negative except as mentioned in the HPI.    Past Medical History:   Diagnosis Date    Bile duct obstruction 2024    found during ERCP    COVID     no hospitalization    Diabetes mellitus 2017    po meds and insulin- checsk sugar rarely    Diverticulitis 2009    surgically intervention    Dizzy     Enlarged prostate     recently started on Proscar    ETOH use 2023    Former smoker 2023    Hearing decreased     History of shingles     2008-  belt line    Hypertension     Joint pain     hands    Melanoma     CHEST, BACK, NECK MULTI, HEAD    Pancreatic mass 2024    found on CT scan; ERCP confirmed    PVD (peripheral vascular disease)     SOBOE (shortness of breath on exertion)     Tinnitus     Unintentional weight loss     Uses contact lenses     bilat    Wears glasses        Past Surgical History:   Procedure Laterality Date    AORTAGRAM Bilateral 2023    Procedure: AORTAGRAM WITH RUNOFFS  STENT OF THE LEFT ILIAC ARTERY;  Surgeon: Moses Escalante MD;  Location: [facility];  Service: Vascular;   Laterality: Bilateral;    COLON RESECTION  05/2009    partial colectomy    COLONOSCOPY      DIAGNOSTIC LAPAROSCOPY N/A 02/07/2024    Procedure: DIAGNOSTIC LAPAROSCOPY WITH LIVER BIOPSY AND PERITONEAL WASHINGS;  Surgeon: Silas Maloney MD;  Location:  [facility];  Service: General;  Laterality: N/A;    ERCP N/A 01/27/2024    Procedure: ENDOSCOPIC RETROGRADE CHOLANGIOPANCREATOGRAPHY WITH STENT PLACEMENT;  Surgeon: Brunner, Mark I, MD;  Location: [facility];  Service: Gastroenterology;  Laterality: N/A;    FEMORAL ENDARTERECTOMY Left 05/25/2023    Procedure: FEMORAL ENDARTERECTOMY WITH PROPATEN GRAFT 8MM X 40CM;  Surgeon: Moses Escalante MD;  Location:  [facility];  Service: Vascular;  Laterality: Left;    FEMORAL FEMORAL BYPASS N/A 05/25/2023    Procedure: FEMORAL FEMORAL BYPASS;  Surgeon: Moses Escalante MD;  Location:  [facility];  Service: Vascular;  Laterality: N/A;    ORIF ANKLE FRACTURE Left     1 plate and 2 screws and 6 pins    SKIN BIOPSY      SKIN CANCER EXCISION      melanoma    WISDOM TOOTH EXTRACTION         Family History: family history includes Diabetes in his brother and mother; Heart attack in his mother; Hodgkin's lymphoma in his father; Hyperlipidemia in his brother and mother; Hypertension in his father and mother; Stroke in his mother. Otherwise pertinent FHx was reviewed and unremarkable.     Social History:  reports that he quit smoking about 13 months ago. His smoking use included cigarettes. He started smoking about 44 years ago. He has a 21.5 pack-year smoking history. He has never been exposed to tobacco smoke. He has never used smokeless tobacco. He reports that he does not currently use alcohol. He reports current drug use. Drug: Marijuana.    Medications:  Medications Prior to Admission   Medication Sig Dispense Refill Last Dose    amLODIPine (NORVASC) 10 MG tablet Take 1 tablet by mouth Daily. 30 tablet 5 3/7/2024    cefuroxime (CEFTIN) 500 MG tablet Take 1 tablet by mouth 2  (Two) Times a Day. For 7 days, stared on 02-29-24   3/7/2024    empagliflozin (Jardiance) 10 MG tablet tablet Take 1 tablet by mouth Daily.   3/7/2024    finasteride (PROSCAR) 5 MG tablet Take 1 tablet by mouth Daily.   3/7/2024    hydrALAZINE (APRESOLINE) 50 MG tablet Take 1 tablet by mouth As Needed.   3/7/2024    Insulin Glargine (BASAGLAR KWIKPEN SC) Inject 40 Units under the skin into the appropriate area as directed Every Night.   3/7/2024    lisinopril (PRINIVIL,ZESTRIL) 40 MG tablet Take 1 tablet by mouth Daily.   3/7/2024    aspirin 81 MG chewable tablet Chew 1 tablet Daily. 90 tablet 0 3/1/2024    [START ON 3/18/2024] clopidogrel (PLAVIX) 75 MG tablet Take 1 tablet by mouth Daily.   3/1/2024    naloxone (NARCAN) 4 MG/0.1ML nasal spray Call 911. Don't prime. [place] in 1 nostril for overdose. Repeat in 2-3 minutes in other nostril if no or minimal breathing/responsiveness. 2 each 0     pantoprazole (PROTONIX) 40 MG EC tablet Take 1 tablet by mouth Daily.   More than a month       Scheduled Meds:[START ON 3/9/2024] amLODIPine, 10 mg, Oral, Daily  famotidine, 20 mg, Intravenous, BID  [START ON 3/9/2024] finasteride, 5 mg, Oral, Daily  insulin detemir, 1-200 Units, Subcutaneous, Nightly - Glucommander  insulin lispro, 1-200 Units, Subcutaneous, 4x Daily With Meals & Nightly  ketorolac, 30 mg, Intravenous, Q6H  [START ON 3/9/2024] lisinopril, 40 mg, Oral, Daily      Continuous Infusions:   PRN Meds:.  acetaminophen    dextrose    dextrose    diazePAM    docusate sodium    glucagon (human recombinant)    HYDROmorphone **AND** naloxone    ibuprofen    insulin lispro    metoclopramide    ondansetron ODT **OR** ondansetron    Allergies   Allergen Reactions    Atenolol Other (See Comments)     Bradycardia.    Morphine Itching    Penicillins Hives     Has tolerated cefazolin, cefepime    Beta lactam allergy details  Antibiotic reaction: (!) diarrhea, hives  Age at reaction: child (1st as child then later in  [facility] [employer] [facility])  Dose to reaction time: days  Reason for antibiotic: URI  Epinephrine required for reaction?: no  Tolerated antibiotics: unknown          Objective   Objective     Vital Signs:   Temp:  [97 °F (36.1 °C)-97.7 °F (36.5 °C)] 97.5 °F (36.4 °C)  Heart Rate:  [81-86] 85  Resp:  [10-18] 16  BP: (105-134)/(61-72) 117/68     Physical Exam  Constitutional: Awake, alert, NAD  Eyes: PERRLA, sclerae anicteric, no conjunctival injection  HENT: NCAT, mucous membranes moist  Neck: Supple, no thyromegaly, no lymphadenopathy, trachea midline  Respiratory: Clear to auscultation bilaterally, nonlabored respirations   Cardiovascular: RRR, no murmurs, rubs, or gallops, palpable pedal pulses bilaterally  Gastrointestinal: hypoactive BS, midline surgical incision with dsg CDI with drain. Appropriately tender.   : F/C  Musculoskeletal: No bilateral ankle edema, no clubbing or cyanosis to extremities  Psychiatric: Appropriate affect, cooperative  Neurologic: Oriented x 3, strength symmetric in all extremities, Cranial Nerves grossly intact to confrontation, speech clear  Skin: No rashes      Hospital Course to date:  Costa Robbins is a 68 y.o. male PMH HTN, HLD, DMII, BPH, alcohol use, hx of pancreatic mass admitted for Whipple procedure per Dr Maloney.       Pancreatic mass (pancreatic head tumor)  - admitted for Whipple procedure per Dr Maloney  - pt does not want dilaudid because he states the last time he had it, it caused intense abd pain. He is requesting norco.     HTN/HLD  - norvasc  - holding plavix and ASA    DMII  - glucommander    BPH  - proscar     Hx of alcohol use    Results Reviewed:  I have personally reviewed current lab, radiology, and data and agree.    Results from last 7 days   Lab Units 03/08/24  1050 03/08/24  0812 03/08/24  0716   WBC 10*3/mm3  --   --  13.23*   HEMOGLOBIN g/dL  --   --  14.3   HEMOGLOBIN, POC g/dL 9.5*   < >  --    HEMATOCRIT %  --   --  40.7   HEMATOCRIT POC % 28*   < >  --   "  PLATELETS 10*3/mm3  --   --  318   INR   --   --  0.89    < > = values in this interval not displayed.     Results from last 7 days   Lab Units 03/08/24  0716   SODIUM mmol/L 135*   POTASSIUM mmol/L 3.3*   CHLORIDE mmol/L 100   CO2 mmol/L 21.0*   BUN mg/dL 13   CREATININE mg/dL 0.90   GLUCOSE mg/dL 176*   CALCIUM mg/dL 10.9*   ALK PHOS U/L 105   ALT (SGPT) U/L 16   AST (SGOT) U/L 15     Estimated Creatinine Clearance: 77.2 mL/min (by C-G formula based on SCr of 0.9 mg/dL).  Brief Urine Lab Results  (Last result in the past 365 days)        Color   Clarity   Blood   Leuk Est   Nitrite   Protein   CREAT   Urine HCG        01/26/24 1639 Dark Yellow   Clear   Negative   Negative   Negative   Negative                 No results found for: \"BNP\"    Microbiology Results Abnormal       None            Imaging Results (Last 24 Hours)       ** No results found for the last 24 hours. **          Results for orders placed during the hospital encounter of 03/03/24    Adult Transthoracic Echo Limited W/ Cont if Necessary Per Protocol    Interpretation Summary    Left ventricular systolic function is normal. Left ventricular ejection fraction appears to be 66 - 70%.    Saline test results are negative.    There is calcification of the aortic and mitral valve.      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    Electronically signed by CHERISE Lewis, 03/08/24, 2:22 PM EST.         "

## 2024-03-08 NOTE — CASE MANAGEMENT/SOCIAL WORK
Discharge Planning Assessment  [facility]     Patient Name: Costa Robbins  MRN: [id number]  Today's Date: 3/8/2024    Admit Date: 3/8/2024    Plan: IDP   Discharge Needs Assessment       Row Name 03/08/24 1527       Living Environment    People in Home spouse    Name(s) of People in Home Prisca [phone]    Current Living Arrangements home    Potentially Unsafe Housing Conditions none    In the past 12 months has the electric, gas, oil, or water company threatened to shut off services in your home? No    Primary Care Provided by self    Provides Primary Care For no one    Family Caregiver if Needed spouse    Family Caregiver Names Prisca [phone]    Quality of Family Relationships helpful;involved;supportive    Able to Return to Prior Arrangements yes       Resource/Environmental Concerns    Resource/Environmental Concerns none    Transportation Concerns none       Transportation Needs    In the past 12 months, has lack of transportation kept you from medical appointments or from getting medications? no    In the past 12 months, has lack of transportation kept you from meetings, work, or from getting things needed for daily living? No       Food Insecurity    Within the past 12 months, you worried that your food would run out before you got the money to buy more. Never true    Within the past 12 months, the food you bought just didn't last and you didn't have money to get more. Never true       Transition Planning    Patient/Family Anticipates Transition to home    Patient/Family Anticipated Services at Transition     Transportation Anticipated family or friend will provide       Discharge Needs Assessment    Readmission Within the Last 30 Days current reason for admission unrelated to previous admission    Equipment Currently Used at Home none    Concerns to be Addressed denies needs/concerns at this time    Anticipated Changes Related to Illness none    Equipment Needed After Discharge  none    Current Discharge Risk chronically ill;terminally ill                   Discharge Plan       Row Name 03/08/24 1528       Plan    Plan IDP    Patient/Family in Agreement with Plan yes    Plan Comments Spoke with patient at bedside for IDP. Lives in [facility] with spouse. Ind. No HH or DME. Patient reported that he does have a walker at home if needed. PCP Griffin Conner. [place] [employer] with scripts filled at [facility] [place] in [place]. CM will ask that therapy see the patient to help identify a safe discharge plan. Patient and wife had no needs from CM today. CM to follow and assist.    Final Discharge Disposition Code 30 - still a patient                  Continued Care and Services - Admitted Since 3/8/2024    No active coordination exists for this encounter.       Selected Continued Care - Prior Encounters Includes continued care and service providers with selected services from prior encounters from 12/9/2023 to 3/8/2024      Discharged on 2/19/2024 Admission date: 2/13/2024 - Discharge disposition: Home or Self Care      Dialysis/Infusion       Service Provider Selected Services Address Phone Fax Patient Preferred    [facility] [address], [facility] [address] [phone] --                             Demographic Summary    No documentation.                  Functional Status       Row Name 03/08/24 1526       Functional Status    Usual Activity Tolerance moderate    Current Activity Tolerance moderate       Physical Activity    On average, how many days per week do you engage in moderate to strenuous exercise (like a brisk walk)? 0 days    On average, how many minutes do you engage in exercise at this level? 0 min    Number of minutes of exercise per week 0       Assessment of Health Literacy    How often do you have someone help you read hospital materials? Occasionally    How often do you have problems learning about  your medical condition because of difficulty understanding written information? Occasionally    How often do you have a problem understanding what is told to you about your medical condition? Occasionally    How confident are you filling out medical forms by yourself? Quite a bit    Health Literacy Good       Functional Status, IADL    Medications independent    Meal Preparation independent    Housekeeping independent    Laundry independent    Shopping independent       Mental Status    General Appearance WDL WDL       Mental Status Summary    Recent Changes in Mental Status/Cognitive Functioning no changes                   Psychosocial    No documentation.                  Abuse/Neglect    No documentation.                  Legal    No documentation.                  Substance Abuse    No documentation.                  Patient Forms    No documentation.                     Criselda Guidry, RN

## 2024-03-08 NOTE — PLAN OF CARE
Problem: Adult Inpatient Plan of Care  Goal: Plan of Care Review  Outcome: Ongoing, Progressing  Flowsheets (Taken 3/8/2024 1527)  Progress: improving  Plan of Care Reviewed With: patient  Goal: Patient-Specific Goal (Individualized)  Outcome: Ongoing, Progressing  Goal: Absence of Hospital-Acquired Illness or Injury  Outcome: Ongoing, Progressing  Goal: Optimal Comfort and Wellbeing  Outcome: Ongoing, Progressing  Goal: Readiness for Transition of Care  Outcome: Ongoing, Progressing  Intervention: Mutually Develop Transition Plan  Recent Flowsheet Documentation  Taken 3/8/2024 1525 by Ramón Silva RN  Transportation Anticipated: family or friend will provide  Patient/Family Anticipated Services at Transition: none  Patient/Family Anticipates Transition to: home     Problem: Pain Acute  Goal: Acceptable Pain Control and Functional Ability  Outcome: Ongoing, Progressing     Problem: Infection  Goal: Absence of Infection Signs and Symptoms  Outcome: Ongoing, Progressing     Problem: Fall Injury Risk  Goal: Absence of Fall and Fall-Related Injury  Outcome: Ongoing, Progressing     Problem: Skin Injury Risk Increased  Goal: Skin Health and Integrity  Outcome: Ongoing, Progressing   Goal Outcome Evaluation:  Plan of Care Reviewed With: patient        Progress: improving

## 2024-03-08 NOTE — BRIEF OP NOTE
Exploratory Laparotomy Progress Note    Costa VERAS Robbins  3/8/2024    Pre-op Diagnosis:   Pancreatic carcinoma metastatic to intra-abdominal lymph node       Post-Op Diagnosis Codes:     * Pancreatic carcinoma metastatic to intra-abdominal lymph node     Procedure/CPT® Codes:        Procedure(s):  EXPLORATORY LAPAROTOMY; SMALL BOWEL REPAIR; BIOPSY MESENTERIC MASS 4CM; CHOLECYSTECTOMY; INSERTION J-TUBE; LYSIS OF ADHESIONS              Surgeon(s):  Silas Maloney MD Stokes, Sean M, MD    Anesthesia: General    Staff:   Circulator: Ana Cabrera RN; Melly Vance RN  Scrub Person: Audrey Montgomery; Audrey Swanson; Lalito Sorto RN  Nursing Assistant: Sherita Krishna PCT; Lola Vasquez  Assistant: Jay Cruz PA  Nurse Extern: Maribel Matthews RN Extern  Assistant: Jay Cruz PA      Estimated Blood Loss: 100 mL    Urine Voided: 450 mL    Specimens:                Specimens       ID Source Type Tests Collected By Collected At Frozen?    A Pancreas Tissue TISSUE PATHOLOGY EXAM   Silas Maloney MD 3/8/24 0943 Yes    Description: mesenteric mass for frozen    B Gallbladder Tissue TISSUE PATHOLOGY EXAM   Silas Maloney MD 3/8/24 1040     Description: gallbladder                  Drains:   Closed/Suction Drain 1 Superior Abdomen Bulb (Active)   Site Description Unable to view 03/08/24 1200   Dressing Status Clean;Dry;Intact 03/08/24 1200   Drainage Appearance Serosanguineous 03/08/24 1200   Status To bulb suction 03/08/24 1200       Gastrostomy/Enterostomy Jejunostomy 1 16 Fr. LUQ (Active)   Securement sutured to abdomen 03/08/24 1200   Drain Status Clamped 03/08/24 1200   Dressing Status Clean;Dry;Intact 03/08/24 1200       Urethral Catheter 16 Fr. (Active)   Daily Indications Selected surgeries ( tract, abdomen) 03/08/24 1200   Site Assessment Clean;Skin intact 03/08/24 1200   Collection Container Standard drainage bag 03/08/24 1200   Securement Method Securing device 03/08/24  1200       Findings: Infiltrating pancreatic head mass densely adherent to superior mesenteric vein; multiple enlarged and firm soft tissue masses at the root of the mesentery distant from the pancreas, largest measuring 4 cm; a portion of the largest mesenteric mass was sent for frozen section evaluation and was consistent with poorly differentiated carcinoma, favoring adenocarcinoma        Complications: None    Assistant: Jay Cruz PA  was responsible for performing the following activities: Retraction and Suction and their skilled assistance was necessary for the success of this case.    Silas Maloney MD     Date: 3/8/2024  Time: 12:14 EST

## 2024-03-08 NOTE — ANESTHESIA PROCEDURE NOTES
Arterial Line      Patient reassessed immediately prior to procedure    Patient location during procedure: pre-op   Line placed for hemodynamic monitoring.  Performed By   CRNA/CAA: Josue Garrett CRNA   Preanesthetic Checklist  Completed: patient identified, IV checked, site marked, risks and benefits discussed, surgical consent, monitors and equipment checked, pre-op evaluation and timeout performed  Arterial Line Prep    Sterile Tech: cap, gloves and sterile barriers  Prep: ChloraPrep  Patient monitoring: blood pressure monitoring, continuous pulse oximetry and EKG  Arterial Line Procedure   Laterality:left  Location:  radial artery  Catheter size: 20 G   Guidance: ultrasound guided  PROCEDURE NOTE/ULTRASOUND INTERPRETATION.  Using ultrasound guidance the potential vascular sites for insertion of the catheter were visualized to determine the patency of the vessel to be used for vascular access.  After selecting the appropriate site for insertion, the needle was visualized under ultrasound being inserted into the radial artery, followed by ultrasound confirmation of wire and catheter placement. There were no abnormalities seen on ultrasound; an image was taken; and the patient tolerated the procedure with no complications.   Number of attempts: 1  Successful placement: yes Images: still images not obtained  Post Assessment   Dressing Type: line sutured, occlusive dressing applied, secured with tape and wrist guard applied.   Complications no  Circ/Move/Sens Assessment: normal and unchanged.   Patient Tolerance: patient tolerated the procedure well with no apparent complications

## 2024-03-08 NOTE — OP NOTE
General Surgery Operative Note    Exploratory Laparotomy  Procedure Report    Patient Name:  Costa Robbins  YOB: 1955  [id number]     Date of Surgery:  3/8/2024       Pre-op Diagnosis: Pancreatic head mass    Post-op Diagnosis: Metastatic pancreatic cancer to mesenteric lymph nodes    Procedure(s):  EXPLORATORY LAPAROTOMY; SMALL BOWEL REPAIR; BIOPSY MESENTERIC MASS 4CM; CHOLECYSTECTOMY; INSERTION J-TUBE; LYSIS OF ADHESIONS    Surgeon: Silas Maloney MD    Assistant:  Fabián Maya MD   2.   Jay Cruz PA     Anesthesia: General         Estimated Blood Loss: 100 mL    Complications: None     Implants:    Implant Name Type Inv. Item Serial No.  Lot No. LRB No. Used Action   CLIP LIGAT VASC HORIZON TI LG ORNG 6CT - [id number] Implant CLIP LIGAT VASC HORIZON TI LG ORNG 6CT  [employer]  N/A 1 Implanted   CLIP LIGAT VASC HORIZON TI MD JARAD 6CT - [id number] Implant CLIP LIGAT VASC HORIZON TI MD JARAD 6CT  [employer]  N/A 1 Implanted   CLIP LIGAT VASC HORIZON TI MD/MARCUS GRN 6CT - [id number] Implant CLIP LIGAT VASC HORIZON TI MD/LG GRN 6CT  [employer]  N/A 1 Implanted   CLIPAPPLR M/ ENDO LIGACLIP 20CLP 11IN MD - [id number] Implant CLIPAPPLR M/ ENDO LIGACLIP 20CLP 11IN MD  [employer]  DIV OF J AND J [id number] N/A 1 Implanted   CLIPAPPLR M/ ENDO LIGACLIP 13IN LG - [id number] Implant CLIPAPPLR M/ ENDO LIGACLIP 13IN LG  [employer]  DIV OF J AND J [id number] N/A 1 Implanted   CLIP LIGAT VASC HORIZON TI SM YEL 6CT - [id number] Implant CLIP LIGAT VASC HORIZON TI SM YEL 6CT  [employer]  N/A 1 Implanted   HEMOST ABS SURGICEL ORIG 4X8IN STRL - [id number] Implant HEMOST ABS SURGICEL ORIG 4X8IN STRL  [employer]  DIV OF J AND J [id number] N/A 1 Implanted       Specimen:          Specimens       ID Source Type Tests Collected By Collected At Frozen?    A Pancreas Tissue TISSUE PATHOLOGY EXAM   Silas Maloney MD 3/8/24 0959 Yes    Description: mesenteric mass for  frozen    B Gallbladder Tissue TISSUE PATHOLOGY EXAM   Silas Maloney MD 3/8/24 1040 No    Description: gallbladder                Findings:  Infiltrating pancreatic head mass densely adherent to superior mesenteric vein; multiple enlarged and firm soft tissue masses at the root of the mesentery distant from the pancreas, largest measuring 4 cm; a portion of the largest mesenteric mass was sent for frozen section evaluation and was consistent with poorly differentiated carcinoma, favoring adenocarcinoma     Indications: The patient is a 68-year-old male with a history of a pancreatic head mass which was causing biliary obstruction.  Diagnostic laparoscopy was performed showing no evidence of metastatic disease.  Discussions were made with the patient about various treatment options and the patient was agreeable to a pancreaticoduodenectomy.  Informed consent was obtained.    Description of Procedure:     After obtaining informed consent, the patient was taken to the operating room and placed in supine position. After appropriate DVT and antibiotic prophylaxis, general anesthesia was induced.  Tap blocks were performed by anesthesia.  Villatoro catheter was placed under sterile conditions.  A left radial arterial line was placed for anesthesia.  The abdomen was prepped and draped in standard sterile fashion.    An upper midline incision was made through the skin using a 10 blade.  The dermis was divided by electrocautery.  Soft tissue was divided using Bovie electrocautery down to the level of the anterior abdominal wall fascia.  The anterior abdominal wall fascia was incised using the Bovie and the peritoneal cavity was entered bluntly.  The fascial incision was expanded superiorly and inferiorly adhesions were noted between the omentum and small bowel and the anterior abdominal wall inferiorly where a previous lower midline incision had been made.  The omentum adhesions were taken down using Bovie electrocautery.   The small bowel was densely adherent to the anterior abdominal wall and adhesions were lysed using Metzenbaum scissors.  During this process a full-thickness defect was created in the small bowel which was temporarily closed with interrupted 3-0 silk sutures.  An additional serosal defect was identified with plan for subsequent repair.  Lysis of adhesions took approximately 30 minutes.    The peritoneal cavity was then inspected.  There were no lesions noted in the peritoneum lining, stomach, liver, spleen, small bowel, or colon.  However the root of the mesentery was found to have a firm mass deep to it and it was unclear at this time if this was due to the pancreatic mass or metastatic disease.  The hepatic flexure of the colon was then mobilized from its lateral attachments and retracted medially and inferiorly.  A Kocher maneuver was performed by dissecting the lateral attachments of the first and second portions of the duodenum and this was retracted medially.  The firm pancreatic head mass was then able to be palpated.  The gastrocolic ligament was then divided using the Enseal device starting from the pylorus and moving towards the extent of the greater curvature of the stomach.  The transverse colon mesentery was then dissected away from the inferior border of the pancreas.  In doing this it was noted that the posterior portion of the pancreatic head mass was densely adherent to the underlying superior mesenteric vein.  As well the mass in the mesentery identified previously was found to be inferior to the pancreas and was as such a separate structure.  Attention was then paid to this mass which was approximately 4 cm in size.  The peritoneum overlying this mass was incised where it was visualized and found to be a firm heterogeneous mass.  A portion of this mass was resected using the LigaSure device and sent to pathology as a frozen section.  This was confirmed to be poorly differentiated carcinoma  favoring adenocarcinoma.  As this had high probability to be a metastatic lymph node for pancreatic adenocarcinoma and the mass itself was densely adherent to the superior mesenteric vein, the decision was made to abort the Whipple procedure.    Cholecystectomy was performed to prevent future episodes of cholecystitis.  The fundus of the gallbladder was grasped with a Marisela clamp and the gallbladder was dissected free from the liver bed using Bovie electrocautery.  The cystic artery was identified and ligated with 2 clips placed proximally 1 distally and transected with scissors.  The cystic artery was bluntly dissected away from surrounding structures.  A right angle was placed across this and a clip was placed distally at the cystic duct and the cystic duct was transected using scissors.  The gallbladder was then sent to pathology as permanent specimen.  The cystic duct was then ligated with a 2-0 silk tie and large clip.    Attention was then paid to the small bowel defect from the lysis of adhesions.  The previously placed 3-0 silk sutures at the full-thickness defect were removed.  The defect was then repaired in 2 layers with a running 3-0 PDS and interrupted 3-0 silk sutures placed in a Lembert fashion.  The small bowel was inspected and was found to not be narrowed.  The serosal defect was then repaired with interrupted 3-0 silk sutures placed in Lembert fashion.  The small bowel in this area was inspected and was found not to be narrowed.    Due to the patient's not attrition secondary to the pancreatic mass the decision was made to create a jejunostomy tube to assist in nutrition.  An area of the jejunum approximately 30 cm distal to the ligament of Treitz was identified.  A pursestring suture was made in the antimesenteric border using 3-0 silk suture.  A small full-thickness defect was created in the small bowel using Bovie electrocautery.  A jejunostomy tube was placed through a separate stab incision  in the left lateral abdominal wall.  This tube was then placed into the small bowel and the pursestring suture was tied down.  The tube was then secured to the jejunum proximal to this with interrupted 3-0 silk suture placed to create a Witzel tunnel.  The jejunum was then secured to the abdominal wall using interrupted 3-0 silk sutures for a distance of approximately 10 cm.  A 10 flat CHERYL drain was placed through a separate stab incision in the right lateral abdominal wall.  This was placed in the area of dissection of the metastatic mesenteric lymph nodes.  This was secured at the skin level using a 2-0 nylon stitch.  The jejunostomy tube was then secured to the skin level using interrupted 2-0 nylon stitches.  The area of dissection was copiously irrigated using warm water.  The fascial defect was reapproximated using running #1 looped PDS.  The skin was reapproximated using interrupted 3-0 Vicryl sutures.  A dry dressing was placed on top of this.    All sponge and needle counts were correct times two at the completion of the procedure. The patient recovered from anesthesia, was extubated in the operating room and was transported to the PACU in stable condition.        Assistant 1: Fabián Maya MD was needed as an assistant due to his expertise in surgical oncology and complex knowledge and technical skills related to pancreatic surgery and pancreatic cancer.  Dr. Maya assisted in all aspects of the surgery including dissection of the primary tumor as well as metastatic tumor, cholecystectomy, small bowel repair, lysis of adhesions, and creation of jejunostomy tube.    Assistant 2: Jay Cruz PA  was responsible for performing the following activities: Retraction, Suction, and Irrigation and their skilled assistance was necessary for the success of this case.    Silas Maloney MD     Date: 3/8/2024  Time: 14:00 EST

## 2024-03-08 NOTE — ANESTHESIA POSTPROCEDURE EVALUATION
Patient: Costa Robbins    Procedure Summary       Date: 03/08/24 Room / Location:  [facility]    Anesthesia Start: 0732 Anesthesia Stop: 1157    Procedure: EXPLORATORY LAPAROTOMY; SMALL BOWEL REPAIR; BIOPSY MESENTERIC MASS 4CM; CHOLECYSTECTOMY; INSERTION J-TUBE; LYSIS OF ADHESIONS (Abdomen) Diagnosis:     Surgeons: Silas Maloney MD Provider: Hoda Conner DO    Anesthesia Type: general with blockBailey ASA Status: 3            Anesthesia Type: general with block, Bailey    Vitals  Vitals Value Taken Time   /61 03/08/24 1153   Temp     Pulse 82 03/08/24 1157   Resp     SpO2 100 % 03/08/24 1157   Vitals shown include unfiled device data.        Post Anesthesia Care and Evaluation    Patient location during evaluation: PACU  Patient participation: complete - patient participated  Level of consciousness: awake and alert  Pain management: adequate    Airway patency: patent  Anesthetic complications: No anesthetic complications  PONV Status: none  Cardiovascular status: hemodynamically stable and acceptable  Respiratory status: nonlabored ventilation, acceptable and nasal cannula  Hydration status: acceptable

## 2024-03-08 NOTE — OUTREACH NOTE
Medical Week 1 Survey      Flowsheet Row Responses   [facility] patient discharged from? [facility]   Does the patient have one of the following disease processes/diagnoses(primary or secondary)? Other   Week 1 attempt successful? No   Unsuccessful attempts Attempt 1   Revoke Readmitted            GAVIN MALDONADO - Registered Nurse

## 2024-03-08 NOTE — ANESTHESIA PROCEDURE NOTES
"Peripheral Block      Patient reassessed immediately prior to procedure    Patient location during procedure: OR  Reason for block: at surgeon's request and post-op pain management  Performed by  CRNA/CAA: Josue Garrett CRNA  Preanesthetic Checklist  Completed: patient identified, IV checked, site marked, risks and benefits discussed, surgical consent, monitors and equipment checked, pre-op evaluation and timeout performed  Prep:  Pt Position: supine  Sterile barriers:cap, gloves, mask and washed/disinfected hands  Prep: ChloraPrep  Patient monitoring: blood pressure monitoring, continuous pulse oximetry and EKG  Procedure    Sedation: yes  Performed under: general  Guidance:ultrasound guided  Images:still images obtained, printed/placed on chart    Laterality:Bilateral  Block Type:TAP  Injection Technique:single-shot  Needle Type:short-bevel and echogenic  Needle Gauge:20 G  Resistance on Injection: none    Medications Used: bupivacaine PF (MARCAINE) 0.25 % injection - Injection   60 mL - 3/8/2024 7:42:00 AM  dexamethasone sodium phosphate injection - Injection   4 mg - 3/8/2024 7:42:00 AM      Medications  Preservative Free Saline:5ml  Comment:Block Injection:  LA dose divided between Right and Left block        Post Assessment  Injection Assessment: negative aspiration for heme, incremental injection and no paresthesia on injection  Patient Tolerance:comfortable throughout block  Complications:no  Additional Notes    Subcostal TAPs    A high-frequency linear transducer, with sterile cover, was placed sub-xiphoid to identify Linea Alba, right and left Rectus Abdominus Muscles (GENOVEVA). The transducer was moved either right or left subcostally to identify the GENOVEVA and the Transverse Abdominus Muscle (OBRIEN). The insertion site was prepped in sterile fashion and then localized with 2-5 ml of 1% Lidocaine. Using ultrasound-guidance, a 20-gauge B-Jackson 4\" Ultraplex 360 non-stimulating echogenic needle was advanced in plane, " "from medial to lateral, until the tip of the needle was in the fascial plane between the GENOVEVA and OBRIEN. 1-3ml of preservative free normal saline was used to hydro-dissect the fascial planes. After the fascial plane was verified, the local anesthetic (LA) was injected. The procedure was repeated on the opposite side for bilateral coverage. Aspiration every 5 ml to prevent intravascular injection. Injection was completed with negative aspiration of blood and negative intravascular injection. Injection pressures were normal with minimal resistance. The subcostal approach to the TAP nerve block ideally anesthetizes the intercostal nerves T6-T9.     Mid-Axillary/Lateral TAPs    A high-frequency linear transducer, with sterile cover, was placed in the midaxillary line between the ASIS and costal margin. The External Oblique Muscle (EOM), Internal Oblique Muscle (IOM), Transverse Abdominus Muscle (OBRIEN), and Peritoneum were identified. The insertion site was prepped in sterile fashion and then localized with 2-5 ml of 1% Lidocaine. Using ultrasound-guidance, a 20-gauge B-Jackson 4\" Ultraplex 360 non-stimulating echogenic needle was advanced in plane, from medial to lateral, until the tip of the needle was in the fascial plane between the IOM and OBRIEN. 1-3ml of preservative free normal saline was used to hydro-dissect the fascial planes. After the fascial plane was verified, the local anesthetic (LA) was injected. The procedure was repeated on the opposite side for bilateral coverage. Aspiration every 5 ml to prevent intravascular injection. Injection was completed with negative aspiration of blood and negative intravascular injection. Injection pressures were normal with minimal resistance. Midaxillary TAPs should reach intercostal nerves T10- T11 and the subcostal nerve T12.              "

## 2024-03-08 NOTE — ANESTHESIA PROCEDURE NOTES
Peripheral IV    Patient location during procedure: pre-op  Line placed for Difficult Access.  Performed By   Anesthesiologist: Hoda Conner DO  Preanesthetic Checklist  Completed: patient identified, IV checked, site marked, risks and benefits discussed, surgical consent, monitors and equipment checked, pre-op evaluation and timeout performed  Peripheral IV Prep   Patient position: supine   Prep: ChloraPrep  Peripheral IV Procedure   Laterality:right  Location:  Arm  Catheter size: 16 G          Post Assessment   Dressing Type: tape and transparent.    IV Dressing/Site: clean, dry and intact

## 2024-03-09 LAB
ALBUMIN SERPL-MCNC: 3.2 G/DL (ref 3.5–5.2)
ALBUMIN/GLOB SERPL: 1.4 G/DL
ALP SERPL-CCNC: 63 U/L (ref 39–117)
ALT SERPL W P-5'-P-CCNC: 11 U/L (ref 1–41)
ANION GAP SERPL CALCULATED.3IONS-SCNC: 10 MMOL/L (ref 5–15)
AST SERPL-CCNC: 14 U/L (ref 1–40)
BASOPHILS # BLD AUTO: 0.05 10*3/MM3 (ref 0–0.2)
BASOPHILS NFR BLD AUTO: 0.4 % (ref 0–1.5)
BILIRUB SERPL-MCNC: 0.7 MG/DL (ref 0–1.2)
BUN SERPL-MCNC: 9 MG/DL (ref 8–23)
BUN/CREAT SERPL: 14.1 (ref 7–25)
CALCIUM SPEC-SCNC: 9.7 MG/DL (ref 8.6–10.5)
CHLORIDE SERPL-SCNC: 108 MMOL/L (ref 98–107)
CO2 SERPL-SCNC: 24 MMOL/L (ref 22–29)
CREAT SERPL-MCNC: 0.64 MG/DL (ref 0.76–1.27)
DEPRECATED RDW RBC AUTO: 43.4 FL (ref 37–54)
EGFRCR SERPLBLD CKD-EPI 2021: 104.4 ML/MIN/1.73
EOSINOPHIL # BLD AUTO: 0.1 10*3/MM3 (ref 0–0.4)
EOSINOPHIL NFR BLD AUTO: 0.7 % (ref 0.3–6.2)
ERYTHROCYTE [DISTWIDTH] IN BLOOD BY AUTOMATED COUNT: 13.3 % (ref 12.3–15.4)
GLOBULIN UR ELPH-MCNC: 2.3 GM/DL
GLUCOSE BLDC GLUCOMTR-MCNC: 105 MG/DL (ref 70–130)
GLUCOSE BLDC GLUCOMTR-MCNC: 204 MG/DL (ref 70–130)
GLUCOSE BLDC GLUCOMTR-MCNC: 212 MG/DL (ref 70–130)
GLUCOSE BLDC GLUCOMTR-MCNC: 55 MG/DL (ref 70–130)
GLUCOSE BLDC GLUCOMTR-MCNC: 59 MG/DL (ref 70–130)
GLUCOSE BLDC GLUCOMTR-MCNC: 98 MG/DL (ref 70–130)
GLUCOSE SERPL-MCNC: 58 MG/DL (ref 65–99)
HCT VFR BLD AUTO: 34.3 % (ref 37.5–51)
HGB BLD-MCNC: 11.9 G/DL (ref 13–17.7)
IMM GRANULOCYTES # BLD AUTO: 0.06 10*3/MM3 (ref 0–0.05)
IMM GRANULOCYTES NFR BLD AUTO: 0.4 % (ref 0–0.5)
LYMPHOCYTES # BLD AUTO: 2.42 10*3/MM3 (ref 0.7–3.1)
LYMPHOCYTES NFR BLD AUTO: 17.3 % (ref 19.6–45.3)
MCH RBC QN AUTO: 31 PG (ref 26.6–33)
MCHC RBC AUTO-ENTMCNC: 34.7 G/DL (ref 31.5–35.7)
MCV RBC AUTO: 89.3 FL (ref 79–97)
MONOCYTES # BLD AUTO: 1.24 10*3/MM3 (ref 0.1–0.9)
MONOCYTES NFR BLD AUTO: 8.9 % (ref 5–12)
NEUTROPHILS NFR BLD AUTO: 10.08 10*3/MM3 (ref 1.7–7)
NEUTROPHILS NFR BLD AUTO: 72.3 % (ref 42.7–76)
NRBC BLD AUTO-RTO: 0 /100 WBC (ref 0–0.2)
PLATELET # BLD AUTO: 304 10*3/MM3 (ref 140–450)
PMV BLD AUTO: 11.5 FL (ref 6–12)
POTASSIUM SERPL-SCNC: 3.2 MMOL/L (ref 3.5–5.2)
POTASSIUM SERPL-SCNC: 3.4 MMOL/L (ref 3.5–5.2)
PROT SERPL-MCNC: 5.5 G/DL (ref 6–8.5)
RBC # BLD AUTO: 3.84 10*6/MM3 (ref 4.14–5.8)
SODIUM SERPL-SCNC: 142 MMOL/L (ref 136–145)
WBC NRBC COR # BLD AUTO: 13.95 10*3/MM3 (ref 3.4–10.8)

## 2024-03-09 PROCEDURE — 63710000001 INSULIN LISPRO (HUMAN) PER 5 UNITS: Performed by: SURGERY

## 2024-03-09 PROCEDURE — 25010000002 KETOROLAC TROMETHAMINE PER 15 MG: Performed by: SURGERY

## 2024-03-09 PROCEDURE — 82948 REAGENT STRIP/BLOOD GLUCOSE: CPT

## 2024-03-09 PROCEDURE — 99222 1ST HOSP IP/OBS MODERATE 55: CPT | Performed by: INTERNAL MEDICINE

## 2024-03-09 PROCEDURE — 80053 COMPREHEN METABOLIC PANEL: CPT | Performed by: SURGERY

## 2024-03-09 PROCEDURE — 84132 ASSAY OF SERUM POTASSIUM: CPT | Performed by: SURGERY

## 2024-03-09 PROCEDURE — 85025 COMPLETE CBC W/AUTO DIFF WBC: CPT | Performed by: SURGERY

## 2024-03-09 RX ORDER — POTASSIUM CHLORIDE 20 MEQ/1
40 TABLET, EXTENDED RELEASE ORAL EVERY 4 HOURS
Status: DISPENSED | OUTPATIENT
Start: 2024-03-09 | End: 2024-03-10

## 2024-03-09 RX ORDER — POTASSIUM CHLORIDE 20 MEQ/1
40 TABLET, EXTENDED RELEASE ORAL EVERY 4 HOURS
Status: COMPLETED | OUTPATIENT
Start: 2024-03-09 | End: 2024-03-09

## 2024-03-09 RX ADMIN — POTASSIUM CHLORIDE 40 MEQ: 1500 TABLET, EXTENDED RELEASE ORAL at 08:39

## 2024-03-09 RX ADMIN — HYDROCODONE BITARTRATE AND ACETAMINOPHEN 1 TABLET: 5; 325 TABLET ORAL at 08:40

## 2024-03-09 RX ADMIN — KETOROLAC TROMETHAMINE 30 MG: 30 INJECTION, SOLUTION INTRAMUSCULAR; INTRAVENOUS at 03:16

## 2024-03-09 RX ADMIN — POTASSIUM CHLORIDE 40 MEQ: 1500 TABLET, EXTENDED RELEASE ORAL at 12:23

## 2024-03-09 RX ADMIN — KETOROLAC TROMETHAMINE 30 MG: 30 INJECTION, SOLUTION INTRAMUSCULAR; INTRAVENOUS at 08:40

## 2024-03-09 RX ADMIN — DEXTROSE MONOHYDRATE 50 ML: 25 INJECTION, SOLUTION INTRAVENOUS at 08:21

## 2024-03-09 RX ADMIN — HYDROCODONE BITARTRATE AND ACETAMINOPHEN 1 TABLET: 5; 325 TABLET ORAL at 12:26

## 2024-03-09 RX ADMIN — KETOROLAC TROMETHAMINE 30 MG: 30 INJECTION, SOLUTION INTRAMUSCULAR; INTRAVENOUS at 14:45

## 2024-03-09 RX ADMIN — DEXTROSE 15 G: 15 GEL ORAL at 07:41

## 2024-03-09 RX ADMIN — AMLODIPINE BESYLATE 10 MG: 10 TABLET ORAL at 08:39

## 2024-03-09 RX ADMIN — HYDROCODONE BITARTRATE AND ACETAMINOPHEN 1 TABLET: 5; 325 TABLET ORAL at 22:37

## 2024-03-09 RX ADMIN — HYDROCODONE BITARTRATE AND ACETAMINOPHEN 1 TABLET: 5; 325 TABLET ORAL at 18:41

## 2024-03-09 RX ADMIN — INSULIN LISPRO 3 UNITS: 100 INJECTION, SOLUTION INTRAVENOUS; SUBCUTANEOUS at 12:23

## 2024-03-09 RX ADMIN — FINASTERIDE 5 MG: 5 TABLET, FILM COATED ORAL at 08:39

## 2024-03-09 RX ADMIN — LISINOPRIL 40 MG: 40 TABLET ORAL at 08:40

## 2024-03-09 NOTE — DISCHARGE PLACEMENT REQUEST
"Robert Kesslershreya VERAS (68 y.o. Male)   Referring MD: Dr. Brandy Ceballos  PCP: Jonathan Conner  Hospice Dx: Met Pancreatic CA  Covid negative on 3/8/24  Consult was for information. Pt has been seen while @ [facility] & the does the 24 hr number to call hospice should he d/c over the weekend & opt for hospice services. HL will NOT be working tomorrow.      Date of Birth   1955    Social Security Number       Address   [address] [place] [address]    Home Phone   [phone]    MRN   [id number]       [facility]    Marital Status                               Admission Date   3/8/24    Admission Type   Elective    Admitting Provider   Silas Maloney MD    Attending Provider   Silas Maloney MD    Department, Room/Bed   [address], S547/1       Discharge Date       Discharge Disposition       Discharge Destination                                 Attending Provider: Silas Maloney MD    Allergies: Atenolol, Morphine, Penicillins    Isolation: None   Infection: None   Code Status: CPR    Ht: 180.3 cm (71\")   Wt: 74.8 kg (165 lb)    Admission Cmt: None   Principal Problem: Pancreatic carcinoma metastatic to intra-abdominal lymph node [C25.9,C77.2]                   Active Insurance as of 3/8/2024       Primary Coverage       Payor Plan Insurance Group Employer/Plan Group    ANTH MEDICARE REPLACEMENT ANTHEM MEDICARE ADVANTAGE [id number]       Payor Plan Address Payor Plan Phone Number Payor Plan Fax Number Effective Dates    [address] [phone]  1/1/2024 - None Entered    [facility] [address]         Subscriber Name Subscriber Birth Date Member ID       COSTA KESSLER EDA 1955 [id number]                     Emergency Contacts        (Rel.) Home Phone Work Phone Mobile Phone    LAWRENCE KESSLER (Spouse) [phone] -- [phone]              Emergency Contact Information       Name Relation Home Work Mobile    LAWRENCE KESSLER Spouse " [phone]          Insurance Information                  Critical access hospital MEDICARE REPLACEMENT/Critical access hospital MEDICARE ADVANTAGE Phone: [phone]    Subscriber: Costa Robbins Subscriber#: [id number]    Group#: [id number] Precert#: --          Problem List             Codes Noted - Resolved       Hospital    * (Principal) Pancreatic carcinoma metastatic to intra-abdominal lymph node ICD-10-CM: C25.9, C77.2  ICD-9-CM: 157.9, 196.2 3/8/2024 - Present       Non-Hospital    Right leg weakness ICD-10-CM: R29.898  ICD-9-CM: 729.89 3/3/2024 - Present    Hepatic abscess ICD-10-CM: K75.0  ICD-9-CM: 572.0 2/14/2024 - Present    Severe malnutrition ICD-10-CM: E43  ICD-9-CM: 261 1/28/2024 - Present    Choledocholithiasis ICD-10-CM: K80.50  ICD-9-CM: 574.50 1/26/2024 - Present    Pancreatic mass ICD-10-CM: K86.89  ICD-9-CM: 577.8 1/26/2024 - Present    Jaundice ICD-10-CM: R17  ICD-9-CM: 782.4 1/26/2024 - Present    Elevated LFTs ICD-10-CM: R79.89  ICD-9-CM: 790.6 1/26/2024 - Present    ETOH use (Chronic) ICD-10-CM: Z78.9  ICD-9-CM: V49.89 5/25/2023 - Present    Former smoker ICD-10-CM: Z87.891  ICD-9-CM: V15.82 5/25/2023 - Present    PVD (peripheral vascular disease) with claudication ICD-10-CM: I73.9  ICD-9-CM: 443.9 3/21/2023 - Present    Renal artery stenosis ICD-10-CM: I70.1  ICD-9-CM: 440.1 1/18/2023 - Present    Family history of coronary artery disease ICD-10-CM: Z82.49  ICD-9-CM: V17.3 9/1/2022 - Present    Mixed hyperlipidemia ICD-10-CM: E78.2  ICD-9-CM: 272.2 9/1/2022 - Present    T2DM (type 2 diabetes mellitus) ICD-10-CM: E11.9  ICD-9-CM: 250.00 5/21/2020 - Present    Essential hypertension ICD-10-CM: I10  ICD-9-CM: 401.9 5/21/2020 - Present    Diverticulosis ICD-10-CM: K57.90  ICD-9-CM: 562.10 5/21/2020 - Present        History & Physical        Jay Cruz PA at 03/08/24 0646       Attestation signed by Silas Maloney MD at 03/08/24 0725    I have reviewed this documentation and agree.           "        Pre-Op H&P  Costa Robbins  [id number]  1955    Chief complaint: \"I have a mass on the pancreas\"    HPI:    Patient is a 68 y.o.male who presents with a known pancreatic mass.  Workup shows no evidence of metastatic disease.  He was seen a week ago in the emergency room secondary to weakness in his lower extremity.  He was found to be hyponatremic and hyper per glycemic.  Both of these were corrected.  His right lower extremity symptoms have resolved.  He held his Plavix.  He is now admitted for a Whipple procedure.    Review of Systems:  General ROS: negative for chills, fever or skin lesions;  No changes since last office visit.  Neg for recent sick exposure  Cardiovascular ROS: no chest pain or dyspnea on exertion  Respiratory ROS: no cough, shortness of breath, or wheezing    Allergies:   Allergies   Allergen Reactions    Atenolol Other (See Comments)     Bradycardia.    Morphine Itching    Penicillins Hives     Has tolerated cefazolin, cefepime    Beta lactam allergy details  Antibiotic reaction: (!) diarrhea, hives  Age at reaction: child (1st as child then later in college)  Dose to reaction time: days  Reason for antibiotic: URI  Epinephrine required for reaction?: no  Tolerated antibiotics: unknown          Home Meds:    No current facility-administered medications on file prior to encounter.     Current Outpatient Medications on File Prior to Encounter   Medication Sig Dispense Refill    amLODIPine (NORVASC) 10 MG tablet Take 1 tablet by mouth Daily. 30 tablet 5    empagliflozin (Jardiance) 10 MG tablet tablet Take 1 tablet by mouth Daily.      finasteride (PROSCAR) 5 MG tablet Take 1 tablet by mouth Daily.      Insulin Glargine (BASAGLAR KWIKPEN SC) Inject 40 Units under the skin into the appropriate area as directed Every Night.      lisinopril (PRINIVIL,ZESTRIL) 40 MG tablet Take 1 tablet by mouth Daily.      naloxone (NARCAN) 4 MG/0.1ML nasal spray Call 911. Don't prime. [place] in 1 " nostril for overdose. Repeat in 2-3 minutes in other nostril if no or minimal breathing/responsiveness. 2 each 0       PMH:   Past Medical History:   Diagnosis Date    Bile duct obstruction 01/27/2024    found during ERCP    COVID 2021    no hospitalization    Diabetes mellitus 2017    po meds and insulin- checsk sugar rarely    Diverticulitis 2009    surgically intervention    Dizzy     Enlarged prostate     recently started on Proscar    ETOH use 05/25/2023    Former smoker 05/25/2023    Hearing decreased     History of shingles     2008-  belt line    Hypertension     Joint pain     hands    Melanoma     CHEST, BACK, NECK MULTI, HEAD    Pancreatic mass 01/27/2024    found on CT scan; ERCP confirmed    PVD (peripheral vascular disease)     SOBOE (shortness of breath on exertion)     Tinnitus     Unintentional weight loss     Uses contact lenses     bilat    Wears glasses      PSH:    Past Surgical History:   Procedure Laterality Date    AORTAGRAM Bilateral 04/12/2023    Procedure: AORTAGRAM WITH RUNOFFS  STENT OF THE LEFT ILIAC ARTERY;  Surgeon: Moses Escalante MD;  Location: [facility];  Service: Vascular;  Laterality: Bilateral;    COLON RESECTION  05/2009    partial colectomy    COLONOSCOPY      DIAGNOSTIC LAPAROSCOPY N/A 02/07/2024    Procedure: DIAGNOSTIC LAPAROSCOPY WITH LIVER BIOPSY AND PERITONEAL WASHINGS;  Surgeon: Silas Maloney MD;  Location:  [facility];  Service: General;  Laterality: N/A;    ERCP N/A 01/27/2024    Procedure: ENDOSCOPIC RETROGRADE CHOLANGIOPANCREATOGRAPHY WITH STENT PLACEMENT;  Surgeon: Brunner, Mark I, MD;  Location:  [facility];  Service: Gastroenterology;  Laterality: N/A;    FEMORAL ENDARTERECTOMY Left 05/25/2023    Procedure: FEMORAL ENDARTERECTOMY WITH PROPATEN GRAFT 8MM X 40CM;  Surgeon: Moses Escalante MD;  Location:  [facility];  Service: Vascular;  Laterality: Left;    FEMORAL FEMORAL BYPASS N/A 05/25/2023    Procedure: FEMORAL FEMORAL BYPASS;  Surgeon:  Moses Escalante MD;  Location: [facility];  Service: Vascular;  Laterality: N/A;    ORIF ANKLE FRACTURE Left     1 plate and 2 screws and 6 pins    SKIN BIOPSY      SKIN CANCER EXCISION      melanoma    WISDOM TOOTH EXTRACTION       Patient denies allergy to contrast dye or latex  Immunization History:  Influenza: No  Pneumococcal: No  Tetanus: Probably up-to-date    Social History:   Tobacco:   Social History     Tobacco Use   Smoking Status Former    Current packs/day: 0.00    Average packs/day: 0.5 packs/day for 43.0 years (21.5 ttl pk-yrs)    Types: Cigarettes    Start date: 1980    Quit date: 2023    Years since quittin.0    Passive exposure: Never   Smokeless Tobacco Never   Tobacco Comments    Pt states that he has been able to stop smoking this month - 2023      Alcohol:     Social History     Substance and Sexual Activity   Alcohol Use Not Currently    Comment: ; hx of ETOH use- nothing last 2 months       Vitals:           There were no vitals taken for this visit.    Physical Exam:  General Appearance:    Alert, cooperative, no distress, appears stated age   Head:    Normocephalic, without obvious abnormality, atraumatic   Lungs:   Clear to auscultation bilaterally to the bases    Heart: S 1 S2 without rubs murmurs or gallops    Abdomen:  Soft, nontender, bowel sounds present throughout.   Breast Exam:    deferred   Genitalia:    deferred   Extremities:   Extremities normal, atraumatic, no cyanosis or edema   Skin:   Skin color, texture, turgor normal, no rashes or lesions   Neurologic:   Grossly intact   Results Review  LABS:  Lab Results   Component Value Date    WBC 10.19 2024    HGB 15.6 2024    HGB 15.6 2024    HCT 46 2024    HCT 46 2024    MCV 90.7 2024     2024    NEUTROABS 7.11 (H) 2024    GLUCOSE 140 (H) 2024    BUN 11 2024    CREATININE 0.57 (L) 2024     (L) 2024    K 3.2 (L) 2024      03/04/2024    CO2 23.0 03/04/2024    MG 1.9 03/04/2024    PHOS 2.5 03/04/2024    CALCIUM 10.1 03/04/2024    ALBUMIN 4.0 03/03/2024    AST 12 03/03/2024    AST 12 03/03/2024    ALT 15 03/03/2024    ALT 15 03/03/2024    BILITOT 0.9 03/03/2024    PTT 26.5 03/03/2024    INR 1 03/03/2024       RADIOLOGY:  Adult Transthoracic Echo Limited W/ Cont if Necessary Per Protocol    Result Date: 3/5/2024    Left ventricular systolic function is normal. Left ventricular ejection fraction appears to be 66 - 70%.   Saline test results are negative.   There is calcification of the aortic and mitral valve.     Duplex Carotid Ultrasound CAR    Result Date: 3/4/2024    Right internal carotid artery demonstrates normal flow without evidence of hemodynamically significant stenosis.   Left internal carotid artery demonstrates normal flow without evidence of hemodynamically significant stenosis.   Bilateral intimal thickening and mild scattered plaque is noted.       I reviewed the patient's new clinical results.    Cancer Staging (if applicable)  Cancer Patient: __ yes __no __unknown; If yes, clinical stage T:__ N:__M:__, stage group or __N/A    Impression: Pancreatic head tumor  Past history of EtOH abuse  Recent hypokalemia, hyponatremia  Recent laparoscopy with liver biopsy.  Jaundice  Type 2 diabetes mellitus  Hypertension    Plan: Whipple procedure    KALYN Ybarra   03/08/24   6:46 AM EST     Electronically signed by Silas Maloney MD at 03/08/24 0731       Current Facility-Administered Medications   Medication Dose Route Frequency Provider Last Rate Last Admin    acetaminophen (TYLENOL) tablet 650 mg  650 mg Oral Q4H PRN Silas Maloney MD        amLODIPine (NORVASC) tablet 10 mg  10 mg Oral Daily Silas Maloney MD   10 mg at 03/09/24 0839    Calcium Replacement - Follow Nurse / BPA Driven Protocol   Does not apply PRN Lucius Elsa, APRN        dextrose (D50W) (25 g/50 mL) IV injection 10-50 mL  10-50  mL Intravenous Q15 Min PRN Silas Maloney MD   50 mL at 03/09/24 0821    dextrose (GLUTOSE) oral gel 15 g  15 g Oral Q15 Min PRN Silas Maloney MD   15 g at 03/09/24 0741    diazePAM (VALIUM) tablet 5 mg  5 mg Oral Q6H PRN Silas Maloney MD        docusate sodium (COLACE) capsule 100 mg  100 mg Oral BID PRN Silas Maloney MD        Famotidine (PF) (PEPCID) injection 20 mg  20 mg Intravenous BID Silas Maloney MD        finasteride (PROSCAR) tablet 5 mg  5 mg Oral Daily Silas Maloney MD   5 mg at 03/09/24 0839    glucagon (GLUCAGEN) injection 1 mg  1 mg Intramuscular Q15 Min PRN Silas Maloney MD        HYDROcodone-acetaminophen (NORCO) 5-325 MG per tablet 1 tablet  1 tablet Oral Q4H PRN Zayda Johnson APRN   1 tablet at 03/09/24 1226    ibuprofen (ADVIL,MOTRIN) tablet 400 mg  400 mg Oral Q6H PRN Silas Maloney MD        insulin detemir (LEVEMIR) injection 1-200 Units  1-200 Units Subcutaneous Nightly - Glucommander Silas Maloney MD   19 Units at 03/08/24 2114    insulin lispro (humaLOG) injection 1-200 Units  1-200 Units Subcutaneous 4x Daily With Meals & Nightly Silas Maloney MD   3 Units at 03/09/24 1223    insulin lispro (humaLOG) injection 1-200 Units  1-200 Units Subcutaneous PRN Silas Maloney MD        ketorolac (TORADOL) injection 30 mg  30 mg Intravenous Q6H Silas Maloney MD   30 mg at 03/09/24 0840    lisinopril (PRINIVIL,ZESTRIL) tablet 40 mg  40 mg Oral Daily Silas Maloney MD   40 mg at 03/09/24 0840    Magnesium Standard Dose Replacement - Follow Nurse / BPA Driven Protocol   Does not apply PRN Elsa Kan, APRSANDRA        metoclopramide (REGLAN) injection 10 mg  10 mg Intravenous Q6H PRN Silas Maloney MD        ondansetron ODT (ZOFRAN-ODT) disintegrating tablet 4 mg  4 mg Oral Q6H PRN Silas Maloney MD        Or    ondansetron (ZOFRAN) injection 4 mg  4 mg Intravenous Q6H PRN Silas Maloney MD      "   Phosphorus Replacement - Follow Nurse / BPA Driven Protocol   Does not apply PRN Elsa Kan APRN        Potassium Replacement - Follow Nurse / BPA Driven Protocol   Does not apply PRN Elsa Kan APRN            Physician Progress Notes (last 72 hours)        Silas Maloney MD at 03/09/24 0846          Patient Name:  Costa Robbins  YOB: 1955  [id number]    Surgery Progress Note    Date of visit: 3/9/2024    Subjective   Pain well controlled. No nausea/vomiting. No fevers/chills.         Objective       /69 (BP Location: Right arm, Patient Position: Lying)   Pulse 67   Temp 98.1 °F (36.7 °C) (Oral)   Resp 18   Ht 180.3 cm (71\")   Wt 74.8 kg (165 lb)   SpO2 95%   BMI 23.01 kg/m²     Intake/Output Summary (Last 24 hours) at 3/9/2024 0846  Last data filed at 3/8/2024 2100  Gross per 24 hour   Intake 4250 ml   Output 2095 ml   Net 2155 ml       CV:  Rhythm regular and rate regular  L:  Clear to auscultation bilaterally  Abd:  Bowel sounds positive, soft, appropriately tender, dressing c/d/I, CHERYL serosanguinous, J tube bilious  Ext:  No cyanosis, clubbing, edema    Recent labs and imaging that are back at this time have been reviewed.   K 3.2  Cr 0.64  WBC 14  Hgb 11.9        Assessment & Plan     Pt POD#1 ex lap, biopsy mesenteric mass, J tube, small bowel repair, cholecystectomy  Advance diet as tolerated  Pain control  OOB, IS, DVT prlx  Clamp J tube, DC Villatoro  I discussed the frozen section results from the mesenteric mass biopsy with the patient. I have placed a referral with Dr. Ceballos with Medical Oncology who will discuss systemic treatment options with the patient.       Silas Maloney MD  3/9/2024  08:46 EST        Electronically signed by Silas Maloney MD at 03/09/24 0851       Progress Notes signed by [employer], [employer] at 03/07/24 1117         [Media Unavailable] Scan on 3/7/2024 0935 by [employer], [employer]: PANCREATIC MASS/RLE WEAKNESS " CONSULTATION, [facility], 03/04/2024          Electronically signed by [employer], [employer] at 03/07/24 1117          Consult Notes (last 72 hours)        Brandy Ceballos MD at 03/09/24 1148        Consult Orders    1. Inpatient Hematology & Oncology Consult [[id number]] ordered by Silas Maloney MD at 03/08/24 1252                   Subjective     PROBLEM LIST:    Pancreatic carcinoma metastatic to intra-abdominal lymph node         CHIEF COMPLAINT: pancreas cancer    HISTORY OF PRESENT ILLNESS:  The patient is a 68 y.o. male, with a history of DM, HL, HTN, PVD s/p left iliac stent and right fem-fem bypass who was found to have a pancreatic mass after presenting with jaundice (tbili 9.8) and biliary obstruction in late January.  MRCP 1/26/24 showed intra and extrahepatic biliary ductal dilatation with narrowing at the level of the pancreatic head.  CT chest 1/28/24 showed sub-cm left lung nodules.  He underwent ERCP with stent placement.  Bile duct brushings 1/27/24 were negative for malignant cells.  Biopsy of a suspicious liver lesion on 2/7/24 showed no evidence of malignancy.  He was taken to the OR for a whipple procedure on 3/8/24, and was noted to have mesenteric adenopathy.  Biopsy of a 4 cm mesenteric mass was performed with frozen section showing carcinoma, likely adenocarcinoma.  CA 19-9 456.    He lives in [facility] with his wife.  He does not have any children.    REVIEW OF SYSTEMS:  A 14 point review of systems was performed and is negative except as noted above.    Past Medical History:   Diagnosis Date    Bile duct obstruction 01/27/2024    found during ERCP    COVID 2021    no hospitalization    Diabetes mellitus 2017    po meds and insulin- checsk sugar rarely    Diverticulitis 2009    surgically intervention    Dizzy     Enlarged prostate     recently started on Proscar    ETOH use 05/25/2023    Former smoker 05/25/2023    Hearing decreased     History of shingles     2008-  belt line     Hypertension     Joint pain     hands    Melanoma     CHEST, BACK, NECK MULTI, HEAD    Pancreatic mass 01/27/2024    found on CT scan; ERCP confirmed    PVD (peripheral vascular disease)     SOBOE (shortness of breath on exertion)     Tinnitus     Unintentional weight loss     Uses contact lenses     bilat    Wears glasses        No current facility-administered medications on file prior to encounter.     Current Outpatient Medications on File Prior to Encounter   Medication Sig Dispense Refill    amLODIPine (NORVASC) 10 MG tablet Take 1 tablet by mouth Daily. 30 tablet 5    empagliflozin (Jardiance) 10 MG tablet tablet Take 1 tablet by mouth Daily.      finasteride (PROSCAR) 5 MG tablet Take 1 tablet by mouth Daily.      Insulin Glargine (BASAGLAR KWIKPEN SC) Inject 40 Units under the skin into the appropriate area as directed Every Night.      lisinopril (PRINIVIL,ZESTRIL) 40 MG tablet Take 1 tablet by mouth Daily.      naloxone (NARCAN) 4 MG/0.1ML nasal spray Call 911. Don't prime. [place] in 1 nostril for overdose. Repeat in 2-3 minutes in other nostril if no or minimal breathing/responsiveness. 2 each 0       Allergies   Allergen Reactions    Atenolol Other (See Comments)     Bradycardia.    Morphine Itching    Penicillins Hives     Has tolerated cefazolin, cefepime    Beta lactam allergy details  Antibiotic reaction: (!) diarrhea, hives  Age at reaction: child (1st as child then later in college)  Dose to reaction time: days  Reason for antibiotic: URI  Epinephrine required for reaction?: no  Tolerated antibiotics: unknown          Past Surgical History:   Procedure Laterality Date    AORTAGRAM Bilateral 04/12/2023    Procedure: AORTAGRAM WITH RUNOFFS  STENT OF THE LEFT ILIAC ARTERY;  Surgeon: Moses Escalante MD;  [facility];  Service: Vascular;  Laterality: Bilateral;    COLON RESECTION  05/2009    partial colectomy    COLONOSCOPY      DIAGNOSTIC LAPAROSCOPY N/A 02/07/2024    Procedure:  DIAGNOSTIC LAPAROSCOPY WITH LIVER BIOPSY AND PERITONEAL WASHINGS;  Surgeon: Silas Maloney MD;  Location:  [facility];  Service: General;  Laterality: N/A;    ERCP N/A 2024    Procedure: ENDOSCOPIC RETROGRADE CHOLANGIOPANCREATOGRAPHY WITH STENT PLACEMENT;  Surgeon: Brunner, Mark I, MD;  Location: [facility];  Service: Gastroenterology;  Laterality: N/A;    FEMORAL ENDARTERECTOMY Left 2023    Procedure: FEMORAL ENDARTERECTOMY WITH PROPATEN GRAFT 8MM X 40CM;  Surgeon: Moses Escalante MD;  Location:  [facility];  Service: Vascular;  Laterality: Left;    FEMORAL FEMORAL BYPASS N/A 2023    Procedure: FEMORAL FEMORAL BYPASS;  Surgeon: Moses Escalante MD;  Location:  [facility];  Service: Vascular;  Laterality: N/A;    ORIF ANKLE FRACTURE Left     1 plate and 2 screws and 6 pins    SKIN BIOPSY      SKIN CANCER EXCISION      melanoma    WISDOM TOOTH EXTRACTION             Social History     Socioeconomic History    Marital status:     Number of children: 0   Tobacco Use    Smoking status: Former     Current packs/day: 0.00     Average packs/day: 0.5 packs/day for 43.0 years (21.5 ttl pk-yrs)     Types: Cigarettes     Start date: 1980     Quit date: 2023     Years since quittin.1     Passive exposure: Never    Smokeless tobacco: Never    Tobacco comments:     Pt states that he has been able to stop smoking this month - 2023   Vaping Use    Vaping status: Never Used   Substance and Sexual Activity    Alcohol use: Not Currently     Comment: ; hx of ETOH use- nothing last 2 months    Drug use: Yes     Types: Marijuana     Comment: ONCE A MONTH    Sexual activity: Defer       Family History   Problem Relation Age of Onset    Diabetes Mother     Heart attack Mother     Hypertension Mother     Hyperlipidemia Mother     Stroke Mother     Hodgkin's lymphoma Father     Hypertension Father     Diabetes Brother     Hyperlipidemia Brother        Objective     Vitals:    24 1523  "03/08/24 2111 03/09/24 0314 03/09/24 0903   BP:  118/71 123/69 142/76   BP Location:  Right arm Right arm Right arm   Patient Position:  Lying Lying Lying   Pulse:  97 67 82   Resp:  18 18 18   Temp:  98.8 °F (37.1 °C) 98.1 °F (36.7 °C) 98.2 °F (36.8 °C)   TempSrc:  Oral Oral Oral   SpO2:  96% 95% 100%   Weight: 74.8 kg (165 lb)      Height: 180.3 cm (71\")                      Performance Status: 2  General: well appearing male in no acute distress  Neuro: alert and oriented  HEENT: sclerae anicteric, oropharynx clear  Extremities: no lower extremity edema  Skin: no rashes, lesions, bruising, or petechiae  Psych: mood and affect appropriate    Lab Results   Component Value Date    WBC 13.95 (H) 03/09/2024    HGB 11.9 (L) 03/09/2024    HCT 34.3 (L) 03/09/2024    MCV 89.3 03/09/2024     03/09/2024     Lab Results   Component Value Date    GLUCOSE 58 (L) 03/09/2024    BUN 9 03/09/2024    CREATININE 0.64 (L) 03/09/2024    BCR 14.1 03/09/2024    K 3.2 (L) 03/09/2024    CO2 24.0 03/09/2024    CALCIUM 9.7 03/09/2024    ALBUMIN 3.2 (L) 03/09/2024    AST 14 03/09/2024    ALT 11 03/09/2024     I personally reviewed the CT imaging studies        Assessment & Plan     Costa Robbins is a 68 y.o. male with a recent diagnosis of pancreatic cancer, now with confirmed metastatic disease based on biopsy of mesenteric mass done yesterday.    We discussed that this is unfortunately an incurable disease, and that palliative chemotherapy is available if he wishes to pursue this.  We discussed the options and prognosis for treatment metastatic pancreas cancer, and potential effect of treatment on quality of life.  We reviewed the side effects of FOLFIRINOX chemotherapy.  The goal of treatment in this situation is to help him maintain the best quality of life for as long as possible.    After discussion, he feels that he would not want chemotherapy.  I would recommend hospice care in this case, and he is agreeable to meet with " the hospice liaison while he is here.  He needs to have further discussion with his wife.  I will check in with him again tomorrow to answer any further questions.                  Brandy Ceballos MD    3/9/2024           Electronically signed by Brandy Ceballos MD at 24 1228       Zayda Johnson APRN at 24 1421        Consult Orders    1. Inpatient Hospitalist Consult [[id number]] ordered by Silas Maloney MD at 24 1226                      [facility] Medicine Services  CONSULT NOTE      Patient Name: Costa Robbins  : 1955  MRN: [id number]    Primary Care Physician: Jonathan Conner MD  Provider requesting consultation: Brandy Ceballos MD    Subjective   Subjective     Reason for Consultation:  Medical management     HPI:  Costa Robbins is a 68 y.o. male PMH HTN, HLD, DMII, BPH, alcohol use, hx of pancreatic mass admitted for Whipple procedure per Dr Maloney. Pt reports abd pain but denies N/V. He ate all of his dinner try except for jello. Pt states he does not want dilaudid for pain because the last time he had it, it caused intense abdominal pain. He is requesting Norco. Will make the adjustment.       Review of Systems  Gen- No fevers, chills  CV- No chest pain, palpitations  Resp- No cough, dyspnea  GI- No N/V/D, (+) abd pain      Otherwise complete ROS reviewed and is negative except as mentioned in the HPI.    Past Medical History:   Diagnosis Date    Bile duct obstruction 2024    found during ERCP    COVID     no hospitalization    Diabetes mellitus 2017    po meds and insulin- checsk sugar rarely    Diverticulitis 2009    surgically intervention    Dizzy     Enlarged prostate     recently started on Proscar    ETOH use 2023    Former smoker 2023    Hearing decreased     History of shingles     2008-  belt line    Hypertension     Joint pain     hands    Melanoma     CHEST, BACK, NECK MULTI, HEAD    Pancreatic mass 2024     found on CT scan; ERCP confirmed    PVD (peripheral vascular disease)     SOBOE (shortness of breath on exertion)     Tinnitus     Unintentional weight loss     Uses contact lenses     bilat    Wears glasses        Past Surgical History:   Procedure Laterality Date    AORTAGRAM Bilateral 04/12/2023    Procedure: AORTAGRAM WITH RUNOFFS  STENT OF THE LEFT ILIAC ARTERY;  Surgeon: Moses Escalante MD;  Location: [facility]BR[facility];  Service: Vascular;  Laterality: Bilateral;    COLON RESECTION  05/2009    partial colectomy    COLONOSCOPY      DIAGNOSTIC LAPAROSCOPY N/A 02/07/2024    Procedure: DIAGNOSTIC LAPAROSCOPY WITH LIVER BIOPSY AND PERITONEAL WASHINGS;  Surgeon: Silas Maloney MD;  Location: [facility] OR;  Service: General;  Laterality: N/A;    ERCP N/A 01/27/2024    Procedure: ENDOSCOPIC RETROGRADE CHOLANGIOPANCREATOGRAPHY WITH STENT PLACEMENT;  Surgeon: Brunner, Mark I, MD;  Location: [facility] ENDOSCOPY;  Service: Gastroenterology;  Laterality: N/A;    FEMORAL ENDARTERECTOMY Left 05/25/2023    Procedure: FEMORAL ENDARTERECTOMY WITH PROPATEN GRAFT 8MM X 40CM;  Surgeon: Moses Escalante MD;  Location:  [facility];  Service: Vascular;  Laterality: Left;    FEMORAL FEMORAL BYPASS N/A 05/25/2023    Procedure: FEMORAL FEMORAL BYPASS;  Surgeon: Moses Escalante MD;  Location: [facility] OR;  Service: Vascular;  Laterality: N/A;    ORIF ANKLE FRACTURE Left     1 plate and 2 screws and 6 pins    SKIN BIOPSY      SKIN CANCER EXCISION      melanoma    WISDOM TOOTH EXTRACTION         Family History: family history includes Diabetes in his brother and mother; Heart attack in his mother; Hodgkin's lymphoma in his father; Hyperlipidemia in his brother and mother; Hypertension in his father and mother; Stroke in his mother. Otherwise pertinent FHx was reviewed and unremarkable.     Social History:  reports that he quit smoking about 13 months ago. His smoking use included cigarettes. He started smoking about 44 years ago. He  has a 21.5 pack-year smoking history. He has never been exposed to tobacco smoke. He has never used smokeless tobacco. He reports that he does not currently use alcohol. He reports current drug use. Drug: Marijuana.    Medications:  Medications Prior to Admission   Medication Sig Dispense Refill Last Dose    amLODIPine (NORVASC) 10 MG tablet Take 1 tablet by mouth Daily. 30 tablet 5 3/7/2024    cefuroxime (CEFTIN) 500 MG tablet Take 1 tablet by mouth 2 (Two) Times a Day. For 7 days, stared on 02-29-24   3/7/2024    empagliflozin (Jardiance) 10 MG tablet tablet Take 1 tablet by mouth Daily.   3/7/2024    finasteride (PROSCAR) 5 MG tablet Take 1 tablet by mouth Daily.   3/7/2024    hydrALAZINE (APRESOLINE) 50 MG tablet Take 1 tablet by mouth As Needed.   3/7/2024    Insulin Glargine (BASAGLAR KWIKPEN SC) Inject 40 Units under the skin into the appropriate area as directed Every Night.   3/7/2024    lisinopril (PRINIVIL,ZESTRIL) 40 MG tablet Take 1 tablet by mouth Daily.   3/7/2024    aspirin 81 MG chewable tablet Chew 1 tablet Daily. 90 tablet 0 3/1/2024    [START ON 3/18/2024] clopidogrel (PLAVIX) 75 MG tablet Take 1 tablet by mouth Daily.   3/1/2024    naloxone (NARCAN) 4 MG/0.1ML nasal spray Call 911. Don't prime. Bradner in 1 nostril for overdose. Repeat in 2-3 minutes in other nostril if no or minimal breathing/responsiveness. 2 each 0     pantoprazole (PROTONIX) 40 MG EC tablet Take 1 tablet by mouth Daily.   More than a month       Scheduled Meds:[START ON 3/9/2024] amLODIPine, 10 mg, Oral, Daily  famotidine, 20 mg, Intravenous, BID  [START ON 3/9/2024] finasteride, 5 mg, Oral, Daily  insulin detemir, 1-200 Units, Subcutaneous, Nightly - Glucommander  insulin lispro, 1-200 Units, Subcutaneous, 4x Daily With Meals & Nightly  ketorolac, 30 mg, Intravenous, Q6H  [START ON 3/9/2024] lisinopril, 40 mg, Oral, Daily      Continuous Infusions:   PRN Meds:.  acetaminophen    dextrose    dextrose    diazePAM    docusate  sodium    glucagon (human recombinant)    HYDROmorphone **AND** naloxone    ibuprofen    insulin lispro    metoclopramide    ondansetron ODT **OR** ondansetron    Allergies   Allergen Reactions    Atenolol Other (See Comments)     Bradycardia.    Morphine Itching    Penicillins Hives     Has tolerated cefazolin, cefepime    Beta lactam allergy details  Antibiotic reaction: (!) diarrhea, hives  Age at reaction: child (1st as child then later in college)  Dose to reaction time: days  Reason for antibiotic: URI  Epinephrine required for reaction?: no  Tolerated antibiotics: unknown          Objective   Objective     Vital Signs:   Temp:  [97 °F (36.1 °C)-97.7 °F (36.5 °C)] 97.5 °F (36.4 °C)  Heart Rate:  [81-86] 85  Resp:  [10-18] 16  BP: (105-134)/(61-72) 117/68     Physical Exam  Constitutional: Awake, alert, NAD  Eyes: PERRLA, sclerae anicteric, no conjunctival injection  HENT: NCAT, mucous membranes moist  Neck: Supple, no thyromegaly, no lymphadenopathy, trachea midline  Respiratory: Clear to auscultation bilaterally, nonlabored respirations   Cardiovascular: RRR, no murmurs, rubs, or gallops, palpable pedal pulses bilaterally  Gastrointestinal: hypoactive BS, midline surgical incision with dsg CDI with drain. Appropriately tender.   : F/C  Musculoskeletal: No bilateral ankle edema, no clubbing or cyanosis to extremities  Psychiatric: Appropriate affect, cooperative  Neurologic: Oriented x 3, strength symmetric in all extremities, Cranial Nerves grossly intact to confrontation, speech clear  Skin: No rashes      Hospital Course to date:  Costa Robbins is a 68 y.o. male PMH HTN, HLD, DMII, BPH, alcohol use, hx of pancreatic mass admitted for Whipple procedure per Dr Maloney.       Pancreatic mass (pancreatic head tumor)  - admitted for Whipple procedure per Dr Maloney  - pt does not want dilaudid because he states the last time he had it, it caused intense abd pain. He is requesting norco.     HTN/HLD  -  "norvasc  - holding plavix and ASA    DMII  - glucommander    BPH  - proscar     Hx of alcohol use    Results Reviewed:  I have personally reviewed current lab, radiology, and data and agree.    Results from last 7 days   Lab Units 03/08/24  1050 03/08/24  0812 03/08/24  0716   WBC 10*3/mm3  --   --  13.23*   HEMOGLOBIN g/dL  --   --  14.3   HEMOGLOBIN, POC g/dL 9.5*   < >  --    HEMATOCRIT %  --   --  40.7   HEMATOCRIT POC % 28*   < >  --    PLATELETS 10*3/mm3  --   --  318   INR   --   --  0.89    < > = values in this interval not displayed.     Results from last 7 days   Lab Units 03/08/24  0716   SODIUM mmol/L 135*   POTASSIUM mmol/L 3.3*   CHLORIDE mmol/L 100   CO2 mmol/L 21.0*   BUN mg/dL 13   CREATININE mg/dL 0.90   GLUCOSE mg/dL 176*   CALCIUM mg/dL 10.9*   ALK PHOS U/L 105   ALT (SGPT) U/L 16   AST (SGOT) U/L 15     Estimated Creatinine Clearance: 77.2 mL/min (by C-G formula based on SCr of 0.9 mg/dL).  Brief Urine Lab Results  (Last result in the past 365 days)        Color   Clarity   Blood   Leuk Est   Nitrite   Protein   CREAT   Urine HCG        01/26/24 1639 Dark Yellow   Clear   Negative   Negative   Negative   Negative                 No results found for: \"BNP\"    Microbiology Results Abnormal       None            Imaging Results (Last 24 Hours)       ** No results found for the last 24 hours. **          Results for orders placed during the hospital encounter of 03/03/24    Adult Transthoracic Echo Limited W/ Cont if Necessary Per Protocol    Interpretation Summary    Left ventricular systolic function is normal. Left ventricular ejection fraction appears to be 66 - 70%.    Saline test results are negative.    There is calcification of the aortic and mitral valve.      Assessment & Plan   Assessment / Plan     Active Hospital Problems    Diagnosis  POA    **Pancreatic carcinoma metastatic to intra-abdominal lymph node [C25.9, C77.2]  Yes         Hospital Course to date:        Thank you for " allowing [facility] to provide consultative care for your patient, we will continue to follow while clinically appropriate.    Electronically signed by CHERISE Lewis, 03/08/24, 2:22 PM EST.           Electronically signed by Zayda Johnson APRN at 03/08/24 5102

## 2024-03-09 NOTE — CONSULTS
Hospice consult received for information. Chart reviewed. Visit made w/ pt who requested a brief conversation about hospice services & requested written information, which was provided. Pt states that he is still trying to process his biopsy results, but is certain that he would not want to do chemotherapy. Pt also states that he wants to speak w/ his wife. Pt is aware that  if he opts for hospice care, [facility] would be his provider. Pt was provided w/  contact information for hospice @ [facility] & the [facility] office. Hospice will continue to follow. Please note that home/out pt hospice will be out of the office on 3/10/24. Should it be assessed that the pt is medical ready for d/c & want hospice services, hospital  will need to call the 24hr number to notify on call team of the pt d/c to [phone]. Pt.'s referral has been sent to hospice intake.

## 2024-03-09 NOTE — PLAN OF CARE
Goal Outcome Evaluation:       Problem: Adult Inpatient Plan of Care  Goal: Plan of Care Review  Outcome: Ongoing, Progressing  Goal: Patient-Specific Goal (Individualized)  Outcome: Ongoing, Progressing  Goal: Absence of Hospital-Acquired Illness or Injury  Outcome: Ongoing, Progressing  Intervention: Identify and Manage Fall Risk  Description: Perform standard risk assessment on admission using a validated tool or comprehensive approach appropriate to the patient; reassess fall risk frequently, with change in status or transfer to another level of care.  Communicate fall injury risk to interprofessional healthcare team.  Determine need for increased observation, equipment and environmental modification, such as low bed, signage and supportive, nonskid footwear.  Adjust safety measures to individual developmental age, stage and identified risk factors.  Reinforce the importance of safety and physical activity with patient and family.  Perform regular intentional rounding to assess need for position change, pain assessment and personal needs, including assistance with toileting.  Recent Flowsheet Documentation  Taken 3/9/2024 1608 by Bertha Em, RN  Safety Promotion/Fall Prevention:   activity supervised   assistive device/personal items within reach   clutter free environment maintained   fall prevention program maintained   nonskid shoes/slippers when out of bed   room organization consistent   safety round/check completed  Taken 3/9/2024 1401 by Bertha Em, RN  Safety Promotion/Fall Prevention:   activity supervised   assistive device/personal items within reach   clutter free environment maintained   fall prevention program maintained   nonskid shoes/slippers when out of bed   room organization consistent   safety round/check completed  Taken 3/9/2024 1200 by Bertha Em, RN  Safety Promotion/Fall Prevention:   activity supervised   clutter free environment maintained   assistive device/personal  items within reach   fall prevention program maintained   nonskid shoes/slippers when out of bed   room organization consistent   safety round/check completed  Taken 3/9/2024 1000 by Bertha Em RN  Safety Promotion/Fall Prevention:   assistive device/personal items within reach   activity supervised   clutter free environment maintained   fall prevention program maintained   nonskid shoes/slippers when out of bed   room organization consistent   safety round/check completed  Taken 3/9/2024 0839 by Bertha Em RN  Safety Promotion/Fall Prevention:   activity supervised   assistive device/personal items within reach   clutter free environment maintained   fall prevention program maintained   nonskid shoes/slippers when out of bed   room organization consistent   safety round/check completed  Intervention: Prevent Skin Injury  Description: Perform a screening for skin injury risk, such as pressure or moisture associated skin damage on admission and at regular intervals throughout hospital stay.  Keep all areas of skin (especially folds) clean and dry.  Maintain adequate skin hydration.  Relieve and redistribute pressure and protect bony prominences; implement measures based on patient-specific risk factors.  Match turning and repositioning schedule to clinical condition.  Encourage weight shift frequently; assist with reposition if unable to complete independently.  Float heels off bed; avoid pressure on the Achilles tendon.  Keep skin free from extended contact with medical devices.  Encourage functional activity and mobility, as early as tolerated.  Use aids (e.g., slide boards, mechanical lift) during transfer.  Recent Flowsheet Documentation  Taken 3/9/2024 1608 by Bertha Em RN  Body Position: position changed independently  Taken 3/9/2024 1401 by Bertha Em RN  Body Position: position changed independently  Taken 3/9/2024 1200 by Bertha Em RN  Body Position: position changed  independently  Taken 3/9/2024 1000 by Bertha Em RN  Body Position: position changed independently  Taken 3/9/2024 0839 by Bertha Em RN  Body Position: position changed independently  Skin Protection:   transparent dressing maintained   skin-to-skin areas padded   skin-to-device areas padded   skin sealant/moisture barrier applied   silicone foam dressing in place  Intervention: Prevent and Manage VTE (Venous Thromboembolism) Risk  Description: Assess for VTE (venous thromboembolism) risk.  Encourage and assist with early ambulation.  Initiate and maintain compression or other therapy, as indicated, based on identified risk in accordance with organizational protocol and provider order.  Encourage both active and passive leg exercises while in bed, if unable to ambulate.  Recent Flowsheet Documentation  Taken 3/9/2024 1608 by Bertha Em RN  Activity Management: activity encouraged  Taken 3/9/2024 1500 by Bertha Em RN  Activity Management: ambulated in room  Taken 3/9/2024 1401 by Bertha Em RN  Activity Management: activity encouraged  Taken 3/9/2024 1200 by Bertha Em RN  Activity Management: activity encouraged  Taken 3/9/2024 1000 by Bertha Em RN  Activity Management: activity encouraged  Taken 3/9/2024 0839 by Bertha Em RN  Activity Management: activity encouraged  VTE Prevention/Management:   bilateral   sequential compression devices off  Range of Motion: active ROM (range of motion) encouraged  Intervention: Prevent Infection  Description: Maintain skin and mucous membrane integrity; promote hand, oral and pulmonary hygiene.  Optimize fluid balance, nutrition, sleep and glycemic control to maximize infection resistance.  Identify potential sources of infection early to prevent or mitigate progression of infection (e.g., wound, lines, devices).  Evaluate ongoing need for invasive devices; remove promptly when no longer indicated.  Recent Flowsheet  Documentation  Taken 3/9/2024 1608 by Bertha Em RN  Infection Prevention:   environmental surveillance performed   equipment surfaces disinfected   hand hygiene promoted   personal protective equipment utilized   rest/sleep promoted   single patient room provided  Taken 3/9/2024 1401 by Bertha Em RN  Infection Prevention:   environmental surveillance performed   equipment surfaces disinfected   hand hygiene promoted   personal protective equipment utilized   rest/sleep promoted   single patient room provided  Taken 3/9/2024 1200 by Bertha Em RN  Infection Prevention:   environmental surveillance performed   equipment surfaces disinfected   hand hygiene promoted   personal protective equipment utilized   rest/sleep promoted   single patient room provided  Taken 3/9/2024 1000 by Bertha Em RN  Infection Prevention:   environmental surveillance performed   equipment surfaces disinfected   hand hygiene promoted   personal protective equipment utilized   rest/sleep promoted   single patient room provided  Taken 3/9/2024 0839 by Bertha Em RN  Infection Prevention:   environmental surveillance performed   equipment surfaces disinfected   hand hygiene promoted   rest/sleep promoted   personal protective equipment utilized   single patient room provided  Goal: Optimal Comfort and Wellbeing  Outcome: Ongoing, Progressing  Intervention: Monitor Pain and Promote Comfort  Description: Assess pain level, treatment efficacy and patient response at regular intervals using a consistent pain scale.  Consider the presence and impact of preexisting chronic pain.  Encourage patient and caregiver involvement in pain assessment, interventions and safety measures.  Recent Flowsheet Documentation  Taken 3/9/2024 1000 by Bertha Em RN  Pain Management Interventions:   pillow support provided   position adjusted   quiet environment facilitated  Taken 3/9/2024 0839 by Bertha Em RN  Pain Management  Interventions:   pillow support provided   position adjusted   quiet environment facilitated   see MAR  Intervention: Provide Person-Centered Care  Description: Use a family-focused approach to care.  Develop trust and rapport by proactively providing information, encouraging questions, addressing concerns and offering reassurance.  Acknowledge emotional response to hospitalization.  Recognize and utilize personal coping strategies.  Honor spiritual and cultural preferences.  Recent Flowsheet Documentation  Taken 3/9/2024 8952 by Bertha Em, RN  Trust Relationship/Rapport:   care explained   choices provided   questions answered   questions encouraged   empathic listening provided   reassurance provided   thoughts/feelings acknowledged  Goal: Readiness for Transition of Care  Outcome: Ongoing, Progressing

## 2024-03-09 NOTE — PROGRESS NOTES
"Patient Name:  Costa Robbins  YOB: 1955  [id number]    Surgery Progress Note    Date of visit: 3/9/2024    Subjective   Pain well controlled. No nausea/vomiting. No fevers/chills.          Objective       /69 (BP Location: Right arm, Patient Position: Lying)   Pulse 67   Temp 98.1 °F (36.7 °C) (Oral)   Resp 18   Ht 180.3 cm (71\")   Wt 74.8 kg (165 lb)   SpO2 95%   BMI 23.01 kg/m²     Intake/Output Summary (Last 24 hours) at 3/9/2024 0846  Last data filed at 3/8/2024 2100  Gross per 24 hour   Intake 4250 ml   Output 2095 ml   Net 2155 ml       CV:  Rhythm regular and rate regular  L:  Clear to auscultation bilaterally  Abd:  Bowel sounds positive, soft, appropriately tender, dressing c/d/I, CHERYL serosanguinous, J tube bilious  Ext:  No cyanosis, clubbing, edema    Recent labs and imaging that are back at this time have been reviewed.   K 3.2  Cr 0.64  WBC 14  Hgb 11.9         Assessment & Plan     Pt POD#1 ex lap, biopsy mesenteric mass, J tube, small bowel repair, cholecystectomy  Advance diet as tolerated  Pain control  OOB, IS, DVT prlx  Clamp J tube, DC Villatoro  I discussed the frozen section results from the mesenteric mass biopsy with the patient. I have placed a referral with Dr. Ceballos with Medical Oncology who will discuss systemic treatment options with the patient.       Silas Maloney MD  3/9/2024  08:46 EST      "

## 2024-03-09 NOTE — CONSULTS
Subjective     PROBLEM LIST:    Pancreatic carcinoma metastatic to intra-abdominal lymph node         CHIEF COMPLAINT: pancreas cancer    HISTORY OF PRESENT ILLNESS:  The patient is a 68 y.o. male, with a history of DM, HL, HTN, PVD s/p left iliac stent and right fem-fem bypass who was found to have a pancreatic mass after presenting with jaundice (tbili 9.8) and biliary obstruction in late January.  MRCP 1/26/24 showed intra and extrahepatic biliary ductal dilatation with narrowing at the level of the pancreatic head.  CT chest 1/28/24 showed sub-cm left lung nodules.  He underwent ERCP with stent placement.  Bile duct brushings 1/27/24 were negative for malignant cells.  Biopsy of a suspicious liver lesion on 2/7/24 showed no evidence of malignancy.  He was taken to the OR for a whipple procedure on 3/8/24, and was noted to have mesenteric adenopathy.  Biopsy of a 4 cm mesenteric mass was performed with frozen section showing carcinoma, likely adenocarcinoma.  CA [id number].    He lives in [place] with his wife.  He does not have any children.    REVIEW OF SYSTEMS:  A 14 point review of systems was performed and is negative except as noted above.    Past Medical History:   Diagnosis Date    Bile duct obstruction 01/27/2024    found during ERCP    COVID 2021    no hospitalization    Diabetes mellitus 2017    po meds and insulin- checsk sugar rarely    Diverticulitis 2009    surgically intervention    Dizzy     Enlarged prostate     recently started on Proscar    ETOH use 05/25/2023    Former smoker 05/25/2023    Hearing decreased     History of shingles     2008-  belt line    Hypertension     Joint pain     hands    Melanoma     CHEST, BACK, NECK MULTI, HEAD    Pancreatic mass 01/27/2024    found on CT scan; ERCP confirmed    PVD (peripheral vascular disease)     SOBOE (shortness of breath on exertion)     Tinnitus     Unintentional weight loss     Uses contact lenses     bilat    Wears glasses        No  current facility-administered medications on file prior to encounter.     Current Outpatient Medications on File Prior to Encounter   Medication Sig Dispense Refill    amLODIPine (NORVASC) 10 MG tablet Take 1 tablet by mouth Daily. 30 tablet 5    empagliflozin (Jardiance) 10 MG tablet tablet Take 1 tablet by mouth Daily.      finasteride (PROSCAR) 5 MG tablet Take 1 tablet by mouth Daily.      Insulin Glargine (BASAGLAR KWIKPEN SC) Inject 40 Units under the skin into the appropriate area as directed Every Night.      lisinopril (PRINIVIL,ZESTRIL) 40 MG tablet Take 1 tablet by mouth Daily.      naloxone (NARCAN) 4 MG/0.1ML nasal spray Call 911. Don't prime. Patterson in 1 nostril for overdose. Repeat in 2-3 minutes in other nostril if no or minimal breathing/responsiveness. 2 each 0       Allergies   Allergen Reactions    Atenolol Other (See Comments)     Bradycardia.    Morphine Itching    Penicillins Hives     Has tolerated cefazolin, cefepime    Beta lactam allergy details  Antibiotic reaction: (!) diarrhea, hives  Age at reaction: child (1st as child then later in college)  Dose to reaction time: days  Reason for antibiotic: URI  Epinephrine required for reaction?: no  Tolerated antibiotics: unknown          Past Surgical History:   Procedure Laterality Date    AORTAGRAM Bilateral 04/12/2023    Procedure: AORTAGRAM WITH RUNOFFS  STENT OF THE LEFT ILIAC ARTERY;  Surgeon: Moses Escalante MD;  Location: [facility] to [facility];  Service: Vascular;  Laterality: Bilateral;    COLON RESECTION  05/2009    partial colectomy    COLONOSCOPY      DIAGNOSTIC LAPAROSCOPY N/A 02/07/2024    Procedure: DIAGNOSTIC LAPAROSCOPY WITH LIVER BIOPSY AND PERITONEAL WASHINGS;  Surgeon: Silas Maloney MD;  Location: [facility]ending [facility] to [facility] OR;  Service: General;  Laterality: N/A;    ERCP N/A 01/27/2024    Procedure: ENDOSCOPIC RETROGRADE CHOLANGIOPANCREATOGRAPHY WITH STENT PLACEMENT;  Surgeon: Brunner, Mark I, MD;  Location: Pending sale to [facility] ENDOSCOPY;  Service:  Gastroenterology;  Laterality: N/A;    FEMORAL ENDARTERECTOMY Left 2023    Procedure: FEMORAL ENDARTERECTOMY WITH PROPATEN GRAFT 8MM X 40CM;  Surgeon: Moses Escalante MD;  Location:  [facility];  Service: Vascular;  Laterality: Left;    FEMORAL FEMORAL BYPASS N/A 2023    Procedure: FEMORAL FEMORAL BYPASS;  Surgeon: Moses Escalante MD;  Location:  [facility];  Service: Vascular;  Laterality: N/A;    ORIF ANKLE FRACTURE Left     1 plate and 2 screws and 6 pins    SKIN BIOPSY      SKIN CANCER EXCISION      melanoma    WISDOM TOOTH EXTRACTION             Social History     Socioeconomic History    Marital status:     Number of children: 0   Tobacco Use    Smoking status: Former     Current packs/day: 0.00     Average packs/day: 0.5 packs/day for 43.0 years (21.5 ttl pk-yrs)     Types: Cigarettes     Start date: 1980     Quit date: 2023     Years since quittin.1     Passive exposure: Never    Smokeless tobacco: Never    Tobacco comments:     Pt states that he has been able to stop smoking this month - 2023   Vaping Use    Vaping status: Never Used   Substance and Sexual Activity    Alcohol use: Not Currently     Comment: ; hx of ETOH use- nothing last 2 months    Drug use: Yes     Types: Marijuana     Comment: ONCE A MONTH    Sexual activity: Defer       Family History   Problem Relation Age of Onset    Diabetes Mother     Heart attack Mother     Hypertension Mother     Hyperlipidemia Mother     Stroke Mother     Hodgkin's lymphoma Father     Hypertension Father     Diabetes Brother     Hyperlipidemia Brother        Objective     Vitals:    24 1523 24 2111 24 0314 24 0903   BP:  118/71 123/69 142/76   BP Location:  Right arm Right arm Right arm   Patient Position:  Lying Lying Lying   Pulse:  97 67 82   Resp:  18 18 18   Temp:  98.8 °F (37.1 °C) 98.1 °F (36.7 °C) 98.2 °F (36.8 °C)   TempSrc:  Oral Oral Oral   SpO2:  96% 95% 100%   Weight: 74.8 kg (165 lb)     "  Height: 180.3 cm (71\")                      Performance Status: 2  General: well appearing male in no acute distress  Neuro: alert and oriented  HEENT: sclerae anicteric, oropharynx clear  Extremities: no lower extremity edema  Skin: no rashes, lesions, bruising, or petechiae  Psych: mood and affect appropriate    Lab Results   Component Value Date    WBC 13.95 (H) 03/09/2024    HGB 11.9 (L) 03/09/2024    HCT 34.3 (L) 03/09/2024    MCV 89.3 03/09/2024     03/09/2024     Lab Results   Component Value Date    GLUCOSE 58 (L) 03/09/2024    BUN 9 03/09/2024    CREATININE 0.64 (L) 03/09/2024    BCR 14.1 03/09/2024    K 3.2 (L) 03/09/2024    CO2 24.0 03/09/2024    CALCIUM 9.7 03/09/2024    ALBUMIN 3.2 (L) 03/09/2024    AST 14 03/09/2024    ALT 11 03/09/2024     I personally reviewed the CT imaging studies        Assessment & Plan     Costa Robbins is a 68 y.o. male with a recent diagnosis of pancreatic cancer, now with confirmed metastatic disease based on biopsy of mesenteric mass done yesterday.    We discussed that this is unfortunately an incurable disease, and that palliative chemotherapy is available if he wishes to pursue this.  We discussed the options and prognosis for treatment metastatic pancreas cancer, and potential effect of treatment on quality of life.  We reviewed the side effects of FOLFIRINOX chemotherapy.  The goal of treatment in this situation is to help him maintain the best quality of life for as long as possible.    After discussion, he feels that he would not want chemotherapy.  I would recommend hospice care in this case, and he is agreeable to meet with the hospice liaison while he is here.  He needs to have further discussion with his wife.  I will check in with him again tomorrow to answer any further questions.                   Brandy Ceballos MD    3/9/2024         "

## 2024-03-09 NOTE — PLAN OF CARE
Goal Outcome Evaluation:                   Problem: Adult Inpatient Plan of Care  Goal: Plan of Care Review  Outcome: Ongoing, Progressing  Goal: Patient-Specific Goal (Individualized)  Outcome: Ongoing, Progressing  Goal: Absence of Hospital-Acquired Illness or Injury  Outcome: Ongoing, Progressing  Intervention: Identify and Manage Fall Risk  Recent Flowsheet Documentation  Taken 3/9/2024 0000 by Audrey Storm RN  Safety Promotion/Fall Prevention:   activity supervised   assistive device/personal items within reach   clutter free environment maintained   lighting adjusted   nonskid shoes/slippers when out of bed   room organization consistent   safety round/check completed  Taken 3/8/2024 2200 by Audrey Storm RN  Safety Promotion/Fall Prevention:   activity supervised   assistive device/personal items within reach   clutter free environment maintained   lighting adjusted   nonskid shoes/slippers when out of bed   room organization consistent   safety round/check completed  Taken 3/8/2024 2000 by Audrey Storm RN  Safety Promotion/Fall Prevention:   activity supervised   assistive device/personal items within reach   clutter free environment maintained   lighting adjusted   nonskid shoes/slippers when out of bed   room organization consistent   safety round/check completed  Intervention: Prevent Skin Injury  Recent Flowsheet Documentation  Taken 3/9/2024 0000 by Audrey Storm RN  Body Position: position changed independently  Skin Protection:   transparent dressing maintained   tubing/devices free from skin contact  Taken 3/8/2024 2200 by Audrey Storm RN  Body Position: position changed independently  Taken 3/8/2024 2000 by Audrey Storm RN  Body Position: position changed independently  Skin Protection:   transparent dressing maintained   tubing/devices free from skin contact  Intervention: Prevent and Manage VTE (Venous Thromboembolism) Risk  Recent Flowsheet Documentation  Taken 3/9/2024 0000 by Audrey Storm  RN  Activity Management: activity encouraged  Range of Motion: active ROM (range of motion) encouraged  Taken 3/8/2024 2200 by Audrey Storm RN  Activity Management: activity encouraged  Taken 3/8/2024 2000 by Audrey Storm RN  Activity Management: activity encouraged  Range of Motion: active ROM (range of motion) encouraged  Intervention: Prevent Infection  Recent Flowsheet Documentation  Taken 3/9/2024 0000 by Audrey Storm RN  Infection Prevention:   hand hygiene promoted   rest/sleep promoted   single patient room provided  Taken 3/8/2024 2200 by Audrey Storm RN  Infection Prevention:   hand hygiene promoted   rest/sleep promoted   single patient room provided  Taken 3/8/2024 2000 by Audrey Storm RN  Infection Prevention:   hand hygiene promoted   rest/sleep promoted   single patient room provided  Goal: Optimal Comfort and Wellbeing  Outcome: Ongoing, Progressing  Intervention: Provide Person-Centered Care  Recent Flowsheet Documentation  Taken 3/8/2024 2000 by Audrey Storm RN  Trust Relationship/Rapport:   care explained   choices provided   emotional support provided   empathic listening provided   questions answered   questions encouraged  Goal: Readiness for Transition of Care  Outcome: Ongoing, Progressing     Problem: Pain Acute  Goal: Acceptable Pain Control and Functional Ability  Outcome: Ongoing, Progressing  Intervention: Prevent or Manage Pain  Recent Flowsheet Documentation  Taken 3/9/2024 0000 by Audrey Storm RN  Sensory Stimulation Regulation:   care clustered   lighting decreased  Taken 3/8/2024 2000 by Audrey Storm RN  Sensory Stimulation Regulation:   care clustered   lighting decreased  Medication Review/Management: medications reviewed  Intervention: Optimize Psychosocial Wellbeing  Recent Flowsheet Documentation  Taken 3/9/2024 0000 by Audrey Storm RN  Supportive Measures: active listening utilized  Taken 3/8/2024 2000 by Audrey Storm RN  Supportive Measures: active listening  utilized     Problem: Infection  Goal: Absence of Infection Signs and Symptoms  Outcome: Ongoing, Progressing     Problem: Fall Injury Risk  Goal: Absence of Fall and Fall-Related Injury  Outcome: Ongoing, Progressing  Intervention: Identify and Manage Contributors  Recent Flowsheet Documentation  Taken 3/8/2024 2000 by Audrey Storm RN  Medication Review/Management: medications reviewed  Self-Care Promotion:   independence encouraged   BADL personal objects within reach   BADL personal routines maintained  Intervention: Promote Injury-Free Environment  Recent Flowsheet Documentation  Taken 3/9/2024 0000 by Audrey Storm RN  Safety Promotion/Fall Prevention:   activity supervised   assistive device/personal items within reach   clutter free environment maintained   lighting adjusted   nonskid shoes/slippers when out of bed   room organization consistent   safety round/check completed  Taken 3/8/2024 2200 by Audrey Storm RN  Safety Promotion/Fall Prevention:   activity supervised   assistive device/personal items within reach   clutter free environment maintained   lighting adjusted   nonskid shoes/slippers when out of bed   room organization consistent   safety round/check completed  Taken 3/8/2024 2000 by Audrey Storm RN  Safety Promotion/Fall Prevention:   activity supervised   assistive device/personal items within reach   clutter free environment maintained   lighting adjusted   nonskid shoes/slippers when out of bed   room organization consistent   safety round/check completed     Problem: Skin Injury Risk Increased  Goal: Skin Health and Integrity  Outcome: Ongoing, Progressing  Intervention: Optimize Skin Protection  Recent Flowsheet Documentation  Taken 3/9/2024 0000 by Audrey Storm RN  Pressure Reduction Techniques: frequent weight shift encouraged  Head of Bed (HOB) Positioning: HOB elevated  Pressure Reduction Devices: positioning supports utilized  Skin Protection:   transparent dressing maintained    tubing/devices free from skin contact  Taken 3/8/2024 2200 by Audrey Storm, ALONDRA  Head of Bed ([facility] [place]) Positioning: HOB elevated  Taken 3/8/2024 2000 by Audrey Storm RN  Pressure Reduction Techniques: frequent weight shift encouraged  Head of Bed ([facility] [place]) Positioning: HOB elevated  Pressure Reduction Devices: positioning supports utilized  Skin Protection:   transparent dressing maintained   tubing/devices free from skin contact

## 2024-03-10 LAB
GLUCOSE BLDC GLUCOMTR-MCNC: 146 MG/DL (ref 70–130)
GLUCOSE BLDC GLUCOMTR-MCNC: 156 MG/DL (ref 70–130)
GLUCOSE BLDC GLUCOMTR-MCNC: 159 MG/DL (ref 70–130)
GLUCOSE BLDC GLUCOMTR-MCNC: 161 MG/DL (ref 70–130)
GLUCOSE BLDC GLUCOMTR-MCNC: 168 MG/DL (ref 70–130)

## 2024-03-10 PROCEDURE — 25010000002 KETOROLAC TROMETHAMINE PER 15 MG: Performed by: SURGERY

## 2024-03-10 PROCEDURE — 63710000001 INSULIN DETEMIR PER 5 UNITS: Performed by: SURGERY

## 2024-03-10 PROCEDURE — 99232 SBSQ HOSP IP/OBS MODERATE 35: CPT | Performed by: INTERNAL MEDICINE

## 2024-03-10 PROCEDURE — 63710000001 INSULIN LISPRO (HUMAN) PER 5 UNITS: Performed by: SURGERY

## 2024-03-10 PROCEDURE — 99232 SBSQ HOSP IP/OBS MODERATE 35: CPT | Performed by: NURSE PRACTITIONER

## 2024-03-10 PROCEDURE — 82948 REAGENT STRIP/BLOOD GLUCOSE: CPT

## 2024-03-10 RX ADMIN — INSULIN DETEMIR 13 UNITS: 100 INJECTION, SOLUTION SUBCUTANEOUS at 21:32

## 2024-03-10 RX ADMIN — INSULIN LISPRO 3 UNITS: 100 INJECTION, SOLUTION INTRAVENOUS; SUBCUTANEOUS at 17:50

## 2024-03-10 RX ADMIN — FINASTERIDE 5 MG: 5 TABLET, FILM COATED ORAL at 09:12

## 2024-03-10 RX ADMIN — HYDROCODONE BITARTRATE AND ACETAMINOPHEN 1 TABLET: 5; 325 TABLET ORAL at 11:57

## 2024-03-10 RX ADMIN — HYDROCODONE BITARTRATE AND ACETAMINOPHEN 1 TABLET: 5; 325 TABLET ORAL at 17:55

## 2024-03-10 RX ADMIN — HYDROCODONE BITARTRATE AND ACETAMINOPHEN 1 TABLET: 5; 325 TABLET ORAL at 03:16

## 2024-03-10 RX ADMIN — HYDROCODONE BITARTRATE AND ACETAMINOPHEN 1 TABLET: 5; 325 TABLET ORAL at 21:32

## 2024-03-10 RX ADMIN — LISINOPRIL 40 MG: 40 TABLET ORAL at 09:12

## 2024-03-10 RX ADMIN — KETOROLAC TROMETHAMINE 30 MG: 30 INJECTION, SOLUTION INTRAMUSCULAR; INTRAVENOUS at 09:11

## 2024-03-10 RX ADMIN — HYDROCODONE BITARTRATE AND ACETAMINOPHEN 1 TABLET: 5; 325 TABLET ORAL at 07:37

## 2024-03-10 RX ADMIN — AMLODIPINE BESYLATE 10 MG: 10 TABLET ORAL at 09:12

## 2024-03-10 NOTE — PROGRESS NOTES
[facility] Medicine Services  PROGRESS NOTE    Patient Name: Costa Robbins  : 1955  MRN: [id number]    Date of Admission: 3/8/2024  Primary Care Physician: Jonathan Conner MD    Subjective   Subjective     CC:  Abdominal pain     HPI:  Doesn't like the food, as he was a kenroy . Pain is controlled. No BM yet. States no new needs at this time.       Objective   Objective     Vital Signs:   Temp:  [97.4 °F (36.3 °C)-98.6 °F (37 °C)] 98.4 °F (36.9 °C)  Heart Rate:  [75-85] 85  Resp:  [18] 18  BP: (124-142)/(69-79) 132/69     Physical Exam:  Constitutional: No acute distress, awake, alert  HENT: NCAT, mucous membranes moist  Respiratory: Clear to auscultation bilaterally, respiratory effort normal   Cardiovascular: RRR, no murmurs, rubs, or gallops  Gastrointestinal: Positive bowel sounds, soft, nontender, nondistended  Musculoskeletal: No bilateral ankle edema  Psychiatric: Appropriate affect, cooperative  Neurologic: Oriented x 3, strength symmetric in all extremities, Cranial Nerves grossly intact to confrontation, speech clear  Skin: No rashes, bilateral abdominal drains in place and midline vertical surgical incision PAULINO    Results Reviewed:  LAB RESULTS:      Lab 24  0429 24  1050 24  0932 24  0812 24  0716 24  1738 24  1737   WBC 13.95*  --   --   --  13.23*  --  10.19   HEMOGLOBIN 11.9*  --   --   --  14.3  --  15.7   HEMOGLOBIN, POC  --  9.5* 9.9* 11.2*  --  15.6  15.6  --    HEMATOCRIT 34.3*  --   --   --  40.7  --  45.1   HEMATOCRIT POC  --  28* 29* 33*  --  46  46  --    PLATELETS 304  --   --   --  318  --  394   NEUTROS ABS 10.08*  --   --   --   --   --  7.11*   IMMATURE GRANS (ABS) 0.06*  --   --   --   --   --  0.07*   LYMPHS ABS 2.42  --   --   --   --   --  2.06   MONOS ABS 1.24*  --   --   --   --   --  0.78   EOS ABS 0.10  --   --   --   --   --  0.10   MCV 89.3  --   --   --  87.5  --  90.7   LACTATE  --   --    --   --   --   --  1.9   PROTIME  --   --   --   --  12.2 12.8 12.8   APTT  --   --   --   --  27.5  --  26.5   D DIMER QUANT  --   --   --   --   --   --  0.67*         Lab 03/09/24  1629 03/09/24  0429 03/08/24 0716 03/04/24  0411 03/04/24  0030 03/03/24 1738 03/03/24 1737   SODIUM  --  142 135* 134* 133*  --  120*   POTASSIUM 3.4* 3.2* 3.3* 3.2* 3.6  --  4.7   CHLORIDE  --  108* 100 102 100  --  84*   CO2  --  24.0 21.0* 23.0 24.0  --  20.0*   ANION GAP  --  10.0 14.0 9.0 9.0  --  16.0*   BUN  --  9 13 11 13  --  17   CREATININE  --  0.64* 0.90 0.57* 0.63* 0.70  0.70 0.96   EGFR  --  104.4 94.2 108.1 104.9 101.6 87.2   GLUCOSE  --  58* 176* 140* 226*  --  677*   CALCIUM  --  9.7 10.9* 10.1 10.4  --  11.2*   MAGNESIUM  --   --   --  1.9 2.0  --  2.3   PHOSPHORUS  --   --   --  2.5 1.9*  --  2.4*   HEMOGLOBIN A1C  --   --  10.60*  --   --   --  10.60*         Lab 03/09/24 0429 03/08/24 0716 03/03/24 1737   TOTAL PROTEIN 5.5* 7.0 7.2   ALBUMIN 3.2* 3.7 4.0   GLOBULIN 2.3 3.3 3.2   ALT (SGPT) 11 16 15  15   AST (SGOT) 14 15 12  12   BILIRUBIN 0.7 0.6 0.9   ALK PHOS 63 105 145*         Lab 03/08/24  0716 03/03/24  1738 03/03/24 1737   HSTROP T  --   --  18   PROTIME 12.2 12.8 12.8   INR 0.89 1 1.1             Lab 03/08/24  0647   ABO TYPING O   RH TYPING Positive   ANTIBODY SCREEN Negative         Lab 03/08/24  1050 03/08/24  0932 03/08/24  0812   PH, ARTERIAL 7.39 7.32* 7.33*     Brief Urine Lab Results  (Last result in the past 365 days)        Color   Clarity   Blood   Leuk Est   Nitrite   Protein   CREAT   Urine HCG        01/26/24 1639 Dark Yellow   Clear   Negative   Negative   Negative   Negative                   Microbiology Results Abnormal       None            No radiology results from the last 24 hrs    Results for orders placed during the hospital encounter of 03/03/24    Adult Transthoracic Echo Limited W/ Cont if Necessary Per Protocol    Interpretation Summary    Left ventricular systolic  function is normal. Left ventricular ejection fraction appears to be 66 - 70%.    Saline test results are negative.    There is calcification of the aortic and mitral valve.      Current medications:  Scheduled Meds:amLODIPine, 10 mg, Oral, Daily  famotidine, 20 mg, Intravenous, BID  finasteride, 5 mg, Oral, Daily  insulin detemir, 1-200 Units, Subcutaneous, Nightly - Glucommander  insulin lispro, 1-200 Units, Subcutaneous, 4x Daily With Meals & Nightly  lisinopril, 40 mg, Oral, Daily      Continuous Infusions:   PRN Meds:.  acetaminophen    Calcium Replacement - Follow Nurse / BPA Driven Protocol    dextrose    dextrose    diazePAM    docusate sodium    glucagon (human recombinant)    HYDROcodone-acetaminophen    ibuprofen    insulin lispro    Magnesium Standard Dose Replacement - Follow Nurse / BPA Driven Protocol    metoclopramide    ondansetron ODT **OR** ondansetron    Phosphorus Replacement - Follow Nurse / BPA Driven Protocol    Potassium Replacement - Follow Nurse / BPA Driven Protocol    Assessment & Plan   Assessment & Plan     Active Hospital Problems    Diagnosis  POA    **Pancreatic carcinoma metastatic to intra-abdominal lymph node [C25.9, C77.2]  Yes      Resolved Hospital Problems   No resolved problems to display.        Brief Hospital Course to date:  Costa Robbins is a 68 y.o. male  PMH HTN, HLD, DMII, BPH, alcohol use, hx of pancreatic mass admitted for Whipple procedure per Dr Maloney.        Pancreatic mass (pancreatic head tumor)  - admitted for Whipple procedure per Dr Maloney  - pt does not want dilaudid because he states the last time he had it, it caused intense abd pain. He is requesting norco.   --patient understands treatment would only be a palliative chemo, and he currently do not desire this. Hospice is following.      HTN/HLD  - norvasc  - holding plavix and ASA     DMII  - glucommander     BPH  - proscar      Hx of alcohol use    Expected Discharge Location and Transportation:  home   Expected Discharge   Expected Discharge Date: 3/12/2024; Expected Discharge Time:      DVT prophylaxis:  Mechanical DVT prophylaxis orders are present.         AM-PAC 6 Clicks Score (PT): 16 (03/10/24 0909)    CODE STATUS:   Code Status and Medical Interventions:   Ordered at: 03/09/24 0658     Level Of Support Discussed With:    Patient     Code Status (Patient has no pulse and is not breathing):    CPR (Attempt to Resuscitate)     Medical Interventions (Patient has pulse or is breathing):    Full Support       Giovana Rankin, CHERISE  03/10/24

## 2024-03-10 NOTE — PLAN OF CARE
Goal Outcome Evaluation:                 pt seems depressed and refusing all meds and care. He wanted only Mobile to sleep. Voided once after FC removed. NP notified.

## 2024-03-10 NOTE — PLAN OF CARE
Problem: Adult Inpatient Plan of Care  Goal: Plan of Care Review  Outcome: Ongoing, Progressing  Goal: Patient-Specific Goal (Individualized)  Outcome: Ongoing, Progressing  Goal: Absence of Hospital-Acquired Illness or Injury  Outcome: Ongoing, Progressing  Intervention: Identify and Manage Fall Risk  Description: Perform standard risk assessment on admission using a validated tool or comprehensive approach appropriate to the patient; reassess fall risk frequently, with change in status or transfer to another level of care.  Communicate fall injury risk to interprofessional healthcare team.  Determine need for increased observation, equipment and environmental modification, such as low bed, signage and supportive, nonskid footwear.  Adjust safety measures to individual developmental age, stage and identified risk factors.  Reinforce the importance of safety and physical activity with patient and family.  Perform regular intentional rounding to assess need for position change, pain assessment and personal needs, including assistance with toileting.  Recent Flowsheet Documentation  Taken 3/10/2024 1400 by Bertha Em, RN  Safety Promotion/Fall Prevention:   activity supervised   assistive device/personal items within reach   clutter free environment maintained   fall prevention program maintained   room organization consistent   safety round/check completed   nonskid shoes/slippers when out of bed  Taken 3/10/2024 1200 by Bertha Em, RN  Safety Promotion/Fall Prevention:   activity supervised   clutter free environment maintained   assistive device/personal items within reach   fall prevention program maintained   nonskid shoes/slippers when out of bed   room organization consistent   safety round/check completed  Taken 3/10/2024 1000 by Bertha Em, RN  Safety Promotion/Fall Prevention:   activity supervised   assistive device/personal items within reach   clutter free environment maintained   fall  prevention program maintained   nonskid shoes/slippers when out of bed   room organization consistent   safety round/check completed  Taken 3/10/2024 0909 by Bertha Em RN  Safety Promotion/Fall Prevention:   activity supervised   assistive device/personal items within reach   clutter free environment maintained   fall prevention program maintained   nonskid shoes/slippers when out of bed   room organization consistent   safety round/check completed  Taken 3/10/2024 0818 by Bertha Em RN  Safety Promotion/Fall Prevention:   activity supervised   assistive device/personal items within reach   clutter free environment maintained   fall prevention program maintained   nonskid shoes/slippers when out of bed   room organization consistent   safety round/check completed  Intervention: Prevent Skin Injury  Description: Perform a screening for skin injury risk, such as pressure or moisture associated skin damage on admission and at regular intervals throughout hospital stay.  Keep all areas of skin (especially folds) clean and dry.  Maintain adequate skin hydration.  Relieve and redistribute pressure and protect bony prominences; implement measures based on patient-specific risk factors.  Match turning and repositioning schedule to clinical condition.  Encourage weight shift frequently; assist with reposition if unable to complete independently.  Float heels off bed; avoid pressure on the Achilles tendon.  Keep skin free from extended contact with medical devices.  Encourage functional activity and mobility, as early as tolerated.  Use aids (e.g., slide boards, mechanical lift) during transfer.  Recent Flowsheet Documentation  Taken 3/10/2024 1400 by Bertha Em RN  Body Position: position changed independently  Taken 3/10/2024 1200 by Bertha Em RN  Body Position: position changed independently  Taken 3/10/2024 1000 by Bertha Em RN  Body Position: position changed independently  Taken  3/10/2024 0909 by Bertha Em RN  Body Position: position changed independently  Taken 3/10/2024 0818 by Bertha Em RN  Body Position: position changed independently  Intervention: Prevent and Manage VTE (Venous Thromboembolism) Risk  Description: Assess for VTE (venous thromboembolism) risk.  Encourage and assist with early ambulation.  Initiate and maintain compression or other therapy, as indicated, based on identified risk in accordance with organizational protocol and provider order.  Encourage both active and passive leg exercises while in bed, if unable to ambulate.  Recent Flowsheet Documentation  Taken 3/10/2024 1400 by Bertha Em RN  Activity Management: activity encouraged  Taken 3/10/2024 1200 by Bertha Em RN  Activity Management: activity encouraged  Taken 3/10/2024 1000 by Bertha Em RN  Activity Management: activity encouraged  Taken 3/10/2024 0909 by Bertha Em RN  Activity Management: ambulated to bathroom  VTE Prevention/Management:   bilateral   sequential compression devices on  Range of Motion: active ROM (range of motion) encouraged  Taken 3/10/2024 0818 by Bertha Em RN  Activity Management: activity encouraged  Intervention: Prevent Infection  Description: Maintain skin and mucous membrane integrity; promote hand, oral and pulmonary hygiene.  Optimize fluid balance, nutrition, sleep and glycemic control to maximize infection resistance.  Identify potential sources of infection early to prevent or mitigate progression of infection (e.g., wound, lines, devices).  Evaluate ongoing need for invasive devices; remove promptly when no longer indicated.  Recent Flowsheet Documentation  Taken 3/10/2024 1000 by Bertha Em RN  Infection Prevention:   environmental surveillance performed   equipment surfaces disinfected   hand hygiene promoted   personal protective equipment utilized   rest/sleep promoted   single patient room provided  Taken 3/10/2024 0818  by Bertha Em RN  Infection Prevention:   equipment surfaces disinfected   environmental surveillance performed   hand hygiene promoted   personal protective equipment utilized   rest/sleep promoted   single patient room provided  Goal: Optimal Comfort and Wellbeing  Outcome: Ongoing, Progressing  Intervention: Monitor Pain and Promote Comfort  Description: Assess pain level, treatment efficacy and patient response at regular intervals using a consistent pain scale.  Consider the presence and impact of preexisting chronic pain.  Encourage patient and caregiver involvement in pain assessment, interventions and safety measures.  Recent Flowsheet Documentation  Taken 3/10/2024 0909 by Bertha Em RN  Pain Management Interventions:   pillow support provided   position adjusted   quiet environment facilitated   see MAR  Intervention: Provide Person-Centered Care  Description: Use a family-focused approach to care.  Develop trust and rapport by proactively providing information, encouraging questions, addressing concerns and offering reassurance.  Acknowledge emotional response to hospitalization.  Recognize and utilize personal coping strategies.  Honor spiritual and cultural preferences.  Recent Flowsheet Documentation  Taken 3/10/2024 0909 by Bertha Em RN  Trust Relationship/Rapport:   care explained   choices provided   questions answered   questions encouraged   thoughts/feelings acknowledged   emotional support provided   empathic listening provided   reassurance provided  Goal: Readiness for Transition of Care  Outcome: Ongoing, Progressing   Goal Outcome Evaluation:

## 2024-03-10 NOTE — PROGRESS NOTES
"Patient Name:  Costa Robbins  YOB: 1955  [id number]    Surgery Progress Note    Date of visit: 3/10/2024    Subjective   Pain well controlled. No nausea/vomiting. No fevers/chills.  Passing small amount of gas this morning.  Uncertain about direction regarding further treatment         Objective       /79 (BP Location: Right arm, Patient Position: Lying)   Pulse 85   Temp 98.2 °F (36.8 °C) (Oral)   Resp 18   Ht 180.3 cm (71\")   Wt 74.8 kg (165 lb)   SpO2 98%   BMI 23.01 kg/m²     Intake/Output Summary (Last 24 hours) at 3/10/2024 1404  Last data filed at 3/10/2024 0818  Gross per 24 hour   Intake --   Output 1710 ml   Net -1710 ml       CV:  Rhythm regular and rate regular  L:  Clear to auscultation bilaterally  Abd:  soft, appropriately tender, midline dressing removed and incision is c/d/i, CHERYL serosanguinous, J tube bilious  Ext:  No cyanosis, clubbing, edema    Recent labs and imaging that are back at this time have been reviewed.     No new labs this morning         Assessment & Plan     Pt POD#2 ex lap, biopsy mesenteric mass, J tube, small bowel repair, cholecystectomy  Advance diet as tolerated  Pain control  OOB, IS, DVT prlx  Clamp J tube  Discussed recent diagnosis of metastatic adenocarcinoma again with patient.  Patient saw Dr. Ceballos yesterday and was unwilling to pursue chemotherapy, however, he is unsure about further treatment when I assessed him this morning.  I will plan to have him n.p.o. at midnight in the event he opts for port placement tomorrow.  He is unsure if he wants to proceed with this during this admission.        Fabián Maya MD  3/10/2024  14:04 EDT      "

## 2024-03-10 NOTE — PROGRESS NOTES
Subjective     PROBLEM LIST:    Pancreatic carcinoma metastatic to intra-abdominal lymph node         INTERVAL HISTORY: patient reports that he spoke with his wife yesterday and they are still considering options.  He hasn't ruled out chemotherapy.  Has questions about necessity of port placement.  Still having some abdominal pain.      REVIEW OF SYSTEMS:  A 14 point review of systems was performed and is negative except as noted above.      Objective     Vitals:    03/09/24 1455 03/09/24 2135 03/09/24 2327 03/10/24 0909   BP: 122/67 136/76 142/79    BP Location: Right arm Right arm Right arm    Patient Position: Lying Lying Lying    Pulse: 80 82 75 85   Resp: 18 18 18 18   Temp: 97 °F (36.1 °C) 98.6 °F (37 °C)  97.4 °F (36.3 °C)   TempSrc: Oral Oral  Oral   SpO2: 95% 97% 96% 98%   Weight:       Height:                       Performance Status: 1  General: well appearing male in no acute distress  Neuro: alert and oriented  HEENT: sclerae anicteric, oropharynx clear  Extremities: no lower extremity edema  Skin: no rashes, lesions, bruising, or petechiae  Psych: mood and affect appropriate          Labs:       Lab Results   Component Value Date    WBC 13.95 (H) 03/09/2024    HGB 11.9 (L) 03/09/2024    HCT 34.3 (L) 03/09/2024    MCV 89.3 03/09/2024     03/09/2024     Lab Results   Component Value Date    GLUCOSE 58 (L) 03/09/2024    BUN 9 03/09/2024    CREATININE 0.64 (L) 03/09/2024    BCR 14.1 03/09/2024    K 3.4 (L) 03/09/2024    CO2 24.0 03/09/2024    CALCIUM 9.7 03/09/2024    ALBUMIN 3.2 (L) 03/09/2024    AST 14 03/09/2024    ALT 11 03/09/2024             Assessment & Plan     Costa Robbins is a 68 y.o. year old male with recently diagnosed metastatic pancreas cancer (still prelim path, suspicious for adenocarcinoma).    Yesterday was leaning toward supportive care only, after talking with his wife and considering options, is now more open to possibility of treatment.  Not ready to make any decisions  at this time.    We discussed that the best available chemotherapy treatment is a 3 drug combination that does require placement of a port.  There is a 2 drug combination that could potentially be given through peripheral IV.    Will continue to follow.           Brandy Ceballos MD  3/10/2024

## 2024-03-11 LAB
ALBUMIN SERPL-MCNC: 3.3 G/DL (ref 3.5–5.2)
ALBUMIN/GLOB SERPL: 1.2 G/DL
ALP SERPL-CCNC: 79 U/L (ref 39–117)
ALT SERPL W P-5'-P-CCNC: 12 U/L (ref 1–41)
ANION GAP SERPL CALCULATED.3IONS-SCNC: 11 MMOL/L (ref 5–15)
AST SERPL-CCNC: 11 U/L (ref 1–40)
BASOPHILS # BLD AUTO: 0.05 10*3/MM3 (ref 0–0.2)
BASOPHILS NFR BLD AUTO: 0.3 % (ref 0–1.5)
BILIRUB SERPL-MCNC: 0.7 MG/DL (ref 0–1.2)
BUN SERPL-MCNC: 12 MG/DL (ref 8–23)
BUN/CREAT SERPL: 19.7 (ref 7–25)
CALCIUM SPEC-SCNC: 9.7 MG/DL (ref 8.6–10.5)
CHLORIDE SERPL-SCNC: 100 MMOL/L (ref 98–107)
CO2 SERPL-SCNC: 23 MMOL/L (ref 22–29)
CREAT SERPL-MCNC: 0.61 MG/DL (ref 0.76–1.27)
DEPRECATED RDW RBC AUTO: 43.3 FL (ref 37–54)
EGFRCR SERPLBLD CKD-EPI 2021: 105.9 ML/MIN/1.73
EOSINOPHIL # BLD AUTO: 0.54 10*3/MM3 (ref 0–0.4)
EOSINOPHIL NFR BLD AUTO: 3.7 % (ref 0.3–6.2)
ERYTHROCYTE [DISTWIDTH] IN BLOOD BY AUTOMATED COUNT: 13 % (ref 12.3–15.4)
GLOBULIN UR ELPH-MCNC: 2.7 GM/DL
GLUCOSE BLDC GLUCOMTR-MCNC: 160 MG/DL (ref 70–130)
GLUCOSE BLDC GLUCOMTR-MCNC: 166 MG/DL (ref 70–130)
GLUCOSE BLDC GLUCOMTR-MCNC: 74 MG/DL (ref 70–130)
GLUCOSE SERPL-MCNC: 97 MG/DL (ref 65–99)
HCT VFR BLD AUTO: 38 % (ref 37.5–51)
HGB BLD-MCNC: 12.8 G/DL (ref 13–17.7)
IMM GRANULOCYTES # BLD AUTO: 0.05 10*3/MM3 (ref 0–0.05)
IMM GRANULOCYTES NFR BLD AUTO: 0.3 % (ref 0–0.5)
LYMPHOCYTES # BLD AUTO: 1.86 10*3/MM3 (ref 0.7–3.1)
LYMPHOCYTES NFR BLD AUTO: 12.6 % (ref 19.6–45.3)
MCH RBC QN AUTO: 30.8 PG (ref 26.6–33)
MCHC RBC AUTO-ENTMCNC: 33.7 G/DL (ref 31.5–35.7)
MCV RBC AUTO: 91.3 FL (ref 79–97)
MONOCYTES # BLD AUTO: 1.33 10*3/MM3 (ref 0.1–0.9)
MONOCYTES NFR BLD AUTO: 9 % (ref 5–12)
NEUTROPHILS NFR BLD AUTO: 10.93 10*3/MM3 (ref 1.7–7)
NEUTROPHILS NFR BLD AUTO: 74.1 % (ref 42.7–76)
NRBC BLD AUTO-RTO: 0 /100 WBC (ref 0–0.2)
PLATELET # BLD AUTO: 277 10*3/MM3 (ref 140–450)
PMV BLD AUTO: 12 FL (ref 6–12)
POTASSIUM SERPL-SCNC: 3.6 MMOL/L (ref 3.5–5.2)
PROT SERPL-MCNC: 6 G/DL (ref 6–8.5)
RBC # BLD AUTO: 4.16 10*6/MM3 (ref 4.14–5.8)
SODIUM SERPL-SCNC: 134 MMOL/L (ref 136–145)
WBC NRBC COR # BLD AUTO: 14.76 10*3/MM3 (ref 3.4–10.8)

## 2024-03-11 PROCEDURE — 63710000001 INSULIN DETEMIR PER 5 UNITS: Performed by: SURGERY

## 2024-03-11 PROCEDURE — 63710000001 INSULIN LISPRO (HUMAN) PER 5 UNITS: Performed by: SURGERY

## 2024-03-11 PROCEDURE — 99232 SBSQ HOSP IP/OBS MODERATE 35: CPT | Performed by: INTERNAL MEDICINE

## 2024-03-11 PROCEDURE — 99232 SBSQ HOSP IP/OBS MODERATE 35: CPT | Performed by: HOSPITALIST

## 2024-03-11 PROCEDURE — 80053 COMPREHEN METABOLIC PANEL: CPT | Performed by: SURGERY

## 2024-03-11 PROCEDURE — 82948 REAGENT STRIP/BLOOD GLUCOSE: CPT

## 2024-03-11 PROCEDURE — 85025 COMPLETE CBC W/AUTO DIFF WBC: CPT | Performed by: SURGERY

## 2024-03-11 RX ORDER — POTASSIUM CHLORIDE 20 MEQ/1
40 TABLET, EXTENDED RELEASE ORAL EVERY 4 HOURS
Status: COMPLETED | OUTPATIENT
Start: 2024-03-11 | End: 2024-03-11

## 2024-03-11 RX ADMIN — INSULIN LISPRO 3 UNITS: 100 INJECTION, SOLUTION INTRAVENOUS; SUBCUTANEOUS at 11:40

## 2024-03-11 RX ADMIN — HYDROCODONE BITARTRATE AND ACETAMINOPHEN 1 TABLET: 5; 325 TABLET ORAL at 03:55

## 2024-03-11 RX ADMIN — POTASSIUM CHLORIDE 40 MEQ: 1500 TABLET, EXTENDED RELEASE ORAL at 08:00

## 2024-03-11 RX ADMIN — HYDROCODONE BITARTRATE AND ACETAMINOPHEN 1 TABLET: 5; 325 TABLET ORAL at 21:27

## 2024-03-11 RX ADMIN — INSULIN DETEMIR 10 UNITS: 100 INJECTION, SOLUTION SUBCUTANEOUS at 21:27

## 2024-03-11 RX ADMIN — LISINOPRIL 40 MG: 40 TABLET ORAL at 08:00

## 2024-03-11 RX ADMIN — HYDROCODONE BITARTRATE AND ACETAMINOPHEN 1 TABLET: 5; 325 TABLET ORAL at 17:01

## 2024-03-11 RX ADMIN — HYDROCODONE BITARTRATE AND ACETAMINOPHEN 1 TABLET: 5; 325 TABLET ORAL at 11:40

## 2024-03-11 RX ADMIN — AMLODIPINE BESYLATE 10 MG: 10 TABLET ORAL at 08:00

## 2024-03-11 RX ADMIN — HYDROCODONE BITARTRATE AND ACETAMINOPHEN 1 TABLET: 5; 325 TABLET ORAL at 08:00

## 2024-03-11 RX ADMIN — FINASTERIDE 5 MG: 5 TABLET, FILM COATED ORAL at 08:00

## 2024-03-11 RX ADMIN — POTASSIUM CHLORIDE 40 MEQ: 1500 TABLET, EXTENDED RELEASE ORAL at 11:39

## 2024-03-11 NOTE — CASE MANAGEMENT/SOCIAL WORK
Continued Stay Note   [place]     Patient Name: Costa Robbins  MRN: [id number]  Today's Date: 3/11/2024    Admit Date: 3/8/2024    Plan: home   Discharge Plan       Row Name 03/11/24 1209       Plan    Plan home    Patient/Family in Agreement with Plan yes    Plan Comments Spoke with the patient at the bedside. IMM given. Patient had no need or concerns for me today. Plan is home with spouse at discharge. Wife to transport. CM following.    Final Discharge Disposition Code 01 - home or self-care                   Discharge Codes    No documentation.                 Expected Discharge Date and Time       Expected Discharge Date Expected Discharge Time    Mar 12, 2024               Criselda Guidry RN

## 2024-03-11 NOTE — PROGRESS NOTES
"Patient Name:  Costa Robbins  YOB: 1955  [id number]    Surgery Progress Note    Date of visit: 3/11/2024    Subjective     Pain well-controlled.  No nausea or vomiting.  No fevers or chills.  No bowel movement yet.       Objective       /82 (BP Location: Right arm, Patient Position: Lying)   Pulse 85   Temp 98.7 °F (37.1 °C) (Oral)   Resp 18   Ht 180.3 cm (71\")   Wt 74.8 kg (165 lb)   SpO2 96%   BMI 23.01 kg/m²     Intake/Output Summary (Last 24 hours) at 3/11/2024 1403  Last data filed at 3/10/2024 2210  Gross per 24 hour   Intake 120 ml   Output 975 ml   Net -855 ml       CV:  Rhythm regular and rate regular  L:  Clear to auscultation bilaterally  Abd:  Bowel sounds positive, soft, appropriately tender, incision clean dry and intact, CHERYL serosanguineous  Ext:  No cyanosis, clubbing, edema    Recent labs and imaging that are back at this time have been reviewed.   Creatinine 0.61  LFTs within normal limits  WBC 14.8  Hemoglobin 12.8       Assessment & Plan     Pt POD#3 ex lap, biopsy mesenteric mass, J tube, small bowel repair, cholecystectomy   The patient has decided against systemic chemotherapy at this point.  Will start GI soft diet.  Clamp G-tube.  Pain control.  Out of bed, incentive spirometer, DVT prophylaxis.  Okay for DC planning once patient tolerates GI soft diet and has a bowel movement.      Silas Maloney MD  3/11/2024  07:53 EDT      "

## 2024-03-11 NOTE — PROGRESS NOTES
Continued Stay Note  [facility]     Patient Name: Costa Robbins  MRN: [id number]  Today's Date: 3/11/2024    Admit Date: 3/8/2024    Plan: Home without hospice   Discharge Plan       Row Name 03/11/24 1736       Plan    Plan Home without hospice    Plan Comments Visit made to pt, pt's wife present. Pt and wife stated does not want hospice at this time, stated has the hospice 24 hr number and will call in the future if needed. Will close the hospice referral. Please call [phone] if can be of further assistance.                   Discharge Codes    No documentation.                 Expected Discharge Date and Time       Expected Discharge Date Expected Discharge Time    Mar 12, 2024               Lanette Rai RN

## 2024-03-11 NOTE — PROGRESS NOTES
[facility] Medicine Services  PROGRESS NOTE    Patient Name: Costa Robbins  : 1955  MRN: [id number]    Date of Admission: 3/8/2024  Primary Care Physician: Jonathan Conner MD    Subjective   Subjective     CC:  F/U abdominal pain    HPI:  Seen this morning. Has decided not to pursue chemotherapy at this time. He reports he will be trying apricot seeds that his family is purchasing off [employer]. No BM yet. Has not been eating much, Surgery is advancing his diet today however.      Objective   Objective     Vital Signs:   Temp:  [98.4 °F (36.9 °C)-99 °F (37.2 °C)] 98.5 °F (36.9 °C)  Heart Rate:  [85-91] 85  Resp:  [18] 18  BP: (121-143)/(69-86) 121/81     Physical Exam:  Gen-no acute distress  HENT-NCAT, mucous membranes moist  CV-RRR, S1 S2 normal, no m/r/g  Resp-CTAB, no wheezes or rales  Abd-soft, NT, ND, +BS  Ext-no edema  Neuro-A&Ox3, no focal deficits  Skin-no rashes  Psych-appropriate mood      Results Reviewed:  LAB RESULTS:      Lab 24  0247 24  0429 24  1050 24  0932 24  0812 24  0716   WBC 14.76* 13.95*  --   --   --  13.23*   HEMOGLOBIN 12.8* 11.9*  --   --   --  14.3   HEMOGLOBIN, POC  --   --  9.5* 9.9* 11.2*  --    HEMATOCRIT 38.0 34.3*  --   --   --  40.7   HEMATOCRIT POC  --   --  28* 29* 33*  --    PLATELETS 277 304  --   --   --  318   NEUTROS ABS 10.93* 10.08*  --   --   --   --    IMMATURE GRANS (ABS) 0.05 0.06*  --   --   --   --    LYMPHS ABS 1.86 2.42  --   --   --   --    MONOS ABS 1.33* 1.24*  --   --   --   --    EOS ABS 0.54* 0.10  --   --   --   --    MCV 91.3 89.3  --   --   --  87.5   PROTIME  --   --   --   --   --  12.2   APTT  --   --   --   --   --  27.5         Lab 24  0247 24  1629 24  0429 24  0716   SODIUM 134*  --  142 135*   POTASSIUM 3.6 3.4* 3.2* 3.3*   CHLORIDE 100  --  108* 100   CO2 23.0  --  24.0 21.0*   ANION GAP 11.0  --  10.0 14.0   BUN 12  --  9 13   CREATININE 0.61*  --   0.64* 0.90   EGFR 105.9  --  104.4 94.2   GLUCOSE 97  --  58* 176*   CALCIUM 9.7  --  9.7 10.9*   HEMOGLOBIN A1C  --   --   --  10.60*         Lab 03/11/24  0247 03/09/24  0429 03/08/24  0716   TOTAL PROTEIN 6.0 5.5* 7.0   ALBUMIN 3.3* 3.2* 3.7   GLOBULIN 2.7 2.3 3.3   ALT (SGPT) 12 11 16   AST (SGOT) 11 14 15   BILIRUBIN 0.7 0.7 0.6   ALK PHOS 79 63 105         Lab 03/08/24  0716   PROTIME 12.2   INR 0.89             Lab 03/08/24  0647   ABO TYPING O   RH TYPING Positive   ANTIBODY SCREEN Negative         Lab 03/08/24  1050 03/08/24  0932 03/08/24  0812   PH, ARTERIAL 7.39 7.32* 7.33*     Brief Urine Lab Results  (Last result in the past 365 days)        Color   Clarity   Blood   Leuk Est   Nitrite   Protein   CREAT   Urine HCG        01/26/24 1639 Dark Yellow   Clear   Negative   Negative   Negative   Negative                   Microbiology Results Abnormal       None            No radiology results from the last 24 hrs    Results for orders placed during the hospital encounter of 03/03/24    Adult Transthoracic Echo Limited W/ Cont if Necessary Per Protocol    Interpretation Summary    Left ventricular systolic function is normal. Left ventricular ejection fraction appears to be 66 - 70%.    Saline test results are negative.    There is calcification of the aortic and mitral valve.      Current medications:  Scheduled Meds:amLODIPine, 10 mg, Oral, Daily  famotidine, 20 mg, Intravenous, BID  finasteride, 5 mg, Oral, Daily  insulin detemir, 1-200 Units, Subcutaneous, Nightly - Glucommander  insulin lispro, 1-200 Units, Subcutaneous, 4x Daily With Meals & Nightly  lisinopril, 40 mg, Oral, Daily      Continuous Infusions:   PRN Meds:.  acetaminophen    Calcium Replacement - Follow Nurse / BPA Driven Protocol    dextrose    dextrose    diazePAM    docusate sodium    glucagon (human recombinant)    HYDROcodone-acetaminophen    ibuprofen    insulin lispro    Magnesium Standard Dose Replacement - Follow Nurse / BPA  Driven Protocol    metoclopramide    ondansetron ODT **OR** ondansetron    Phosphorus Replacement - Follow Nurse / BPA Driven Protocol    Potassium Replacement - Follow Nurse / BPA Driven Protocol    Assessment & Plan   Assessment & Plan     Active Hospital Problems    Diagnosis  POA    **Pancreatic carcinoma metastatic to intra-abdominal lymph node [C25.9, C77.2]  Yes      Resolved Hospital Problems   No resolved problems to display.        Brief Hospital Course to date:  Costa Robbins is a 68 y.o. male with PMH significant for HTN, HLD, DMII, BPH, alcohol use, and hx of pancreatic mass admitted for Whipple procedure per Dr. Maloney. Unfortunately during his ex-lap, he was found to have metastatic disease. Hospital Medicine consulted post-operatively for medical management.      This patient's problems and plans were partially entered by my partner and updated as appropriate by me 03/11/24. Copied text in this note has been reviewed and is accurate as of today's date.     Pancreatic mass (pancreatic head tumor), preliminary pathology suspicious for adenocarcinoma  - Admitted for Whipple procedure per Dr. Maloney, however during ex-lap on 3/8/23 he was noted to have metastatic disease - frozen section of a mesenteric mass was sent and showed poorly differentiated carcinoma, favoring adencarcinoma  - Dr. Ceballos/Oncology consulted, has discussed options with patient regarding palliative chemotherapy - he has declined at this time, plans to try alternative therapies (he mentioned apricot seeds)  - Hospice has also seen  - Plan will be to discharge home once he is tolerating diet and has had a BM; will provide patient with Dr. Ceballos's contact information in case he changes his mind about treatment in the future  - Patient does not want Dilaudid because he states the last time he had it, it caused intense abdominal pain     HTN/HLD  - Norvasc  - Holding ASA and Plavix     DMII, uncontrolled  - HbA1c 10.6%  -  Glucommander     BPH  - Proscar      Hx of alcohol use    Anticipate home when okay with Surgery.     DVT prophylaxis:  Mechanical DVT prophylaxis orders are present.         AM-PAC 6 Clicks Score (PT): 16 (03/11/24 0803)    CODE STATUS:   Code Status and Medical Interventions:   Ordered at: 03/09/24 0658     Level Of Support Discussed With:    Patient     Code Status (Patient has no pulse and is not breathing):    CPR (Attempt to Resuscitate)     Medical Interventions (Patient has pulse or is breathing):    Full Support       Radha Sampson MD  03/11/24

## 2024-03-11 NOTE — PLAN OF CARE
Problem: Adult Inpatient Plan of Care  Goal: Plan of Care Review  Outcome: Ongoing, Progressing  Goal: Patient-Specific Goal (Individualized)  Outcome: Ongoing, Progressing  Goal: Absence of Hospital-Acquired Illness or Injury  Outcome: Ongoing, Progressing  Intervention: Identify and Manage Fall Risk  Recent Flowsheet Documentation  Taken 3/11/2024 0803 by Heavenly Zamora RN  Safety Promotion/Fall Prevention: activity supervised  Intervention: Prevent Skin Injury  Recent Flowsheet Documentation  Taken 3/11/2024 0803 by Heavenly Zamora RN  Body Position: position changed independently  Intervention: Prevent and Manage VTE (Venous Thromboembolism) Risk  Recent Flowsheet Documentation  Taken 3/11/2024 0803 by Heavenly Zamora RN  Activity Management: activity encouraged  VTE Prevention/Management:   bilateral   sequential compression devices on  Goal: Optimal Comfort and Wellbeing  Outcome: Ongoing, Progressing  Intervention: Monitor Pain and Promote Comfort  Recent Flowsheet Documentation  Taken 3/11/2024 0803 by Heavenly Zamora RN  Pain Management Interventions: pillow support provided  Goal: Readiness for Transition of Care  Outcome: Ongoing, Progressing     Problem: Pain Acute  Goal: Acceptable Pain Control and Functional Ability  Outcome: Ongoing, Progressing  Intervention: Prevent or Manage Pain  Recent Flowsheet Documentation  Taken 3/11/2024 0803 by Heavenly Zamora RN  Medication Review/Management: medications reviewed  Intervention: Develop Pain Management Plan  Recent Flowsheet Documentation  Taken 3/11/2024 0803 by Heavenly Zamora RN  Pain Management Interventions: pillow support provided     Problem: Infection  Goal: Absence of Infection Signs and Symptoms  Outcome: Ongoing, Progressing     Problem: Fall Injury Risk  Goal: Absence of Fall and Fall-Related Injury  Outcome: Ongoing, Progressing  Intervention: Identify and Manage Contributors  Recent Flowsheet Documentation  Taken 3/11/2024 0803  by Heavenly Zamora, RN  Medication Review/Management: medications reviewed  Intervention: Promote Injury-Free Environment  Recent Flowsheet Documentation  Taken 3/11/2024 0803 by Heavenly Zamora, RN  Safety Promotion/Fall Prevention: activity supervised     Problem: Skin Injury Risk Increased  Goal: Skin Health and Integrity  Outcome: Ongoing, Progressing  Intervention: Optimize Skin Protection  Recent Flowsheet Documentation  Taken 3/11/2024 0803 by Heavenly Zamora, RN  Head of Bed (HOB) Positioning: HOB at 30 degrees     Problem: Adjustment to Illness (Sepsis/Septic Shock)  Goal: Optimal Coping  Outcome: Ongoing, Progressing     Problem: Bleeding (Sepsis/Septic Shock)  Goal: Absence of Bleeding  Outcome: Ongoing, Progressing     Problem: Glycemic Control Impaired (Sepsis/Septic Shock)  Goal: Blood Glucose Level Within Desired Range  Outcome: Ongoing, Progressing     Problem: Infection Progression (Sepsis/Septic Shock)  Goal: Absence of Infection Signs and Symptoms  Outcome: Ongoing, Progressing  Intervention: Promote Recovery  Recent Flowsheet Documentation  Taken 3/11/2024 0803 by Heavenly Zamora, RN  Activity Management: activity encouraged     Problem: Nutrition Impaired (Sepsis/Septic Shock)  Goal: Optimal Nutrition Intake  Outcome: Ongoing, Progressing   Goal Outcome Evaluation:

## 2024-03-11 NOTE — PROGRESS NOTES
Subjective     PROBLEM LIST:    Pancreatic carcinoma metastatic to intra-abdominal lymph node         INTERVAL HISTORY: eating solid food today, tolerating well so far.  Reports that after discussion with his family he plans to try apricot seeds for treatment of his cancer.  Plans to switch to his wife's PCP in [place].      REVIEW OF SYSTEMS:  A 14 point review of systems was performed and is negative except as noted above.      Objective     Vitals:    03/10/24 2131 03/10/24 2342 03/11/24 0343 03/11/24 0803   BP: 143/86 135/81 134/82 121/81   BP Location: Right arm Right arm Right arm Right arm   Patient Position: Lying Lying Lying Sitting   Pulse: 91 88 85    Resp: 18 18 18 18   Temp: 99 °F (37.2 °C) 99 °F (37.2 °C) 98.7 °F (37.1 °C) 98.5 °F (36.9 °C)   TempSrc: Oral Oral Oral Oral   SpO2: 96%   97%   Weight:       Height:                       Performance Status: 1  General: well appearing male in no acute distress  Neuro: alert and oriented  HEENT: sclerae anicteric, oropharynx clear  Extremities: no lower extremity edema  Skin: no rashes, lesions, bruising, or petechiae  Psych: mood and affect appropriate          Labs:       Lab Results   Component Value Date    WBC 14.76 (H) 03/11/2024    HGB 12.8 (L) 03/11/2024    HCT 38.0 03/11/2024    MCV 91.3 03/11/2024     03/11/2024     Lab Results   Component Value Date    GLUCOSE 97 03/11/2024    BUN 12 03/11/2024    CREATININE 0.61 (L) 03/11/2024    BCR 19.7 03/11/2024    K 3.6 03/11/2024    CO2 23.0 03/11/2024    CALCIUM 9.7 03/11/2024    ALBUMIN 3.3 (L) 03/11/2024    AST 11 03/11/2024    ALT 12 03/11/2024             Assessment & Plan     Costa Robbins is a 68 y.o. year old male with recently diagnosed metastatic pancreas cancer (still prelim path, suspicious for adenocarcinoma).    He plans to try alternative treatments for now.  Discussed the availability of palliative care clinic for symptom management if he is not enrolled in hospice, but  hospice care likely the best option to avoid unnecessary travel.  He also reports he plans to establish care with a new PCP in [place].    Please include my contact info on his discharge paperwork. No f/u will be scheduled, but I am available to see him if he desires.    Please call with further questions.           Brandy Ceballos MD  3/11/2024

## 2024-03-12 ENCOUNTER — READMISSION MANAGEMENT (OUTPATIENT)
Dept: CALL CENTER | Facility: HOSPITAL | Age: 69
End: 2024-03-12
Payer: MEDICARE

## 2024-03-12 ENCOUNTER — PATIENT OUTREACH (OUTPATIENT)
Dept: ONCOLOGY | Facility: CLINIC | Age: 69
End: 2024-03-12
Payer: MEDICARE

## 2024-03-12 VITALS
BODY MASS INDEX: 23.1 KG/M2 | DIASTOLIC BLOOD PRESSURE: 69 MMHG | TEMPERATURE: 98.1 F | SYSTOLIC BLOOD PRESSURE: 116 MMHG | HEART RATE: 83 BPM | HEIGHT: 71 IN | WEIGHT: 165 LBS | OXYGEN SATURATION: 100 % | RESPIRATION RATE: 18 BRPM

## 2024-03-12 DIAGNOSIS — K86.89 PANCREATIC MASS: Primary | ICD-10-CM

## 2024-03-12 LAB — GLUCOSE BLDC GLUCOMTR-MCNC: 114 MG/DL (ref 70–130)

## 2024-03-12 PROCEDURE — 82948 REAGENT STRIP/BLOOD GLUCOSE: CPT

## 2024-03-12 PROCEDURE — 99232 SBSQ HOSP IP/OBS MODERATE 35: CPT | Performed by: NURSE PRACTITIONER

## 2024-03-12 RX ORDER — PSEUDOEPHEDRINE HCL 30 MG
100 TABLET ORAL 2 TIMES DAILY PRN
Qty: 30 CAPSULE | Refills: 0 | Status: SHIPPED | OUTPATIENT
Start: 2024-03-12

## 2024-03-12 RX ORDER — HYDROCODONE BITARTRATE AND ACETAMINOPHEN 5; 325 MG/1; MG/1
1 TABLET ORAL EVERY 4 HOURS PRN
Qty: 20 TABLET | Refills: 0 | Status: SHIPPED | OUTPATIENT
Start: 2024-03-12 | End: 2024-03-19

## 2024-03-12 RX ADMIN — FINASTERIDE 5 MG: 5 TABLET, FILM COATED ORAL at 08:37

## 2024-03-12 RX ADMIN — HYDROCODONE BITARTRATE AND ACETAMINOPHEN 1 TABLET: 5; 325 TABLET ORAL at 12:25

## 2024-03-12 RX ADMIN — LISINOPRIL 40 MG: 40 TABLET ORAL at 08:37

## 2024-03-12 RX ADMIN — HYDROCODONE BITARTRATE AND ACETAMINOPHEN 1 TABLET: 5; 325 TABLET ORAL at 03:36

## 2024-03-12 RX ADMIN — HYDROCODONE BITARTRATE AND ACETAMINOPHEN 1 TABLET: 5; 325 TABLET ORAL at 08:45

## 2024-03-12 RX ADMIN — AMLODIPINE BESYLATE 10 MG: 10 TABLET ORAL at 08:37

## 2024-03-12 NOTE — PLAN OF CARE
Problem: Adult Inpatient Plan of Care  Goal: Plan of Care Review  Outcome: Met  Goal: Patient-Specific Goal (Individualized)  Outcome: Met  Goal: Absence of Hospital-Acquired Illness or Injury  Outcome: Met  Intervention: Identify and Manage Fall Risk  Recent Flowsheet Documentation  Taken 3/12/2024 1000 by Eleanor Deras RN  Safety Promotion/Fall Prevention:   activity supervised   assistive device/personal items within reach   clutter free environment maintained   toileting scheduled   safety round/check completed   room organization consistent  Taken 3/12/2024 0839 by Eleanor Deras RN  Safety Promotion/Fall Prevention:   activity supervised   assistive device/personal items within reach   clutter free environment maintained   toileting scheduled   safety round/check completed   room organization consistent  Intervention: Prevent Skin Injury  Recent Flowsheet Documentation  Taken 3/12/2024 1000 by Eleanor Deras RN  Body Position:   weight shifting   tilted  Skin Protection:   adhesive use limited   tubing/devices free from skin contact  Taken 3/12/2024 0839 by Eleanor Deras RN  Body Position:   weight shifting   tilted  Skin Protection:   adhesive use limited   tubing/devices free from skin contact  Intervention: Prevent and Manage VTE (Venous Thromboembolism) Risk  Recent Flowsheet Documentation  Taken 3/12/2024 1000 by Eleanor Deras RN  Activity Management: activity encouraged  Taken 3/12/2024 0839 by Eleanor Deras RN  Activity Management: activity encouraged  VTE Prevention/Management:   bilateral   sequential compression devices off  Intervention: Prevent Infection  Recent Flowsheet Documentation  Taken 3/12/2024 1000 by Eleanor Deras RN  Infection Prevention: single patient room provided  Taken 3/12/2024 0839 by Eleanor Deras RN  Infection Prevention: single patient room provided  Goal: Optimal Comfort and Wellbeing  Outcome: Met  Intervention: Provide  Person-Centered Care  Recent Flowsheet Documentation  Taken 3/12/2024 1000 by Eleanor Deras RN  Trust Relationship/Rapport:   care explained   choices provided   emotional support provided   empathic listening provided   questions answered   questions encouraged   reassurance provided   thoughts/feelings acknowledged  Taken 3/12/2024 0839 by Eleanor Deras RN  Trust Relationship/Rapport:   care explained   choices provided   emotional support provided   empathic listening provided   questions answered   questions encouraged   reassurance provided   thoughts/feelings acknowledged  Goal: Readiness for Transition of Care  Outcome: Met     Problem: Pain Acute  Goal: Acceptable Pain Control and Functional Ability  Outcome: Met  Intervention: Prevent or Manage Pain  Recent Flowsheet Documentation  Taken 3/12/2024 1000 by Eleanor Deras RN  Medication Review/Management: medications reviewed  Taken 3/12/2024 0839 by Eleanor Deras RN  Medication Review/Management: medications reviewed     Problem: Infection  Goal: Absence of Infection Signs and Symptoms  Outcome: Met     Problem: Fall Injury Risk  Goal: Absence of Fall and Fall-Related Injury  Outcome: Met  Intervention: Identify and Manage Contributors  Recent Flowsheet Documentation  Taken 3/12/2024 1000 by Eleanor Deras RN  Medication Review/Management: medications reviewed  Taken 3/12/2024 0839 by Eleanor Deras RN  Medication Review/Management: medications reviewed  Intervention: Promote Injury-Free Environment  Recent Flowsheet Documentation  Taken 3/12/2024 1000 by Eleanor Deras RN  Safety Promotion/Fall Prevention:   activity supervised   assistive device/personal items within reach   clutter free environment maintained   toileting scheduled   safety round/check completed   room organization consistent  Taken 3/12/2024 0839 by Eleanor Deras RN  Safety Promotion/Fall Prevention:   activity supervised   assistive  device/personal items within reach   clutter free environment maintained   toileting scheduled   safety round/check completed   room organization consistent     Problem: Skin Injury Risk Increased  Goal: Skin Health and Integrity  Outcome: Met  Intervention: Optimize Skin Protection  Recent Flowsheet Documentation  Taken 3/12/2024 1000 by Eleanor Deras RN  Pressure Reduction Techniques:   frequent weight shift encouraged   heels elevated off bed   positioned off wounds  Head of Bed (HOB) Positioning: HOB at 20-30 degrees  Pressure Reduction Devices:   pressure-redistributing mattress utilized   positioning supports utilized   heel offloading device utilized  Skin Protection:   adhesive use limited   tubing/devices free from skin contact  Taken 3/12/2024 0839 by Eleanor Deras RN  Pressure Reduction Techniques:   frequent weight shift encouraged   heels elevated off bed   positioned off wounds  Head of Bed (HOB) Positioning: HOB at 20-30 degrees  Pressure Reduction Devices:   pressure-redistributing mattress utilized   positioning supports utilized   heel offloading device utilized  Skin Protection:   adhesive use limited   tubing/devices free from skin contact     Problem: Adjustment to Illness (Sepsis/Septic Shock)  Goal: Optimal Coping  Outcome: Met     Problem: Bleeding (Sepsis/Septic Shock)  Goal: Absence of Bleeding  Outcome: Met     Problem: Glycemic Control Impaired (Sepsis/Septic Shock)  Goal: Blood Glucose Level Within Desired Range  Outcome: Met  Intervention: Optimize Glycemic Control  Recent Flowsheet Documentation  Taken 3/12/2024 0839 by Eleanor Deras RN  Glycemic Management: blood glucose monitored     Problem: Infection Progression (Sepsis/Septic Shock)  Goal: Absence of Infection Signs and Symptoms  Outcome: Met  Intervention: Initiate Sepsis Management  Recent Flowsheet Documentation  Taken 3/12/2024 1000 by Eleanor Deras RN  Infection Prevention: single patient room  provided  Taken 3/12/2024 0839 by Eleanor Deras, RN  Infection Prevention: single patient room provided  Intervention: Promote Recovery  Recent Flowsheet Documentation  Taken 3/12/2024 1000 by Eleanor Deras, RN  Activity Management: activity encouraged  Taken 3/12/2024 0839 by Eleanor Deras, RN  Activity Management: activity encouraged     Problem: Nutrition Impaired (Sepsis/Septic Shock)  Goal: Optimal Nutrition Intake  Outcome: Met   Goal Outcome Evaluation:

## 2024-03-12 NOTE — PROGRESS NOTES
[facility] Medicine Services  PROGRESS NOTE    Patient Name: Costa Robbins  : 1955  MRN: [id number]    Date of Admission: 3/8/2024  Primary Care Physician: Jonathan Conner MD    Subjective   Subjective     CC:  F/u abdominal pain    HPI:  Patient resting in bed.  Says he is going home today.  Says he is opting against hospice and chemo for now and we will try alternative therapy for treatment of pancreatic cancer.      Objective   Objective     Vital Signs:   Temp:  [97.7 °F (36.5 °C)-98.5 °F (36.9 °C)] 98.3 °F (36.8 °C)  Heart Rate:  [90-94] 90  Resp:  [18] 18  BP: (121-133)/(72-82) 122/73     Physical Exam:  Constitutional: No acute distress, awake, alert  HENT: NCAT, mucous membranes moist  Respiratory: Clear to auscultation bilaterally, respiratory effort normal, room air  Cardiovascular: RRR, no murmurs, rubs, or gallops  Gastrointestinal: Positive bowel sounds, soft, nontender, nondistended  Musculoskeletal: No bilateral ankle edema  Psychiatric: Appropriate affect, cooperative  Neurologic: Oriented x 3, moves all extremities, speech clear  Skin: No rashes      Results Reviewed:  LAB RESULTS:      Lab 24  0247 24  0429 24  1050 24  0932 24  0812 24  0716   WBC 14.76* 13.95*  --   --   --  13.23*   HEMOGLOBIN 12.8* 11.9*  --   --   --  14.3   HEMOGLOBIN, POC  --   --  9.5* 9.9* 11.2*  --    HEMATOCRIT 38.0 34.3*  --   --   --  40.7   HEMATOCRIT POC  --   --  28* 29* 33*  --    PLATELETS 277 304  --   --   --  318   NEUTROS ABS 10.93* 10.08*  --   --   --   --    IMMATURE GRANS (ABS) 0.05 0.06*  --   --   --   --    LYMPHS ABS 1.86 2.42  --   --   --   --    MONOS ABS 1.33* 1.24*  --   --   --   --    EOS ABS 0.54* 0.10  --   --   --   --    MCV 91.3 89.3  --   --   --  87.5   PROTIME  --   --   --   --   --  12.2   APTT  --   --   --   --   --  27.5         Lab 24  0247 24  1629 24  0429 24  0716   SODIUM 134*   --  142 135*   POTASSIUM 3.6 3.4* 3.2* 3.3*   CHLORIDE 100  --  108* 100   CO2 23.0  --  24.0 21.0*   ANION GAP 11.0  --  10.0 14.0   BUN 12  --  9 13   CREATININE 0.61*  --  0.64* 0.90   EGFR 105.9  --  104.4 94.2   GLUCOSE 97  --  58* 176*   CALCIUM 9.7  --  9.7 10.9*   HEMOGLOBIN A1C  --   --   --  10.60*         Lab 03/11/24  0247 03/09/24  0429 03/08/24  0716   TOTAL PROTEIN 6.0 5.5* 7.0   ALBUMIN 3.3* 3.2* 3.7   GLOBULIN 2.7 2.3 3.3   ALT (SGPT) 12 11 16   AST (SGOT) 11 14 15   BILIRUBIN 0.7 0.7 0.6   ALK PHOS 79 63 105         Lab 03/08/24  0716   PROTIME 12.2   INR 0.89             Lab 03/08/24  0647   ABO TYPING O   RH TYPING Positive   ANTIBODY SCREEN Negative         Lab 03/08/24  1050 03/08/24  0932 03/08/24  0812   PH, ARTERIAL 7.39 7.32* 7.33*     Brief Urine Lab Results  (Last result in the past 365 days)        Color   Clarity   Blood   Leuk Est   Nitrite   Protein   CREAT   Urine HCG        01/26/24 1639 Dark Yellow   Clear   Negative   Negative   Negative   Negative                   Microbiology Results Abnormal       None            No radiology results from the last 24 hrs    Results for orders placed during the hospital encounter of 03/03/24    Adult Transthoracic Echo Limited W/ Cont if Necessary Per Protocol    Interpretation Summary    Left ventricular systolic function is normal. Left ventricular ejection fraction appears to be 66 - 70%.    Saline test results are negative.    There is calcification of the aortic and mitral valve.      Current medications:  Scheduled Meds:amLODIPine, 10 mg, Oral, Daily  famotidine, 20 mg, Intravenous, BID  finasteride, 5 mg, Oral, Daily  insulin detemir, 1-200 Units, Subcutaneous, Nightly - Glucommander  insulin lispro, 1-200 Units, Subcutaneous, 4x Daily With Meals & Nightly  lisinopril, 40 mg, Oral, Daily      Continuous Infusions:   PRN Meds:.  acetaminophen    Calcium Replacement - Follow Nurse / BPA Driven Protocol    dextrose    dextrose    diazePAM     docusate sodium    glucagon (human recombinant)    HYDROcodone-acetaminophen    ibuprofen    insulin lispro    Magnesium Standard Dose Replacement - Follow Nurse / BPA Driven Protocol    metoclopramide    ondansetron ODT **OR** ondansetron    Phosphorus Replacement - Follow Nurse / BPA Driven Protocol    Potassium Replacement - Follow Nurse / BPA Driven Protocol    Assessment & Plan   Assessment & Plan     Active Hospital Problems    Diagnosis  POA    **Pancreatic carcinoma metastatic to intra-abdominal lymph node [C25.9, C77.2]  Yes      Resolved Hospital Problems   No resolved problems to display.        Brief Hospital Course to date:  Costa Robbins is a 68 y.o. male  with PMH significant for HTN, HLD, DMII, BPH, alcohol use, and hx of pancreatic mass admitted for Whipple procedure per Dr. Maloney. Unfortunately, during his ex-lap he was found to have metastatic disease. Hospital Medicine consulted post-operatively for medical management.      This patient's problems and plans were partially entered by my partner and updated as appropriate by me 03/12/24.       Pancreatic mass (pancreatic head tumor), preliminary pathology suspicious for adenocarcinoma  - Admitted for Whipple procedure per Dr. Maloney, however during ex-lap on 3/8/23 he was noted to have metastatic disease - frozen section of a mesenteric mass was sent and showed poorly differentiated carcinoma, favoring adencarcinoma  - Dr. Ceballos/Oncology consulted, has discussed options with patient regarding palliative chemotherapy - he has declined at this time, plans to try alternative therapies (he mentioned apricot seeds)  - Hospice has also seen  - Plan to discharge home today as he is tolerating diet and has had a BM. Patient will be provided with Dr. Ceballos's contact information in case he changes his mind about treatment in the future  - Follow up with Dr. Maloney in one week.      HTN/HLD  - Norvasc  - ASA and Plavix held for procedure. Continue  at discharge.     DMII, uncontrolled  - HbA1c 10.6%  - Glucommander while inpatient  - Resume home antihyperglycemics at DC.      BPH  - Continue Proscar      Hx of alcohol use       Expected Discharge Location and Transportation: Home  Expected Discharge   Expected Discharge Date: 3/12/2024; Expected Discharge Time:      DVT prophylaxis:  Mechanical DVT prophylaxis orders are present.         AM-PAC 6 Clicks Score (PT): 23 (03/11/24 2000)    CODE STATUS:   Code Status and Medical Interventions:   Ordered at: 03/09/24 0658     Level Of Support Discussed With:    Patient     Code Status (Patient has no pulse and is not breathing):    CPR (Attempt to Resuscitate)     Medical Interventions (Patient has pulse or is breathing):    Full Support       Verito Altman, APRN  03/12/24

## 2024-03-12 NOTE — DISCHARGE SUMMARY
Patient Name:  Costa Robbins  YOB: 1955  [id number]    Date of Discharge:  3/12/2024    Discharge Diagnosis: Metastatic pancreatic cancer    Presenting Problem/History of Present Illness  Active Hospital Problems    Diagnosis  POA    **Pancreatic carcinoma metastatic to intra-abdominal lymph node [C25.9, C77.2]  Yes      Resolved Hospital Problems   No resolved problems to display.      Hospital Course  Patient is a 68 y.o. male presented with metastatic pancreatic cancer. On 3/9/2024 I performed an exploratory laparotomy with small bowel repair, biopsy of mesenteric mass, cholecystectomy, J tube placement. On postoperative day four, pain was well controlled. Having bowel movements. No nausea/vomiting. No fevers/chills. Voiding spontaneously. Ambulating appropriately. The patient was thus found to be safe for discharge to home.           Procedures Performed    Procedure(s):  EXPLORATORY LAPAROTOMY, SMALL BOWEL REPAIR, BIOPSY MESENTERIC MASS, CHOLECYSTECTOMY, INSERTION J-TUBE, LYSIS OF ADHESIONS  -------------------       Consults:   Consults       Date and Time Order Name Status Description    3/8/2024 12:53 PM Inpatient Hematology & Oncology Consult Completed     3/8/2024 12:27 PM Inpatient Hospitalist Consult Completed     3/3/2024 10:35 PM Inpatient General Surgery Consult Completed     3/3/2024  8:10 PM Inpatient Neurology Consult General Completed     3/3/2024  5:28 PM Inpatient Neurology Consult Stroke Completed     2/15/2024 10:18 PM Inpatient Palliative Care MD Consult Completed     2/14/2024  3:01 PM Inpatient Infectious Diseases Consult Completed     2/14/2024  4:02 AM Inpatient General Surgery Consult Completed             Pertinent Test Results: N/A    Condition on Discharge:  Pain controlled with oral pain medication, tolerating diet, ambulating appropriately      Vital Signs  Temp:  [97.7 °F (36.5 °C)-98.5 °F (36.9 °C)] 98.3 °F (36.8 °C)  Heart Rate:  [90-94] 90  Resp:  [18]  18  BP: (121-133)/(72-82) 122/73    Physical Exam:     General Appearance:  Alert, cooperative, in no acute distress   Head:  Normocephalic, without obvious abnormality, atraumatic   Eyes:  Lids and lashes normal, conjunctivae and sclerae normal, no icterus, no pallor, corneas clear, PERRLA   Ears:  Ears appear intact with no abnormalities noted   Throat:  No oral lesions, no thrush, oral mucosa moist   Neck:  No adenopathy, supple, trachea midline, no thyromegaly, no carotid bruit, no JVD   Back:  No kyphosis present, no scoliosis present, no skin lesions, erythema or scars, no tenderness to percussion or palpation, range of motion normal   Lungs:  Clear to auscultation, respirations regular, even and unlabored    Heart:  Regular rhythm and normal rate, normal S1 and S2, no murmur, no gallop, no rub, no click   Chest Wall:  No abnormalities observed   Abdomen:  Normal bowel sounds, no masses, no organomegaly, soft non-tender, non-distended, no guarding, no rebound tenderness, incision c/d/I, CHERYL serosanguineous, J tube site cd/i   Rectal:  Deferred   Extremities:  Moves all extremities well, no edema, no cyanosis, no redness   Pulses:  Pulses palpable and equal bilaterally   Skin:  No bleeding, bruising or rash   Lymph nodes:  No palpable adenopathy   Neurologic:  Cranial nerves 2 - 12 grossly intact, sensation intact, DTR present and equal bilaterally                                                                               Discharge Disposition  Home or Self Care    Discharge Medications     Discharge Medications        New Medications        Instructions Start Date   docusate sodium 100 MG capsule   100 mg, Oral, 2 Times Daily PRN      HYDROcodone-acetaminophen 5-325 MG per tablet  Commonly known as: NORCO   1 tablet, Oral, Every 4 Hours PRN             Continue These Medications        Instructions Start Date   amLODIPine 10 MG tablet  Commonly known as: NORVASC   10 mg, Oral, Daily      aspirin 81 MG  chewable tablet   81 mg, Oral, Daily      BASAGLAR KWIKPEN SC   40 Units, Subcutaneous, Nightly      cefuroxime 500 MG tablet  Commonly known as: CEFTIN   500 mg, Oral, 2 Times Daily, For 7 days, stared on 02-29-24      clopidogrel 75 MG tablet  Commonly known as: PLAVIX   75 mg, Oral, Daily   Start Date: March 18, 2024     finasteride 5 MG tablet  Commonly known as: PROSCAR   1 tablet, Oral, Daily      hydrALAZINE 50 MG tablet  Commonly known as: APRESOLINE   50 mg, Oral, As Needed      Jardiance 10 MG tablet tablet  Generic drug: empagliflozin   10 mg, Oral, Daily      lisinopril 40 MG tablet  Commonly known as: PRINIVIL,ZESTRIL   40 mg, Oral, Daily      naloxone 4 MG/0.1ML nasal spray  Commonly known as: NARCAN   Call 911. Don't prime. [place] in 1 nostril for overdose. Repeat in 2-3 minutes in other nostril if no or minimal breathing/responsiveness.      pantoprazole 40 MG EC tablet  Commonly known as: PROTONIX   40 mg, Oral, Daily               Discharge Diet:   Diet Instructions       Diet: Regular/House Diet; Thin (IDDSI 0)      Discharge Diet: Regular/House Diet    Fluid Consistency: Thin (IDDSI 0)            Activity at Discharge:   Activity Instructions       Discharge Activity      1) No driving until no longer taking narcotics.   2) Return to school / work in 2 weeks.  3) May shower. No tub baths. No swimming.  4) Do not lift / push / pull more than 15 lbs.  5) Measure drain output daily. Bring records to follow up appointment with Dr. Maloney.            Follow-up Appointments  Future Appointments   Date Time Provider Department Center   3/25/2024 11:00 AM Kaela Murphy PA [facility]   7/17/2024 12:30 PM Mariana Benavides APRN [facility]     Additional Instructions for the Follow-ups that You Need to Schedule       Discharge Follow-up with Specified Provider: Dr. Maloney; 1 Week   As directed      To: Dr. Maloney   Follow Up: 1 Week   Follow Up Details: Call [phone] to make an appointment         Notify Physician or Go To The ED For the Following Conditions   As directed      Fever >100.4, increased abdominal pain, persistent nausea/vomiting, new redness/drainage from incision    Order Comments: Fever >100.4, increased abdominal pain, persistent nausea/vomiting, new redness/drainage from incision                 Test Results Pending at Discharge       Silas Maloney MD   03/12/24  07:46 EDT    Time: Discharge 15 min

## 2024-03-13 LAB
CYTO UR: NORMAL
LAB AP CASE REPORT: NORMAL
LAB AP CLINICAL INFORMATION: NORMAL
LAB AP DIAGNOSIS COMMENT: NORMAL
Lab: NORMAL
PATH REPORT.ADDENDUM SPEC: NORMAL
PATH REPORT.FINAL DX SPEC: NORMAL
PATH REPORT.GROSS SPEC: NORMAL

## 2024-03-13 NOTE — OUTREACH NOTE
Prep Survey      Flowsheet Row Responses   [facility] discharged [facility]? [facility]   Is LACE score < 7 ? No   Eligibility Readm Mgmt   Discharge diagnosis Pancreatic carcinoma metastatic to intra-abdominal lymph   Does the patient have one of the following disease processes/diagnoses(primary or secondary)? Other   Does the patient have Home health ordered? No   Is there a DME ordered? No   Prep survey completed? Yes            ERID FRANCOIS - Registered Nurse

## 2024-03-15 ENCOUNTER — READMISSION MANAGEMENT (OUTPATIENT)
Dept: CALL CENTER | Facility: HOSPITAL | Age: 69
End: 2024-03-15
Payer: MEDICARE

## 2024-03-15 NOTE — OUTREACH NOTE
Medical Week 1 Survey      Flowsheet Row Responses   [facility] patient discharged from? [facility]   Does the patient have one of the following disease processes/diagnoses(primary or secondary)? Other   Week 1 attempt successful? Yes   Call start time 1220   Discharge diagnosis Pancreatic carcinoma metastatic to intra-abdominal lymph   Person spoke with today (if not patient) and relationship Patient   Meds reviewed with patient/caregiver? Yes   Does the patient have all medications ordered at discharge? Yes   Prescription comments no concerns or questions noted.   Is the patient taking all medications as directed (includes completed medication regime)? Yes   Does the patient have a primary care provider?  Yes   Comments regarding PCP Sees surgeon on monday   Has home health visited the patient within 72 hours of discharge? N/A   Psychosocial issues? No   Did the patient receive a copy of their discharge instructions? Yes   Nursing interventions Reviewed instructions with patient   What is the patient's perception of their health status since discharge? Improving   Is the patient/caregiver able to teach back signs and symptoms related to disease process for when to call PCP? Yes   Is the patient/caregiver able to teach back signs and symptoms related to disease process for when to call 911? Yes   Is the patient/caregiver able to teach back the hierarchy of who to call/visit for symptoms/problems? PCP, Specialist, Home health nurse, Urgent Care, ED, 911 Yes   Week 1 call completed? Yes   Graduated Yes   Wrap up additional comments Quick call- patient reports he is doing fine- meets with surgeon on monday, no concerns or questions noted.            Marisela LAMBERT - Registered Nurse

## 2024-03-26 ENCOUNTER — TELEPHONE (OUTPATIENT)
Dept: ONCOLOGY | Facility: CLINIC | Age: 69
End: 2024-03-26
Payer: MEDICARE

## 2024-03-26 NOTE — TELEPHONE ENCOUNTER
I called patient per Dr Ceballos and asked him if he was wanting to discuss chemotherapy.  Patient was unsure if that would do any good per his report.  He understands that what he has is not curable and he is interested in quality of life versus quantity.  He has been taking supplements and seeds and he said that they have made him sick.  We discussed palliation and symptom management per hospice briefly.  I advised that Dr. Ceballos would reach out to him tomorrow.  He verbalized understanding.

## 2024-03-26 NOTE — TELEPHONE ENCOUNTER
Caller: Costa Robbins    Relationship to patient: Self    Best call back number: [phone]    Chief complaint: PT CALLED TO SCHEDULE HOSPITAL FOLLOW UP AND HE WOULD LIKE A PHONE VISIT     Type of visit: HOSPITAL FOLLOW UP

## 2024-03-27 ENCOUNTER — TELEMEDICINE (OUTPATIENT)
Dept: ONCOLOGY | Facility: CLINIC | Age: 69
End: 2024-03-27
Payer: MEDICARE

## 2024-03-27 DIAGNOSIS — K86.89 PANCREATIC MASS: Primary | ICD-10-CM

## 2024-03-27 PROCEDURE — 99213 OFFICE O/P EST LOW 20 MIN: CPT | Performed by: INTERNAL MEDICINE

## 2024-03-27 NOTE — PROGRESS NOTES
You have chosen to receive care through a telehealth visit.  Do you consent to use a video/audio connection for your medical care today? Yes        PROBLEM LIST:  Metastatic pancreas cancer  Was found to have a pancreatic mass after presenting with jaundice (tbili 9.8) and biliary obstruction in late January. MRCP 1/26/24 showed intra and extrahepatic biliary ductal dilatation with narrowing at the level of the pancreatic head. CT chest 1/28/24 showed sub-cm left lung nodules. He underwent ERCP with stent placement. Bile duct brushings 1/27/24 were negative for malignant cells. Biopsy of a suspicious liver lesion on 2/7/24 showed no evidence of malignancy. He was taken to the OR for a whipple procedure on 3/8/24, and was noted to have mesenteric adenopathy. Biopsy of mesenteric mass 3/8/24 showed poorly differentiated adenocarcinoma  DM  HL  HTN  PVD s/p left iliac stent and right fem-fem bypass    Subjective     CHIEF COMPLAINT: pancreas cancer    HISTORY OF PRESENT ILLNESS:   Costa Robbins returns for follow-up.   He says he has been feeling pretty sick.  He again wants to discuss his options.      Objective      There were no vitals taken for this visit.     Performance Status:              General: well appearing male in no acute distress  Neuro: alert and oriented  HEENT: sclera anicteric, oropharynx clear  Lymphatics: No masses by inspection  Lungs: Respiratory effort appears normal  Skin: no rashes, lesions, bruising, or petechiae  Psych: mood and affect appropriate          RECENT LABS:  Lab Results   Component Value Date    WBC 14.76 (H) 03/11/2024    HGB 12.8 (L) 03/11/2024    HCT 38.0 03/11/2024    MCV 91.3 03/11/2024     03/11/2024       Lab Results   Component Value Date    GLUCOSE 97 03/11/2024    BUN 12 03/11/2024    CREATININE 0.61 (L) 03/11/2024    BCR 19.7 03/11/2024    K 3.6 03/11/2024    CO2 23.0 03/11/2024    CALCIUM 9.7 03/11/2024    ALBUMIN 3.3 (L) 03/11/2024    AST 11 03/11/2024     ALT 12 03/11/2024                 ASSESSMENT AND PLAN:     Costa Robbins is a 68 y.o. male with metastatic pancreas cancer.  He requested an appointment today to again discuss options for treatment.    We reviewed that he unfortunately has an incurable malignancy.  The goal of chemotherapy would be to try to give him more time and hopefully improved quality of life.  However it is a risk that the side effects of chemotherapy could make his quality of life worse.  We discussed the recent approval of Naliri-manzano, but that this regimen is not necessarily lower toxicity than other treatments.    He does not want to pursue chemotherapy at this time.  He has the information to contact hospice.  He has no further questions.  I am happy to see him in the future if needed.                        Brandy Cebalols MD  [facility]    3/27/2024          CC:

## 2024-03-27 NOTE — LETTER
March 27, 2024     Jonathan Conner MD  [address] [place] [address] [address] [place] [address]    Patient: Costa Robbins   YOB: 1955   Date of Visit: 3/27/2024       Dear Jonathan Conner MD    Costa Robbins was in my office today. Below is a copy of my note.    If you have questions, please do not hesitate to call me. I look forward to following Costa along with you.         Sincerely,        Brandy Ceballos MD        CC: No Recipients    You have chosen to receive care through a telehealth visit.  Do you consent to use a video/audio connection for your medical care today? Yes        PROBLEM LIST:  Metastatic pancreas cancer  Was found to have a pancreatic mass after presenting with jaundice (tbili 9.8) and biliary obstruction in late January. MRCP 1/26/24 showed intra and extrahepatic biliary ductal dilatation with narrowing at the level of the pancreatic head. CT chest 1/28/24 showed sub-cm left lung nodules. He underwent ERCP with stent placement. Bile duct brushings 1/27/24 were negative for malignant cells. Biopsy of a suspicious liver lesion on 2/7/24 showed no evidence of malignancy. He was taken to the OR for a whipple procedure on 3/8/24, and was noted to have mesenteric adenopathy. Biopsy of mesenteric mass 3/8/24 showed poorly differentiated adenocarcinoma  DM  HL  HTN  PVD s/p left iliac stent and right fem-fem bypass    Subjective    CHIEF COMPLAINT: pancreas cancer    HISTORY OF PRESENT ILLNESS:   Costa Robbins returns for follow-up.   He says he has been feeling pretty sick.  He again wants to discuss his options.      Objective     There were no vitals taken for this visit.     Performance Status:              General: well appearing male in no acute distress  Neuro: alert and oriented  HEENT: sclera anicteric, oropharynx clear  Lymphatics: No masses by inspection  Lungs: Respiratory effort appears normal  Skin: no rashes, lesions, bruising, or petechiae  Psych: mood and affect  appropriate          RECENT LABS:  Lab Results   Component Value Date    WBC 14.76 (H) 03/11/2024    HGB 12.8 (L) 03/11/2024    HCT 38.0 03/11/2024    MCV 91.3 03/11/2024     03/11/2024       Lab Results   Component Value Date    GLUCOSE 97 03/11/2024    BUN 12 03/11/2024    CREATININE 0.61 (L) 03/11/2024    BCR 19.7 03/11/2024    K 3.6 03/11/2024    CO2 23.0 03/11/2024    CALCIUM 9.7 03/11/2024    ALBUMIN 3.3 (L) 03/11/2024    AST 11 03/11/2024    ALT 12 03/11/2024                 ASSESSMENT AND PLAN:     Costa Robbins is a 68 y.o. male with metastatic pancreas cancer.  He requested an appointment today to again discuss options for treatment.    We reviewed that he unfortunately has an incurable malignancy.  The goal of chemotherapy would be to try to give him more time and hopefully improved quality of life.  However it is a risk that the side effects of chemotherapy could make his quality of life worse.  We discussed the recent approval of Naliri-manzano, but that this regimen is not necessarily lower toxicity than other treatments.    He does not want to pursue chemotherapy at this time.  He has the information to contact hospice.  He has no further questions.  I am happy to see him in the future if needed.                        Brandy Ceballos MD  [facility]    3/27/2024          CC:

## 2024-03-27 NOTE — TELEPHONE ENCOUNTER
I called the patient again this morning to confirm that he can have a visit with Dr Ceballos later today.  He has the [employer] lia and we will do mychart visit at 3:45.  Patient verbalized understanding

## 2024-04-12 LAB
CYTO UR: NORMAL
CYTO UR: NORMAL
LAB AP CASE REPORT: NORMAL
LAB AP CASE REPORT: NORMAL
LAB AP CLINICAL INFORMATION: NORMAL
LAB AP CLINICAL INFORMATION: NORMAL
LAB AP DIAGNOSIS COMMENT: NORMAL
LAB AP DIAGNOSIS COMMENT: NORMAL
LAB AP OUTSIDE REPORT, ADDENDUM: NORMAL
LAB AP OUTSIDE REPORT, ADDENDUM: NORMAL
Lab: NORMAL
Lab: NORMAL
PATH REPORT.ADDENDUM SPEC: NORMAL
PATH REPORT.FINAL DX SPEC: NORMAL
PATH REPORT.FINAL DX SPEC: NORMAL
PATH REPORT.GROSS SPEC: NORMAL
PATH REPORT.GROSS SPEC: NORMAL

## (undated) DEVICE — SINGLE PORT MANIFOLD: Brand: NEPTUNE 2

## (undated) DEVICE — GLV SURG PREMIERPRO MIC LTX PF SZ7 BRN

## (undated) DEVICE — TRAP FLD MINIVAC MEGADYNE 100ML

## (undated) DEVICE — HYBRID CO2 TUBING/CAP SET FOR OLYMPUS® SCOPES & CO2 SOURCE: Brand: ERBE

## (undated) DEVICE — SUT SILK 3/0 TIES 18IN A184H

## (undated) DEVICE — PTCH VASC VASCUGUARD 1X6CM STRL: Type: IMPLANTABLE DEVICE | Site: GROIN | Status: NON-FUNCTIONAL

## (undated) DEVICE — SUCTION CANISTER 2500CC: Brand: DEROYAL

## (undated) DEVICE — THE BITE BLOCK MAXI, LATEX FREE STRAP IS USED TO PROTECT THE ENDOSCOPE INSERTION TUBE FROM BEING BITTEN BY THE PATIENT.

## (undated) DEVICE — CANNULATING SPHINCTEROTOME: Brand: JAGTOME™ REVOLUTION RX

## (undated) DEVICE — BOWL PLSTC MD 16OZ BLU STRL

## (undated) DEVICE — SUT PDS 4/0 RB1 27IN VIL PDP304H

## (undated) DEVICE — SUT SILK 3/0 SH CR8 18IN C013D

## (undated) DEVICE — SUT PDS LP 1 TP1 96IN VIO PDP880GA

## (undated) DEVICE — POOLE SUCTION INSTRUMENT WITH REMOVABLE SHEATH: Brand: POOLE

## (undated) DEVICE — TOTAL TRAY, 16FR 10ML SIL FOLEY, URN: Brand: MEDLINE

## (undated) DEVICE — ANTIBACTERIAL UNDYED BRAIDED (POLYGLACTIN 910), SYNTHETIC ABSORBABLE SUTURE: Brand: COATED VICRYL

## (undated) DEVICE — SNAP KOVER: Brand: UNBRANDED

## (undated) DEVICE — SUT SILK 2/0 30IN A305H

## (undated) DEVICE — UNDERGLV SURG BIOGEL INDICATOR LF PF 7.5

## (undated) DEVICE — PK VASC 10

## (undated) DEVICE — PENCL ROCKRSWCH MEGADYNE W/HOLSTR 10FT SS

## (undated) DEVICE — Device

## (undated) DEVICE — GLV SURG BIOGEL LTX PF 7

## (undated) DEVICE — SUT PDS 1 CTX 36IN Z371T

## (undated) DEVICE — FOGARTY - HYDRAGRIP SURGICAL - CLAMP INSERTS: Brand: FOGARTY SOFTJAW

## (undated) DEVICE — COVER,MAYO STAND,XL,STERILE: Brand: MEDLINE

## (undated) DEVICE — SAFELINER SUCTION CANISTER 1000CC: Brand: DEROYAL

## (undated) DEVICE — CVR HNDL LIGHT RIGID

## (undated) DEVICE — DBD-DRAPE,LAP,CHOLE,W/TROUGHS,STERILE: Brand: MEDLINE

## (undated) DEVICE — ADHS SKIN PREMIERPRO EXOFIN TOPICAL HI/VISC .5ML

## (undated) DEVICE — SUT PROLN 4/0 RB1 D/A 36IN 8557H

## (undated) DEVICE — INFLATION DEVICE: Brand: ENCORE™ 26

## (undated) DEVICE — CLTH CLENS READYCLEANSE PERI CARE PK/5

## (undated) DEVICE — SUT PDS 3/0 SH 27IN CLR PDP416H

## (undated) DEVICE — PATIENT RETURN ELECTRODE, SINGLE-USE, CONTACT QUALITY MONITORING, ADULT, WITH 9FT CORD, FOR PATIENTS WEIGING OVER 33LBS. (15KG): Brand: MEGADYNE

## (undated) DEVICE — NDL PERC 1PRT THNWALL W/BASEPLT 18G 7CM

## (undated) DEVICE — SUT SILK 2/0 TIES 18IN A185H

## (undated) DEVICE — TRIPLE LUMEN ERCP CANNULA: Brand: TANDEM XL

## (undated) DEVICE — CATH GUIDE SOFTVU FLUSH HT PIG .035 4F 65CM

## (undated) DEVICE — LUBE JELLY FOIL PACK 1.4 OZ: Brand: MEDLINE INDUSTRIES, INC.

## (undated) DEVICE — SYS VISABILITY LAP/SCPE LAPAROVUE CLN WARM 2/PRT 3TO12MM

## (undated) DEVICE — SOL IRR H2O BTL 1000ML STRL

## (undated) DEVICE — PK ANGIO OR 10

## (undated) DEVICE — SAFESECURE,SECUREMENT,FOLEY CATH,STERILE: Brand: MEDLINE

## (undated) DEVICE — DECANTER BAG 9": Brand: MEDLINE INDUSTRIES, INC.

## (undated) DEVICE — ELECTRD BLD EZ CLN STD 4IN

## (undated) DEVICE — INTRO ACCSR BLNT TP

## (undated) DEVICE — RESERVOIR,SUCTION,100CC,SILICONE: Brand: MEDLINE

## (undated) DEVICE — SUT PROLN 6/0 C1 D/A 30IN 8706H

## (undated) DEVICE — WIREGUIDED CYTOLOGY BRUSH: Brand: RX CYTOLOGY BRUSH

## (undated) DEVICE — APPL CHLORAPREP TINTED 26ML TEAL

## (undated) DEVICE — 3M™ IOBAN™ 2 ANTIMICROBIAL INCISE DRAPE 6650EZ: Brand: IOBAN™ 2

## (undated) DEVICE — LN INJ CONTRST FLXCIL HP BR F/M LL W/ADAPT/ROT 1200PSI 48IN

## (undated) DEVICE — SPNG ENDO BEDSIDE TUB ENZYM

## (undated) DEVICE — ADHS LIQ MASTISOL 2/3ML

## (undated) DEVICE — TROC BLADLES XCEL/OPTI SLV THRD 11X100

## (undated) DEVICE — MARYLAND JAW OPEN SEALER/DIVIDER COATED 23CM: Brand: LIGASURE

## (undated) DEVICE — PCH INST SURG INVISISHIELD 2PCKT

## (undated) DEVICE — RETRV BG SPECI ENDOPOUCH

## (undated) DEVICE — TUBING, SUCTION, 1/4" X 10', STRAIGHT: Brand: MEDLINE

## (undated) DEVICE — 3M™ IOBAN™ 2 ANTIMICROBIAL INCISE DRAPE 6651EZ: Brand: IOBAN™ 2

## (undated) DEVICE — SPHINCTEROTOME: Brand: DREAMTOME™ RX 44

## (undated) DEVICE — SUT PROLN 3/0 SH D/A 36IN 8522H

## (undated) DEVICE — SOLIDIFIER LIQ PREMISORB 1500CC

## (undated) DEVICE — INCISIONLINE PLEDGET SFT 22X1 2.75MM

## (undated) DEVICE — SLV TROC ENDOPATHXCEL THRD 5X100MM CB5LT

## (undated) DEVICE — JP PERF DRN SIL FLT 10MM FULL: Brand: CARDINAL HEALTH

## (undated) DEVICE — DEV LK WIREGUIDE FUSN OLYMP SCP

## (undated) DEVICE — TBG PENCL TELESCP MEGADYNE SMOKE EVAC 10FT

## (undated) DEVICE — TOWEL,OR,DSP,ST,BLUE,STD,4/PK,20PK/CS: Brand: MEDLINE

## (undated) DEVICE — PK LAP LASR CHOLE 10

## (undated) DEVICE — ENSEAL 20 CM SHAFT, LARGE JAW: Brand: ENSEAL X1

## (undated) DEVICE — AVANTI + 4F STD W/GW: Brand: AVANTI

## (undated) DEVICE — SI AVANTI+ 6F STD W/GW  NO OBT: Brand: AVANTI

## (undated) DEVICE — INTENDED TO BE USED TO OCCLUDE, RETRACT AND IDENTIFY ARTERIES, VEINS, TENDONS AND NERVES IN SURGICAL PROCEDURES: Brand: STERION®  VESSEL LOOP

## (undated) DEVICE — SPNG GZ WOVN 4X4IN 12PLY 10/BX STRL

## (undated) DEVICE — INTENDED FOR TISSUE SEPARATION, AND OTHER PROCEDURES THAT REQUIRE A SHARP SURGICAL BLADE TO PUNCTURE OR CUT.: Brand: BARD-PARKER ® STAINLESS STEEL BLADES

## (undated) DEVICE — KT ORCA ORCAPOD DISP STRL

## (undated) DEVICE — SPONGE,DISSECTOR,K,XRAY,9/16"X1/4",STRL: Brand: MEDLINE

## (undated) DEVICE — BOOT,SUTURE,STANDARD,YELLOW-IN-BLUE: Brand: MEDLINE

## (undated) DEVICE — UMBILICAL TAPE: Brand: DEROYAL

## (undated) DEVICE — GOWN SURG ORBIS LVL3 2XL STRL

## (undated) DEVICE — SUT SILK 3/0 SH CR8 30IN C017D

## (undated) DEVICE — TBSET INSUFF HEATED W RTP FOR PNEUMO

## (undated) DEVICE — SUT PROLN 4/0 V5 36IN 8935H

## (undated) DEVICE — PAD GRND E/S MEGADYNE MONOPLR 2/PLT W/CORD A/ DISP

## (undated) DEVICE — GW JAG STR .035IN 450CM

## (undated) DEVICE — SUT SILK 2/0 SH 30IN K833H

## (undated) DEVICE — SUT SILK 0 TIES 30IN A306H

## (undated) DEVICE — SUT ETHLN 2/0 PS 18IN 585H

## (undated) DEVICE — 4-PORT MANIFOLD: Brand: NEPTUNE 2

## (undated) DEVICE — DRN PENRS SIL 1/4X18IN LF STRL

## (undated) DEVICE — SUT PROLN CARDIO V5 3/0 36IN 8936H

## (undated) DEVICE — DRAPE,INSTRUMENT,MAGNETIC,10X16: Brand: MEDLINE

## (undated) DEVICE — KT TBG FEED J MIC ENFIT TRIM/DIST/TP 16F 51CM

## (undated) DEVICE — SYR LL TP 10ML STRL

## (undated) DEVICE — FIRST STEP BEDSIDE ADD WATER KIT - RESEALABLE STAND-UP POUCH, ENDOSCOPIC CLEANING PAD - 1 POUCH: Brand: FIRST STEP BEDSIDE ADD WATER KIT - RESEALABLE STAND-UP POUCH, ENDOSCOPIC CLEANIN

## (undated) DEVICE — GLIDEX™ COATED HYDROPHILIC GUIDEWIRE: Brand: MAGIC TORQUE™

## (undated) DEVICE — SYR LUERLOK 50ML

## (undated) DEVICE — CONTN GRAD MEAS TRIANG 32OZ BLK